# Patient Record
Sex: FEMALE | Race: BLACK OR AFRICAN AMERICAN | NOT HISPANIC OR LATINO | Employment: FULL TIME | URBAN - METROPOLITAN AREA
[De-identification: names, ages, dates, MRNs, and addresses within clinical notes are randomized per-mention and may not be internally consistent; named-entity substitution may affect disease eponyms.]

---

## 2017-06-01 PROBLEM — R87.612 LGSIL ON PAP SMEAR OF CERVIX: Status: ACTIVE | Noted: 2017-06-01

## 2017-11-16 ENCOUNTER — OFFICE VISIT (OUTPATIENT)
Dept: OBSTETRICS AND GYNECOLOGY | Facility: CLINIC | Age: 22
End: 2017-11-16
Payer: COMMERCIAL

## 2017-11-16 VITALS
HEIGHT: 61 IN | DIASTOLIC BLOOD PRESSURE: 64 MMHG | TEMPERATURE: 99 F | WEIGHT: 101.88 LBS | SYSTOLIC BLOOD PRESSURE: 120 MMHG | BODY MASS INDEX: 19.23 KG/M2

## 2017-11-16 DIAGNOSIS — R82.71 BACTERIURIA: Primary | ICD-10-CM

## 2017-11-16 DIAGNOSIS — R10.2 PELVIC PAIN IN FEMALE: ICD-10-CM

## 2017-11-16 DIAGNOSIS — N92.6 MISSED PERIOD: ICD-10-CM

## 2017-11-16 DIAGNOSIS — D57.1 HB-SS DISEASE WITHOUT CRISIS: ICD-10-CM

## 2017-11-16 LAB
B-HCG UR QL: NEGATIVE
BILIRUB SERPL-MCNC: ABNORMAL MG/DL
BLOOD URINE, POC: NEGATIVE
COLOR, POC UA: YELLOW
CTP QC/QA: YES
GLUCOSE UR QL STRIP: NORMAL
KETONES UR QL STRIP: NEGATIVE
LEUKOCYTE ESTERASE URINE, POC: ABNORMAL
NITRITE, POC UA: POSITIVE
PH, POC UA: 8
PROTEIN, POC: ABNORMAL
SPECIFIC GRAVITY, POC UA: 1
UROBILINOGEN, POC UA: 8

## 2017-11-16 PROCEDURE — 81000 URINALYSIS NONAUTO W/SCOPE: CPT

## 2017-11-16 PROCEDURE — 99999 PR PBB SHADOW E&M-NEW PATIENT-LVL III: CPT | Mod: PBBFAC,,, | Performed by: OBSTETRICS & GYNECOLOGY

## 2017-11-16 PROCEDURE — 99203 OFFICE O/P NEW LOW 30 MIN: CPT | Mod: 25,S$GLB,, | Performed by: OBSTETRICS & GYNECOLOGY

## 2017-11-16 PROCEDURE — 87591 N.GONORRHOEAE DNA AMP PROB: CPT

## 2017-11-16 PROCEDURE — 81002 URINALYSIS NONAUTO W/O SCOPE: CPT | Mod: S$GLB,,, | Performed by: OBSTETRICS & GYNECOLOGY

## 2017-11-16 PROCEDURE — 81025 URINE PREGNANCY TEST: CPT | Mod: S$GLB,,, | Performed by: OBSTETRICS & GYNECOLOGY

## 2017-11-16 RX ORDER — SULFAMETHOXAZOLE AND TRIMETHOPRIM 800; 160 MG/1; MG/1
1 TABLET ORAL 2 TIMES DAILY
Qty: 14 TABLET | Refills: 0 | Status: SHIPPED | OUTPATIENT
Start: 2017-11-16 | End: 2017-11-23

## 2017-11-16 RX ORDER — PENICILLIN V POTASSIUM 250 MG/1
TABLET, FILM COATED ORAL
Refills: 3 | COMMUNITY
Start: 2017-09-12 | End: 2022-11-23 | Stop reason: ALTCHOICE

## 2017-11-16 NOTE — PROGRESS NOTES
Subjective:       Patient ID: Lana Chou is a 22 y.o. female.    Chief Complaint:  Pelvic Discomfort (Pt complaining of pelvic discomfort for 2 weeks)      History of Present Illness  HPI  Patient comes in today complaining of having pelvic pain and discomfort for the past two weeks  No fever or chills.  No nausea or vomiting.  No back pain  No dyspareunia    Has prescribed Depo Provera by her other doctor.  But has not gotten the injection yet.      GYN & OB History  Patient's last menstrual period was 10/17/2017 (exact date).   Date of Last Pap: No result found    OB History    Para Term  AB Living   0 0 0 0 0 0   SAB TAB Ectopic Multiple Live Births   0 0 0 0 0           Past Medical History:   Diagnosis Date    Sickle cell disease        Past Surgical History:   Procedure Laterality Date    BREAST LUMPECTOMY      CARDIAC SURGERY      NEPHRECTOMY         Family History   Problem Relation Age of Onset    Sickle cell trait Father     Sickle cell trait Mother     Breast cancer Neg Hx     Colon cancer Neg Hx     Ovarian cancer Neg Hx        Social History     Social History    Marital status: Single     Spouse name: N/A    Number of children: N/A    Years of education: N/A     Social History Main Topics    Smoking status: Never Smoker    Smokeless tobacco: Never Used    Alcohol use No    Drug use: No    Sexual activity: Yes     Partners: Male     Birth control/ protection: Injection     Other Topics Concern    None     Social History Narrative    Together since early     He works offshore    She works for [employer]       Current Outpatient Prescriptions   Medication Sig Dispense Refill    folic acid (FOLVITE) 1 MG tablet TK 1 T PO QD  6    hydrocodone-acetaminophen 5-325mg (NORCO) 5-325 mg per tablet TK ONE T  PO Q 6 H PRN P  0    hydroxyurea (HYDREA) 500 mg Cap TAKE THREE CAPSULES (1500MG) BY MOUTH EVERY DAY  6    penicillin v potassium (VEETID) 250 MG tablet TK 1 T PO   BID  3    medroxyPROGESTERone (DEPO-PROVERA) 150 mg/mL injection Inject 1 mL (150 mg total) into the muscle every 3 (three) months. Please dispense kit with syringe and needle to bring to office 1 mL 3    sulfamethoxazole-trimethoprim 800-160mg (BACTRIM DS) 800-160 mg Tab Take 1 tablet by mouth 2 (two) times daily. 14 tablet 0     No current facility-administered medications for this visit.        Review of patient's allergies indicates:  No Known Allergies    Review of Systems  Review of Systems   Constitutional: Negative for activity change, appetite change, chills, fatigue, fever and unexpected weight change.   HENT: Negative for mouth sores.    Respiratory: Negative for cough, shortness of breath and wheezing.    Cardiovascular: Negative for chest pain and palpitations.   Gastrointestinal: Positive for abdominal pain. Negative for bloating, blood in stool, constipation, nausea and vomiting.   Endocrine: Negative for diabetes and hot flashes.   Genitourinary: Positive for hematuria and pelvic pain. Negative for dyspareunia, dysuria, frequency, menorrhagia, menstrual problem, urgency, vaginal bleeding, vaginal discharge, vaginal pain, dysmenorrhea, urinary incontinence, postcoital bleeding and vaginal odor.   Musculoskeletal: Negative for back pain and myalgias.   Skin:  Negative for rash.   Neurological: Negative for seizures and headaches.   Psychiatric/Behavioral: Negative for depression and sleep disturbance. The patient is not nervous/anxious.    Breast: Negative for breast mass, breast pain and nipple discharge          Objective:    Physical Exam:   Constitutional: She appears well-developed and well-nourished. No distress.    HENT:   Head: Normocephalic and atraumatic.    Eyes: EOM are normal.    Neck: Normal range of motion.     Pulmonary/Chest: Effort normal. No respiratory distress.            Abdominal: Soft. She exhibits no distension. There is no tenderness. There is no rebound and no guarding.    No suprapubic tenderness     Genitourinary: Vagina normal and uterus normal. No vaginal discharge found.   Genitourinary Comments: Vulva without any obvious lesions.  Vaginal vault with good support.  Minimal discharge noted.  No obvious lesion.  Cervix is somewhat stenotic without any cervical motion tenderness.  No obvious lesion.  Uterus is small, non-tender, normal contour.  Adnexa is without any masses or tenderness.    No bladder tenderness           Musculoskeletal: Normal range of motion.       Neurological: She is alert.    Skin: Skin is warm and dry.    Psychiatric: She has a normal mood and affect.          Urine dipstick with positive WBC, nitrite and blood    Assessment:        1. Bacteriuria    2. Missed period    3. Pelvic pain in female    4. Hb-SS disease without crisis             Plan:      I have extensively discussed with the patient regarding her condition  She has had sickle cell disease.  Now status post left nephrectomy  Urine dipstick with signs of infection  Will try Bactrim DS and send urine in for microscopy  Encourage more water intake    Gonorrhea and chlamydia performed.  Patient was treated for gonorrhea a couple of months ago.  Still with some pelvic pain    Back in about 2 weeks

## 2017-11-17 LAB
BACTERIA #/AREA URNS HPF: ABNORMAL /HPF
MICROSCOPIC COMMENT: ABNORMAL
RBC #/AREA URNS HPF: 2 /HPF (ref 0–4)
SQUAMOUS #/AREA URNS HPF: 7 /HPF
WBC #/AREA URNS HPF: 10 /HPF (ref 0–5)

## 2017-11-18 LAB
C TRACH DNA SPEC QL NAA+PROBE: NOT DETECTED
N GONORRHOEA DNA SPEC QL NAA+PROBE: NOT DETECTED

## 2017-11-30 ENCOUNTER — CLINICAL SUPPORT (OUTPATIENT)
Dept: OBSTETRICS AND GYNECOLOGY | Facility: CLINIC | Age: 22
End: 2017-11-30
Payer: COMMERCIAL

## 2017-11-30 ENCOUNTER — OFFICE VISIT (OUTPATIENT)
Dept: OBSTETRICS AND GYNECOLOGY | Facility: CLINIC | Age: 22
End: 2017-11-30
Payer: COMMERCIAL

## 2017-11-30 VITALS
WEIGHT: 99.88 LBS | DIASTOLIC BLOOD PRESSURE: 62 MMHG | DIASTOLIC BLOOD PRESSURE: 62 MMHG | WEIGHT: 99.88 LBS | HEIGHT: 61 IN | TEMPERATURE: 99 F | BODY MASS INDEX: 18.86 KG/M2 | BODY MASS INDEX: 18.87 KG/M2 | SYSTOLIC BLOOD PRESSURE: 110 MMHG | SYSTOLIC BLOOD PRESSURE: 110 MMHG

## 2017-11-30 DIAGNOSIS — R10.2 PELVIC PAIN IN FEMALE: Primary | ICD-10-CM

## 2017-11-30 DIAGNOSIS — Z30.013 ENCOUNTER FOR INITIAL PRESCRIPTION OF INJECTABLE CONTRACEPTIVE: ICD-10-CM

## 2017-11-30 DIAGNOSIS — Z30.42 ENCOUNTER FOR MANAGEMENT AND INJECTION OF DEPO-PROVERA: Primary | ICD-10-CM

## 2017-11-30 DIAGNOSIS — Z30.09 FAMILY PLANNING: ICD-10-CM

## 2017-11-30 PROCEDURE — 99999 PR PBB SHADOW E&M-EST. PATIENT-LVL III: CPT | Mod: PBBFAC,,, | Performed by: OBSTETRICS & GYNECOLOGY

## 2017-11-30 PROCEDURE — 99213 OFFICE O/P EST LOW 20 MIN: CPT | Mod: 25,S$GLB,, | Performed by: OBSTETRICS & GYNECOLOGY

## 2017-11-30 PROCEDURE — 96372 THER/PROPH/DIAG INJ SC/IM: CPT | Mod: S$GLB,,, | Performed by: OBSTETRICS & GYNECOLOGY

## 2017-11-30 PROCEDURE — 99999 PR PBB SHADOW E&M-EST. PATIENT-LVL II: CPT | Mod: PBBFAC,,,

## 2017-11-30 RX ORDER — MEDROXYPROGESTERONE ACETATE 150 MG/ML
150 INJECTION, SUSPENSION INTRAMUSCULAR ONCE
Status: DISCONTINUED | OUTPATIENT
Start: 2017-11-30 | End: 2017-11-30 | Stop reason: CLARIF

## 2017-11-30 RX ORDER — MEDROXYPROGESTERONE ACETATE 150 MG/ML
150 INJECTION, SUSPENSION INTRAMUSCULAR
Status: DISCONTINUED | OUTPATIENT
Start: 2017-11-30 | End: 2018-03-13

## 2017-11-30 RX ADMIN — MEDROXYPROGESTERONE ACETATE 150 MG: 150 INJECTION, SUSPENSION INTRAMUSCULAR at 02:11

## 2017-11-30 NOTE — PROGRESS NOTES
REVIEWED WITH PT DEPO PROVERA,  HAND OUT GIVEN TO PT ABOUT DEPO PROVERA, REVIEWED MEDICATION DOCUMENTATION TO CONFIRM CORRECT MEDICATION, INJECTION GIVEN VIA L GLUTEAL  W/O INCIDENT, NEXT APPT MADE FOR     02/28/18 @ 2P

## 2017-11-30 NOTE — PROGRESS NOTES
Subjective:       Patient ID: Lana Chou is a 22 y.o. female.    Chief Complaint:  Follow-up (2 wks follow up)      History of Present Illness  HPI  Patient comes in today for follow-up  Feeling much better  Still would like to get Depo Provera  Cycle starts today      GYN & OB History  Patient's last menstrual period was 2017 (exact date).   Date of Last Pap: No result found    OB History    Para Term  AB Living   0 0 0 0 0 0   SAB TAB Ectopic Multiple Live Births   0 0 0 0 0           Past Medical History:   Diagnosis Date    Sickle cell disease        Past Surgical History:   Procedure Laterality Date    BREAST LUMPECTOMY      CARDIAC SURGERY      NEPHRECTOMY         Family History   Problem Relation Age of Onset    Sickle cell trait Father     Sickle cell trait Mother     Breast cancer Neg Hx     Colon cancer Neg Hx     Ovarian cancer Neg Hx        Social History     Social History    Marital status: Single     Spouse name: N/A    Number of children: N/A    Years of education: N/A     Social History Main Topics    Smoking status: Never Smoker    Smokeless tobacco: Never Used    Alcohol use No    Drug use: No    Sexual activity: Yes     Partners: Male     Birth control/ protection: Injection     Other Topics Concern    None     Social History Narrative    Together since early     He works [employer]    She works for [employer]       Current Outpatient Prescriptions   Medication Sig Dispense Refill    folic acid (FOLVITE) 1 MG tablet TK 1 T PO QD  6    hydrocodone-acetaminophen 5-325mg (NORCO) 5-325 mg per tablet TK ONE T  PO Q 6 H PRN P  0    hydroxyurea (HYDREA) 500 mg Cap TAKE THREE CAPSULES (1500MG) BY MOUTH EVERY DAY  6    penicillin v potassium (VEETID) 250 MG tablet TK 1 T PO  BID  3     Current Facility-Administered Medications   Medication Dose Route Frequency Provider Last Rate Last Dose    medroxyPROGESTERone (DEPO-PROVERA) syringe 150 mg  150 mg  Intramuscular Once Tyson Landers MD           Review of patient's allergies indicates:  No Known Allergies    Review of Systems  Review of Systems   Constitutional: Negative for activity change, appetite change, chills, fatigue, fever and unexpected weight change.   HENT: Negative for mouth sores.    Respiratory: Negative for cough, shortness of breath and wheezing.    Cardiovascular: Negative for chest pain and palpitations.   Gastrointestinal: Negative for abdominal pain, bloating, blood in stool, constipation, nausea and vomiting.   Endocrine: Negative for diabetes and hot flashes.   Genitourinary: Negative for dyspareunia, dysuria, frequency, hematuria, menorrhagia, menstrual problem, pelvic pain, urgency, vaginal bleeding, vaginal discharge, vaginal pain, dysmenorrhea, urinary incontinence, postcoital bleeding and vaginal odor.   Musculoskeletal: Negative for back pain and myalgias.   Skin:  Negative for rash.   Neurological: Negative for seizures and headaches.   Psychiatric/Behavioral: Negative for depression and sleep disturbance. The patient is not nervous/anxious.    Breast: Negative for breast mass, breast pain and nipple discharge          Objective:    Physical Exam:   Constitutional: She appears well-developed and well-nourished. No distress.    HENT:   Head: Normocephalic and atraumatic.    Eyes: EOM are normal.    Neck: Normal range of motion.    Cardiovascular: Normal rate.     Pulmonary/Chest: Effort normal. No respiratory distress.                  Musculoskeletal: Normal range of motion.       Neurological: She is alert.    Skin: Skin is warm and dry.    Psychiatric: She has a normal mood and affect.          Assessment:        1. Pelvic pain in female    2. Family planning              Plan:      I have discussed with the patient regarding her condition  She is doing well   We again discussed Depo Provera.  She would like to try    Will give Depo Provera today  Back in 3 months for another  injection

## 2018-03-05 ENCOUNTER — TELEPHONE (OUTPATIENT)
Dept: OBSTETRICS AND GYNECOLOGY | Facility: CLINIC | Age: 23
End: 2018-03-05

## 2018-03-05 NOTE — TELEPHONE ENCOUNTER
3/5/18 @ 0940 (YUNIOR)  CALL ATTEMPT TO PT UNSUCCESSFUL , MESSAGE LEFT FOR PT TO RETURN CALL TO OFFICE CONCERNING RESCHEDULING HER MISSED APPT BEFORE 3/14/18

## 2018-03-13 ENCOUNTER — OFFICE VISIT (OUTPATIENT)
Dept: OBSTETRICS AND GYNECOLOGY | Facility: CLINIC | Age: 23
End: 2018-03-13
Payer: COMMERCIAL

## 2018-03-13 VITALS
BODY MASS INDEX: 19.65 KG/M2 | WEIGHT: 104.06 LBS | TEMPERATURE: 99 F | SYSTOLIC BLOOD PRESSURE: 104 MMHG | DIASTOLIC BLOOD PRESSURE: 64 MMHG | HEIGHT: 61 IN

## 2018-03-13 DIAGNOSIS — N92.1 BREAKTHROUGH BLEEDING ON DEPO PROVERA: Primary | ICD-10-CM

## 2018-03-13 DIAGNOSIS — Z30.09 FAMILY PLANNING: ICD-10-CM

## 2018-03-13 PROCEDURE — 99999 PR PBB SHADOW E&M-EST. PATIENT-LVL III: CPT | Mod: PBBFAC,,, | Performed by: OBSTETRICS & GYNECOLOGY

## 2018-03-13 PROCEDURE — 99213 OFFICE O/P EST LOW 20 MIN: CPT | Mod: S$GLB,,, | Performed by: OBSTETRICS & GYNECOLOGY

## 2018-03-13 RX ORDER — LEVONORGESTREL AND ETHINYL ESTRADIOL 0.1-0.02MG
1 KIT ORAL DAILY
Qty: 30 TABLET | Refills: 6 | Status: SHIPPED | OUTPATIENT
Start: 2018-03-13 | End: 2018-08-07

## 2018-03-13 NOTE — PROGRESS NOTES
"  Subjective:       Patient ID: Lana Chou is a 22 y.o. female.    Chief Complaint:  Vaginal Bleeding (Bleeding on depo)      History of Present Illness  HPI  Patient comes in today complaining of having vaginal bleeding "everyday since her Depo Provera"  Denies fever or chills.  No nausea or vomiting.  No pain.  Same partner    Also frequently "tearing up" though she denies "Stressing out" or depression.        GYN & OB History  No LMP recorded. Patient has had an injection.   Date of Last Pap: No result found    OB History    Para Term  AB Living   0 0 0 0 0 0   SAB TAB Ectopic Multiple Live Births   0 0 0 0 0           Past Medical History:   Diagnosis Date    Sickle cell disease        Past Surgical History:   Procedure Laterality Date    BREAST LUMPECTOMY      CARDIAC SURGERY      NEPHRECTOMY         Family History   Problem Relation Age of Onset    Sickle cell trait Father     Sickle cell trait Mother     Breast cancer Neg Hx     Colon cancer Neg Hx     Ovarian cancer Neg Hx        Social History     Social History    Marital status: Single     Spouse name: N/A    Number of children: N/A    Years of education: N/A     Social History Main Topics    Smoking status: Never Smoker    Smokeless tobacco: Never Used    Alcohol use No    Drug use: No    Sexual activity: Yes     Partners: Male     Birth control/ protection: Injection     Other Topics Concern    None     Social History Narrative    Together since early     He works [employer]    She works for [employer]       Current Outpatient Prescriptions   Medication Sig Dispense Refill    folic acid (FOLVITE) 1 MG tablet TK 1 T PO QD  6    hydrocodone-acetaminophen 5-325mg (NORCO) 5-325 mg per tablet TK ONE T  PO Q 6 H PRN P  0    hydroxyurea (HYDREA) 500 mg Cap TAKE THREE CAPSULES (1500MG) BY MOUTH EVERY DAY  6    penicillin v potassium (VEETID) 250 MG tablet TK 1 T PO  BID  3     Current Facility-Administered Medications "   Medication Dose Route Frequency Provider Last Rate Last Dose    medroxyPROGESTERone (DEPO-PROVERA) injection 150 mg  150 mg Intramuscular Q90 Days Tyson Landers MD   150 mg at 11/30/17 1401       Review of patient's allergies indicates:  No Known Allergies    Review of Systems  Review of Systems   Constitutional: Negative for activity change, appetite change, chills, fatigue, fever and unexpected weight change.   HENT: Negative for mouth sores.    Respiratory: Negative for cough, shortness of breath and wheezing.    Cardiovascular: Negative for chest pain and palpitations.   Gastrointestinal: Negative for abdominal pain, bloating, blood in stool, constipation, nausea and vomiting.   Endocrine: Negative for diabetes and hot flashes.   Genitourinary: Positive for vaginal bleeding. Negative for dyspareunia, dysuria, frequency, hematuria, menorrhagia, menstrual problem, pelvic pain, urgency, vaginal discharge, vaginal pain, dysmenorrhea, urinary incontinence, postcoital bleeding and vaginal odor.   Musculoskeletal: Negative for back pain and myalgias.   Skin:  Negative for rash.   Neurological: Negative for seizures and headaches.   Psychiatric/Behavioral: Negative for depression and sleep disturbance. The patient is not nervous/anxious.         Crying spells   Breast: Negative for breast mass, breast pain and nipple discharge          Objective:    Physical Exam:   Constitutional: She appears well-developed and well-nourished. No distress.    HENT:   Head: Normocephalic and atraumatic.    Eyes: EOM are normal.    Neck: Normal range of motion.     Pulmonary/Chest: Effort normal. No respiratory distress.        Abdominal: Soft. She exhibits no distension. There is no tenderness. There is no rebound and no guarding.     Genitourinary: Uterus normal. Vaginal discharge found.   Genitourinary Comments: Vulva without any obvious lesions.  Vaginal vault with good support.  Minimal bloody discharge noted.  No obvious lesion.   Cervix is somewhat stenotic without any cervical motion tenderness.  No obvious lesion.  Uterus is small, non-tender, normal contour.  Adnexa is without any masses or tenderness.           Musculoskeletal: Normal range of motion.       Neurological: She is alert.    Skin: Skin is warm and dry.    Psychiatric: She has a normal mood and affect.          Assessment:        1. Breakthrough bleeding on depo provera    2.  Family planning         Plan:      I have discussed with the patient regarding her condition  Will stop Depo Provera for now    I have also discussed with the patient regarding her contraceptive options.  Risks and benefits of all discussed including oral contraceptives, Depo-Provera, OrthoEvra, NuvaRing, Mirena/ParaGard, Implanon, sterilization. After extensive dicussion, the patient wishes to have oral contraceptive pills.  Risks and benefits again discussed along with how to use and when to start.  All of her questions were answered appropriately to her satisfaction.       Ronaldo prescribed.  Back in 2-3 months for follow-up

## 2018-08-07 ENCOUNTER — OFFICE VISIT (OUTPATIENT)
Dept: OBSTETRICS AND GYNECOLOGY | Facility: CLINIC | Age: 23
End: 2018-08-07
Payer: COMMERCIAL

## 2018-08-07 VITALS
BODY MASS INDEX: 18.48 KG/M2 | SYSTOLIC BLOOD PRESSURE: 98 MMHG | HEIGHT: 61 IN | WEIGHT: 97.88 LBS | DIASTOLIC BLOOD PRESSURE: 60 MMHG

## 2018-08-07 DIAGNOSIS — Z12.4 CERVICAL CANCER SCREENING: ICD-10-CM

## 2018-08-07 DIAGNOSIS — Z11.3 SCREEN FOR STD (SEXUALLY TRANSMITTED DISEASE): ICD-10-CM

## 2018-08-07 DIAGNOSIS — Z01.419 WELL WOMAN EXAM WITH ROUTINE GYNECOLOGICAL EXAM: Primary | ICD-10-CM

## 2018-08-07 DIAGNOSIS — Z87.42 H/O ABNORMAL CERVICAL PAPANICOLAOU SMEAR: ICD-10-CM

## 2018-08-07 LAB
B-HCG UR QL: NEGATIVE
CTP QC/QA: YES

## 2018-08-07 PROCEDURE — 88141 CYTOPATH C/V INTERPRET: CPT | Mod: ,,, | Performed by: PATHOLOGY

## 2018-08-07 PROCEDURE — 87491 CHLMYD TRACH DNA AMP PROBE: CPT

## 2018-08-07 PROCEDURE — 99395 PREV VISIT EST AGE 18-39: CPT | Mod: S$GLB,,, | Performed by: OBSTETRICS & GYNECOLOGY

## 2018-08-07 PROCEDURE — 81025 URINE PREGNANCY TEST: CPT | Mod: S$GLB,,, | Performed by: OBSTETRICS & GYNECOLOGY

## 2018-08-07 PROCEDURE — 99999 PR PBB SHADOW E&M-EST. PATIENT-LVL III: CPT | Mod: PBBFAC,,, | Performed by: OBSTETRICS & GYNECOLOGY

## 2018-08-07 PROCEDURE — 88175 CYTOPATH C/V AUTO FLUID REDO: CPT | Performed by: PATHOLOGY

## 2018-08-07 PROCEDURE — 87624 HPV HI-RISK TYP POOLED RSLT: CPT

## 2018-08-07 NOTE — PROGRESS NOTES
"[facility]  Ambulatory Clinic  Obstetrics & Gynecology    Visit Date:  8/7/2018    Chief Complaint:  Annual GYN exam    History of Present Illness:       Lana Chou is a 23 y.o. G0, new pt to me, here for a gynecologic exam.      Pt has no major complaints today.      Menses are regular, not heavy or painful.    Pt current method of family planning is condoms, and reports no problems with this method.      Pt denies family h/o adverse reaction to hormonal contraception.    Last pap LGSIL 5/2017.    Pt denies active sexually transmitted infections.    Pt performs monthly self breast examination, non-smoker, uses seat belts, and denies abuse.     Pt denies abnormal vaginal bleeding, vaginal discharge, dysmenorrhea, dyspareunia, pelvic pain, bloating, early satiety, unintentional weight loss, breast mass/skin changes, incontinence, GI or urinary complaints.      Otherwise, the pt is in her usual state of health.    Past History:  Gynecologic history as noted above.    Review of Systems:      GENERAL:  No fever, fatigue, excessive weight gain or loss  HEENT:  No headaches, hearing changes, visual disturbance  RESPIRATORY:  No cough, shortness of breath  CARDIOVASCULAR:  No chest pain, heart palpitations, leg swelling  BREAST:  No lump, pain, nipple discharge, skin changes  GASTROINTESTINAL:  No nausea, vomiting, constipation, diarrhea, abd pain, rectal bleeding   GENITOURINARY:  See HPI  ENDOCRINE:  No heat or cold intolerance  HEMATOLOGIC:  No easy bruisability or bleeding   LYMPHATICS:  No enlarged nodes  MUSCULOSKELETAL:  No joint pain or swelling  SKIN:  No rash, lesions, jaundice  NEUROLOGIC:  No dizziness, weakness, syncope  PSYCHIATRIC:  No depression, homicidal/suicidal ideations, anxiety or mood swings    Physical Exam:     BP 98/60   Ht 5' 1" (1.549 m)   Wt 44.4 kg (97 lb 14.2 oz)   LMP 06/28/2018   BMI 18.50 kg/m²   Pulse 70, Resp rate 16  Patient's last menstrual period was " 06/28/2018.     GENERAL:  No acute distress, well-nourished  HEENT:  Atraumatic, anicteric, moist mucus membranes. Neck supple w/o masses.  BREAST:  Symmetric, nontender, no obvious masses, adenopathy, skin changes or nipple discharge.  LUNGS:  Clear to auscultation  HEART:  Regular rate and rhythm, no murmurs, gallops, or rubs  ABDOMEN:  Soft, non-tender, non-distended, normoactive bowel sounds, no obvious organomegaly  EXT:  Symmetric w/o cramping, claudication, or edema. +2 distal pulses.  SKIN:  No rashes or bruising  PSYCH:  Mood and affect appropriate  NEURO:  Grossly intact bilaterally, FROM,  no sensory or motor deficits     GENITOURINARY:    VULVAR:  Female external genitalia w/o obvious lesions. Female hair distribution. Normal urethral meatus. No gross lymphadenopathy.    VAGINA:  Pink, moist, well-rugated. Good support. No obvious lesion. No discharge.  CERVIX:  No cervical motion tenderness, discharge, or obvious lesions.   UTERUS:  Small, non-tender, normal contour  ADNEXA:  No masses, non-tender    RECTAL:  Declined. No obvious external lesions  WET PREP:  Negative     Chaperone present for exam.    Assessment:     23 y.o. G0:    1. Well woman gynecologic exam  2. H/o LGSIL pap    Plan:    A gynecologic health assessment was performed with age appropriate counseling.    Cervical cancer screening - pap obtained.    STI screening - pt requested gonorrhea & chlamydia testing.  Pt declined other STI testing including HIV.  Safe sex discussed.      Encourage healthy lifestyle modifications, monthly self breast exams, encourage HPV vaccination, Ca/Vit D.    F/u with PCP for health maintenance.    Return 1 year for gynecologic exam, or sooner as needed.  All questions answered, pt voiced understanding.        Timi Harris MD

## 2018-08-08 LAB
C TRACH DNA SPEC QL NAA+PROBE: NOT DETECTED
N GONORRHOEA DNA SPEC QL NAA+PROBE: NOT DETECTED

## 2018-08-16 LAB
HPV HR 12 DNA CVX QL NAA+PROBE: NEGATIVE
HPV16 AG SPEC QL: NEGATIVE
HPV18 DNA SPEC QL NAA+PROBE: NEGATIVE

## 2019-06-12 ENCOUNTER — LAB VISIT (OUTPATIENT)
Dept: LAB | Facility: HOSPITAL | Age: 24
End: 2019-06-12
Attending: OBSTETRICS & GYNECOLOGY
Payer: COMMERCIAL

## 2019-06-12 ENCOUNTER — OFFICE VISIT (OUTPATIENT)
Dept: OBSTETRICS AND GYNECOLOGY | Facility: CLINIC | Age: 24
End: 2019-06-12
Payer: COMMERCIAL

## 2019-06-12 VITALS
WEIGHT: 104.5 LBS | BODY MASS INDEX: 19.73 KG/M2 | SYSTOLIC BLOOD PRESSURE: 112 MMHG | HEIGHT: 61 IN | DIASTOLIC BLOOD PRESSURE: 68 MMHG

## 2019-06-12 DIAGNOSIS — Z01.419 WELL WOMAN EXAM WITH ROUTINE GYNECOLOGICAL EXAM: Primary | ICD-10-CM

## 2019-06-12 DIAGNOSIS — Z01.419 WELL WOMAN EXAM WITH ROUTINE GYNECOLOGICAL EXAM: ICD-10-CM

## 2019-06-12 DIAGNOSIS — Z87.42 H/O ABNORMAL CERVICAL PAPANICOLAOU SMEAR: ICD-10-CM

## 2019-06-12 DIAGNOSIS — D57.1 HB-SS DISEASE WITHOUT CRISIS: ICD-10-CM

## 2019-06-12 PROCEDURE — 99395 PR PREVENTIVE VISIT,EST,18-39: ICD-10-PCS | Mod: S$GLB,,, | Performed by: OBSTETRICS & GYNECOLOGY

## 2019-06-12 PROCEDURE — 99999 PR PBB SHADOW E&M-EST. PATIENT-LVL III: CPT | Mod: PBBFAC,,, | Performed by: OBSTETRICS & GYNECOLOGY

## 2019-06-12 PROCEDURE — 99999 PR PBB SHADOW E&M-EST. PATIENT-LVL III: ICD-10-PCS | Mod: PBBFAC,,, | Performed by: OBSTETRICS & GYNECOLOGY

## 2019-06-12 PROCEDURE — 87491 CHLMYD TRACH DNA AMP PROBE: CPT

## 2019-06-12 PROCEDURE — 36415 COLL VENOUS BLD VENIPUNCTURE: CPT

## 2019-06-12 PROCEDURE — 88141 LIQUID-BASED PAP SMEAR, SCREENING: ICD-10-PCS | Mod: ,,, | Performed by: PATHOLOGY

## 2019-06-12 PROCEDURE — 86703 HIV-1/HIV-2 1 RESULT ANTBDY: CPT

## 2019-06-12 PROCEDURE — 99395 PREV VISIT EST AGE 18-39: CPT | Mod: S$GLB,,, | Performed by: OBSTETRICS & GYNECOLOGY

## 2019-06-12 PROCEDURE — 88141 CYTOPATH C/V INTERPRET: CPT | Mod: ,,, | Performed by: PATHOLOGY

## 2019-06-12 PROCEDURE — 88175 CYTOPATH C/V AUTO FLUID REDO: CPT | Performed by: PATHOLOGY

## 2019-06-12 RX ORDER — NITROFURANTOIN 25 MG/5ML
100 SUSPENSION ORAL
COMMUNITY
End: 2020-12-26

## 2019-06-12 NOTE — PROGRESS NOTES
Subjective:       Patient ID: Lana Chou is a 24 y.o. female.    Chief Complaint:  Gynecologic Exam (Last pap was 18- ASCUS and pap on 17= LGSIL, [place]: 17- Negative)      History of Present Illness  HPI  Annual Exam-Premenopausal  Patient presents for annual exam. The patient has no complaints today. The patient is sexually active. GYN screening history: last pap: approximate date 2018 and was abnormal: ASCUS. The patient wears seatbelts: yes. The patient participates in regular exercise: yes. Has the patient ever been transfused or tattooed?: yes. The patient reports that there is not domestic violence in her life.    History of LGSIL Pap on 2017.  Colposcopy with biopsy was negative.  Sickle cell disease.  Does not have a regular partner.  Uses condoms as needed.  Does not want to be on oral contraceptive.      GYN & OB History  Patient's last menstrual period was 2019.   Date of Last Pap: 2018    OB History    Para Term  AB Living   0 0 0 0 0 0   SAB TAB Ectopic Multiple Live Births   0 0 0 0 0     Past Medical History:   Diagnosis Date    Sickle cell disease        Past Surgical History:   Procedure Laterality Date    BREAST LUMPECTOMY      CARDIAC SURGERY      NEPHRECTOMY         Family History   Problem Relation Age of Onset    Sickle cell trait Father     Sickle cell trait Mother     Sickle cell trait Sister     Sickle cell trait Sister     Breast cancer Neg Hx     Colon cancer Neg Hx     Ovarian cancer Neg Hx        Social History     Socioeconomic History    Marital status: Single     Spouse name: Not on file    Number of children: Not on file    Years of education: Not on file    Highest education level: Not on file   Occupational History    Not on file   Social Needs    Financial resource strain: Not on file    Food insecurity:     Worry: Not on file     Inability: Not on file    Transportation needs:     Medical: Not on file      Non-medical: Not on file   Tobacco Use    Smoking status: Never Smoker    Smokeless tobacco: Never Used   Substance and Sexual Activity    Alcohol use: No    Drug use: No    Sexual activity: Yes     Partners: Male     Birth control/protection: Injection   Lifestyle    Physical activity:     Days per week: Not on file     Minutes per session: Not on file    Stress: Not on file   Relationships    Social connections:     Talks on phone: Not on file     Gets together: Not on file     Attends Cheondoism service: Not on file     Active member of club or organization: Not on file     Attends meetings of clubs or organizations: Not on file     Relationship status: Not on file   Other Topics Concern    Not on file   Social History Narrative    No partner since 2017    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  [place]       Current Outpatient Medications   Medication Sig Dispense Refill    folic acid (FOLVITE) 1 MG tablet TK 1 T PO QD  6    hydrocodone-acetaminophen 5-325mg (NORCO) 5-325 mg per tablet TK ONE T  PO Q 6 H PRN P  0    hydroxyurea (HYDREA) 500 mg Cap TAKE THREE CAPSULES (1500MG) BY MOUTH EVERY DAY  6    nitrofurantoin (FURADANTIN) 25 mg/5 mL Susp Take 100 mg by mouth.      penicillin v potassium (VEETID) 250 MG tablet TK 1 T PO  BID  3     No current facility-administered medications for this visit.        Review of patient's allergies indicates:  No Known Allergies    Review of Systems  Review of Systems   Constitutional: Negative for activity change, appetite change, chills, fatigue, fever and unexpected weight change.   HENT: Negative for mouth sores.    Respiratory: Negative for cough, shortness of breath and wheezing.    Cardiovascular: Negative for chest pain and palpitations.   Gastrointestinal: Negative for abdominal pain, bloating, blood in stool, constipation, nausea and vomiting.   Endocrine: Negative for diabetes and hot flashes.   Genitourinary: Negative for  dysmenorrhea, dyspareunia, dysuria, frequency, hematuria, menorrhagia, menstrual problem, pelvic pain, urgency, vaginal bleeding, vaginal discharge, vaginal pain, urinary incontinence, postcoital bleeding and vaginal odor.   Musculoskeletal: Negative for back pain and myalgias.   Integumentary:  Negative for rash, breast mass and nipple discharge.   Neurological: Negative for seizures and headaches.   Psychiatric/Behavioral: Negative for depression and sleep disturbance. The patient is not nervous/anxious.    Breast: Negative for mass, mastodynia and nipple discharge          Objective:    Physical Exam:   Constitutional: She appears well-developed and well-nourished. No distress.    HENT:   Head: Normocephalic and atraumatic.    Eyes: EOM are normal.    Neck: Normal range of motion.     Pulmonary/Chest: Effort normal. No respiratory distress.   Breasts: Non-tender, no engorgement, no masses, no retraction, no discharge. Negative for lymphadenopathy.         Abdominal: Soft. She exhibits no distension. There is no tenderness. There is no rebound and no guarding.     Genitourinary: Vagina normal and uterus normal. No vaginal discharge found.   Genitourinary Comments: Vulva without any obvious lesions.  Vaginal vault with good support.  Minimal white discharge noted.  No obvious lesion.  Cervix is stenotic but friable without any cervical motion tenderness.  No obvious lesion.  Uterus is small, non-tender, normal contour.  Adnexa is without any masses or tenderness.           Musculoskeletal: Normal range of motion.       Neurological: She is alert.    Skin: Skin is warm and dry.    Psychiatric: She has a normal mood and affect.          Assessment:        1. Well woman exam with routine gynecological exam    2. H/O abnormal cervical Papanicolaou smear    3. Hb-SS disease without crisis              Plan:          I have discussed with the patient her condition.  Monthly breast examination was instructed, discussed,  and encouraged.  Patient was encouraged to consume a low-calorie, low fat diet, and to increase of physical activity.  Healthy habits encouraged.  A Pap smear was performed according to the USPSTF recommendations.  Mammogram was not ordered because of the combination of her age and risk factors, according to ACOG guidelines.  Gonorrhea and Chlamydia testing performed;  HIV test ordered, again according to guidelines.    Patient is to continue her medications as prescribed.  She will come back to see me in one year for her annual visit.  She can come back to see me sooner as necessary.  All of her questions were answered appropriately to her satisfaction.

## 2019-06-13 LAB
C TRACH DNA SPEC QL NAA+PROBE: NOT DETECTED
HIV 1+2 AB+HIV1 P24 AG SERPL QL IA: NEGATIVE
N GONORRHOEA DNA SPEC QL NAA+PROBE: NOT DETECTED

## 2019-06-27 ENCOUNTER — PROCEDURE VISIT (OUTPATIENT)
Dept: OBSTETRICS AND GYNECOLOGY | Facility: CLINIC | Age: 24
End: 2019-06-27
Payer: COMMERCIAL

## 2019-06-27 VITALS
DIASTOLIC BLOOD PRESSURE: 66 MMHG | WEIGHT: 103.63 LBS | BODY MASS INDEX: 19.57 KG/M2 | SYSTOLIC BLOOD PRESSURE: 110 MMHG | HEIGHT: 61 IN

## 2019-06-27 DIAGNOSIS — R87.612 LGSIL ON PAP SMEAR OF CERVIX: Primary | ICD-10-CM

## 2019-06-27 LAB
B-HCG UR QL: NEGATIVE
CTP QC/QA: YES

## 2019-06-27 PROCEDURE — 57454 COLPOSCOPY W/BIOPSY AND ECC- TODAY: ICD-10-PCS | Mod: S$GLB,,, | Performed by: OBSTETRICS & GYNECOLOGY

## 2019-06-27 PROCEDURE — 88305 TISSUE EXAM BY PATHOLOGIST: CPT | Performed by: PATHOLOGY

## 2019-06-27 PROCEDURE — 88305 TISSUE EXAM BY PATHOLOGIST: CPT | Mod: 26,,, | Performed by: PATHOLOGY

## 2019-06-27 PROCEDURE — 57454 BX/CURETT OF CERVIX W/SCOPE: CPT | Mod: S$GLB,,, | Performed by: OBSTETRICS & GYNECOLOGY

## 2019-06-27 PROCEDURE — 88305 TISSUE SPECIMEN TO PATHOLOGY, OBSTETRICS/GYNECOLOGY: ICD-10-PCS | Mod: 26,,, | Performed by: PATHOLOGY

## 2019-06-27 PROCEDURE — 81025 URINE PREGNANCY TEST: CPT | Mod: S$GLB,,, | Performed by: OBSTETRICS & GYNECOLOGY

## 2019-06-27 PROCEDURE — 81025 POCT URINE PREGNANCY: ICD-10-PCS | Mod: S$GLB,,, | Performed by: OBSTETRICS & GYNECOLOGY

## 2019-06-27 NOTE — PROCEDURES
Colposcopy W/BIOPSY AND ECC- Today  Date/Time: 6/27/2019 3:01 PM  Performed by: Tyson Landers MD  Authorized by: Tyson Landers MD   Preparation: Patient was prepped and draped in the usual sterile fashion.  Local anesthesia used: no    Anesthesia:  Local anesthesia used: no    Sedation:  Patient sedated: no    Patient tolerance: Patient tolerated the procedure well with no immediate complications        The patient is here for colposcopy secondary to LGSIL on Pap.  The procedures with and without endocervical curettage explained to the patient.  All questions answered.  Consent signed.  Patient was eager to proceed.  The speculum was placed.  A weak solution of acetic acid was applied to the patient's cervix and upper vaginal vault using cotton balls.  Colposcopy was performed.  The transitional zone was seen in its entirety.  No obvious areas of acetowhite focal lesions noted.  Biopsy performed at 0600 and 1200.  Endocervical curettage was performed using the Kevorkian curet and Cytobrush.  Bleeding was noted at the biopsy site.  Silver nitrate was used to obtain good hemostasis.  Patient tolerated the procedure well without any complaints.  No complications noted.  Postprocedure pain was noted at 1/10.  Educational material given.    The lesion was most likely Normal.    Patient was reassured.  The plan is pending.  She will be back for followup in 2 weeks.

## 2019-07-01 ENCOUNTER — PATIENT MESSAGE (OUTPATIENT)
Dept: OBSTETRICS AND GYNECOLOGY | Facility: CLINIC | Age: 24
End: 2019-07-01

## 2019-07-30 ENCOUNTER — TELEPHONE (OUTPATIENT)
Dept: OBSTETRICS AND GYNECOLOGY | Facility: CLINIC | Age: 24
End: 2019-07-30

## 2019-07-30 NOTE — TELEPHONE ENCOUNTER
----- Message from Tyson Landers MD sent at 7/27/2019 10:40 AM CDT -----  Still does not have an appointment to come in?    Tyson Landers MD

## 2020-10-12 ENCOUNTER — OFFICE VISIT (OUTPATIENT)
Dept: OBSTETRICS AND GYNECOLOGY | Facility: CLINIC | Age: 25
End: 2020-10-12
Payer: COMMERCIAL

## 2020-10-12 ENCOUNTER — LAB VISIT (OUTPATIENT)
Dept: LAB | Facility: HOSPITAL | Age: 25
End: 2020-10-12
Attending: OBSTETRICS & GYNECOLOGY
Payer: COMMERCIAL

## 2020-10-12 VITALS
SYSTOLIC BLOOD PRESSURE: 114 MMHG | WEIGHT: 111.31 LBS | HEIGHT: 61 IN | BODY MASS INDEX: 21.02 KG/M2 | DIASTOLIC BLOOD PRESSURE: 60 MMHG

## 2020-10-12 DIAGNOSIS — Z01.419 WELL WOMAN EXAM WITH ROUTINE GYNECOLOGICAL EXAM: ICD-10-CM

## 2020-10-12 DIAGNOSIS — Z01.419 WELL WOMAN EXAM WITH ROUTINE GYNECOLOGICAL EXAM: Primary | ICD-10-CM

## 2020-10-12 DIAGNOSIS — N87.0 MILD CERVICAL DYSPLASIA, HISTOLOGICALLY CONFIRMED: ICD-10-CM

## 2020-10-12 DIAGNOSIS — D57.1 HB-SS DISEASE WITHOUT CRISIS: ICD-10-CM

## 2020-10-12 PROCEDURE — 88141 CYTOPATH C/V INTERPRET: CPT | Mod: ,,, | Performed by: PATHOLOGY

## 2020-10-12 PROCEDURE — 99999 PR PBB SHADOW E&M-EST. PATIENT-LVL III: ICD-10-PCS | Mod: PBBFAC,,, | Performed by: OBSTETRICS & GYNECOLOGY

## 2020-10-12 PROCEDURE — 36415 COLL VENOUS BLD VENIPUNCTURE: CPT

## 2020-10-12 PROCEDURE — 99395 PR PREVENTIVE VISIT,EST,18-39: ICD-10-PCS | Mod: S$GLB,,, | Performed by: OBSTETRICS & GYNECOLOGY

## 2020-10-12 PROCEDURE — 99999 PR PBB SHADOW E&M-EST. PATIENT-LVL III: CPT | Mod: PBBFAC,,, | Performed by: OBSTETRICS & GYNECOLOGY

## 2020-10-12 PROCEDURE — 88175 CYTOPATH C/V AUTO FLUID REDO: CPT | Performed by: PATHOLOGY

## 2020-10-12 PROCEDURE — 99395 PREV VISIT EST AGE 18-39: CPT | Mod: S$GLB,,, | Performed by: OBSTETRICS & GYNECOLOGY

## 2020-10-12 PROCEDURE — 86703 HIV-1/HIV-2 1 RESULT ANTBDY: CPT

## 2020-10-12 PROCEDURE — 87491 CHLMYD TRACH DNA AMP PROBE: CPT

## 2020-10-12 PROCEDURE — 88141 PR  CYTOPATH CERV/VAG INTERPRET: ICD-10-PCS | Mod: ,,, | Performed by: PATHOLOGY

## 2020-10-12 RX ORDER — HYDROCODONE BITARTRATE AND ACETAMINOPHEN 7.5; 325 MG/1; MG/1
1 TABLET ORAL
COMMUNITY
Start: 2020-04-20 | End: 2022-11-23 | Stop reason: SDUPTHER

## 2020-10-12 NOTE — PROGRESS NOTES
Subjective:       Patient ID: Laan Chou is a 25 y.o. female.    Chief Complaint:  Gynecologic Exam (Last pap was 19 and [place] on 19. Mild dysplasia- here for pap)      History of Present Illness  HPI  Annual Exam-Premenopausal  Patient presents for annual exam. The patient has no complaints today. The patient is not currently sexually active. GYN screening history: last pap: approximate date 2019 and was abnormal: LGSIL. The patient wears seatbelts: yes. The patient participates in regular exercise: no. Has the patient ever been transfused or tattooed?: yes. The patient reports that there is not domestic violence in her life.    Sickle cell disease  Status post left nephrectomy  History of mild cervical dysplasia with colposcopy on 2019.      GYN & OB History  Patient's last menstrual period was 2020 (exact date).   Date of Last Pap: 2019    OB History    Para Term  AB Living   0 0 0 0 0 0   SAB TAB Ectopic Multiple Live Births   0 0 0 0 0     Past Medical History:   Diagnosis Date    Sickle cell disease        Past Surgical History:   Procedure Laterality Date    BREAST LUMPECTOMY      CARDIAC SURGERY      NEPHRECTOMY         Family History   Problem Relation Age of Onset    Sickle cell trait Father     Sickle cell trait Mother     Sickle cell trait Sister     Sickle cell trait Sister     Breast cancer Neg Hx     Colon cancer Neg Hx     Ovarian cancer Neg Hx        Social History     Socioeconomic History    Marital status: Single     Spouse name: Not on file    Number of children: Not on file    Years of education: Not on file    Highest education level: Not on file   Occupational History    Not on file   Social Needs    Financial resource strain: Not on file    Food insecurity     Worry: Not on file     Inability: Not on file    Transportation needs     Medical: Not on file     Non-medical: Not on file   Tobacco Use    Smoking status: Never  Smoker    Smokeless tobacco: Never Used   Substance and Sexual Activity    Alcohol use: No    Drug use: No    Sexual activity: Yes     Partners: Male     Birth control/protection: Injection   Lifestyle    Physical activity     Days per week: Not on file     Minutes per session: Not on file    Stress: Not on file   Relationships    Social connections     Talks on phone: Not on file     Gets together: Not on file     Attends Christian service: Not on file     Active member of club or organization: Not on file     Attends meetings of clubs or organizations: Not on file     Relationship status: Not on file   Other Topics Concern    Not on file   Social History Narrative    No partner since 2017    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  [employer]       Current Outpatient Medications   Medication Sig Dispense Refill    folic acid (FOLVITE) 1 MG tablet TK 1 T PO QD  6    HYDROcodone-acetaminophen (NORCO) 7.5-325 mg per tablet Take 1 tablet by mouth.      hydroxyurea (HYDREA) 500 mg Cap TAKE THREE CAPSULES (1500MG) BY MOUTH EVERY DAY  6    nitrofurantoin (FURADANTIN) 25 mg/5 mL Susp Take 100 mg by mouth.      penicillin v potassium (VEETID) 250 MG tablet TK 1 T PO  BID  3     No current facility-administered medications for this visit.        Review of patient's allergies indicates:  No Known Allergies    Review of Systems  Review of Systems   Constitutional: Negative for activity change, appetite change, chills, fatigue, fever and unexpected weight change.   HENT: Negative for mouth sores.    Respiratory: Negative for cough, shortness of breath and wheezing.    Cardiovascular: Negative for chest pain and palpitations.   Gastrointestinal: Negative for abdominal pain, bloating, blood in stool, constipation, nausea and vomiting.   Endocrine: Negative for diabetes and hot flashes.   Genitourinary: Negative for dysmenorrhea, dyspareunia, dysuria, frequency, hematuria, menorrhagia,  menstrual problem, pelvic pain, urgency, vaginal bleeding, vaginal discharge, vaginal pain, urinary incontinence, postcoital bleeding and vaginal odor.   Musculoskeletal: Negative for back pain and myalgias.   Integumentary:  Negative for rash, breast mass and nipple discharge.   Neurological: Negative for seizures and headaches.   Psychiatric/Behavioral: Negative for depression and sleep disturbance. The patient is not nervous/anxious.    Breast: Negative for mass, mastodynia and nipple discharge          Objective:    Physical Exam:   Constitutional: She appears well-developed and well-nourished. No distress.    HENT:   Head: Normocephalic and atraumatic.    Eyes: EOM are normal.    Neck: Normal range of motion.     Pulmonary/Chest: Effort normal. No respiratory distress.   Breasts: Non-tender, no engorgement, no masses, no retraction, no discharge. Negative for lymphadenopathy.         Abdominal: Soft. She exhibits no distension. There is no abdominal tenderness. There is no rebound and no guarding.     Genitourinary:    Vagina and uterus normal.      Genitourinary Comments: Vulva without any obvious lesions.  Urethral meatus normal size and location without any lesion.  Urethra is non-tender without stricture or discharge.  Bladder is non-tender.  Vaginal vault with good support.  Minimal white discharge noted.  No obvious lesion.  Normal rugation.  Cervix is severely stenotic without any cervical motion tenderness.  No obvious lesion.  Uterus is small, non-tender, normal contour.  Adnexa is without any masses or tenderness.  Perineum without obvious lesion.     negative for vaginal discharge          Musculoskeletal: Normal range of motion.       Neurological: She is alert.    Skin: Skin is warm and dry.    Psychiatric: She has a normal mood and affect.          Assessment:        1. Well woman exam with routine gynecological exam    2. Hb-SS disease without crisis    3. Mild cervical dysplasia, histologically  confirmed             Plan:          I have discussed with the patient her condition.  Monthly breast examination was instructed, discussed, and encouraged.  Patient was encouraged to consume a low-calorie, low fat diet, and to increase of physical activity.  Healthy habits encouraged.  A Pap smear was performed without HR-HPV according to the USPSTF recommendations.  Mammogram was not ordered because of the combination of her age and risk factors, according to ACOG guidelines.  Gonorrhea and Chlamydia testing performed;  HIV test offered, again according to guidelines.    Patient is to continue her medications as prescribed.  She will come back to see me in one year for her annual visit.  She can come back to see me sooner as necessary.  All of her questions were answered appropriately to her satisfaction.     Does not want to be on any contraception.

## 2020-10-13 LAB — HIV 1+2 AB+HIV1 P24 AG SERPL QL IA: NEGATIVE

## 2020-11-09 LAB
FINAL PATHOLOGIC DIAGNOSIS: NORMAL
Lab: NORMAL

## 2020-12-04 DIAGNOSIS — Z01.84 ANTIBODY RESPONSE EXAMINATION: ICD-10-CM

## 2020-12-26 ENCOUNTER — HOSPITAL ENCOUNTER (EMERGENCY)
Facility: HOSPITAL | Age: 25
Discharge: HOME | End: 2020-12-26
Attending: EMERGENCY MEDICINE
Payer: COMMERCIAL

## 2020-12-26 VITALS
RESPIRATION RATE: 16 BRPM | DIASTOLIC BLOOD PRESSURE: 67 MMHG | TEMPERATURE: 98 F | OXYGEN SATURATION: 98 % | HEART RATE: 68 BPM | SYSTOLIC BLOOD PRESSURE: 100 MMHG | BODY MASS INDEX: 21.71 KG/M2 | HEIGHT: 61 IN | WEIGHT: 115 LBS

## 2020-12-26 DIAGNOSIS — R11.2 NAUSEA AND VOMITING, INTRACTABILITY OF VOMITING NOT SPECIFIED, UNSPECIFIED VOMITING TYPE: ICD-10-CM

## 2020-12-26 DIAGNOSIS — R05.9 COUGH: Primary | ICD-10-CM

## 2020-12-26 PROBLEM — N18.9 CHRONIC KIDNEY DISEASE (CKD): Status: ACTIVE | Noted: 2018-04-09

## 2020-12-26 PROBLEM — Z90.5 H/O UNILATERAL NEPHRECTOMY: Status: ACTIVE | Noted: 2018-07-23

## 2020-12-26 PROBLEM — D57.1 SICKLE CELL DISEASE: Status: ACTIVE | Noted: 2018-04-09

## 2020-12-26 LAB
ALBUMIN SERPL BCP-MCNC: 4.2 G/DL (ref 3.5–5.2)
ALP SERPL-CCNC: 90 U/L (ref 55–135)
ALT SERPL W/O P-5'-P-CCNC: 16 U/L (ref 10–44)
ANION GAP SERPL CALC-SCNC: 10 MMOL/L (ref 8–16)
ANISOCYTOSIS BLD QL SMEAR: SLIGHT
AST SERPL-CCNC: 33 U/L (ref 10–40)
B-HCG UR QL: NEGATIVE
BACTERIA #/AREA URNS AUTO: ABNORMAL /HPF
BASO STIPL BLD QL SMEAR: ABNORMAL
BASOPHILS # BLD AUTO: 0.08 K/UL (ref 0–0.2)
BASOPHILS NFR BLD: 0.6 % (ref 0–1.9)
BILIRUB SERPL-MCNC: 3.4 MG/DL (ref 0.1–1)
BILIRUB UR QL STRIP: NEGATIVE
BUN SERPL-MCNC: 10 MG/DL (ref 6–20)
CALCIUM SERPL-MCNC: 9.4 MG/DL (ref 8.7–10.5)
CHLORIDE SERPL-SCNC: 105 MMOL/L (ref 95–110)
CLARITY UR REFRACT.AUTO: ABNORMAL
CO2 SERPL-SCNC: 23 MMOL/L (ref 23–29)
COLOR UR AUTO: YELLOW
CREAT SERPL-MCNC: 0.8 MG/DL (ref 0.5–1.4)
CTP QC/QA: YES
DIFFERENTIAL METHOD: ABNORMAL
EOSINOPHIL # BLD AUTO: 0.2 K/UL (ref 0–0.5)
EOSINOPHIL NFR BLD: 1.5 % (ref 0–8)
ERYTHROCYTE [DISTWIDTH] IN BLOOD BY AUTOMATED COUNT: 22.2 % (ref 11.5–14.5)
EST. GFR  (AFRICAN AMERICAN): >60 ML/MIN/1.73 M^2
EST. GFR  (NON AFRICAN AMERICAN): >60 ML/MIN/1.73 M^2
GLUCOSE SERPL-MCNC: 88 MG/DL (ref 70–110)
GLUCOSE UR QL STRIP: NEGATIVE
HCT VFR BLD AUTO: 18.2 % (ref 37–48.5)
HGB BLD-MCNC: 6.3 G/DL (ref 12–16)
HGB UR QL STRIP: ABNORMAL
HOWELL-JOLLY BOD BLD QL SMEAR: ABNORMAL
HYPOCHROMIA BLD QL SMEAR: ABNORMAL
IMM GRANULOCYTES # BLD AUTO: 0.18 K/UL (ref 0–0.04)
IMM GRANULOCYTES NFR BLD AUTO: 1.3 % (ref 0–0.5)
KETONES UR QL STRIP: NEGATIVE
LEUKOCYTE ESTERASE UR QL STRIP: ABNORMAL
LYMPHOCYTES # BLD AUTO: 3.1 K/UL (ref 1–4.8)
LYMPHOCYTES NFR BLD: 23 % (ref 18–48)
MCH RBC QN AUTO: 29 PG (ref 27–31)
MCHC RBC AUTO-ENTMCNC: 34.6 G/DL (ref 32–36)
MCV RBC AUTO: 84 FL (ref 82–98)
MICROSCOPIC COMMENT: ABNORMAL
MONOCYTES # BLD AUTO: 2.2 K/UL (ref 0.3–1)
MONOCYTES NFR BLD: 16.5 % (ref 4–15)
NEUTROPHILS # BLD AUTO: 7.7 K/UL (ref 1.8–7.7)
NEUTROPHILS NFR BLD: 57.1 % (ref 38–73)
NITRITE UR QL STRIP: NEGATIVE
NRBC BLD-RTO: 4 /100 WBC
OVALOCYTES BLD QL SMEAR: ABNORMAL
PAPPENHEIMER BOD BLD QL SMEAR: PRESENT
PH UR STRIP: 6 [PH] (ref 5–8)
PLATELET # BLD AUTO: 393 K/UL (ref 150–350)
PLATELET BLD QL SMEAR: ABNORMAL
PMV BLD AUTO: 9.6 FL (ref 9.2–12.9)
POC MOLECULAR INFLUENZA A AGN: NEGATIVE
POC MOLECULAR INFLUENZA B AGN: NEGATIVE
POIKILOCYTOSIS BLD QL SMEAR: SLIGHT
POLYCHROMASIA BLD QL SMEAR: ABNORMAL
POTASSIUM SERPL-SCNC: 3.6 MMOL/L (ref 3.5–5.1)
PROT SERPL-MCNC: 7.7 G/DL (ref 6–8.4)
PROT UR QL STRIP: NEGATIVE
RBC # BLD AUTO: 2.17 M/UL (ref 4–5.4)
RBC #/AREA URNS AUTO: 1 /HPF (ref 0–4)
RETICS/RBC NFR AUTO: 15 % (ref 0.5–2.5)
SARS-COV-2 RDRP RESP QL NAA+PROBE: NEGATIVE
SCHISTOCYTES BLD QL SMEAR: ABNORMAL
SICKLE CELLS BLD QL SMEAR: ABNORMAL
SODIUM SERPL-SCNC: 138 MMOL/L (ref 136–145)
SP GR UR STRIP: 1 (ref 1–1.03)
SQUAMOUS #/AREA URNS AUTO: 2 /HPF
TARGETS BLD QL SMEAR: ABNORMAL
URN SPEC COLLECT METH UR: ABNORMAL
WBC # BLD AUTO: 13.41 K/UL (ref 3.9–12.7)
WBC #/AREA URNS AUTO: 11 /HPF (ref 0–5)

## 2020-12-26 PROCEDURE — U0002 COVID-19 LAB TEST NON-CDC: HCPCS | Performed by: PHYSICIAN ASSISTANT

## 2020-12-26 PROCEDURE — 87077 CULTURE AEROBIC IDENTIFY: CPT

## 2020-12-26 PROCEDURE — 99284 EMERGENCY DEPT VISIT MOD MDM: CPT | Mod: ,,, | Performed by: PHYSICIAN ASSISTANT

## 2020-12-26 PROCEDURE — 85025 COMPLETE CBC W/AUTO DIFF WBC: CPT

## 2020-12-26 PROCEDURE — 96361 HYDRATE IV INFUSION ADD-ON: CPT

## 2020-12-26 PROCEDURE — 99223 1ST HOSP IP/OBS HIGH 75: CPT | Mod: ,,, | Performed by: STUDENT IN AN ORGANIZED HEALTH CARE EDUCATION/TRAINING PROGRAM

## 2020-12-26 PROCEDURE — 85045 AUTOMATED RETICULOCYTE COUNT: CPT

## 2020-12-26 PROCEDURE — 81025 URINE PREGNANCY TEST: CPT | Performed by: PHYSICIAN ASSISTANT

## 2020-12-26 PROCEDURE — 87502 INFLUENZA DNA AMP PROBE: CPT

## 2020-12-26 PROCEDURE — 63600175 PHARM REV CODE 636 W HCPCS: Performed by: PHYSICIAN ASSISTANT

## 2020-12-26 PROCEDURE — 99223 PR INITIAL HOSPITAL CARE,LEVL III: ICD-10-PCS | Mod: ,,, | Performed by: STUDENT IN AN ORGANIZED HEALTH CARE EDUCATION/TRAINING PROGRAM

## 2020-12-26 PROCEDURE — 87086 URINE CULTURE/COLONY COUNT: CPT

## 2020-12-26 PROCEDURE — 81001 URINALYSIS AUTO W/SCOPE: CPT

## 2020-12-26 PROCEDURE — 80053 COMPREHEN METABOLIC PANEL: CPT

## 2020-12-26 PROCEDURE — 25000003 PHARM REV CODE 250: Performed by: PHYSICIAN ASSISTANT

## 2020-12-26 PROCEDURE — 99284 EMERGENCY DEPT VISIT MOD MDM: CPT | Mod: 25

## 2020-12-26 PROCEDURE — 99284 PR EMERGENCY DEPT VISIT,LEVEL IV: ICD-10-PCS | Mod: ,,, | Performed by: PHYSICIAN ASSISTANT

## 2020-12-26 PROCEDURE — 96374 THER/PROPH/DIAG INJ IV PUSH: CPT

## 2020-12-26 PROCEDURE — 87088 URINE BACTERIA CULTURE: CPT

## 2020-12-26 PROCEDURE — 87186 SC STD MICRODIL/AGAR DIL: CPT

## 2020-12-26 RX ORDER — ONDANSETRON 4 MG/1
4 TABLET, FILM COATED ORAL EVERY 6 HOURS
Qty: 12 TABLET | Refills: 0 | Status: SHIPPED | OUTPATIENT
Start: 2020-12-26

## 2020-12-26 RX ORDER — NITROFURANTOIN 25; 75 MG/1; MG/1
100 CAPSULE ORAL 2 TIMES DAILY
Qty: 10 CAPSULE | Refills: 0 | Status: SHIPPED | OUTPATIENT
Start: 2020-12-26 | End: 2020-12-31

## 2020-12-26 RX ORDER — ONDANSETRON 2 MG/ML
8 INJECTION INTRAMUSCULAR; INTRAVENOUS
Status: COMPLETED | OUTPATIENT
Start: 2020-12-26 | End: 2020-12-26

## 2020-12-26 RX ADMIN — SODIUM CHLORIDE 1000 ML: 0.9 INJECTION, SOLUTION INTRAVENOUS at 05:12

## 2020-12-26 RX ADMIN — ONDANSETRON 8 MG: 2 INJECTION INTRAMUSCULAR; INTRAVENOUS at 05:12

## 2020-12-26 NOTE — ASSESSMENT & PLAN NOTE
25 years old female patient with history of HbSS, who presented with nausea, vomiting, abdominal discomfort and shortness of breath. symptoms have resolved following IVF and zofran. She denies her regular crisis pain. She denies shortness of breath, chest pain at this moment.   Her hgb is 6.3 today, which is lower than her baseline. However, she is asymptomatic.   Recommendations:   - Would hold transfusion for now, as she is asymptomatic.   - repeat her labs in one week with her primary hematologist Dr. Loza (she said she will schedule her appointment with her )   - continue hydrea and folic acid on discharge   - update echo as outpatient to evaluate for pulmonary hypertension.

## 2020-12-26 NOTE — CONSULTS
[facility]  Hematology/Oncology  Consult Note    Patient Name: Lana Chou  MRN: [id number]  Admission Date: 12/26/2020  Hospital Length of Stay: 0 days  Code Status: No Order   Attending Provider: No att. providers found  Consulting Provider: Cristobal Gao MD  Primary Care Physician: Primary Doctor No  Principal Problem:<principal problem not specified>    Inpatient consult to Hematology  Consult performed by: Cristobal Gao MD  Consult ordered by: Jani North MD        Subjective:     HPI:  25 years old female patient with HbSS on hydrea, presenting with cough, shortness of breath, nausea and vomiting for one day. She had mild cough for few days, and then started to feel nauseous and short of breath. She denied her usual crisis pain, but she has mild abdominal discomfort. She denied fever, chills, rigors, chest pain, diarrhea or constipation.   For her HbSS, she is on hydrea 1500 mg daily, folic acid 1 mg daily. She uses norco 7.5 mg for pain,  2 - 3 times / month. She reports a history of acute chest syndrome, doesn't remember one, and she got simple transfusions for it, never had exchange transfusion. She was placed on nitrofurantoin for recurrent UTI. She follows with Dr. Loza at [facility], last seen a month ago (records not available).   She received IVF and Zofran in the ER with resolution of her symptoms. She feels she is back to her baseline.   Labs showed H/H 6.3/18.2, moderate sickle cells, Retic 15, Bilirubin 3.5. CXR is clear   Reviewing care-everywhere, her baseline hgb is 7-8, bilirubin 3-4.   Hematology was asked to evaluate for the need of blood transfusion      Oncology Treatment Plan:   [No treatment plan]    Medications:  Continuous Infusions:  Scheduled Meds:  PRN Meds:     Review of patient's allergies indicates:  No Known Allergies     Past Medical History:   Diagnosis Date    Sickle cell disease      Past Surgical History:   Procedure Laterality Date    BREAST LUMPECTOMY       CARDIAC SURGERY      NEPHRECTOMY       Family History     Problem Relation (Age of Onset)    Sickle cell trait Father, Mother, Sister, Sister        Tobacco Use    Smoking status: Never Smoker    Smokeless tobacco: Never Used   Substance and Sexual Activity    Alcohol use: No    Drug use: No    Sexual activity: Yes     Partners: Male     Birth control/protection: Injection       Review of Systems   Constitutional: Negative for activity change, appetite change, chills, fatigue, fever and unexpected weight change.   HENT: Negative for congestion.    Eyes: Negative for pain and visual disturbance.   Respiratory: Positive for cough and shortness of breath (resolved). Negative for chest tightness and wheezing.    Cardiovascular: Negative for chest pain, palpitations and leg swelling.   Gastrointestinal: Positive for nausea (resolved). Negative for abdominal pain (abdominal discomfort), blood in stool, constipation, diarrhea and vomiting.   Genitourinary: Negative for difficulty urinating, flank pain, frequency, hematuria and urgency.   Musculoskeletal: Negative for arthralgias, back pain and myalgias.   Neurological: Negative for dizziness, weakness, numbness and headaches.   Psychiatric/Behavioral: The patient is not nervous/anxious.      Objective:     Vital Signs (Most Recent):  Temp: 97.8 °F (36.6 °C) (12/26/20 0413)  Pulse: 69 (12/26/20 0754)  Resp: 16 (12/26/20 0754)  BP: (!) 100/47 (12/26/20 0754)  SpO2: 98 % (12/26/20 0754) Vital Signs (24h Range):  Temp:  [97.8 °F (36.6 °C)] 97.8 °F (36.6 °C)  Pulse:  [69-76] 69  Resp:  [16-22] 16  SpO2:  [98 %-100 %] 98 %  BP: (100-106)/(47-63) 100/47     Weight: 52.2 kg (115 lb)  Body mass index is 21.73 kg/m².  Body surface area is 1.5 meters squared.    No intake or output data in the 24 hours ending 12/26/20 0757    Physical Exam  Constitutional:       General: She is not in acute distress.     Appearance: Normal appearance. She is not ill-appearing.   HENT:       Head: Normocephalic and atraumatic.   Eyes:      Pupils: Pupils are equal, round, and reactive to light.   Cardiovascular:      Rate and Rhythm: Normal rate and regular rhythm.      Heart sounds: No murmur.   Pulmonary:      Effort: No respiratory distress.      Breath sounds: Normal breath sounds. No wheezing.   Abdominal:      General: Abdomen is flat. Bowel sounds are normal. There is no distension.      Palpations: Abdomen is soft. There is no mass.      Tenderness: There is no abdominal tenderness.   Musculoskeletal:         General: No swelling or deformity.   Skin:     Coloration: Skin is not jaundiced.   Neurological:      General: No focal deficit present.      Mental Status: She is alert and oriented to person, place, and time. Mental status is at baseline.         Significant Labs:   CBC:   Recent Labs   Lab 12/26/20  0528   WBC 13.41*   HGB 6.3*   HCT 18.2*   *    and CMP:   Recent Labs   Lab 12/26/20  0528      K 3.6      CO2 23   GLU 88   BUN 10   CREATININE 0.8   CALCIUM 9.4   PROT 7.7   ALBUMIN 4.2   BILITOT 3.4*   ALKPHOS 90   AST 33   ALT 16   ANIONGAP 10   EGFRNONAA >60.0       Diagnostic Results:  I have reviewed and interpreted all pertinent imaging results/findings within the past 24 hours.    Assessment/Plan:     Sickle cell disease  25 years old female patient with history of HbSS, who presented with nausea, vomiting, abdominal discomfort and shortness of breath. symptoms have resolved following IVF and zofran. She denies her regular crisis pain. She denies shortness of breath, chest pain at this moment.   Her hgb is 6.3 today, which is lower than her baseline. However, she is asymptomatic.   Recommendations:   - Would hold transfusion for now, as she is asymptomatic.   - repeat her labs in one week with her primary hematologist Dr. Loza (she said she will schedule her appointment with her )   - continue hydrea and folic acid on discharge   - update echo as outpatient to  evaluate for pulmonary hypertension.         The patient will be discussed with the attending physician  Recommendations outlined in this note are not final until attending attestation.    Thank you for your consult. I will sign off. Please contact us if you have any additional questions.    Cristobal Gao MD  Hematology/Oncology  [facility]

## 2020-12-26 NOTE — ED PROVIDER NOTES
Encounter Date: 12/26/2020       History     Chief Complaint   Patient presents with    COVID-19 Concerns     PT with n/v, runny nose and SOB.     25-year-old female with sickle cell anemia presents for cough, shortness of breath, nausea and vomiting for 1 day.  She endorses mild dry cough over the past couple days but today she started to feel short of breath with nausea and 3 episodes of emesis.  She denies any palliating factors, did not take any medications prior to arrival.  She states that she ate some Robin food may have made her feel nauseated.  She reports associated lightheadedness and abdominal cramping.  She denies abdominal pain, changes in bowel movements, fevers/chills, chest pain or urinary symptoms.  She does have a history of acute chest syndrome but states that her symptoms do not feel consistent with this.  She denies any known sick contacts.        Review of patient's allergies indicates:  No Known Allergies  Past Medical History:   Diagnosis Date    Sickle cell disease      Past Surgical History:   Procedure Laterality Date    BREAST LUMPECTOMY      CARDIAC SURGERY      NEPHRECTOMY       Family History   Problem Relation Age of Onset    Sickle cell trait Father     Sickle cell trait Mother     Sickle cell trait Sister     Sickle cell trait Sister     Breast cancer Neg Hx     Colon cancer Neg Hx     Ovarian cancer Neg Hx      Social History     Tobacco Use    Smoking status: Never Smoker    Smokeless tobacco: Never Used   Substance Use Topics    Alcohol use: No    Drug use: No     Review of Systems   Constitutional: Negative for fatigue and fever.   HENT: Negative for sore throat.    Respiratory: Positive for cough and shortness of breath.    Cardiovascular: Negative for chest pain, palpitations and leg swelling.   Gastrointestinal: Positive for nausea and vomiting. Negative for abdominal distention, abdominal pain, anal bleeding, blood in stool, constipation, diarrhea and  rectal pain.   Genitourinary: Negative for dysuria and hematuria.   Musculoskeletal: Negative for back pain.   Skin: Negative for rash.   Neurological: Negative for weakness.   Hematological: Does not bruise/bleed easily.       Physical Exam     Initial Vitals [12/26/20 0413]   BP Pulse Resp Temp SpO2   106/63 76 (!) 22 97.8 °F (36.6 °C) 100 %      MAP       --         Physical Exam    Nursing note and vitals reviewed.  Constitutional: She appears well-developed and well-nourished. She is not diaphoretic. No distress.   HENT:   Head: Normocephalic and atraumatic.   Eyes: EOM are normal. Pupils are equal, round, and reactive to light. Scleral icterus is present.   Neck: Normal range of motion.   Cardiovascular: Normal rate, regular rhythm, normal heart sounds and intact distal pulses. Exam reveals no gallop and no friction rub.    No murmur heard.  Pulmonary/Chest: Breath sounds normal. No respiratory distress. She has no wheezes. She has no rhonchi. She has no rales. She exhibits no tenderness.   Abdominal: Soft. Bowel sounds are normal. She exhibits no distension and no mass. There is no abdominal tenderness. There is no rebound and no guarding.   Musculoskeletal: Normal range of motion.   Neurological: She is alert and oriented to person, place, and time.   Skin: Skin is warm and dry.   Psychiatric: She has a normal mood and affect.         ED Course   Procedures  Labs Reviewed   CBC W/ AUTO DIFFERENTIAL - Abnormal; Notable for the following components:       Result Value    WBC 13.41 (*)     RBC 2.17 (*)     Hemoglobin 6.3 (*)     Hematocrit 18.2 (*)     RDW 22.2 (*)     Platelets 393 (*)     Immature Granulocytes 1.3 (*)     Immature Grans (Abs) 0.18 (*)     Mono # 2.2 (*)     nRBC 4 (*)     Mono % 16.5 (*)     All other components within normal limits    Narrative:      HCT  critical result(s) called and verbal readback obtained from   LINDA PA RN by [facility] 12/26/2020 06:03   COMPREHENSIVE METABOLIC PANEL    SARS-COV-2 RDRP GENE    Narrative:     This test utilizes isothermal nucleic acid amplification   technology to detect the SARS-CoV-2 RdRp nucleic acid segment.   The analytical sensitivity (limit of detection) is 125 genome   equivalents/mL.   A POSITIVE result implies infection with the SARS-CoV-2 virus;   the patient is presumed to be contagious.     A NEGATIVE result means that SARS-CoV-2 nucleic acids are not   present above the limit of detection. A NEGATIVE result should be   treated as presumptive. It does not rule out the possibility of   COVID-19 and should not be the sole basis for treatment decisions.   If COVID-19 is strongly suspected based on clinical and exposure   history, re-testing using an alternate molecular assay should be   considered.   This test is only for use under the Food and Drug   Administration s Emergency Use Authorization (EUA).   Commercial kits are provided by [employer].   Performance characteristics of the EUA have been independently   verified by [facility] Department of   Pathology and Laboratory Medicine.   _________________________________________________________________   The authorized Fact Sheet for Healthcare Providers and the authorized Fact   Sheet for Patients of the ID NOW COVID-19 are available on the FDA   website:     https://www.fda.gov/media/850881/download  https://www.fda.gov/media/189174/download         POCT URINE PREGNANCY   POCT INFLUENZA A/B MOLECULAR          Imaging Results          X-Ray Chest AP Portable (Final result)  Result time 12/26/20 05:43:23    Final result by Wiliam Alexander MD (12/26/20 05:43:23)                 Impression:      No convincing radiographic evidence of acute intrathoracic process on this single view.      Electronically signed by: Wiliam Alexander MD  Date:    12/26/2020  Time:    05:43             Narrative:    EXAMINATION:  XR CHEST AP PORTABLE    CLINICAL HISTORY:  shortness of  breath;    TECHNIQUE:  Single frontal view of the chest was performed.    COMPARISON:  None    FINDINGS:  The cardiomediastinal silhouette appears within normal limits.  Lungs are symmetrically expanded without evidence of confluent airspace consolidation.  No significant volume of pleural fluid or pneumothorax identified.  The visualized osseous structures appear intact.                                 Medical Decision Making:   History:   Old Medical Records: I decided to obtain old medical records.  Old Records Summarized: records from previous admission(s) and records from another hospital.       <> Summary of Records: Most recent admission in July for sickle cell crisis with UTI at [facility]  Initial Assessment:   25-year-old female with sickle cell anemia presenting for shortness of breath, cough, nausea and vomiting for 1 day.  She is mildly tachypneic with otherwise normal vitals.  Abdomen is soft and nontender with normoactive vitals.  Differential Diagnosis:   COVID-19  Gastroenteritis  Dehydration  Electrolyte derangement  Lower clinical suspicion for acute chest syndrome  Pneumonia  Independently Interpreted Test(s):   I have ordered and independently interpreted X-rays - see summary below.       <> Summary of X-Ray Reading(s): No consolidation  Clinical Tests:   Lab Tests: Ordered and Reviewed  Radiological Study: Ordered and Reviewed  ED Management:  Will check labs, give fluid bolus, Zofran, do chest x-ray and reassess.    Patient reports improvement of symptoms after Zofran and fluids.  Labs are notable for hemoglobin of 6.3.  Patient states that her baseline is around 6.9.  Per chart review, she was last seen at [facility] in July her hemoglobin was 7.5.  I am unable to find any notes from her hematologist regarding threshold for transfusion.  Dispo pending remainder of lab workup and p.o. challenge.  I discussed this patient my supervising physician and I am signing out to Jani North MD.    6:13 AM  Leanne  OSWALDO Heredia                Attending Attestation:     Physician Attestation Statement for NP/PA:   I discussed this assessment and plan of this patient with the NP/PA, but I did not personally examine the patient. The face to face encounter was performed by the NP/PA.                  ED Course as of Dec 26 0613   Sat Dec 26, 2020   0447 SARS-CoV-2 RNA, Amplification, Qual: Negative [CC]   0609 Patient, baseline hemoglobin is 6.9.  Most recent hemoglobin on file 7.5 at [facility] in July.   Hemoglobin(!): 6.3 [CC]      ED Course User Index  [CC] Leanne Heredia PA-C            Clinical Impression:                                       Leanne Heredia PA-C  12/26/20 0613       [place] MD  12/28/20 6132

## 2020-12-26 NOTE — SUBJECTIVE & OBJECTIVE
Oncology Treatment Plan:   [No treatment plan]    Medications:  Continuous Infusions:  Scheduled Meds:  PRN Meds:     Review of patient's allergies indicates:  No Known Allergies     Past Medical History:   Diagnosis Date    Sickle cell disease      Past Surgical History:   Procedure Laterality Date    BREAST LUMPECTOMY      CARDIAC SURGERY      NEPHRECTOMY       Family History     Problem Relation (Age of Onset)    Sickle cell trait Father, Mother, Sister, Sister        Tobacco Use    Smoking status: Never Smoker    Smokeless tobacco: Never Used   Substance and Sexual Activity    Alcohol use: No    Drug use: No    Sexual activity: Yes     Partners: Male     Birth control/protection: Injection       Review of Systems   Constitutional: Negative for activity change, appetite change, chills, fatigue, fever and unexpected weight change.   HENT: Negative for congestion.    Eyes: Negative for pain and visual disturbance.   Respiratory: Positive for cough and shortness of breath (resolved). Negative for chest tightness and wheezing.    Cardiovascular: Negative for chest pain, palpitations and leg swelling.   Gastrointestinal: Positive for nausea (resolved). Negative for abdominal pain (abdominal discomfort), blood in stool, constipation, diarrhea and vomiting.   Genitourinary: Negative for difficulty urinating, flank pain, frequency, hematuria and urgency.   Musculoskeletal: Negative for arthralgias, back pain and myalgias.   Neurological: Negative for dizziness, weakness, numbness and headaches.   Psychiatric/Behavioral: The patient is not nervous/anxious.      Objective:     Vital Signs (Most Recent):  Temp: 97.8 °F (36.6 °C) (12/26/20 0413)  Pulse: 69 (12/26/20 0754)  Resp: 16 (12/26/20 0754)  BP: (!) 100/47 (12/26/20 0754)  SpO2: 98 % (12/26/20 0754) Vital Signs (24h Range):  Temp:  [97.8 °F (36.6 °C)] 97.8 °F (36.6 °C)  Pulse:  [69-76] 69  Resp:  [16-22] 16  SpO2:  [98 %-100 %] 98 %  BP: (100-106)/(47-63)  100/47     Weight: 52.2 kg (115 lb)  Body mass index is 21.73 kg/m².  Body surface area is 1.5 meters squared.    No intake or output data in the 24 hours ending 12/26/20 0757    Physical Exam  Constitutional:       General: She is not in acute distress.     Appearance: Normal appearance. She is not ill-appearing.   HENT:      Head: Normocephalic and atraumatic.   Eyes:      Pupils: Pupils are equal, round, and reactive to light.   Cardiovascular:      Rate and Rhythm: Normal rate and regular rhythm.      Heart sounds: No murmur.   Pulmonary:      Effort: No respiratory distress.      Breath sounds: Normal breath sounds. No wheezing.   Abdominal:      General: Abdomen is flat. Bowel sounds are normal. There is no distension.      Palpations: Abdomen is soft. There is no mass.      Tenderness: There is no abdominal tenderness.   Musculoskeletal:         General: No swelling or deformity.   Skin:     Coloration: Skin is not jaundiced.   Neurological:      General: No focal deficit present.      Mental Status: She is alert and oriented to person, place, and time. Mental status is at baseline.         Significant Labs:   CBC:   Recent Labs   Lab 12/26/20  0528   WBC 13.41*   HGB 6.3*   HCT 18.2*   *    and CMP:   Recent Labs   Lab 12/26/20  0528      K 3.6      CO2 23   GLU 88   BUN 10   CREATININE 0.8   CALCIUM 9.4   PROT 7.7   ALBUMIN 4.2   BILITOT 3.4*   ALKPHOS 90   AST 33   ALT 16   ANIONGAP 10   EGFRNONAA >60.0       Diagnostic Results:  I have reviewed and interpreted all pertinent imaging results/findings within the past 24 hours.

## 2020-12-26 NOTE — PROVIDER PROGRESS NOTES - EMERGENCY DEPT.
Encounter Date: 12/26/2020    ED Physician Progress Notes        Physician Note:   Assumed care of patient at 0600 sign out pending labs, reassessment.  Labs notable for:  Negative COVID-19, negative urine pregnancy test, leukocytosis, anemia to 6.3 (baseline 6.9).  No infiltrate on CXR.  I discussed the patient with hematology who will evaluate her in the ED.     10:01 AM  Hematology evaluated patient.  No indication for transfusion at this time, given she is not symptomatic.  Favor her presentation to be secondary to viral illness.  Her abdomen is non-tender.  She has tolerated PO.  No dysuria or CVAT, but will tx for UTI, do not suspect pyelonephritis clinically.  Advised patient to f/u with PCP this week for repeat hemoglobin check.  All questions answered prior to discharge.  Return precautions given.

## 2020-12-26 NOTE — HPI
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 She had mild cough for few days, and then started to feel nauseous and short of breath. She denied her usual crisis pain, but she has mild abdominal discomfort. She denied fever, chills, rigors, chest pain, diarrhea or constipation.   For her HbSS, she is on hydrea 1500 mg daily, folic acid 1 mg daily. She uses norco 7.5 mg for pain,  2 - 3 times / month. She reports a history of acute chest syndrome, doesn't remember one, and she got simple transfusions for it, never had exchange transfusion. She was placed on nitrofurantoin for recurrent UTI. She follows with Dr. Loza at [facility], last seen a month ago (records not available).   She received IVF and Zofran in the ER with resolution of her symptoms. She feels she is back to her baseline.   Labs showed H/H 6.3/18.2, moderate sickle cells, Retic 15, Bilirubin 3.5. CXR is clear   Reviewing care-everywhere, her baseline hgb is 7-8, bilirubin 3-4.   Hematology was asked to evaluate for the need of blood transfusion

## 2020-12-28 LAB — BACTERIA UR CULT: ABNORMAL

## 2021-04-05 ENCOUNTER — OFFICE VISIT (OUTPATIENT)
Dept: OBSTETRICS AND GYNECOLOGY | Facility: CLINIC | Age: 26
End: 2021-04-05
Payer: COMMERCIAL

## 2021-04-05 VITALS
BODY MASS INDEX: 20.82 KG/M2 | HEIGHT: 61 IN | SYSTOLIC BLOOD PRESSURE: 110 MMHG | WEIGHT: 110.25 LBS | DIASTOLIC BLOOD PRESSURE: 62 MMHG

## 2021-04-05 DIAGNOSIS — Z11.3 SCREEN FOR STD (SEXUALLY TRANSMITTED DISEASE): ICD-10-CM

## 2021-04-05 DIAGNOSIS — N87.0 MILD CERVICAL DYSPLASIA, HISTOLOGICALLY CONFIRMED: Primary | ICD-10-CM

## 2021-04-05 PROCEDURE — 3008F BODY MASS INDEX DOCD: CPT | Mod: CPTII,S$GLB,, | Performed by: OBSTETRICS & GYNECOLOGY

## 2021-04-05 PROCEDURE — 3008F PR BODY MASS INDEX (BMI) DOCUMENTED: ICD-10-PCS | Mod: CPTII,S$GLB,, | Performed by: OBSTETRICS & GYNECOLOGY

## 2021-04-05 PROCEDURE — 99213 PR OFFICE/OUTPT VISIT, EST, LEVL III, 20-29 MIN: ICD-10-PCS | Mod: S$GLB,,, | Performed by: OBSTETRICS & GYNECOLOGY

## 2021-04-05 PROCEDURE — 99213 OFFICE O/P EST LOW 20 MIN: CPT | Mod: S$GLB,,, | Performed by: OBSTETRICS & GYNECOLOGY

## 2021-04-05 PROCEDURE — 87491 CHLMYD TRACH DNA AMP PROBE: CPT | Performed by: OBSTETRICS & GYNECOLOGY

## 2021-04-05 PROCEDURE — 99999 PR PBB SHADOW E&M-EST. PATIENT-LVL III: CPT | Mod: PBBFAC,,, | Performed by: OBSTETRICS & GYNECOLOGY

## 2021-04-05 PROCEDURE — 1126F PR PAIN SEVERITY QUANTIFIED, NO PAIN PRESENT: ICD-10-PCS | Mod: S$GLB,,, | Performed by: OBSTETRICS & GYNECOLOGY

## 2021-04-05 PROCEDURE — 87591 N.GONORRHOEAE DNA AMP PROB: CPT | Performed by: OBSTETRICS & GYNECOLOGY

## 2021-04-05 PROCEDURE — 1126F AMNT PAIN NOTED NONE PRSNT: CPT | Mod: S$GLB,,, | Performed by: OBSTETRICS & GYNECOLOGY

## 2021-04-05 PROCEDURE — 99999 PR PBB SHADOW E&M-EST. PATIENT-LVL III: ICD-10-PCS | Mod: PBBFAC,,, | Performed by: OBSTETRICS & GYNECOLOGY

## 2021-04-05 PROCEDURE — 87624 HPV HI-RISK TYP POOLED RSLT: CPT | Performed by: OBSTETRICS & GYNECOLOGY

## 2021-04-05 RX ORDER — FERROUS SULFATE 325(65) MG
1 TABLET ORAL DAILY
COMMUNITY
Start: 2021-01-09 | End: 2023-03-15 | Stop reason: SDUPTHER

## 2021-04-05 RX ORDER — NITROFURANTOIN MACROCRYSTALS 50 MG/1
50 CAPSULE ORAL NIGHTLY
COMMUNITY
Start: 2021-01-09 | End: 2022-11-23

## 2021-04-05 RX ORDER — ASPIRIN 325 MG
50000 TABLET, DELAYED RELEASE (ENTERIC COATED) ORAL
COMMUNITY
Start: 2021-01-09 | End: 2023-03-15 | Stop reason: SDUPTHER

## 2021-04-07 LAB
C TRACH DNA SPEC QL NAA+PROBE: NOT DETECTED
N GONORRHOEA DNA SPEC QL NAA+PROBE: NOT DETECTED

## 2021-04-13 ENCOUNTER — PATIENT MESSAGE (OUTPATIENT)
Dept: OBSTETRICS AND GYNECOLOGY | Facility: CLINIC | Age: 26
End: 2021-04-13

## 2021-04-13 LAB
HPV HR 12 DNA SPEC QL NAA+PROBE: POSITIVE
HPV16 AG SPEC QL: NEGATIVE
HPV18 DNA SPEC QL NAA+PROBE: NEGATIVE

## 2021-04-15 ENCOUNTER — OFFICE VISIT (OUTPATIENT)
Dept: OBSTETRICS AND GYNECOLOGY | Facility: CLINIC | Age: 26
End: 2021-04-15
Payer: COMMERCIAL

## 2021-04-15 VITALS
SYSTOLIC BLOOD PRESSURE: 102 MMHG | HEIGHT: 61 IN | DIASTOLIC BLOOD PRESSURE: 60 MMHG | WEIGHT: 110.25 LBS | BODY MASS INDEX: 20.82 KG/M2

## 2021-04-15 DIAGNOSIS — N87.0 MILD CERVICAL DYSPLASIA, HISTOLOGICALLY CONFIRMED: Primary | ICD-10-CM

## 2021-04-15 PROCEDURE — 3008F BODY MASS INDEX DOCD: CPT | Mod: CPTII,S$GLB,, | Performed by: OBSTETRICS & GYNECOLOGY

## 2021-04-15 PROCEDURE — 99999 PR PBB SHADOW E&M-EST. PATIENT-LVL III: CPT | Mod: PBBFAC,,, | Performed by: OBSTETRICS & GYNECOLOGY

## 2021-04-15 PROCEDURE — 3008F PR BODY MASS INDEX (BMI) DOCUMENTED: ICD-10-PCS | Mod: CPTII,S$GLB,, | Performed by: OBSTETRICS & GYNECOLOGY

## 2021-04-15 PROCEDURE — 1126F AMNT PAIN NOTED NONE PRSNT: CPT | Mod: S$GLB,,, | Performed by: OBSTETRICS & GYNECOLOGY

## 2021-04-15 PROCEDURE — 99499 UNLISTED E&M SERVICE: CPT | Mod: S$GLB,,, | Performed by: OBSTETRICS & GYNECOLOGY

## 2021-04-15 PROCEDURE — 99499 NO LOS: ICD-10-PCS | Mod: S$GLB,,, | Performed by: OBSTETRICS & GYNECOLOGY

## 2021-04-15 PROCEDURE — 99999 PR PBB SHADOW E&M-EST. PATIENT-LVL III: ICD-10-PCS | Mod: PBBFAC,,, | Performed by: OBSTETRICS & GYNECOLOGY

## 2021-04-15 PROCEDURE — 1126F PR PAIN SEVERITY QUANTIFIED, NO PAIN PRESENT: ICD-10-PCS | Mod: S$GLB,,, | Performed by: OBSTETRICS & GYNECOLOGY

## 2021-08-30 ENCOUNTER — HOSPITAL ENCOUNTER (EMERGENCY)
Facility: HOSPITAL | Age: 26
Discharge: HOME | End: 2021-08-30
Attending: EMERGENCY MEDICINE
Payer: COMMERCIAL

## 2021-08-30 VITALS
BODY MASS INDEX: 20.78 KG/M2 | WEIGHT: 110 LBS | RESPIRATION RATE: 16 BRPM | HEART RATE: 85 BPM | TEMPERATURE: 98 F | OXYGEN SATURATION: 95 % | DIASTOLIC BLOOD PRESSURE: 53 MMHG | SYSTOLIC BLOOD PRESSURE: 97 MMHG

## 2021-08-30 DIAGNOSIS — R07.9 CHEST PAIN: ICD-10-CM

## 2021-08-30 DIAGNOSIS — R00.0 TACHYCARDIA: ICD-10-CM

## 2021-08-30 DIAGNOSIS — D57.00 SICKLE CELL ANEMIA WITH CRISIS: Primary | ICD-10-CM

## 2021-08-30 LAB
ABO + RH BLD: NORMAL
ALBUMIN SERPL BCP-MCNC: 4.2 G/DL (ref 3.5–5.2)
ALP SERPL-CCNC: 85 U/L (ref 55–135)
ALT SERPL W/O P-5'-P-CCNC: 22 U/L (ref 10–44)
ANION GAP SERPL CALC-SCNC: 14 MMOL/L (ref 8–16)
AST SERPL-CCNC: 49 U/L (ref 10–40)
B-HCG UR QL: NEGATIVE
BASOPHILS # BLD AUTO: 0.11 K/UL (ref 0–0.2)
BASOPHILS NFR BLD: 0.8 % (ref 0–1.9)
BILIRUB SERPL-MCNC: 4.2 MG/DL (ref 0.1–1)
BLD GP AB SCN CELLS X3 SERPL QL: NORMAL
BUN SERPL-MCNC: 9 MG/DL (ref 6–20)
CALCIUM SERPL-MCNC: 9.4 MG/DL (ref 8.7–10.5)
CHLORIDE SERPL-SCNC: 110 MMOL/L (ref 95–110)
CO2 SERPL-SCNC: 16 MMOL/L (ref 23–29)
CREAT SERPL-MCNC: 0.8 MG/DL (ref 0.5–1.4)
CTP QC/QA: YES
CTP QC/QA: YES
DIFFERENTIAL METHOD: ABNORMAL
EOSINOPHIL # BLD AUTO: 0.2 K/UL (ref 0–0.5)
EOSINOPHIL NFR BLD: 1.4 % (ref 0–8)
ERYTHROCYTE [DISTWIDTH] IN BLOOD BY AUTOMATED COUNT: 19.5 % (ref 11.5–14.5)
EST. GFR  (AFRICAN AMERICAN): >60 ML/MIN/1.73 M^2
EST. GFR  (NON AFRICAN AMERICAN): >60 ML/MIN/1.73 M^2
GLUCOSE SERPL-MCNC: 78 MG/DL (ref 70–110)
HCT VFR BLD AUTO: 18.4 % (ref 37–48.5)
HGB BLD-MCNC: 6.9 G/DL (ref 12–16)
IMM GRANULOCYTES # BLD AUTO: 0.51 K/UL (ref 0–0.04)
IMM GRANULOCYTES NFR BLD AUTO: 3.7 % (ref 0–0.5)
LYMPHOCYTES # BLD AUTO: 3.1 K/UL (ref 1–4.8)
LYMPHOCYTES NFR BLD: 22.5 % (ref 18–48)
MCH RBC QN AUTO: 32.7 PG (ref 27–31)
MCHC RBC AUTO-ENTMCNC: 37.5 G/DL (ref 32–36)
MCV RBC AUTO: 87 FL (ref 82–98)
MONOCYTES # BLD AUTO: 1.9 K/UL (ref 0.3–1)
MONOCYTES NFR BLD: 13.4 % (ref 4–15)
NEUTROPHILS # BLD AUTO: 8.1 K/UL (ref 1.8–7.7)
NEUTROPHILS NFR BLD: 58.2 % (ref 38–73)
NRBC BLD-RTO: 4 /100 WBC
PLATELET # BLD AUTO: 332 K/UL (ref 150–450)
PMV BLD AUTO: 9.6 FL (ref 9.2–12.9)
POTASSIUM SERPL-SCNC: 4.1 MMOL/L (ref 3.5–5.1)
PROT SERPL-MCNC: 7.6 G/DL (ref 6–8.4)
RBC # BLD AUTO: 2.11 M/UL (ref 4–5.4)
RETICS/RBC NFR AUTO: 12.7 % (ref 0.5–2.5)
SARS-COV-2 RDRP RESP QL NAA+PROBE: NEGATIVE
SODIUM SERPL-SCNC: 140 MMOL/L (ref 136–145)
WBC # BLD AUTO: 13.93 K/UL (ref 3.9–12.7)

## 2021-08-30 PROCEDURE — 85025 COMPLETE CBC W/AUTO DIFF WBC: CPT | Performed by: EMERGENCY MEDICINE

## 2021-08-30 PROCEDURE — 85045 AUTOMATED RETICULOCYTE COUNT: CPT | Performed by: EMERGENCY MEDICINE

## 2021-08-30 PROCEDURE — 99285 EMERGENCY DEPT VISIT HI MDM: CPT | Mod: 25

## 2021-08-30 PROCEDURE — 93010 ELECTROCARDIOGRAM REPORT: CPT | Mod: ,,, | Performed by: INTERNAL MEDICINE

## 2021-08-30 PROCEDURE — 96374 THER/PROPH/DIAG INJ IV PUSH: CPT

## 2021-08-30 PROCEDURE — 96361 HYDRATE IV INFUSION ADD-ON: CPT

## 2021-08-30 PROCEDURE — 86900 BLOOD TYPING SEROLOGIC ABO: CPT | Performed by: EMERGENCY MEDICINE

## 2021-08-30 PROCEDURE — 99285 EMERGENCY DEPT VISIT HI MDM: CPT | Mod: CR,CS,, | Performed by: EMERGENCY MEDICINE

## 2021-08-30 PROCEDURE — 93005 ELECTROCARDIOGRAM TRACING: CPT

## 2021-08-30 PROCEDURE — 80053 COMPREHEN METABOLIC PANEL: CPT | Performed by: EMERGENCY MEDICINE

## 2021-08-30 PROCEDURE — 99285 PR EMERGENCY DEPT VISIT,LEVEL V: ICD-10-PCS | Mod: CR,CS,, | Performed by: EMERGENCY MEDICINE

## 2021-08-30 PROCEDURE — 96375 TX/PRO/DX INJ NEW DRUG ADDON: CPT

## 2021-08-30 PROCEDURE — 93010 EKG 12-LEAD: ICD-10-PCS | Mod: ,,, | Performed by: INTERNAL MEDICINE

## 2021-08-30 PROCEDURE — 87389 HIV-1 AG W/HIV-1&-2 AB AG IA: CPT | Performed by: EMERGENCY MEDICINE

## 2021-08-30 PROCEDURE — 86803 HEPATITIS C AB TEST: CPT | Performed by: EMERGENCY MEDICINE

## 2021-08-30 PROCEDURE — U0002 COVID-19 LAB TEST NON-CDC: HCPCS | Performed by: EMERGENCY MEDICINE

## 2021-08-30 PROCEDURE — 25000003 PHARM REV CODE 250: Performed by: EMERGENCY MEDICINE

## 2021-08-30 PROCEDURE — 63600175 PHARM REV CODE 636 W HCPCS: Performed by: EMERGENCY MEDICINE

## 2021-08-30 PROCEDURE — 81025 URINE PREGNANCY TEST: CPT | Performed by: EMERGENCY MEDICINE

## 2021-08-30 PROCEDURE — 80047 BASIC METABLC PNL IONIZED CA: CPT | Mod: 59

## 2021-08-30 RX ORDER — MORPHINE SULFATE 4 MG/ML
4 INJECTION, SOLUTION INTRAMUSCULAR; INTRAVENOUS
Status: COMPLETED | OUTPATIENT
Start: 2021-08-30 | End: 2021-08-30

## 2021-08-30 RX ORDER — OXYCODONE AND ACETAMINOPHEN 10; 325 MG/1; MG/1
1 TABLET ORAL EVERY 4 HOURS PRN
Qty: 12 TABLET | Refills: 0 | Status: SHIPPED | OUTPATIENT
Start: 2021-08-30 | End: 2022-11-23 | Stop reason: ALTCHOICE

## 2021-08-30 RX ORDER — HYDROMORPHONE HYDROCHLORIDE 1 MG/ML
1 INJECTION, SOLUTION INTRAMUSCULAR; INTRAVENOUS; SUBCUTANEOUS
Status: COMPLETED | OUTPATIENT
Start: 2021-08-30 | End: 2021-08-30

## 2021-08-30 RX ADMIN — MORPHINE SULFATE 4 MG: 4 INJECTION INTRAVENOUS at 10:08

## 2021-08-30 RX ADMIN — SODIUM CHLORIDE 1000 ML: 0.9 INJECTION, SOLUTION INTRAVENOUS at 07:08

## 2021-08-30 RX ADMIN — HYDROMORPHONE HYDROCHLORIDE 1 MG: 1 INJECTION, SOLUTION INTRAMUSCULAR; INTRAVENOUS; SUBCUTANEOUS at 08:08

## 2021-08-31 LAB
BUN SERPL-MCNC: 8 MG/DL (ref 6–30)
CHLORIDE SERPL-SCNC: 110 MMOL/L (ref 95–110)
CREAT SERPL-MCNC: 0.9 MG/DL (ref 0.5–1.4)
GLUCOSE SERPL-MCNC: 81 MG/DL (ref 70–110)
HCT VFR BLD CALC: 19 %PCV (ref 36–54)
POC IONIZED CALCIUM: ABNORMAL MMOL/L
POC TCO2 (MEASURED): 18 MMOL/L (ref 23–29)
POTASSIUM BLD-SCNC: 4.1 MMOL/L (ref 3.5–5.1)
SAMPLE: ABNORMAL
SODIUM BLD-SCNC: 143 MMOL/L (ref 136–145)

## 2021-09-02 LAB
HCV AB SERPL QL IA: NEGATIVE
HIV 1+2 AB+HIV1 P24 AG SERPL QL IA: NEGATIVE

## 2021-09-21 ENCOUNTER — IMMUNIZATION (OUTPATIENT)
Dept: OBSTETRICS AND GYNECOLOGY | Facility: CLINIC | Age: 26
End: 2021-09-21
Payer: COMMERCIAL

## 2021-09-21 DIAGNOSIS — Z23 NEED FOR VACCINATION: Primary | ICD-10-CM

## 2021-09-21 PROCEDURE — 91300 COVID-19, MRNA, LNP-S, PF, 30 MCG/0.3 ML DOSE VACCINE: CPT | Mod: PBBFAC | Performed by: FAMILY MEDICINE

## 2021-10-11 ENCOUNTER — IMMUNIZATION (OUTPATIENT)
Dept: OBSTETRICS AND GYNECOLOGY | Facility: CLINIC | Age: 26
End: 2021-10-11
Payer: COMMERCIAL

## 2021-10-11 DIAGNOSIS — Z23 NEED FOR VACCINATION: Primary | ICD-10-CM

## 2021-10-11 PROCEDURE — 91300 COVID-19, MRNA, LNP-S, PF, 30 MCG/0.3 ML DOSE VACCINE: CPT | Mod: PBBFAC | Performed by: FAMILY MEDICINE

## 2021-10-11 PROCEDURE — 0002A COVID-19, MRNA, LNP-S, PF, 30 MCG/0.3 ML DOSE VACCINE: CPT | Mod: PBBFAC | Performed by: FAMILY MEDICINE

## 2021-12-30 ENCOUNTER — PATIENT MESSAGE (OUTPATIENT)
Dept: ADMINISTRATIVE | Facility: OTHER | Age: 26
End: 2021-12-30
Payer: COMMERCIAL

## 2021-12-30 ENCOUNTER — LAB VISIT (OUTPATIENT)
Dept: PRIMARY CARE CLINIC | Facility: OTHER | Age: 26
End: 2021-12-30
Attending: INTERNAL MEDICINE
Payer: COMMERCIAL

## 2021-12-30 DIAGNOSIS — Z20.822 ENCOUNTER FOR LABORATORY TESTING FOR COVID-19 VIRUS: ICD-10-CM

## 2021-12-30 PROCEDURE — U0003 INFECTIOUS AGENT DETECTION BY NUCLEIC ACID (DNA OR RNA); SEVERE ACUTE RESPIRATORY SYNDROME CORONAVIRUS 2 (SARS-COV-2) (CORONAVIRUS DISEASE [COVID-19]), AMPLIFIED PROBE TECHNIQUE, MAKING USE OF HIGH THROUGHPUT TECHNOLOGIES AS DESCRIBED BY CMS-2020-01-R: HCPCS | Performed by: INTERNAL MEDICINE

## 2022-01-01 LAB
SARS-COV-2 RNA RESP QL NAA+PROBE: DETECTED
SARS-COV-2- CYCLE NUMBER: 16

## 2022-05-14 ENCOUNTER — HOSPITAL ENCOUNTER (EMERGENCY)
Facility: HOSPITAL | Age: 27
Discharge: HOME | End: 2022-05-14
Attending: EMERGENCY MEDICINE
Payer: COMMERCIAL

## 2022-05-14 VITALS
WEIGHT: 110 LBS | OXYGEN SATURATION: 94 % | RESPIRATION RATE: 18 BRPM | HEIGHT: 61 IN | TEMPERATURE: 98 F | BODY MASS INDEX: 20.77 KG/M2 | HEART RATE: 76 BPM | DIASTOLIC BLOOD PRESSURE: 54 MMHG | SYSTOLIC BLOOD PRESSURE: 99 MMHG

## 2022-05-14 DIAGNOSIS — D57.00 SICKLE CELL PAIN CRISIS: Primary | ICD-10-CM

## 2022-05-14 LAB
ALBUMIN SERPL BCP-MCNC: 4.4 G/DL (ref 3.5–5.2)
ALP SERPL-CCNC: 65 U/L (ref 55–135)
ALT SERPL W/O P-5'-P-CCNC: 16 U/L (ref 10–44)
ANION GAP SERPL CALC-SCNC: 10 MMOL/L (ref 8–16)
AST SERPL-CCNC: 37 U/L (ref 10–40)
B-HCG UR QL: NEGATIVE
BASOPHILS # BLD AUTO: 0.08 K/UL (ref 0–0.2)
BASOPHILS NFR BLD: 0.6 % (ref 0–1.9)
BILIRUB SERPL-MCNC: 4.9 MG/DL (ref 0.1–1)
BILIRUB UR QL STRIP: NEGATIVE
BUN SERPL-MCNC: 7 MG/DL (ref 6–20)
CALCIUM SERPL-MCNC: 9.3 MG/DL (ref 8.7–10.5)
CHLORIDE SERPL-SCNC: 106 MMOL/L (ref 95–110)
CLARITY UR: CLEAR
CO2 SERPL-SCNC: 21 MMOL/L (ref 23–29)
COLOR UR: YELLOW
CREAT SERPL-MCNC: 0.8 MG/DL (ref 0.5–1.4)
CTP QC/QA: YES
DIFFERENTIAL METHOD: ABNORMAL
EOSINOPHIL # BLD AUTO: 0.2 K/UL (ref 0–0.5)
EOSINOPHIL NFR BLD: 1.3 % (ref 0–8)
ERYTHROCYTE [DISTWIDTH] IN BLOOD BY AUTOMATED COUNT: 18.9 % (ref 11.5–14.5)
EST. GFR  (AFRICAN AMERICAN): >60 ML/MIN/1.73 M^2
EST. GFR  (NON AFRICAN AMERICAN): >60 ML/MIN/1.73 M^2
GLUCOSE SERPL-MCNC: 91 MG/DL (ref 70–110)
GLUCOSE UR QL STRIP: NEGATIVE
HCT VFR BLD AUTO: 19.7 % (ref 37–48.5)
HGB BLD-MCNC: 7.1 G/DL (ref 12–16)
HGB UR QL STRIP: NEGATIVE
IMM GRANULOCYTES # BLD AUTO: 0.21 K/UL (ref 0–0.04)
IMM GRANULOCYTES NFR BLD AUTO: 1.6 % (ref 0–0.5)
KETONES UR QL STRIP: NEGATIVE
LEUKOCYTE ESTERASE UR QL STRIP: NEGATIVE
LYMPHOCYTES # BLD AUTO: 2.5 K/UL (ref 1–4.8)
LYMPHOCYTES NFR BLD: 19.5 % (ref 18–48)
MCH RBC QN AUTO: 32.6 PG (ref 27–31)
MCHC RBC AUTO-ENTMCNC: 36 G/DL (ref 32–36)
MCV RBC AUTO: 90 FL (ref 82–98)
MONOCYTES # BLD AUTO: 1.9 K/UL (ref 0.3–1)
MONOCYTES NFR BLD: 14.9 % (ref 4–15)
NEUTROPHILS # BLD AUTO: 7.9 K/UL (ref 1.8–7.7)
NEUTROPHILS NFR BLD: 62.1 % (ref 38–73)
NITRITE UR QL STRIP: NEGATIVE
NRBC BLD-RTO: 3 /100 WBC
PH UR STRIP: 6 [PH] (ref 5–8)
PLATELET # BLD AUTO: 398 K/UL (ref 150–450)
PMV BLD AUTO: 9.3 FL (ref 9.2–12.9)
POTASSIUM SERPL-SCNC: 3.7 MMOL/L (ref 3.5–5.1)
PROT SERPL-MCNC: 7.5 G/DL (ref 6–8.4)
PROT UR QL STRIP: NEGATIVE
RBC # BLD AUTO: 2.18 M/UL (ref 4–5.4)
RETICS/RBC NFR AUTO: 17.1 % (ref 0.5–2.5)
SODIUM SERPL-SCNC: 137 MMOL/L (ref 136–145)
SP GR UR STRIP: 1.01 (ref 1–1.03)
URN SPEC COLLECT METH UR: NORMAL
UROBILINOGEN UR STRIP-ACNC: NEGATIVE EU/DL
WBC # BLD AUTO: 12.78 K/UL (ref 3.9–12.7)

## 2022-05-14 PROCEDURE — 85045 AUTOMATED RETICULOCYTE COUNT: CPT | Performed by: EMERGENCY MEDICINE

## 2022-05-14 PROCEDURE — 85025 COMPLETE CBC W/AUTO DIFF WBC: CPT | Performed by: EMERGENCY MEDICINE

## 2022-05-14 PROCEDURE — 81025 URINE PREGNANCY TEST: CPT | Performed by: EMERGENCY MEDICINE

## 2022-05-14 PROCEDURE — 63600175 PHARM REV CODE 636 W HCPCS: Performed by: EMERGENCY MEDICINE

## 2022-05-14 PROCEDURE — 96374 THER/PROPH/DIAG INJ IV PUSH: CPT

## 2022-05-14 PROCEDURE — 80053 COMPREHEN METABOLIC PANEL: CPT | Performed by: EMERGENCY MEDICINE

## 2022-05-14 PROCEDURE — 99284 EMERGENCY DEPT VISIT MOD MDM: CPT | Mod: 25

## 2022-05-14 PROCEDURE — 81003 URINALYSIS AUTO W/O SCOPE: CPT | Performed by: EMERGENCY MEDICINE

## 2022-05-14 PROCEDURE — 96375 TX/PRO/DX INJ NEW DRUG ADDON: CPT

## 2022-05-14 PROCEDURE — 96376 TX/PRO/DX INJ SAME DRUG ADON: CPT

## 2022-05-14 PROCEDURE — 25000003 PHARM REV CODE 250: Performed by: EMERGENCY MEDICINE

## 2022-05-14 PROCEDURE — 96361 HYDRATE IV INFUSION ADD-ON: CPT

## 2022-05-14 RX ORDER — KETOROLAC TROMETHAMINE 30 MG/ML
15 INJECTION, SOLUTION INTRAMUSCULAR; INTRAVENOUS
Status: COMPLETED | OUTPATIENT
Start: 2022-05-14 | End: 2022-05-14

## 2022-05-14 RX ORDER — HYDROCODONE BITARTRATE AND ACETAMINOPHEN 7.5; 325 MG/1; MG/1
1 TABLET ORAL EVERY 4 HOURS PRN
Qty: 18 TABLET | Refills: 0 | Status: SHIPPED | OUTPATIENT
Start: 2022-05-14 | End: 2022-05-17

## 2022-05-14 RX ORDER — MORPHINE SULFATE 4 MG/ML
4 INJECTION, SOLUTION INTRAMUSCULAR; INTRAVENOUS
Status: COMPLETED | OUTPATIENT
Start: 2022-05-14 | End: 2022-05-14

## 2022-05-14 RX ORDER — MORPHINE SULFATE 4 MG/ML
0.1 INJECTION, SOLUTION INTRAMUSCULAR; INTRAVENOUS
Status: COMPLETED | OUTPATIENT
Start: 2022-05-14 | End: 2022-05-14

## 2022-05-14 RX ADMIN — SODIUM CHLORIDE 1000 ML: 0.9 INJECTION, SOLUTION INTRAVENOUS at 03:05

## 2022-05-14 RX ADMIN — MORPHINE SULFATE 4.99 MG: 4 INJECTION INTRAVENOUS at 01:05

## 2022-05-14 RX ADMIN — SODIUM CHLORIDE 1000 ML: 0.9 INJECTION, SOLUTION INTRAVENOUS at 01:05

## 2022-05-14 RX ADMIN — MORPHINE SULFATE 4 MG: 4 INJECTION INTRAVENOUS at 03:05

## 2022-05-14 RX ADMIN — KETOROLAC TROMETHAMINE 15 MG: 30 INJECTION, SOLUTION INTRAMUSCULAR at 01:05

## 2022-05-14 NOTE — ED PROVIDER NOTES
Encounter Date: 5/14/2022       History     Chief Complaint   Patient presents with    Sickle Cell Pain Crisis     26 yo female to triage for sickle cell pain crisis. Has pain to bilateral knees and shoulders that started about an hour ago. Says she tried to take a pain pill to help w/ discomfort but it didn't help any. VSS, NAD, AAOx4      27-year-old female with history of sickle cell disease presenting with generalized pain to bilateral knees, arms, hips, back.  Per patient reports symptoms are identical to prior sickle cell crises.  Notes symptoms started yesterday, attempted to take home medications without significant relief.  Reports she believes this is likely due to the recent change in the weather which usually causes her crises.  Denies chest pain, shortness of breath, fevers, chills, nausea, vomiting, abdominal pain, headache, numbness, weakness.  Previously in usual state of health.        Review of patient's allergies indicates:  No Known Allergies  Past Medical History:   Diagnosis Date    Sickle cell anemia     Sickle cell disease      Past Surgical History:   Procedure Laterality Date    BREAST LUMPECTOMY      CARDIAC SURGERY      NEPHRECTOMY       Family History   Problem Relation Age of Onset    Sickle cell trait Father     Sickle cell trait Mother     Sickle cell trait Sister     Sickle cell trait Sister     Breast cancer Neg Hx     Colon cancer Neg Hx     Ovarian cancer Neg Hx      Social History     Tobacco Use    Smoking status: Never Smoker    Smokeless tobacco: Never Used   Substance Use Topics    Alcohol use: No    Drug use: No     Review of Systems   Constitutional: Negative for chills and fever.   HENT: Negative for sore throat.    Respiratory: Negative for chest tightness and shortness of breath.    Cardiovascular: Negative for chest pain.   Gastrointestinal: Negative for nausea.   Genitourinary: Negative for dysuria.   Musculoskeletal: Positive for arthralgias and back  pain.   Skin: Negative for rash.   Neurological: Negative for weakness.   Psychiatric/Behavioral: Negative for confusion.       Physical Exam     Initial Vitals [05/14/22 1305]   BP Pulse Resp Temp SpO2   (!) 100/50 85 17 98 °F (36.7 °C) 98 %      MAP       --         Physical Exam    Nursing note and vitals reviewed.  Constitutional: She appears well-developed and well-nourished. She is not diaphoretic. No distress.   HENT:   Head: Normocephalic and atraumatic.   Nose: Nose normal.   Eyes: EOM are normal. Pupils are equal, round, and reactive to light. No scleral icterus.   Neck: Neck supple.   Normal range of motion.  Cardiovascular: Normal rate, regular rhythm, normal heart sounds and intact distal pulses. Exam reveals no gallop and no friction rub.    No murmur heard.  Pulmonary/Chest: Breath sounds normal. No stridor. No respiratory distress. She has no wheezes. She has no rhonchi. She has no rales.   Abdominal: Abdomen is soft. Bowel sounds are normal. She exhibits no distension. There is no abdominal tenderness. There is no rebound and no guarding.   Musculoskeletal:         General: No tenderness or edema. Normal range of motion.      Cervical back: Normal range of motion and neck supple.     Neurological: She is oriented to person, place, and time. No cranial nerve deficit. GCS score is 15. GCS eye subscore is 4. GCS verbal subscore is 5. GCS motor subscore is 6.   Skin: Skin is warm and dry. No rash noted.   Psychiatric: She has a normal mood and affect. Her behavior is normal.         ED Course   Procedures  Labs Reviewed   CBC W/ AUTO DIFFERENTIAL - Abnormal; Notable for the following components:       Result Value    WBC 12.78 (*)     RBC 2.18 (*)     Hemoglobin 7.1 (*)     Hematocrit 19.7 (*)     MCH 32.6 (*)     RDW 18.9 (*)     Immature Granulocytes 1.6 (*)     Gran # (ANC) 7.9 (*)     Immature Grans (Abs) 0.21 (*)     Mono # 1.9 (*)     nRBC 3 (*)     All other components within normal limits     Narrative:      HCT critical result(s) called and verbal readback obtained from   Sera Barakat RN by FREDDIE 05/14/2022 14:06   COMPREHENSIVE METABOLIC PANEL - Abnormal; Notable for the following components:    CO2 21 (*)     Total Bilirubin 4.9 (*)     All other components within normal limits   RETICULOCYTES - Abnormal; Notable for the following components:    Retic 17.1 (*)     All other components within normal limits   URINALYSIS, REFLEX TO URINE CULTURE    Narrative:     Specimen Source->Urine   POCT URINE PREGNANCY          Imaging Results          X-Ray Chest AP Portable (Final result)  Result time 05/14/22 13:58:15    Final result by Moira Robledo MD (05/14/22 13:58:15)                 Impression:      No acute cardiopulmonary disease      Electronically signed by: Moira Robledo  Date:    05/14/2022  Time:    13:58             Narrative:    EXAMINATION:  XR CHEST AP PORTABLE    CLINICAL HISTORY:  sickle cell crisis;    TECHNIQUE:  Single frontal view of the chest was performed.    COMPARISON:  08/30/2021    FINDINGS:  The lungs are clear.  No pleural effusion.  The cardiomediastinal silhouette is within normal limits.                                 Medications   morphine injection 4.992 mg (4.992 mg Intravenous Given 5/14/22 1347)   ketorolac injection 15 mg (15 mg Intravenous Given 5/14/22 1345)   sodium chloride 0.9% bolus 1,000 mL (0 mLs Intravenous Stopped 5/14/22 1505)   morphine injection 4 mg (4 mg Intravenous Given 5/14/22 1527)   sodium chloride 0.9% bolus 1,000 mL (0 mLs Intravenous Stopped 5/14/22 1659)     Medical Decision Making:   Initial Assessment:   27-year-old male with history of sickle cell pain crises.  Presenting with pain in her back in limbs consistent with prior crisis.  Denies fevers chills nausea vomiting shortness of breath, severe headache, numbness, weakness, or other unusual symptoms. Patient attempted pain medication at home.    On exam, patient well-appearing and a  minimal if any distress. Pleasant, polite.  Has some scleral icterus.  Exam otherwise unremarkable. We will get labs, give pain control, fluid hydration with Toradol and saline, and reassess.    ED Management:  Patient reports feeling substantially improved after multiple rounds of pain medication.  Labs reassuring.  No evidence of acute chest syndrome or aplastic crisis.  Discharged with home narcotic prescription for 3 days.  Believe she is safe for discharge home.  Discussed return precautions.                      Clinical Impression:   Final diagnoses:  [D57.00] Sickle cell pain crisis (Primary)          ED Disposition Condition    Discharge Stable        ED Prescriptions     Medication Sig Dispense Start Date End Date Auth. Provider    HYDROcodone-acetaminophen (NORCO) 7.5-325 mg per tablet Take 1 tablet by mouth every 4 (four) hours as needed for Pain. 18 tablet 5/14/2022 5/17/2022 López Thibodeaux MD        Follow-up Information     Follow up With Specialties Details Why Contact Info    [facility] [place] [facility] [place] [facility]  As needed, If symptoms worsen [id number]  [facility] [id number] [phone]           López Thibodeaux MD  05/14/22 6390

## 2022-05-14 NOTE — ED NOTES
Patient presents to ED reports sickle cell pain crisis x1 hour. Patient reports took hydrocodone, but after taking pain was worse. Patient reports pain to bilateral shoulders and knees.

## 2022-05-14 NOTE — DISCHARGE INSTRUCTIONS
You were seen in the emergency department for pain similar to your prior sickle cell pain.  You're labs and x-ray are reassuring and at or near your baseline.  We gave me some pain medication and you felt better. Please follow up with your primary care provider this week. Please return for any new or worsening pain, fever, difficulty breathing, dizziness, weakness, changes in vision, severe headache, or you become concerned in any other way.

## 2022-05-14 NOTE — ED NOTES
Patient requesting another liter of fluids and additional pain medications will message provider.

## 2022-07-07 DIAGNOSIS — R05.9 COUGH: Primary | ICD-10-CM

## 2022-07-07 LAB
CTP QC/QA: YES
SARS-COV-2 AG RESP QL IA.RAPID: POSITIVE

## 2022-07-12 ENCOUNTER — PATIENT MESSAGE (OUTPATIENT)
Dept: ADMINISTRATIVE | Facility: OTHER | Age: 27
End: 2022-07-12
Payer: COMMERCIAL

## 2022-11-23 ENCOUNTER — OFFICE VISIT (OUTPATIENT)
Dept: PRIMARY CARE CLINIC | Facility: CLINIC | Age: 27
End: 2022-11-23
Payer: COMMERCIAL

## 2022-11-23 ENCOUNTER — LAB VISIT (OUTPATIENT)
Dept: LAB | Facility: HOSPITAL | Age: 27
End: 2022-11-23
Attending: INTERNAL MEDICINE
Payer: COMMERCIAL

## 2022-11-23 VITALS
BODY MASS INDEX: 19.83 KG/M2 | TEMPERATURE: 98 F | HEART RATE: 105 BPM | WEIGHT: 105 LBS | OXYGEN SATURATION: 97 % | SYSTOLIC BLOOD PRESSURE: 110 MMHG | DIASTOLIC BLOOD PRESSURE: 68 MMHG | RESPIRATION RATE: 18 BRPM | HEIGHT: 61 IN

## 2022-11-23 DIAGNOSIS — Z30.02 ENCOUNTER FOR COUNSELING AND INSTRUCTION IN NATURAL FAMILY PLANNING TO AVOID PREGNANCY: ICD-10-CM

## 2022-11-23 DIAGNOSIS — E55.9 VITAMIN D DEFICIENCY: ICD-10-CM

## 2022-11-23 DIAGNOSIS — D57.1 HEMOGLOBIN SS DISEASE WITHOUT CRISIS: ICD-10-CM

## 2022-11-23 DIAGNOSIS — D57.1 HEMOGLOBIN SS DISEASE WITHOUT CRISIS: Primary | ICD-10-CM

## 2022-11-23 DIAGNOSIS — D57.00 SICKLE CELL PAIN CRISIS: ICD-10-CM

## 2022-11-23 DIAGNOSIS — Z00.00 PREVENTATIVE HEALTH CARE: ICD-10-CM

## 2022-11-23 DIAGNOSIS — R63.4 LOSS OF WEIGHT: ICD-10-CM

## 2022-11-23 DIAGNOSIS — R11.11 VOMITING WITHOUT NAUSEA, UNSPECIFIED VOMITING TYPE: ICD-10-CM

## 2022-11-23 DIAGNOSIS — Z02.0 SCHOOL PHYSICAL EXAM: ICD-10-CM

## 2022-11-23 PROBLEM — R87.612 LGSIL ON PAP SMEAR OF CERVIX: Status: RESOLVED | Noted: 2017-06-01 | Resolved: 2022-11-23

## 2022-11-23 PROBLEM — R11.10 EMESIS: Status: ACTIVE | Noted: 2022-11-23

## 2022-11-23 LAB
25(OH)D3+25(OH)D2 SERPL-MCNC: 8 NG/ML (ref 30–96)
FOLATE SERPL-MCNC: 5.9 NG/ML (ref 4–24)
HIV 1+2 AB+HIV1 P24 AG SERPL QL IA: NORMAL
PREALB SERPL-MCNC: 19 MG/DL (ref 20–43)
VIT B12 SERPL-MCNC: 429 PG/ML (ref 210–950)

## 2022-11-23 PROCEDURE — 99999 PR PBB SHADOW E&M-EST. PATIENT-LVL IV: CPT | Mod: PBBFAC,,, | Performed by: INTERNAL MEDICINE

## 2022-11-23 PROCEDURE — 3074F PR MOST RECENT SYSTOLIC BLOOD PRESSURE < 130 MM HG: ICD-10-PCS | Mod: CPTII,S$GLB,, | Performed by: INTERNAL MEDICINE

## 2022-11-23 PROCEDURE — 80061 LIPID PANEL: CPT | Performed by: INTERNAL MEDICINE

## 2022-11-23 PROCEDURE — 90715 TDAP VACCINE 7 YRS/> IM: CPT | Mod: S$GLB,,, | Performed by: INTERNAL MEDICINE

## 2022-11-23 PROCEDURE — 99214 OFFICE O/P EST MOD 30 MIN: CPT | Mod: PBBFAC,PN | Performed by: INTERNAL MEDICINE

## 2022-11-23 PROCEDURE — 87389 HIV-1 AG W/HIV-1&-2 AB AG IA: CPT | Performed by: INTERNAL MEDICINE

## 2022-11-23 PROCEDURE — 82746 ASSAY OF FOLIC ACID SERUM: CPT | Performed by: INTERNAL MEDICINE

## 2022-11-23 PROCEDURE — 3074F SYST BP LT 130 MM HG: CPT | Mod: CPTII,S$GLB,, | Performed by: INTERNAL MEDICINE

## 2022-11-23 PROCEDURE — 85025 COMPLETE CBC W/AUTO DIFF WBC: CPT | Performed by: INTERNAL MEDICINE

## 2022-11-23 PROCEDURE — 86580 TB INTRADERMAL TEST: CPT | Mod: S$GLB,,, | Performed by: INTERNAL MEDICINE

## 2022-11-23 PROCEDURE — 82607 VITAMIN B-12: CPT | Performed by: INTERNAL MEDICINE

## 2022-11-23 PROCEDURE — 90715 TDAP VACCINE GREATER THAN OR EQUAL TO 7YO IM: ICD-10-PCS | Mod: S$GLB,,, | Performed by: INTERNAL MEDICINE

## 2022-11-23 PROCEDURE — 99999 PR PBB SHADOW E&M-EST. PATIENT-LVL IV: ICD-10-PCS | Mod: PBBFAC,,, | Performed by: INTERNAL MEDICINE

## 2022-11-23 PROCEDURE — 99395 PREV VISIT EST AGE 18-39: CPT | Mod: 25,S$GLB,, | Performed by: INTERNAL MEDICINE

## 2022-11-23 PROCEDURE — 3008F PR BODY MASS INDEX (BMI) DOCUMENTED: ICD-10-PCS | Mod: CPTII,S$GLB,, | Performed by: INTERNAL MEDICINE

## 2022-11-23 PROCEDURE — 84134 ASSAY OF PREALBUMIN: CPT | Performed by: INTERNAL MEDICINE

## 2022-11-23 PROCEDURE — 86592 SYPHILIS TEST NON-TREP QUAL: CPT | Performed by: INTERNAL MEDICINE

## 2022-11-23 PROCEDURE — 80053 COMPREHEN METABOLIC PANEL: CPT | Performed by: INTERNAL MEDICINE

## 2022-11-23 PROCEDURE — 3008F BODY MASS INDEX DOCD: CPT | Mod: CPTII,S$GLB,, | Performed by: INTERNAL MEDICINE

## 2022-11-23 PROCEDURE — 86580 POCT TB SKIN TEST: ICD-10-PCS | Mod: S$GLB,,, | Performed by: INTERNAL MEDICINE

## 2022-11-23 PROCEDURE — 3078F PR MOST RECENT DIASTOLIC BLOOD PRESSURE < 80 MM HG: ICD-10-PCS | Mod: CPTII,S$GLB,, | Performed by: INTERNAL MEDICINE

## 2022-11-23 PROCEDURE — 85045 AUTOMATED RETICULOCYTE COUNT: CPT | Performed by: INTERNAL MEDICINE

## 2022-11-23 PROCEDURE — 90715 TDAP VACCINE 7 YRS/> IM: CPT | Mod: PBBFAC,PN

## 2022-11-23 PROCEDURE — 82306 VITAMIN D 25 HYDROXY: CPT | Performed by: INTERNAL MEDICINE

## 2022-11-23 PROCEDURE — 90471 TDAP VACCINE GREATER THAN OR EQUAL TO 7YO IM: ICD-10-PCS | Mod: S$GLB,,, | Performed by: INTERNAL MEDICINE

## 2022-11-23 PROCEDURE — 86580 TB INTRADERMAL TEST: CPT | Mod: PBBFAC,PN

## 2022-11-23 PROCEDURE — 1159F MED LIST DOCD IN RCRD: CPT | Mod: CPTII,S$GLB,, | Performed by: INTERNAL MEDICINE

## 2022-11-23 PROCEDURE — 36415 COLL VENOUS BLD VENIPUNCTURE: CPT | Mod: PN | Performed by: INTERNAL MEDICINE

## 2022-11-23 PROCEDURE — 90471 IMMUNIZATION ADMIN: CPT | Mod: S$GLB,,, | Performed by: INTERNAL MEDICINE

## 2022-11-23 PROCEDURE — 99395 PR PREVENTIVE VISIT,EST,18-39: ICD-10-PCS | Mod: 25,S$GLB,, | Performed by: INTERNAL MEDICINE

## 2022-11-23 PROCEDURE — 3078F DIAST BP <80 MM HG: CPT | Mod: CPTII,S$GLB,, | Performed by: INTERNAL MEDICINE

## 2022-11-23 PROCEDURE — 81514 NFCT DS BV&VAGINITIS DNA ALG: CPT | Performed by: INTERNAL MEDICINE

## 2022-11-23 PROCEDURE — 1159F PR MEDICATION LIST DOCUMENTED IN MEDICAL RECORD: ICD-10-PCS | Mod: CPTII,S$GLB,, | Performed by: INTERNAL MEDICINE

## 2022-11-23 RX ORDER — HYDROCODONE BITARTRATE AND ACETAMINOPHEN 7.5; 325 MG/1; MG/1
1 TABLET ORAL EVERY 4 HOURS PRN
Qty: 35 TABLET | Refills: 0 | Status: SHIPPED | OUTPATIENT
Start: 2022-11-23 | End: 2023-01-08 | Stop reason: SDUPTHER

## 2022-11-23 RX ORDER — ETONOGESTREL AND ETHINYL ESTRADIOL VAGINAL RING .015; .12 MG/D; MG/D
1 RING VAGINAL
Qty: 1 EACH | Refills: 11 | Status: SHIPPED | OUTPATIENT
Start: 2022-11-23 | End: 2023-11-15

## 2022-11-23 RX ORDER — DICYCLOMINE HYDROCHLORIDE 10 MG/1
10 CAPSULE ORAL
Qty: 120 CAPSULE | Refills: 5 | Status: SHIPPED | OUTPATIENT
Start: 2022-11-23 | End: 2022-12-23

## 2022-11-23 RX ORDER — IBUPROFEN 600 MG/1
TABLET ORAL
Qty: 90 TABLET | Refills: 3 | Status: ON HOLD | OUTPATIENT
Start: 2022-11-23 | End: 2023-03-03 | Stop reason: HOSPADM

## 2022-11-23 RX ORDER — FOLIC ACID 1 MG/1
1 TABLET ORAL DAILY
Qty: 90 TABLET | Refills: 3 | Status: SHIPPED | OUTPATIENT
Start: 2022-11-23 | End: 2023-03-15 | Stop reason: SDUPTHER

## 2022-11-23 NOTE — ASSESSMENT & PLAN NOTE
-get COVID bivalent booster  -pap smear in 10/2023  -take MVI daily   -STD labs, vaginitis swab   -PPD today and RTC after 48 hours to be read (Friday)  -Tdap today   -order Nuvaring for contraception  -counseled on protected sex with condoms always

## 2022-11-23 NOTE — ASSESSMENT & PLAN NOTE
-refill HC and Motrin 600mg with food prn pain crisis  -refill folic acid  -cont Hydroxyurea 1500mg po daily  -check CBC with retic, CMP, folic acid level

## 2022-11-23 NOTE — PROGRESS NOTES
[facility] Progress Note      Chief Complaint     Annual Exam       Subjective:      History of Present Illness:  Lana Chou is a 27 y.o. female established to me here for routine care.    HbSS disease/anemia:  had  pain crisis last night right shoulder to right leg relieved with Motrin 800mg; upon awakening in am, her left shoulder 4/10; needs refill of folic acid and HC which ran out approx 2 weeks ago  -last pain crisis in Sept - current due to weather change now with approx twice per month relieved with Motrin since ran out of HC which usually takes one tab with extra dose of tylenol and relieved pain.     Stress disorder with weight loss: due to school; not eating well; works 12 hour shift from 6:45pm -7:15am 3 times per week but goes to school 8:30am-4pm so gets very little sleep!!!!    Emesis since 10/2019 since GB removed: no nausea just emesis of food approx 30 min after eating; food gets stuck in mid-chest; no constipation; drinks 6-8 glasses of liquids daily     Has one more semester at [employer]; doing clinicals at [employer]; going to [employer] next semester.     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      ferrous sulfate (FEOSOL) 325 mg (65 mg iron) Tab tablet Take 1 tablet by mouth once  daily.      hydroxyurea (HYDREA) 500 mg Cap TAKE THREE CAPSULES (1500MG) BY MOUTH EVERY DAY  6    ondansetron (ZOFRAN) 4 MG tablet Take 1 tablet (4 mg total) by mouth every 6 (six) hours. 12 tablet 0    dicyclomine (BENTYL) 10 MG capsule Take 1 capsule (10 mg total) by mouth 4 (four) times daily before meals and nightly. 120 capsule 5    etonogestreL-ethinyl estradioL (NUVARING) 0.12-0.015 mg/24 hr vaginal ring Place 1 each vaginally every 28 days. 1 each 11    folic acid (FOLVITE) 1 MG tablet Take 1 tablet (1 mg total) by mouth once daily. 90 tablet 3    HYDROcodone-acetaminophen (NORCO) 7.5-325 mg per tablet Take 1 tablet by mouth every 4 (four) hours as needed for Pain. 35 tablet 0    ibuprofen (ADVIL,MOTRIN) 600 MG tablet Take one tab po with food every 8 hours prn 90 tablet 3     No current facility-administered medications for this visit.        Family History:  Family History   Problem Relation Age of Onset    Sickle cell trait Father     Sickle cell trait Mother     Sickle cell trait Sister     Sickle cell trait Sister     Breast cancer Neg Hx     Colon cancer Neg Hx     Ovarian cancer Neg Hx        Social History:  Social History     Tobacco Use    Smoking status: Never    Smokeless tobacco: Never   Substance Use Topics    Alcohol use: No    Drug use: No       Review of Systems:  Pertinent positives and negatives listed in HPI. All other systems are reviewed and are negative.    Health Maintaince :   Health Maintenance Topics with due status: Not Due       Topic Last Completion Date    TETANUS VACCINE 11/23/2022           Eye: May 2022 myopia  Dental: needs dental after Thanksgiving     Immunizations:   Tdap: 2008 last one  Influenza: UTD.  Pneumovax: 5/2021 UTD  COVID: x 2; needs COVID booster (bivalent)  Hepatitis C:   Cancer Screening:  PAP: UTD 10/2020      The ASCVD Risk score (Mikhail KAUR, et al., 2019) failed to calculate for the following reasons:    The 2019 ASCVD risk score is only valid for ages 40  "to 79      Objective:   /68 (BP Location: Right arm, Patient Position: Sitting, BP Method: Small (Manual))   Pulse 105   Temp 98.4 °F (36.9 °C) (Oral)   Resp 18   Ht 5' 1" (1.549 m)   Wt 47.6 kg (105 lb)   LMP 10/27/2022   SpO2 97%   BMI 19.84 kg/m²      Body mass index is 19.84 kg/m².       Physical Examination:  General: Alert and awake in no apparent distress  HEENT: Normocephalic and atraumatic; Tms WNL  Eyes:  PERRL; EOMi with mild icteric sclera and clear conjunctivae  Mouth:  Oropharynx clear and without exudate; moist mucous membranes  Neck:   Cervical nodes not enlarged; supple; no bruits  Cardio:  Regular rate and rhythm with normal S1 and S2; II/VI COLEEN or rubs  Resp:  CTAB; respirations unlabored; no wheezes, crackles or rhonchi  Abdom: Soft, NTND with normoactive bowel sounds; negative HSM  Extrem: Warm and well-perfused with no clubbing, cyanosis or edema  Skin:  No rashes, lesions, or color changes  Pulses:  2+ and symmetric distally  Neuro:  AAOx3; cooperative and pleasant with no focal deficits    Laboratory:      Most Recent Data:  Lab Results   Component Value Date    WBC 12.78 (H) 05/14/2022    HGB 7.1 (L) 05/14/2022    HCT 19.7 (LL) 05/14/2022     05/14/2022    CHOL 103 07/01/2022    TRIG 96 07/01/2022    HDL 39 (L) 07/01/2022    ALT 16 05/14/2022    AST 37 05/14/2022     05/14/2022    K 3.7 05/14/2022     05/14/2022    BUN 7 05/14/2022    CO2 21 (L) 05/14/2022              CBC:   WBC   Date Value Ref Range Status   05/14/2022 12.78 (H) 3.90 - 12.70 K/uL Final     Hemoglobin   Date Value Ref Range Status   05/14/2022 7.1 (L) 12.0 - 16.0 g/dL Final     POC Hematocrit   Date Value Ref Range Status   08/30/2021 19 (LL) 36 - 54 %PCV Final     Hematocrit   Date Value Ref Range Status   05/14/2022 19.7 (LL) 37.0 - 48.5 % Final     Comment:     HCT critical result(s) called and verbal readback obtained from   Sera Barakat RN by FREDDIE 05/14/2022 14:06       Platelets "   Date Value Ref Range Status   05/14/2022 398 150 - 450 K/uL Final     MCV   Date Value Ref Range Status   05/14/2022 90 82 - 98 fL Final     RDW   Date Value Ref Range Status   05/14/2022 18.9 (H) 11.5 - 14.5 % Final     BMP:   Sodium   Date Value Ref Range Status   05/14/2022 137 136 - 145 mmol/L Final     Potassium   Date Value Ref Range Status   05/14/2022 3.7 3.5 - 5.1 mmol/L Final     Chloride   Date Value Ref Range Status   05/14/2022 106 95 - 110 mmol/L Final     CO2   Date Value Ref Range Status   05/14/2022 21 (L) 23 - 29 mmol/L Final     BUN   Date Value Ref Range Status   05/14/2022 7 6 - 20 mg/dL Final     Creatinine   Date Value Ref Range Status   05/14/2022 0.8 0.5 - 1.4 mg/dL Final     Glucose   Date Value Ref Range Status   05/14/2022 91 70 - 110 mg/dL Final     Calcium   Date Value Ref Range Status   05/14/2022 9.3 8.7 - 10.5 mg/dL Final     LFTs:   Total Protein   Date Value Ref Range Status   05/14/2022 7.5 6.0 - 8.4 g/dL Final     Albumin   Date Value Ref Range Status   05/14/2022 4.4 3.5 - 5.2 g/dL Final     Total Bilirubin   Date Value Ref Range Status   05/14/2022 4.9 (H) 0.1 - 1.0 mg/dL Final     Comment:     For infants and newborns, interpretation of results should be based  on gestational age, weight and in agreement with clinical  observations.    Premature Infant recommended reference ranges:  Up to 24 hours.............<8.0 mg/dL  Up to 48 hours............<12.0 mg/dL  3-5 days..................<15.0 mg/dL  6-29 days.................<15.0 mg/dL       AST   Date Value Ref Range Status   05/14/2022 37 10 - 40 U/L Final     Alkaline Phosphatase   Date Value Ref Range Status   05/14/2022 65 55 - 135 U/L Final     ALT   Date Value Ref Range Status   05/14/2022 16 10 - 44 U/L Final     Coags: No results found for: INR, PROTIME, PTT  FLP:      Lab Results   Component Value Date    CHOL 103 07/01/2022     Lab Results   Component Value Date    HDL 39 (L) 07/01/2022     Lab Results   Component  Value Date    LDLCALC 49 07/01/2022     Lab Results   Component Value Date    TRIG 96 07/01/2022     Lab Results   Component Value Date    CHOLHDL 2.64 07/01/2022      DM:      LDL Calculated   Date Value Ref Range Status   07/01/2022 49 0 - 85 mg/dL Final     Creatinine   Date Value Ref Range Status   05/14/2022 0.8 0.5 - 1.4 mg/dL Final     Thyroid: No results found for: TSH, FREET4, B3WSAAE, H4PMLNN, THYROIDAB  Anemia:   Vitamin B-12   Date Value Ref Range Status   11/23/2022 429 210 - 950 pg/mL Final     Folate   Date Value Ref Range Status   11/23/2022 5.9 4.0 - 24.0 ng/mL Final     Cardiac: No results found for: TROPONINI, CKTOTAL, CKMB, BNP  Urinalysis:   Urine Culture, Routine   Date Value Ref Range Status   12/26/2020 KLEBSIELLA PNEUMONIAE  >100,000 cfu/ml   (A)  Final     Color, UA   Date Value Ref Range Status   11/23/2022 Yellow Yellow, Straw, Faith Final     Specific Gravity, UA   Date Value Ref Range Status   11/23/2022 1.010 1.005 - 1.030 Final     Nitrite, UA   Date Value Ref Range Status   11/23/2022 Negative Negative Final     Ketones, UA   Date Value Ref Range Status   11/23/2022 Negative Negative Final     Urobilinogen, UA   Date Value Ref Range Status   05/14/2022 Negative <2.0 EU/dL Final     WBC, UA   Date Value Ref Range Status   12/26/2020 11 (H) 0 - 5 /hpf Final       Other Laboratory Data:      Other Results:  EKG (my interpretation):     Radiology:  X-Ray Chest AP Portable  Narrative: EXAMINATION:  XR CHEST AP PORTABLE    CLINICAL HISTORY:  sickle cell crisis;    TECHNIQUE:  Single frontal view of the chest was performed.    COMPARISON:  08/30/2021    FINDINGS:  The lungs are clear.  No pleural effusion.  The cardiomediastinal silhouette is within normal limits.  Impression: No acute cardiopulmonary disease    Electronically signed by: Moira Robledo  Date:    05/14/2022  Time:    13:58          Assessment/Plan     Lana Chou is a 27 y.o. female with:  1. 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 Encounter for counseling and instruction in natural family planning to avoid pregnancy  -     etonogestreL-ethinyl estradioL (NUVARING) 0.12-0.015 mg/24 hr vaginal ring; Place 1 each vaginally every 28 days.  Dispense: 1 each; Refill: 11    3. Sickle cell pain crisis  -     folic acid (FOLVITE) 1 MG tablet; Take 1 tablet (1 mg total) by mouth once daily.  Dispense: 90 tablet; Refill: 3  -     HYDROcodone-acetaminophen (NORCO) 7.5-325 mg per tablet; Take 1 tablet by mouth every 4 (four) hours as needed for Pain.  Dispense: 35 tablet; Refill: 0  -     ibuprofen (ADVIL,MOTRIN) 600 MG tablet; Take one tab po with food every 8 hours prn  Dispense: 90 tablet; Refill: 3    4. Vomiting without nausea, unspecified vomiting type  Assessment & Plan:  -start Bentyl 10mg qid 30 min prior to meals  -refer to GI for EGD vs barium swallow    Orders:  -     dicyclomine (BENTYL) 10 MG capsule; Take 1 capsule (10 mg total) by mouth 4 (four) times daily before meals and nightly.  Dispense: 120 capsule; Refill: 5  -     Ambulatory referral/consult to Gastroenterology; Future; Expected date: 11/30/2022    5. Loss of weight  Assessment & Plan:  -check prealbumin, CMP, folate, B12  -start MVI daily  -encourage small frequent meals/snacks    Orders:  -     Vitamin B12; Future; Expected date: 11/23/2022  -     PREALBUMIN; Future; Expected date: 11/23/2022    6. Preventative health care  Assessment & Plan:  -get COVID bivalent booster  -pap smear in 10/2023  -take MVI daily   -STD labs, vaginitis swab   -PPD today and RTC after 48 hours to be read (Friday)  -Tdap today    -order Nuvaring for contraception  -counseled on protected sex with condoms always    Orders:  -     Lipid Panel; Future; Expected date: 11/23/2022  -     Urinalysis; Future; Expected date: 11/23/2022  -     Comprehensive Metabolic Panel; Future; Expected date: 11/23/2022  -     HIV 1/2 Ag/Ab (4th Gen); Future; Expected date: 11/23/2022  -     VAGINOSIS SCREEN BY DNA PROBE  -     RPR; Future; Expected date: 11/23/2022    7. Vitamin D deficiency  Assessment & Plan:  -check Vit D level on Vit D 50,000 units weekly     Orders:  -     Vitamin D; Future; Expected date: 11/23/2022    8. School physical exam  Assessment & Plan:  -get COVID bivalent booster  -pap smear in 10/2023  -take MVI daily   -STD labs, vaginitis swab   -PPD today and RTC after 48 hours to be read (Friday)  -Tdap today   -order Nuvaring for contraception  -counseled on protected sex with condoms always    Orders:  -     (In Office Administered) Tdap Vaccine  -     POCT TB Skin Test Read           I spent a total of 61 minutes on the day of the visit.This includes face to face time and non-face to face time preparing to see the patient (eg, review of tests), obtaining and/or reviewing separately obtained history, documenting clinical information in the electronic or other health record, independently interpreting results and communicating results to the patient/family/caregiver, or care coordinator.   Code Status:     Luann Loza MD

## 2022-11-25 ENCOUNTER — CLINICAL SUPPORT (OUTPATIENT)
Dept: PRIMARY CARE CLINIC | Facility: CLINIC | Age: 27
End: 2022-11-25
Payer: COMMERCIAL

## 2022-11-25 DIAGNOSIS — Z02.0 SCHOOL PHYSICAL EXAM: Primary | ICD-10-CM

## 2022-11-25 LAB
ALBUMIN SERPL BCP-MCNC: 4.6 G/DL (ref 3.5–5.2)
ALP SERPL-CCNC: 61 U/L (ref 55–135)
ALT SERPL W/O P-5'-P-CCNC: 23 U/L (ref 10–44)
ANION GAP SERPL CALC-SCNC: 11 MMOL/L (ref 8–16)
AST SERPL-CCNC: 48 U/L (ref 10–40)
BASOPHILS # BLD AUTO: 0.08 K/UL (ref 0–0.2)
BASOPHILS NFR BLD: 0.6 % (ref 0–1.9)
BILIRUB SERPL-MCNC: 6.1 MG/DL (ref 0.1–1)
BUN SERPL-MCNC: 14 MG/DL (ref 6–20)
CALCIUM SERPL-MCNC: 9.7 MG/DL (ref 8.7–10.5)
CHLORIDE SERPL-SCNC: 102 MMOL/L (ref 95–110)
CHOLEST SERPL-MCNC: 113 MG/DL (ref 120–199)
CHOLEST/HDLC SERPL: 2.9 {RATIO} (ref 2–5)
CO2 SERPL-SCNC: 22 MMOL/L (ref 23–29)
CREAT SERPL-MCNC: 0.8 MG/DL (ref 0.5–1.4)
DIFFERENTIAL METHOD: ABNORMAL
EOSINOPHIL # BLD AUTO: 0.2 K/UL (ref 0–0.5)
EOSINOPHIL NFR BLD: 1.4 % (ref 0–8)
ERYTHROCYTE [DISTWIDTH] IN BLOOD BY AUTOMATED COUNT: 19.4 % (ref 11.5–14.5)
EST. GFR  (NO RACE VARIABLE): >60 ML/MIN/1.73 M^2
GLUCOSE SERPL-MCNC: 79 MG/DL (ref 70–110)
HCT VFR BLD AUTO: 21.2 % (ref 37–48.5)
HDLC SERPL-MCNC: 39 MG/DL (ref 40–75)
HDLC SERPL: 34.5 % (ref 20–50)
HGB BLD-MCNC: 7.3 G/DL (ref 12–16)
IMM GRANULOCYTES # BLD AUTO: 0.1 K/UL (ref 0–0.04)
IMM GRANULOCYTES NFR BLD AUTO: 0.7 % (ref 0–0.5)
LDLC SERPL CALC-MCNC: 49.4 MG/DL (ref 63–159)
LYMPHOCYTES # BLD AUTO: 2.4 K/UL (ref 1–4.8)
LYMPHOCYTES NFR BLD: 17.8 % (ref 18–48)
MCH RBC QN AUTO: 31.7 PG (ref 27–31)
MCHC RBC AUTO-ENTMCNC: 34.4 G/DL (ref 32–36)
MCV RBC AUTO: 92 FL (ref 82–98)
MONOCYTES # BLD AUTO: 1.9 K/UL (ref 0.3–1)
MONOCYTES NFR BLD: 14 % (ref 4–15)
NEUTROPHILS # BLD AUTO: 8.8 K/UL (ref 1.8–7.7)
NEUTROPHILS NFR BLD: 65.5 % (ref 38–73)
NONHDLC SERPL-MCNC: 74 MG/DL
NRBC BLD-RTO: 4 /100 WBC
PLATELET # BLD AUTO: 324 K/UL (ref 150–450)
PMV BLD AUTO: 10.5 FL (ref 9.2–12.9)
POTASSIUM SERPL-SCNC: 4.4 MMOL/L (ref 3.5–5.1)
PROT SERPL-MCNC: 8.1 G/DL (ref 6–8.4)
RBC # BLD AUTO: 2.3 M/UL (ref 4–5.4)
RETICS/RBC NFR AUTO: 16.3 % (ref 0.5–2.5)
RPR SER QL: NORMAL
SODIUM SERPL-SCNC: 135 MMOL/L (ref 136–145)
TB INDURATION - 48 HR READ: 0 MM
TB INDURATION - 72 HR READ: NORMAL
TB SKIN TEST - 48 HR READ: NEGATIVE
TB SKIN TEST - 72 HR READ: NORMAL
TRIGL SERPL-MCNC: 123 MG/DL (ref 30–150)
WBC # BLD AUTO: 13.39 K/UL (ref 3.9–12.7)

## 2022-11-26 ENCOUNTER — PATIENT MESSAGE (OUTPATIENT)
Dept: PRIMARY CARE CLINIC | Facility: CLINIC | Age: 27
End: 2022-11-26
Payer: COMMERCIAL

## 2022-11-26 LAB
BACTERIAL VAGINOSIS DNA: POSITIVE
CANDIDA GLABRATA DNA: NEGATIVE
CANDIDA KRUSEI DNA: NEGATIVE
CANDIDA RRNA VAG QL PROBE: POSITIVE
T VAGINALIS RRNA GENITAL QL PROBE: NEGATIVE

## 2023-01-02 RX ORDER — METRONIDAZOLE 500 MG/1
500 TABLET ORAL EVERY 12 HOURS
Qty: 14 TABLET | Refills: 3 | Status: SHIPPED | OUTPATIENT
Start: 2023-01-02 | End: 2023-01-09

## 2023-01-08 DIAGNOSIS — D57.00 SICKLE CELL PAIN CRISIS: ICD-10-CM

## 2023-01-08 RX ORDER — HYDROCODONE BITARTRATE AND ACETAMINOPHEN 7.5; 325 MG/1; MG/1
1 TABLET ORAL EVERY 4 HOURS PRN
Qty: 35 TABLET | Refills: 0 | Status: SHIPPED | OUTPATIENT
Start: 2023-01-08 | End: 2023-02-09 | Stop reason: SDUPTHER

## 2023-02-09 ENCOUNTER — TELEPHONE (OUTPATIENT)
Dept: PRIMARY CARE CLINIC | Facility: CLINIC | Age: 28
End: 2023-02-09
Payer: COMMERCIAL

## 2023-02-09 DIAGNOSIS — D57.00 SICKLE CELL PAIN CRISIS: ICD-10-CM

## 2023-02-09 RX ORDER — HYDROCODONE BITARTRATE AND ACETAMINOPHEN 7.5; 325 MG/1; MG/1
1 TABLET ORAL EVERY 4 HOURS PRN
Qty: 35 TABLET | Refills: 0 | Status: SHIPPED | OUTPATIENT
Start: 2023-02-09 | End: 2023-03-15 | Stop reason: SDUPTHER

## 2023-02-27 PROBLEM — Z00.00 PREVENTATIVE HEALTH CARE: Status: RESOLVED | Noted: 2022-11-23 | Resolved: 2023-02-27

## 2023-03-02 ENCOUNTER — HOSPITAL ENCOUNTER (OUTPATIENT)
Facility: HOSPITAL | Age: 28
Discharge: HOME | End: 2023-03-03
Attending: HOSPITALIST | Admitting: HOSPITALIST
Payer: COMMERCIAL

## 2023-03-02 DIAGNOSIS — D64.9 ANEMIA: ICD-10-CM

## 2023-03-02 DIAGNOSIS — N76.0 BV (BACTERIAL VAGINOSIS): Primary | ICD-10-CM

## 2023-03-02 DIAGNOSIS — B96.89 BV (BACTERIAL VAGINOSIS): Primary | ICD-10-CM

## 2023-03-02 DIAGNOSIS — R07.9 CHEST PAIN: ICD-10-CM

## 2023-03-02 DIAGNOSIS — N39.0 URINARY TRACT INFECTION WITHOUT HEMATURIA, SITE UNSPECIFIED: ICD-10-CM

## 2023-03-02 DIAGNOSIS — R10.32 LEFT LOWER QUADRANT ABDOMINAL PAIN: ICD-10-CM

## 2023-03-02 LAB
ABO + RH BLD: NORMAL
ALBUMIN SERPL BCP-MCNC: 4.3 G/DL (ref 3.5–5.2)
ALP SERPL-CCNC: 65 U/L (ref 55–135)
ALT SERPL W/O P-5'-P-CCNC: 11 U/L (ref 10–44)
ANION GAP SERPL CALC-SCNC: 10 MMOL/L (ref 8–16)
ANISOCYTOSIS BLD QL SMEAR: SLIGHT
AST SERPL-CCNC: 30 U/L (ref 10–40)
B-HCG UR QL: NEGATIVE
BACTERIA #/AREA URNS HPF: ABNORMAL /HPF
BACTERIA GENITAL QL WET PREP: ABNORMAL
BASOPHILS # BLD AUTO: 0.04 K/UL (ref 0–0.2)
BASOPHILS NFR BLD: 0.3 % (ref 0–1.9)
BILIRUB SERPL-MCNC: 5.3 MG/DL (ref 0.1–1)
BILIRUB UR QL STRIP: NEGATIVE
BLD GP AB SCN CELLS X3 SERPL QL: NORMAL
BUN SERPL-MCNC: 13 MG/DL (ref 6–20)
CALCIUM SERPL-MCNC: 9.5 MG/DL (ref 8.7–10.5)
CHLORIDE SERPL-SCNC: 106 MMOL/L (ref 95–110)
CLARITY UR: ABNORMAL
CLUE CELLS VAG QL WET PREP: ABNORMAL
CO2 SERPL-SCNC: 24 MMOL/L (ref 23–29)
COLOR UR: YELLOW
CREAT SERPL-MCNC: 0.9 MG/DL (ref 0.5–1.4)
CTP QC/QA: YES
DACRYOCYTES BLD QL SMEAR: ABNORMAL
DIFFERENTIAL METHOD: ABNORMAL
EOSINOPHIL # BLD AUTO: 0.1 K/UL (ref 0–0.5)
EOSINOPHIL NFR BLD: 0.6 % (ref 0–8)
ERYTHROCYTE [DISTWIDTH] IN BLOOD BY AUTOMATED COUNT: 20.5 % (ref 11.5–14.5)
EST. GFR  (NO RACE VARIABLE): >60 ML/MIN/1.73 M^2
FILAMENT FUNGI VAG WET PREP-#/AREA: ABNORMAL
GLUCOSE SERPL-MCNC: 86 MG/DL (ref 70–110)
GLUCOSE UR QL STRIP: NEGATIVE
HCT VFR BLD AUTO: 17.7 % (ref 37–48.5)
HGB BLD-MCNC: 6.5 G/DL (ref 12–16)
HGB UR QL STRIP: NEGATIVE
IMM GRANULOCYTES # BLD AUTO: 0.1 K/UL (ref 0–0.04)
IMM GRANULOCYTES NFR BLD AUTO: 0.6 % (ref 0–0.5)
KETONES UR QL STRIP: NEGATIVE
LEUKOCYTE ESTERASE UR QL STRIP: ABNORMAL
LIPASE SERPL-CCNC: 35 U/L (ref 4–60)
LYMPHOCYTES # BLD AUTO: 2.3 K/UL (ref 1–4.8)
LYMPHOCYTES NFR BLD: 14.5 % (ref 18–48)
MCH RBC QN AUTO: 31.6 PG (ref 27–31)
MCHC RBC AUTO-ENTMCNC: 36.7 G/DL (ref 32–36)
MCV RBC AUTO: 86 FL (ref 82–98)
MICROSCOPIC COMMENT: ABNORMAL
MONOCYTES # BLD AUTO: 2.8 K/UL (ref 0.3–1)
MONOCYTES NFR BLD: 17.9 % (ref 4–15)
NEUTROPHILS # BLD AUTO: 10.3 K/UL (ref 1.8–7.7)
NEUTROPHILS NFR BLD: 66.1 % (ref 38–73)
NITRITE UR QL STRIP: NEGATIVE
NRBC BLD-RTO: 2 /100 WBC
OVALOCYTES BLD QL SMEAR: ABNORMAL
PH UR STRIP: 7 [PH] (ref 5–8)
PLATELET # BLD AUTO: 324 K/UL (ref 150–450)
PMV BLD AUTO: 9.1 FL (ref 9.2–12.9)
POIKILOCYTOSIS BLD QL SMEAR: SLIGHT
POLYCHROMASIA BLD QL SMEAR: ABNORMAL
POTASSIUM SERPL-SCNC: 3.7 MMOL/L (ref 3.5–5.1)
PROT SERPL-MCNC: 8.4 G/DL (ref 6–8.4)
PROT UR QL STRIP: NEGATIVE
RBC # BLD AUTO: 2.06 M/UL (ref 4–5.4)
RBC #/AREA URNS HPF: 1 /HPF (ref 0–4)
RETICS/RBC NFR AUTO: 14.2 % (ref 0.5–2.5)
SICKLE CELLS BLD QL SMEAR: ABNORMAL
SODIUM SERPL-SCNC: 140 MMOL/L (ref 136–145)
SP GR UR STRIP: 1.01 (ref 1–1.03)
SPECIMEN SOURCE: ABNORMAL
SQUAMOUS #/AREA URNS HPF: 8 /HPF
T VAGINALIS GENITAL QL WET PREP: ABNORMAL
TARGETS BLD QL SMEAR: ABNORMAL
URN SPEC COLLECT METH UR: ABNORMAL
UROBILINOGEN UR STRIP-ACNC: ABNORMAL EU/DL
WBC # BLD AUTO: 15.59 K/UL (ref 3.9–12.7)
WBC #/AREA URNS HPF: 33 /HPF (ref 0–5)
WBC #/AREA VAG WET PREP: ABNORMAL
WBC CLUMPS URNS QL MICRO: ABNORMAL
YEAST GENITAL QL WET PREP: ABNORMAL

## 2023-03-02 PROCEDURE — 96361 HYDRATE IV INFUSION ADD-ON: CPT

## 2023-03-02 PROCEDURE — 87210 SMEAR WET MOUNT SALINE/INK: CPT

## 2023-03-02 PROCEDURE — 86902 BLOOD TYPE ANTIGEN DONOR EA: CPT

## 2023-03-02 PROCEDURE — 85025 COMPLETE CBC W/AUTO DIFF WBC: CPT | Performed by: EMERGENCY MEDICINE

## 2023-03-02 PROCEDURE — 87077 CULTURE AEROBIC IDENTIFY: CPT | Performed by: EMERGENCY MEDICINE

## 2023-03-02 PROCEDURE — 81025 URINE PREGNANCY TEST: CPT | Performed by: EMERGENCY MEDICINE

## 2023-03-02 PROCEDURE — 63600175 PHARM REV CODE 636 W HCPCS

## 2023-03-02 PROCEDURE — G0378 HOSPITAL OBSERVATION PER HR: HCPCS

## 2023-03-02 PROCEDURE — 80053 COMPREHEN METABOLIC PANEL: CPT | Performed by: EMERGENCY MEDICINE

## 2023-03-02 PROCEDURE — 86920 COMPATIBILITY TEST SPIN: CPT | Performed by: NURSE PRACTITIONER

## 2023-03-02 PROCEDURE — 83690 ASSAY OF LIPASE: CPT | Performed by: EMERGENCY MEDICINE

## 2023-03-02 PROCEDURE — 25000003 PHARM REV CODE 250: Performed by: PHYSICIAN ASSISTANT

## 2023-03-02 PROCEDURE — 99285 EMERGENCY DEPT VISIT HI MDM: CPT | Mod: 25

## 2023-03-02 PROCEDURE — 87186 SC STD MICRODIL/AGAR DIL: CPT | Performed by: EMERGENCY MEDICINE

## 2023-03-02 PROCEDURE — 86905 BLOOD TYPING RBC ANTIGENS: CPT

## 2023-03-02 PROCEDURE — 25000003 PHARM REV CODE 250

## 2023-03-02 PROCEDURE — 87591 N.GONORRHOEAE DNA AMP PROB: CPT

## 2023-03-02 PROCEDURE — 87088 URINE BACTERIA CULTURE: CPT | Performed by: EMERGENCY MEDICINE

## 2023-03-02 PROCEDURE — 25500020 PHARM REV CODE 255

## 2023-03-02 PROCEDURE — 96375 TX/PRO/DX INJ NEW DRUG ADDON: CPT

## 2023-03-02 PROCEDURE — 81000 URINALYSIS NONAUTO W/SCOPE: CPT | Performed by: EMERGENCY MEDICINE

## 2023-03-02 PROCEDURE — 96365 THER/PROPH/DIAG IV INF INIT: CPT | Mod: 59

## 2023-03-02 PROCEDURE — 87086 URINE CULTURE/COLONY COUNT: CPT | Performed by: EMERGENCY MEDICINE

## 2023-03-02 PROCEDURE — 86900 BLOOD TYPING SEROLOGIC ABO: CPT

## 2023-03-02 PROCEDURE — 85045 AUTOMATED RETICULOCYTE COUNT: CPT | Performed by: EMERGENCY MEDICINE

## 2023-03-02 PROCEDURE — 86920 COMPATIBILITY TEST SPIN: CPT

## 2023-03-02 RX ORDER — KETOROLAC TROMETHAMINE 30 MG/ML
15 INJECTION, SOLUTION INTRAMUSCULAR; INTRAVENOUS
Status: COMPLETED | OUTPATIENT
Start: 2023-03-02 | End: 2023-03-02

## 2023-03-02 RX ORDER — METRONIDAZOLE 7.5 MG/G
GEL VAGINAL DAILY
Status: DISCONTINUED | OUTPATIENT
Start: 2023-03-03 | End: 2023-03-02

## 2023-03-02 RX ORDER — LIDOCAINE HYDROCHLORIDE 20 MG/ML
15 SOLUTION OROPHARYNGEAL ONCE
Status: COMPLETED | OUTPATIENT
Start: 2023-03-02 | End: 2023-03-02

## 2023-03-02 RX ORDER — GLUCAGON 1 MG
1 KIT INJECTION
Status: DISCONTINUED | OUTPATIENT
Start: 2023-03-02 | End: 2023-03-03 | Stop reason: HOSPADM

## 2023-03-02 RX ORDER — AMOXICILLIN 250 MG
1 CAPSULE ORAL DAILY PRN
Status: DISCONTINUED | OUTPATIENT
Start: 2023-03-02 | End: 2023-03-03 | Stop reason: HOSPADM

## 2023-03-02 RX ORDER — IBUPROFEN 200 MG
24 TABLET ORAL
Status: DISCONTINUED | OUTPATIENT
Start: 2023-03-02 | End: 2023-03-03 | Stop reason: HOSPADM

## 2023-03-02 RX ORDER — HYDROCODONE BITARTRATE AND ACETAMINOPHEN 500; 5 MG/1; MG/1
TABLET ORAL
Status: DISCONTINUED | OUTPATIENT
Start: 2023-03-02 | End: 2023-03-03 | Stop reason: HOSPADM

## 2023-03-02 RX ORDER — HYDROXYUREA 500 MG/1
1500 CAPSULE ORAL DAILY
Status: DISCONTINUED | OUTPATIENT
Start: 2023-03-03 | End: 2023-03-03 | Stop reason: HOSPADM

## 2023-03-02 RX ORDER — LANOLIN ALCOHOL/MO/W.PET/CERES
800 CREAM (GRAM) TOPICAL
Status: DISCONTINUED | OUTPATIENT
Start: 2023-03-02 | End: 2023-03-03 | Stop reason: HOSPADM

## 2023-03-02 RX ORDER — TALC
6 POWDER (GRAM) TOPICAL NIGHTLY PRN
Status: DISCONTINUED | OUTPATIENT
Start: 2023-03-02 | End: 2023-03-03 | Stop reason: HOSPADM

## 2023-03-02 RX ORDER — ACETAMINOPHEN 325 MG/1
650 TABLET ORAL EVERY 4 HOURS PRN
Status: DISCONTINUED | OUTPATIENT
Start: 2023-03-02 | End: 2023-03-03 | Stop reason: HOSPADM

## 2023-03-02 RX ORDER — NALOXONE HCL 0.4 MG/ML
0.02 VIAL (ML) INJECTION
Status: DISCONTINUED | OUTPATIENT
Start: 2023-03-02 | End: 2023-03-03 | Stop reason: HOSPADM

## 2023-03-02 RX ORDER — MAG HYDROX/ALUMINUM HYD/SIMETH 200-200-20
30 SUSPENSION, ORAL (FINAL DOSE FORM) ORAL ONCE
Status: COMPLETED | OUTPATIENT
Start: 2023-03-02 | End: 2023-03-02

## 2023-03-02 RX ORDER — SODIUM CHLORIDE 0.9 % (FLUSH) 0.9 %
10 SYRINGE (ML) INJECTION EVERY 8 HOURS
Status: DISCONTINUED | OUTPATIENT
Start: 2023-03-02 | End: 2023-03-02

## 2023-03-02 RX ORDER — FOLIC ACID 1 MG/1
1 TABLET ORAL DAILY
Status: DISCONTINUED | OUTPATIENT
Start: 2023-03-03 | End: 2023-03-03 | Stop reason: HOSPADM

## 2023-03-02 RX ORDER — ONDANSETRON 2 MG/ML
4 INJECTION INTRAMUSCULAR; INTRAVENOUS
Status: DISCONTINUED | OUTPATIENT
Start: 2023-03-02 | End: 2023-03-02

## 2023-03-02 RX ORDER — IBUPROFEN 200 MG
16 TABLET ORAL
Status: DISCONTINUED | OUTPATIENT
Start: 2023-03-02 | End: 2023-03-03 | Stop reason: HOSPADM

## 2023-03-02 RX ORDER — SODIUM CHLORIDE 0.9 % (FLUSH) 0.9 %
10 SYRINGE (ML) INJECTION
Status: DISCONTINUED | OUTPATIENT
Start: 2023-03-02 | End: 2023-03-03 | Stop reason: HOSPADM

## 2023-03-02 RX ORDER — KETOROLAC TROMETHAMINE 30 MG/ML
15 INJECTION, SOLUTION INTRAMUSCULAR; INTRAVENOUS
Status: DISCONTINUED | OUTPATIENT
Start: 2023-03-02 | End: 2023-03-02

## 2023-03-02 RX ORDER — METRONIDAZOLE 7.5 MG/G
GEL VAGINAL NIGHTLY
Status: DISCONTINUED | OUTPATIENT
Start: 2023-03-02 | End: 2023-03-03 | Stop reason: HOSPADM

## 2023-03-02 RX ADMIN — KETOROLAC TROMETHAMINE 15 MG: 30 INJECTION, SOLUTION INTRAMUSCULAR; INTRAVENOUS at 03:03

## 2023-03-02 RX ADMIN — SODIUM CHLORIDE 1000 ML: 9 INJECTION, SOLUTION INTRAVENOUS at 03:03

## 2023-03-02 RX ADMIN — ACETAMINOPHEN 650 MG: 325 TABLET ORAL at 10:03

## 2023-03-02 RX ADMIN — LIDOCAINE HYDROCHLORIDE 15 ML: 20 SOLUTION ORAL; TOPICAL at 04:03

## 2023-03-02 RX ADMIN — IOHEXOL 75 ML: 350 INJECTION, SOLUTION INTRAVENOUS at 05:03

## 2023-03-02 RX ADMIN — METRONIDAZOLE: 7.5 GEL VAGINAL at 09:03

## 2023-03-02 RX ADMIN — ALUMINUM HYDROXIDE, MAGNESIUM HYDROXIDE, AND SIMETHICONE 30 ML: 200; 200; 20 SUSPENSION ORAL at 04:03

## 2023-03-02 RX ADMIN — CEFTRIAXONE 1 G: 1 INJECTION, SOLUTION INTRAVENOUS at 04:03

## 2023-03-02 NOTE — ED PROVIDER NOTES
"Encounter Date: 3/2/2023       History     Chief Complaint   Patient presents with    Abdominal Pain     Pt reports lower abdominal pain since yesterday, reports she think she has "food poisoning" because she ate a "salad that was out overnight". Denies N/V/D.      27-year-old female with a past medical history of sickle cell anemia presents to ED for abdominal pain.  Patient states this started yesterday.  Patient complaining of left lower quadrant pain that is feels like tightening that comes and goes, she rates it a 7/10.  She is not taken anything to make this better.  States movement and urination makes it worse.  Patient denies any sick contacts or recent trauma.  Patient's last menstrual period was February 6th.  Patient states she has had a cholecystectomy, but no other surgeries.  Patient denies this feeling like a sickle cell pain crisis.  Patient does admit to eating a salad that could have been possibly bad yesterday.  Patient denies fever, sweats, chills, shortness breath, chest pain, nausea, vomiting, diarrhea constipation,  symptoms, dizziness, lightheadedness, and headaches.    Review of patient's allergies indicates:  No Known Allergies  Past Medical History:   Diagnosis Date    LGSIL on Pap smear of cervix 6/1/2017    Sickle cell anemia     Sickle cell disease      Past Surgical History:   Procedure Laterality Date    BREAST LUMPECTOMY      CARDIAC SURGERY      NEPHRECTOMY       Family History   Problem Relation Age of Onset    Sickle cell trait Father     Sickle cell trait Mother     Sickle cell trait Sister     Sickle cell trait Sister     Breast cancer Neg Hx     Colon cancer Neg Hx     Ovarian cancer Neg Hx      Social History     Tobacco Use    Smoking status: Never    Smokeless tobacco: Never   Substance Use Topics    Alcohol use: No    Drug use: No     Review of Systems   Constitutional:  Negative for chills, diaphoresis and fever.   Respiratory:  Negative for shortness of breath.  "   Cardiovascular:  Negative for chest pain.   Gastrointestinal:  Positive for abdominal pain (LLQ). Negative for constipation, diarrhea, nausea and vomiting.   Genitourinary:  Negative for decreased urine volume, difficulty urinating, dysuria, frequency, urgency, vaginal bleeding, vaginal discharge and vaginal pain.   Musculoskeletal:  Negative for arthralgias and myalgias.   Skin:  Negative for rash.   Neurological:  Negative for dizziness, light-headedness and headaches.     Physical Exam     Initial Vitals [03/02/23 1520]   BP Pulse Resp Temp SpO2   (!) 114/55 (!) 128 16 99.3 °F (37.4 °C) 98 %      MAP       --         Physical Exam    Nursing note and vitals reviewed.  Constitutional: She appears well-developed and well-nourished. She is not diaphoretic. She is active. She does not appear ill. No distress.   HENT:   Head: Normocephalic and atraumatic.   Right Ear: External ear normal.   Left Ear: External ear normal.   Nose: Nose normal.   Eyes: Conjunctivae, EOM and lids are normal. Pupils are equal, round, and reactive to light. Right eye exhibits no discharge. Left eye exhibits no discharge.   Neck: Neck supple.   Normal range of motion.   Full passive range of motion without pain.     Cardiovascular:  Normal rate and regular rhythm.           Pulmonary/Chest: Effort normal and breath sounds normal. No respiratory distress.   Abdominal: Abdomen is soft. She exhibits no distension. There is abdominal tenderness in the suprapubic area and left lower quadrant.   Genitourinary: There is no rash, tenderness, lesion or injury on the right labia. There is no rash, tenderness, lesion or injury on the left labia. Cervix exhibits no motion tenderness, no discharge and no friability. Right adnexum displays no mass, no tenderness and no fullness. Left adnexum displays no mass, no tenderness and no fullness.    Vaginal discharge (yellow green) present.      No vaginal erythema, tenderness or bleeding.   No erythema,  tenderness or bleeding in the vagina.    No foreign body in the vagina.      No signs of injury in the vagina.     Musculoskeletal:         General: Normal range of motion.      Cervical back: Full passive range of motion without pain, normal range of motion and neck supple.     Neurological: She is alert and oriented to person, place, and time. GCS eye subscore is 4. GCS verbal subscore is 5. GCS motor subscore is 6.   Skin: Skin is dry. Capillary refill takes less than 2 seconds.       ED Course   Procedures  Labs Reviewed   VAGINAL SCREEN - Abnormal; Notable for the following components:       Result Value    Clue Cells Occasional (*)     WBC - Vaginal Screen Rare (*)     Bacteria - Vaginal Screen Occasional (*)     All other components within normal limits    Narrative:     Release to patient->Immediate   CBC W/ AUTO DIFFERENTIAL - Abnormal; Notable for the following components:    WBC 15.59 (*)     RBC 2.06 (*)     Hemoglobin 6.5 (*)     Hematocrit 17.7 (*)     MCH 31.6 (*)     MCHC 36.7 (*)     RDW 20.5 (*)     MPV 9.1 (*)     Immature Granulocytes 0.6 (*)     Gran # (ANC) 10.3 (*)     Immature Grans (Abs) 0.10 (*)     Mono # 2.8 (*)     nRBC 2 (*)     Lymph % 14.5 (*)     Mono % 17.9 (*)     Sickle Cells Occasional (*)     All other components within normal limits    Narrative:     HCT critical result(s) called and verbal readback obtained from Alanis Hernandez RN  by [facility] 03/02/2023 16:54   COMPREHENSIVE METABOLIC PANEL - Abnormal; Notable for the following components:    Total Bilirubin 5.3 (*)     All other components within normal limits   URINALYSIS, REFLEX TO URINE CULTURE - Abnormal; Notable for the following components:    Appearance, UA Hazy (*)     Urobilinogen, UA 4.0-6.0 (*)     Leukocytes, UA 2+ (*)     All other components within normal limits    Narrative:     Specimen Source->Urine   URINALYSIS MICROSCOPIC - Abnormal; Notable for the following components:    WBC, UA 33 (*)     WBC Clumps, UA  Occasional (*)     Bacteria Many (*)     All other components within normal limits    Narrative:     Specimen Source->Urine   RETICULOCYTES - Abnormal; Notable for the following components:    Retic 14.2 (*)     All other components within normal limits   CULTURE, URINE   C. TRACHOMATIS/N. GONORRHOEAE BY AMP DNA   LIPASE   RETICULOCYTES   POCT URINE PREGNANCY   TYPE & SCREEN   PREPARE RBC SOFT          Imaging Results              CT Abdomen Pelvis With Contrast (Final result)  Result time 03/02/23 18:09:22      Final result by Oralia Diaz MD (03/02/23 18:09:22)                   Impression:      Mildly heterogeneous prominent liver.  No intrahepatic biliary ductal dilatation.  Consider correlation with LFTs.    Absent left kidney.    Left adnexal cyst.  Findings may be ovarian in origin, possibly a hemorrhagic cyst or cyst with debris, or other etiology.  If warranted, consider pelvic ultrasound.    Few prominent mesenteric lymph nodes.  Mesenteric adenitis is a diagnosis of exclusion.      Electronically signed by: Oralia Diaz  Date:    03/02/2023  Time:    18:09               Narrative:    EXAMINATION:  CT OF ABDOMEN PELVIS WITH    CLINICAL HISTORY:  LLQ abdominal pain;    TECHNIQUE:  5 mm enhanced axial images were obtained from the lung bases through the greater trochanters.  Seventy-five mL of Omnipaque 350 was injected.    COMPARISON:  None.    FINDINGS:  The prominent liver is mildly heterogeneous.  The right lobe of the liver extends caudal relative to the inferior pole of the left kidney.  There is no intrahepatic biliary ductal dilatation.    Spleen, pancreas, and adrenal glands are unremarkable. The gallbladder is surgically absent.    A solitary right kidney is present.    There is no definite evidence for abdominal ascites.  There are few prominent mesenteric lymph nodes.  There is belly jewelry.    There is a left adnexal cystic lesion measuring 2.9 x 2.6 x 2.2 cm (series 2 axial image 107  and series 601 coronal image 74).  It measures slightly higher than simple fluid.    There is no free fluid in the pelvis.  The uterus is retroverted.  Moderate fecal material is noted.    There is mild bibasilar atelectasis.                                       Medications   0.9%  NaCl infusion (for blood administration) (has no administration in time range)   sodium chloride 0.9% bolus 1,000 mL 1,000 mL (0 mLs Intravenous Stopped 3/2/23 1740)   aluminum-magnesium hydroxide-simethicone 200-200-20 mg/5 mL suspension 30 mL (30 mLs Oral Given 3/2/23 1635)     And   LIDOcaine HCl 2% oral solution 15 mL (15 mLs Oral Given 3/2/23 1634)   ketorolac injection 15 mg (15 mg Intravenous Given 3/2/23 1545)   cefTRIAXone (ROCEPHIN) 1 g/50 mL D5W IVPB (1 g Intravenous New Bag 3/2/23 1644)   iohexoL (OMNIPAQUE 350) injection 75 mL (75 mLs Intravenous Given 3/2/23 1734)     Medical Decision Making:   Initial Assessment:   27-year-old female with a past medical history of sickle cell anemia presents to ED for abdominal pain.  Patient's chart and medical history reviewed.  Differential Diagnosis:   Constipation  Diverticulitis   SBO  Colitis  Appendicitis  UTI  Ectopic pregnancy  Ovarian torsion  Ovarian cyst  Gonorrhea  Chlamydia  Trichomonas  PID  Bacterial vaginosis  Clinical Tests:   Lab Tests: Ordered and Reviewed  Radiological Study: Ordered and Reviewed  ED Management:  Patient's vitals reviewed.  She is afebrile, no respiratory distress, nontoxic-appearing in the ED. patient had suprapubic and left lower quadrant tenderness to palpation.  Pelvic exam showed a yellow-green vaginal discharge with no CMT or friability, unlikely PID.. Sent off wet prep and GC swabs. Discussed with patient her results will be back in 2-3 days, and she will be called with any abnormal results and sent in the proper medications. She verbalized understanding.  UPT was negative.  Patient started on fluids given Toradol for pain, and a GI cocktail.  UA was remarkable for UTI. Patient will be sent home on Doxy.  Discussed with her a urine culture will be performed and if anything grows thatt is not covered by this antibiotic she will be called and an appropriate antibiotic will be prescribed.  She verbalized understanding.  Patient given Rocephin in the ED. CBC showed leukocytosis of 15.59 with anemia of 2.0 section H&H of 6.5 and 17.7, patient meets criteria for transfusion.  Patient moved to the main side ED for blood transfusion.  Patient consented.  Patient given 1 unit of blood.  Lipase in normal range, pancreatitis unlikely.  CMP was unremarkable.  Retic count is 14.2%, per chart review this is less than her baseline. CT showed Mildly heterogeneous prominent liver.  No intrahepatic biliary ductal dilatation.  Consider correlation with LFTs. Absent left kidney. Left adnexal cyst.  Findings may be ovarian in origin, possibly a hemorrhagic cyst or cyst with debris, or other etiology.  If warranted, consider pelvic ultrasound. Few prominent mesenteric lymph nodes.  Mesenteric adenitis is a diagnosis of exclusion.  Pelvic ultrasound ordered.  Wet prep showed occasional clue cells, patient will be treated for BV with Flagyl.  Patient states she does not like the oral antibiotics and would prefer the vaginal applicators.  Discussed this case with Hospital Medicine Tai Cooney PA-C.  Patient will be admitted to observation for further management to Dr. Bonilla.                        Clinical Impression:   Final diagnoses:  [D64.9] Anemia  [N76.0, B96.89] BV (bacterial vaginosis) (Primary)  [R10.32] Left lower quadrant abdominal pain  [N39.0] Urinary tract infection without hematuria, site unspecified        ED Disposition Condition    Observation Stable                Alayna Holdsworth, PA-C  03/02/23 7218

## 2023-03-02 NOTE — Clinical Note
Diagnosis: Anemia [119714]   Future Attending Provider: CATINA MONTGOMERY [[id number]]   Admitting Provider:: CATINA MONTGOMERY [[id number]]

## 2023-03-02 NOTE — ED TRIAGE NOTES
Pt to the ED with complaints of lower abdominal pain since last night. Pt denies N/V/D, fever/chills, constipation.

## 2023-03-02 NOTE — FIRST PROVIDER EVALUATION
Medical screening examination initiated.  I have conducted a focused provider triage encounter, findings are as follows:    Brief history of present illness:  Patient with lower abdominal pain, nausea vomiting since yesterday. Thinks she has food poisoning from salad.     There were no vitals filed for this visit.    Pertinent physical exam:  Normal exam, well appearing but mildly uncomfortable appearing    Brief workup plan:  Labs, POCT pregnancy test,     Preliminary workup initiated; this workup will be continued and followed by the physician or advanced practice provider that is assigned to the patient when roomed.

## 2023-03-03 VITALS
RESPIRATION RATE: 18 BRPM | HEIGHT: 61 IN | DIASTOLIC BLOOD PRESSURE: 53 MMHG | BODY MASS INDEX: 20.11 KG/M2 | SYSTOLIC BLOOD PRESSURE: 93 MMHG | WEIGHT: 106.5 LBS | TEMPERATURE: 98 F | OXYGEN SATURATION: 98 % | HEART RATE: 93 BPM

## 2023-03-03 LAB
ALBUMIN SERPL BCP-MCNC: 3.3 G/DL (ref 3.5–5.2)
ALP SERPL-CCNC: 57 U/L (ref 55–135)
ALT SERPL W/O P-5'-P-CCNC: 9 U/L (ref 10–44)
ANION GAP SERPL CALC-SCNC: 8 MMOL/L (ref 8–16)
AST SERPL-CCNC: 33 U/L (ref 10–40)
BASOPHILS # BLD AUTO: 0.04 K/UL (ref 0–0.2)
BASOPHILS NFR BLD: 0.4 % (ref 0–1.9)
BILIRUB SERPL-MCNC: 3 MG/DL (ref 0.1–1)
BLD PROD TYP BPU: NORMAL
BLD PROD TYP BPU: NORMAL
BLOOD UNIT EXPIRATION DATE: NORMAL
BLOOD UNIT EXPIRATION DATE: NORMAL
BLOOD UNIT TYPE CODE: 5100
BLOOD UNIT TYPE CODE: 5100
BLOOD UNIT TYPE: NORMAL
BLOOD UNIT TYPE: NORMAL
BUN SERPL-MCNC: 13 MG/DL (ref 6–20)
C TRACH DNA SPEC QL NAA+PROBE: NOT DETECTED
CALCIUM SERPL-MCNC: 8.6 MG/DL (ref 8.7–10.5)
CHLORIDE SERPL-SCNC: 112 MMOL/L (ref 95–110)
CO2 SERPL-SCNC: 22 MMOL/L (ref 23–29)
CODING SYSTEM: NORMAL
CODING SYSTEM: NORMAL
CREAT SERPL-MCNC: 0.8 MG/DL (ref 0.5–1.4)
CROSSMATCH INTERPRETATION: NORMAL
CROSSMATCH INTERPRETATION: NORMAL
DIFFERENTIAL METHOD: ABNORMAL
DISPENSE STATUS: NORMAL
DISPENSE STATUS: NORMAL
EOSINOPHIL # BLD AUTO: 0.2 K/UL (ref 0–0.5)
EOSINOPHIL NFR BLD: 1.5 % (ref 0–8)
ERYTHROCYTE [DISTWIDTH] IN BLOOD BY AUTOMATED COUNT: 20.7 % (ref 11.5–14.5)
EST. GFR  (NO RACE VARIABLE): >60 ML/MIN/1.73 M^2
FERRITIN SERPL-MCNC: 800 NG/ML (ref 20–300)
GLUCOSE SERPL-MCNC: 89 MG/DL (ref 70–110)
HCT VFR BLD AUTO: 17.8 % (ref 37–48.5)
HGB BLD-MCNC: 6.2 G/DL (ref 12–16)
IMM GRANULOCYTES # BLD AUTO: 0.08 K/UL (ref 0–0.04)
IMM GRANULOCYTES NFR BLD AUTO: 0.7 % (ref 0–0.5)
IRON SERPL-MCNC: 145 UG/DL (ref 30–160)
LYMPHOCYTES # BLD AUTO: 2.4 K/UL (ref 1–4.8)
LYMPHOCYTES NFR BLD: 22.8 % (ref 18–48)
MCH RBC QN AUTO: 28.1 PG (ref 27–31)
MCHC RBC AUTO-ENTMCNC: 34.8 G/DL (ref 32–36)
MCV RBC AUTO: 81 FL (ref 82–98)
MONOCYTES # BLD AUTO: 2 K/UL (ref 0.3–1)
MONOCYTES NFR BLD: 18.3 % (ref 4–15)
N GONORRHOEA DNA SPEC QL NAA+PROBE: NOT DETECTED
NEUTROPHILS # BLD AUTO: 6 K/UL (ref 1.8–7.7)
NEUTROPHILS NFR BLD: 56.3 % (ref 38–73)
NRBC BLD-RTO: 2 /100 WBC
NUM UNITS TRANS PACKED RBC: NORMAL
PLATELET # BLD AUTO: 273 K/UL (ref 150–450)
PMV BLD AUTO: 9.2 FL (ref 9.2–12.9)
POTASSIUM SERPL-SCNC: 3.9 MMOL/L (ref 3.5–5.1)
PROT SERPL-MCNC: 6.6 G/DL (ref 6–8.4)
RBC # BLD AUTO: 2.21 M/UL (ref 4–5.4)
SATURATED IRON: 71 % (ref 20–50)
SODIUM SERPL-SCNC: 142 MMOL/L (ref 136–145)
TOTAL IRON BINDING CAPACITY: 204 UG/DL (ref 250–450)
TRANS ERYTHROCYTES VOL PATIENT: NORMAL ML
TRANSFERRIN SERPL-MCNC: 138 MG/DL (ref 200–375)
WBC # BLD AUTO: 10.72 K/UL (ref 3.9–12.7)

## 2023-03-03 PROCEDURE — 96366 THER/PROPH/DIAG IV INF ADDON: CPT

## 2023-03-03 PROCEDURE — 84466 ASSAY OF TRANSFERRIN: CPT | Performed by: PHYSICIAN ASSISTANT

## 2023-03-03 PROCEDURE — 63600175 PHARM REV CODE 636 W HCPCS: Performed by: PHYSICIAN ASSISTANT

## 2023-03-03 PROCEDURE — 25000003 PHARM REV CODE 250

## 2023-03-03 PROCEDURE — G0378 HOSPITAL OBSERVATION PER HR: HCPCS

## 2023-03-03 PROCEDURE — P9016 RBC LEUKOCYTES REDUCED: HCPCS

## 2023-03-03 PROCEDURE — 36415 COLL VENOUS BLD VENIPUNCTURE: CPT | Performed by: PHYSICIAN ASSISTANT

## 2023-03-03 PROCEDURE — 85025 COMPLETE CBC W/AUTO DIFF WBC: CPT | Performed by: PHYSICIAN ASSISTANT

## 2023-03-03 PROCEDURE — 25000003 PHARM REV CODE 250: Performed by: PHYSICIAN ASSISTANT

## 2023-03-03 PROCEDURE — 82728 ASSAY OF FERRITIN: CPT | Performed by: PHYSICIAN ASSISTANT

## 2023-03-03 PROCEDURE — 80053 COMPREHEN METABOLIC PANEL: CPT | Performed by: PHYSICIAN ASSISTANT

## 2023-03-03 PROCEDURE — 36430 TRANSFUSION BLD/BLD COMPNT: CPT

## 2023-03-03 RX ORDER — HYDROCODONE BITARTRATE AND ACETAMINOPHEN 500; 5 MG/1; MG/1
TABLET ORAL
Status: DISCONTINUED | OUTPATIENT
Start: 2023-03-03 | End: 2023-03-03 | Stop reason: HOSPADM

## 2023-03-03 RX ORDER — CEPHALEXIN 500 MG/1
500 CAPSULE ORAL 2 TIMES DAILY
Qty: 10 CAPSULE | Refills: 0 | Status: SHIPPED | OUTPATIENT
Start: 2023-03-03 | End: 2023-03-15

## 2023-03-03 RX ORDER — METRONIDAZOLE 7.5 MG/G
4 GEL VAGINAL NIGHTLY
Qty: 70 G | Refills: 0 | Status: SHIPPED | OUTPATIENT
Start: 2023-03-03 | End: 2023-03-15

## 2023-03-03 RX ADMIN — FOLIC ACID 1 MG: 1 TABLET ORAL at 08:03

## 2023-03-03 RX ADMIN — CEFTRIAXONE 1 G: 1 INJECTION, SOLUTION INTRAVENOUS at 08:03

## 2023-03-03 RX ADMIN — SODIUM CHLORIDE: 9 INJECTION, SOLUTION INTRAVENOUS at 07:03

## 2023-03-03 RX ADMIN — HYDROXYUREA 1500 MG: 500 CAPSULE ORAL at 08:03

## 2023-03-03 NOTE — HPI
Lana Chou 27 y.o. female with Sickle cell disease presents to the hospital with a chief complaint of LLQ. She reports sudden onset of left lower quadrant abdominal pain that began yesterday and has remained constant.  And she describes it as sharp  She finds it is better with sitting worse with ambulation.  She attempted no treatment home.  She denies fever chest pain nausea vomiting leg swelling syncope dizziness dysuria melena hematuria hematemesis.    In the ED, hemoglobin 6.5 white blood cell count 15.5 for tick count 14.2 bilirubin 5.3 O positive type and screen UPT negative vaginal screen with clue cells UA with leukocytes bacteria 8 squamous cells ultrasound pelvis with left ovarian hemorrhagic cyst CT abdomen with mildly heterogenous liver left adnexal cyst.

## 2023-03-03 NOTE — PLAN OF CARE
03/03/2023      Lana Chou  [address] [place] [address]          Hospital Medicine Dept.  [facility] [address] [place] [address]  [phone] after hours  [phone] fax Lana Chou has been hospitalized at the [facility] since 3/2/2023.  Please excuse the patient from duties.  Patient may return on Monday 3/6/2023.  No restrictions.     Please contact me if you have any questions.                  __________________________  Angie Harris NP  03/03/2023

## 2023-03-03 NOTE — NURSING
Received report from [facility]. Patient lying in bed resting, NAD noted. Safety Precautions maintained, Will Monitor.

## 2023-03-03 NOTE — SUBJECTIVE & OBJECTIVE
Past Medical History:   Diagnosis Date    LGSIL on Pap smear of cervix 6/1/2017    Sickle cell anemia     Sickle cell disease        Past Surgical History:   Procedure Laterality Date    BREAST LUMPECTOMY      CARDIAC SURGERY      NEPHRECTOMY         Review of patient's allergies indicates:  No Known Allergies    No current facility-administered medications on file prior to encounter.     Current Outpatient Medications on File Prior to Encounter   Medication Sig    cholecalciferol, vitamin D3, 1,250 mcg (50,000 unit) capsule Take 50,000 Units by mouth every 7 days.    etonogestreL-ethinyl estradioL (NUVARING) 0.12-0.015 mg/24 hr vaginal ring Place 1 each vaginally every 28 days.    ferrous sulfate (FEOSOL) 325 mg (65 mg iron) Tab tablet Take 1 tablet by mouth once daily.    folic acid (FOLVITE) 1 MG tablet Take 1 tablet (1 mg total) by mouth once daily.    HYDROcodone-acetaminophen (NORCO) 7.5-325 mg per tablet Take 1 tablet by mouth every 4 (four) hours as needed for Pain.    hydroxyurea (HYDREA) 500 mg Cap TAKE THREE CAPSULES (1500MG) BY MOUTH EVERY DAY    ibuprofen (ADVIL,MOTRIN) 600 MG tablet Take one tab po with food every 8 hours prn    ondansetron (ZOFRAN) 4 MG tablet Take 1 tablet (4 mg total) by mouth every 6 (six) hours.     Family History       Problem Relation (Age of Onset)    Sickle cell trait Father, Mother, Sister, Sister          Tobacco Use    Smoking status: Never    Smokeless tobacco: Never   Substance and Sexual Activity    Alcohol use: No    Drug use: No    Sexual activity: Yes     Partners: Male     Birth control/protection: Injection     Review of Systems   Constitutional:  Negative for chills and fever.   HENT:  Negative for nosebleeds and tinnitus.    Eyes:  Negative for photophobia and visual disturbance.   Respiratory:  Negative for shortness of breath and wheezing.    Cardiovascular:  Negative for chest pain, palpitations and leg swelling.   Gastrointestinal:  Positive for abdominal  pain. Negative for abdominal distention, nausea and vomiting.   Genitourinary:  Negative for dysuria, flank pain and hematuria.   Musculoskeletal:  Negative for gait problem and joint swelling.   Skin:  Negative for rash and wound.   Neurological:  Negative for seizures and syncope.   Objective:     Vital Signs (Most Recent):  Temp: 98.8 °F (37.1 °C) (03/02/23 2014)  Pulse: 80 (03/02/23 2014)  Resp: 16 (03/02/23 2014)  BP: 99/62 (03/02/23 2014)  SpO2: 100 % (03/02/23 2014) Vital Signs (24h Range):  Temp:  [98 °F (36.7 °C)-99.3 °F (37.4 °C)] 98.8 °F (37.1 °C)  Pulse:  [] 80  Resp:  [16-19] 16  SpO2:  [98 %-100 %] 100 %  BP: ()/(55-64) 99/62     Weight: 47.6 kg (105 lb)  Body mass index is 19.84 kg/m².    Physical Exam  Vitals and nursing note reviewed.   Constitutional:       General: She is not in acute distress.     Appearance: She is well-developed. She is not diaphoretic.   HENT:      Head: Normocephalic and atraumatic.      Right Ear: External ear normal.      Left Ear: External ear normal.   Eyes:      General:         Right eye: No discharge.         Left eye: No discharge.      Conjunctiva/sclera: Conjunctivae normal.   Neck:      Thyroid: No thyromegaly.   Cardiovascular:      Rate and Rhythm: Normal rate and regular rhythm.      Heart sounds: No murmur heard.  Pulmonary:      Effort: Pulmonary effort is normal. No respiratory distress.      Breath sounds: Normal breath sounds.   Abdominal:      General: Bowel sounds are normal. There is no distension.      Palpations: Abdomen is soft. There is no mass.      Tenderness: There is abdominal tenderness (LLQ).   Musculoskeletal:         General: No deformity.      Cervical back: Normal range of motion and neck supple.      Right lower leg: No edema.      Left lower leg: No edema.   Skin:     General: Skin is warm and dry.   Neurological:      Mental Status: She is alert and oriented to person, place, and time.      Sensory: No sensory deficit.    Psychiatric:         Mood and Affect: Mood normal.         Behavior: Behavior normal.           Significant Labs: CBC:   Recent Labs   Lab 03/02/23  1632   WBC 15.59*   HGB 6.5*   HCT 17.7*        CMP:   Recent Labs   Lab 03/02/23  1632      K 3.7      CO2 24   GLU 86   BUN 13   CREATININE 0.9   CALCIUM 9.5   PROT 8.4   ALBUMIN 4.3   BILITOT 5.3*   ALKPHOS 65   AST 30   ALT 11   ANIONGAP 10     Urine Studies:   Recent Labs   Lab 03/02/23  1540   COLORU Yellow   APPEARANCEUA Hazy*   PHUR 7.0   SPECGRAV 1.010   PROTEINUA Negative   GLUCUA Negative   KETONESU Negative   BILIRUBINUA Negative   OCCULTUA Negative   NITRITE Negative   UROBILINOGEN 4.0-6.0*   LEUKOCYTESUR 2+*   RBCUA 1   WBCUA 33*   BACTERIA Many*   SQUAMEPITHEL 8       Significant Imaging:   Imaging Results              US Pelvis Comp with Transvag NON-OB (xpd) (Final result)  Result time 03/02/23 20:19:40      Final result by Oralia Diaz MD (03/02/23 20:19:40)                   Impression:      2.8 x 2.0 x 2.2 cm left ovarian hemorrhagic cyst.  No evidence of ovarian torsion.      Electronically signed by: Oralia Diaz  Date:    03/02/2023  Time:    20:19               Narrative:    EXAMINATION:  PELVIC ULTRASOUND    CLINICAL HISTORY:  LLQ pain with cyst in left adexenal on CT;    TECHNIQUE:  Real-time ultrasound of the pelvis was performed transabdominally and transvaginally.    COMPARISON:  CT abdomen pelvis 03/02/2023 describing a left adnexal cystic lesion    FINDINGS:  The uterus measures 6.2 x 3.6 x 4.4 cm.  The endometrial stripe measures 11.6 mm.  There are no uterine masses.    The right ovary measures 1.8 x 1.4 x 0.9 cm. Vascular flow seen in the right ovary.    The left ovary measures 4.3 x 2.5 x 3.7 cm. There is a 2.8 x 2.0 x 2.2 cm complex left ovarian cystic lesion with a lattice like pattern.  Vascular flow is seen in the left ovary    There is small free fluid in the pelvis.                                        CT Abdomen Pelvis With Contrast (Final result)  Result time 03/02/23 18:09:22      Final result by Oralia Diaz MD (03/02/23 18:09:22)                   Impression:      Mildly heterogeneous prominent liver.  No intrahepatic biliary ductal dilatation.  Consider correlation with LFTs.    Absent left kidney.    Left adnexal cyst.  Findings may be ovarian in origin, possibly a hemorrhagic cyst or cyst with debris, or other etiology.  If warranted, consider pelvic ultrasound.    Few prominent mesenteric lymph nodes.  Mesenteric adenitis is a diagnosis of exclusion.      Electronically signed by: Oralia Diaz  Date:    03/02/2023  Time:    18:09               Narrative:    EXAMINATION:  CT OF ABDOMEN PELVIS WITH    CLINICAL HISTORY:  LLQ abdominal pain;    TECHNIQUE:  5 mm enhanced axial images were obtained from the lung bases through the greater trochanters.  Seventy-five mL of Omnipaque 350 was injected.    COMPARISON:  None.    FINDINGS:  The prominent liver is mildly heterogeneous.  The right lobe of the liver extends caudal relative to the inferior pole of the left kidney.  There is no intrahepatic biliary ductal dilatation.    Spleen, pancreas, and adrenal glands are unremarkable. The gallbladder is surgically absent.    A solitary right kidney is present.    There is no definite evidence for abdominal ascites.  There are few prominent mesenteric lymph nodes.  There is belly jewelry.    There is a left adnexal cystic lesion measuring 2.9 x 2.6 x 2.2 cm (series 2 axial image 107 and series 601 coronal image 74).  It measures slightly higher than simple fluid.    There is no free fluid in the pelvis.  The uterus is retroverted.  Moderate fecal material is noted.    There is mild bibasilar atelectasis.

## 2023-03-03 NOTE — ASSESSMENT & PLAN NOTE
Presents with pain, with findings possible for UTI, and bacterial vaginosis seen on pelvic exam in ED. US with left hemorrhagic cyst. Suspect pain from cyst  Will continue rocephin for possible UTI  Started on vaginal flagyl for BV  outpt f/u for ovarian cyst

## 2023-03-03 NOTE — NURSING
Received call from Jana in Blood Bank, stated that blood for this patient has to come from [facility].

## 2023-03-03 NOTE — PLAN OF CARE
[facility] - Telemetry  Discharge Final Note  [facility] - Telemetry  Discharge Final Note    Primary Care Provider: Luann Loza MD    Expected Discharge Date: 3/3/2023    Final Discharge Note (most recent)       Final Note - 03/03/23 1033          Final Note    Assessment Type Final Discharge Note     Anticipated Discharge Disposition Home or Self Care     What phone number can be called within the next 1-3 days to see how you are doing after discharge? [phone]     Hospital Resources/Appts/Education Provided Appointments scheduled and added to AVS;Appointments scheduled by Navigator/Coordinator        Post-Acute Status    Discharge Delays Procedure Scheduling (IR, OR, Labs, Echo, Cath, Echo, EEG)   Patient is to receive blood.                    Important Message from Medicare                      Patient is pending receiving blood today.   M[facility] secure chat nurse Belle that patient cleared from case management once patient receives blood.

## 2023-03-03 NOTE — HOSPITAL COURSE
Admission to the hospital for left lower quadrant pain felt secondary to left hemorraghic cyst. Patient admitted for acute cystitis and anemia.  Rocephin and vaginal flagyl started for UTI and BV. Patient have history of sickle cell anemia.  On admission hemoglobin 6.5> 6.2 after 1 unit blood transfusion.  Denies any overt melena hematochezia.  Patient does admit to heavy menstrual cycle.  Patient received 1 L of normal saline in ED which likely is contributing to hemoglobin.  Patient denies chest pain shortness of breath headache or dizziness or weakness.  Leukocytosis resolved. See below for discharge home medication. Patient requesting to go home.   US left hemorrhagic cyst. No longer have pain when cough or positioning which is a significant improvement. Follow up primary GYN Dr. Landers.   All findings and plan were explained to the patient. All questions and concerns were answered. Patient verbalized understanding. Patient is in stable condition to d/c home and has been informed to follow up with PCP and GYN.

## 2023-03-03 NOTE — PLAN OF CARE
Problem: Adult Inpatient Plan of Care  Goal: Plan of Care Review  3/3/2023 0646 by Peter Green RN  Outcome: Ongoing, Progressing  3/3/2023 0646 by Peter Green RN  Outcome: Ongoing, Progressing  Goal: Optimal Comfort and Wellbeing  Outcome: Ongoing, Progressing  Goal: Readiness for Transition of Care  Outcome: Ongoing, Progressing

## 2023-03-03 NOTE — NURSING
Reviewed all discharge instructions with patient, new medications, continued medications, follow-up appointments and signs/symptoms to report to PCP or seek emergency medical care. Patient verbalized understanding to all instructions and education. Patient denies any complaints or concerns at this time, and does not require second unit of blood per GONZALO Adams. Patient medically stable for discharge, ambulated off unit, will drive self home.

## 2023-03-03 NOTE — PLAN OF CARE
[facility] - Telemetry  Discharge Assessment    Primary Care Provider: Luann Loza MD     Discharge Assessment (most recent)       BRIEF DISCHARGE ASSESSMENT - 03/03/23 0933          Discharge Planning    Assessment Type Discharge Planning Brief Assessment     Resource/Environmental Concerns none     Support Systems Parent     Assistance Needed None     Equipment Currently Used at Home none     Current Living Arrangements home     Patient/Family Anticipates Transition to home with family     Patient/Family Anticipated Services at Transition none     DME Needed Upon Discharge  none     Discharge Plan A Home with family     Discharge Plan B Home with family        Housing Stability    In the last 12 months, was there a time when you were not able to pay the mortgage or rent on time? No     In the last 12 months, was there a time when you did not have a steady place to sleep or slept in a shelter (including now)? No        Transportation Needs    In the past 12 months, has lack of transportation kept you from medical appointments or from getting medications? No     In the past 12 months, has lack of transportation kept you from meetings, work, or from getting things needed for daily living? No                   Patient stated she lives with her mother who will be her help at home. Patient stated she plans  herself home at discharge.

## 2023-03-03 NOTE — NURSING
BP transfusion in progress. 15min vitas cycled. Pt BP 82/38 HR 97. Pt stated she felt lightheaded. Denied SOB, chest pain, itching. Pt afebrile. Blood transfusion paused.               notified. Per MD, symptoms not consistent with transfusion reaction, wait 15 min and resume transfusion at lower rate. If episode occurs again terminate blood transfusion.

## 2023-03-03 NOTE — ASSESSMENT & PLAN NOTE
Presents with hemoglobin of 6.5, bilirubin of 5.3, and retic count of 14. Denies pain symptoms. Baseline hemoglobin of 6.5-7.3 for the last 2 years. Denies bleeding. Has chronic leukocytosis  Typed and screened  Transfused 1 unit per ED  Continue home folic acid/hydroxyurea.

## 2023-03-03 NOTE — H&P
"[facility] Medicine  History & Physical    Patient Name: Lana Chou  MRN: [id number]  Patient Class: OP- Observation  Admission Date: 3/2/2023  Attending Physician: Radu Bonilla MD   Primary Care Provider: Luann Loza MD         Patient information was obtained from patient, past medical records and ER records.     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 Denies N/V/D.         HPI: Lana Chou 27 y.o. female with Sickle cell disease presents to the hospital with a chief complaint of LLQ. She reports sudden onset of left lower quadrant abdominal pain that began yesterday and has remained constant.  And she describes it as sharp  She finds it is better with sitting worse with ambulation.  She attempted no treatment home.  She denies fever chest pain nausea vomiting leg swelling syncope dizziness dysuria melena hematuria hematemesis.    In the ED, hemoglobin 6.5 white blood cell count 15.5 for tick count 14.2 bilirubin 5.3 O positive type and screen UPT negative vaginal screen with clue cells UA with leukocytes bacteria 8 squamous cells ultrasound pelvis with left ovarian hemorrhagic cyst CT abdomen with mildly heterogenous liver left adnexal cyst.      Past Medical History:   Diagnosis Date    LGSIL on Pap smear of cervix 6/1/2017    Sickle cell anemia     Sickle cell disease        Past Surgical History:   Procedure Laterality Date    BREAST LUMPECTOMY      CARDIAC SURGERY      NEPHRECTOMY         Review of patient's allergies indicates:  No Known Allergies    No current facility-administered medications on file prior to encounter.     Current Outpatient Medications on File Prior to Encounter   Medication Sig    " cholecalciferol, vitamin D3, 1,250 mcg (50,000 unit) capsule Take 50,000 Units by mouth every 7 days.    etonogestreL-ethinyl estradioL (NUVARING) 0.12-0.015 mg/24 hr vaginal ring Place 1 each vaginally every 28 days.    ferrous sulfate (FEOSOL) 325 mg (65 mg iron) Tab tablet Take 1 tablet by mouth once daily.    folic acid (FOLVITE) 1 MG tablet Take 1 tablet (1 mg total) by mouth once daily.    HYDROcodone-acetaminophen (NORCO) 7.5-325 mg per tablet Take 1 tablet by mouth every 4 (four) hours as needed for Pain.    hydroxyurea (HYDREA) 500 mg Cap TAKE THREE CAPSULES (1500MG) BY MOUTH EVERY DAY    ibuprofen (ADVIL,MOTRIN) 600 MG tablet Take one tab po with food every 8 hours prn    ondansetron (ZOFRAN) 4 MG tablet Take 1 tablet (4 mg total) by mouth every 6 (six) hours.     Family History       Problem Relation (Age of Onset)    Sickle cell trait Father, Mother, Sister, Sister          Tobacco Use    Smoking status: Never    Smokeless tobacco: Never   Substance and Sexual Activity    Alcohol use: No    Drug use: No    Sexual activity: Yes     Partners: Male     Birth control/protection: Injection     Review of Systems   Constitutional:  Negative for chills and fever.   HENT:  Negative for nosebleeds and tinnitus.    Eyes:  Negative for photophobia and visual disturbance.   Respiratory:  Negative for shortness of breath and wheezing.    Cardiovascular:  Negative for chest pain, palpitations and leg swelling.   Gastrointestinal:  Positive for abdominal pain. Negative for abdominal distention, nausea and vomiting.   Genitourinary:  Negative for dysuria, flank pain and hematuria.   Musculoskeletal:  Negative for gait problem and joint swelling.   Skin:  Negative for rash and wound.   Neurological:  Negative for seizures and syncope.   Objective:     Vital Signs (Most Recent):  Temp: 98.8 °F (37.1 °C) (03/02/23 2014)  Pulse: 80 (03/02/23 2014)  Resp: 16 (03/02/23 2014)  BP: 99/62 (03/02/23 2014)  SpO2: 100 %  (03/02/23 2014) Vital Signs (24h Range):  Temp:  [98 °F (36.7 °C)-99.3 °F (37.4 °C)] 98.8 °F (37.1 °C)  Pulse:  [] 80  Resp:  [16-19] 16  SpO2:  [98 %-100 %] 100 %  BP: ()/(55-64) 99/62     Weight: 47.6 kg (105 lb)  Body mass index is 19.84 kg/m².    Physical Exam  Vitals and nursing note reviewed.   Constitutional:       General: She is not in acute distress.     Appearance: She is well-developed. She is not diaphoretic.   HENT:      Head: Normocephalic and atraumatic.      Right Ear: External ear normal.      Left Ear: External ear normal.   Eyes:      General:         Right eye: No discharge.         Left eye: No discharge.      Conjunctiva/sclera: Conjunctivae normal.   Neck:      Thyroid: No thyromegaly.   Cardiovascular:      Rate and Rhythm: Normal rate and regular rhythm.      Heart sounds: No murmur heard.  Pulmonary:      Effort: Pulmonary effort is normal. No respiratory distress.      Breath sounds: Normal breath sounds.   Abdominal:      General: Bowel sounds are normal. There is no distension.      Palpations: Abdomen is soft. There is no mass.      Tenderness: There is abdominal tenderness (LLQ).   Musculoskeletal:         General: No deformity.      Cervical back: Normal range of motion and neck supple.      Right lower leg: No edema.      Left lower leg: No edema.   Skin:     General: Skin is warm and dry.   Neurological:      Mental Status: She is alert and oriented to person, place, and time.      Sensory: No sensory deficit.   Psychiatric:         Mood and Affect: Mood normal.         Behavior: Behavior normal.           Significant Labs: CBC:   Recent Labs   Lab 03/02/23  1632   WBC 15.59*   HGB 6.5*   HCT 17.7*        CMP:   Recent Labs   Lab 03/02/23  1632      K 3.7      CO2 24   GLU 86   BUN 13   CREATININE 0.9   CALCIUM 9.5   PROT 8.4   ALBUMIN 4.3   BILITOT 5.3*   ALKPHOS 65   AST 30   ALT 11   ANIONGAP 10     Urine Studies:   Recent Labs   Lab 03/02/23  1540    COLORU Yellow   APPEARANCEUA Hazy*   PHUR 7.0   SPECGRAV 1.010   PROTEINUA Negative   GLUCUA Negative   KETONESU Negative   BILIRUBINUA Negative   OCCULTUA Negative   NITRITE Negative   UROBILINOGEN 4.0-6.0*   LEUKOCYTESUR 2+*   RBCUA 1   WBCUA 33*   BACTERIA Many*   SQUAMEPITHEL 8       Significant Imaging:   Imaging Results              US Pelvis Comp with Transvag NON-OB (xpd) (Final result)  Result time 03/02/23 20:19:40      Final result by Oralia Diaz MD (03/02/23 20:19:40)                   Impression:      2.8 x 2.0 x 2.2 cm left ovarian hemorrhagic cyst.  No evidence of ovarian torsion.      Electronically signed by: Oralia Diaz  Date:    03/02/2023  Time:    20:19               Narrative:    EXAMINATION:  PELVIC ULTRASOUND    CLINICAL HISTORY:  LLQ pain with cyst in left adexenal on CT;    TECHNIQUE:  Real-time ultrasound of the pelvis was performed transabdominally and transvaginally.    COMPARISON:  CT abdomen pelvis 03/02/2023 describing a left adnexal cystic lesion    FINDINGS:  The uterus measures 6.2 x 3.6 x 4.4 cm.  The endometrial stripe measures 11.6 mm.  There are no uterine masses.    The right ovary measures 1.8 x 1.4 x 0.9 cm. Vascular flow seen in the right ovary.    The left ovary measures 4.3 x 2.5 x 3.7 cm. There is a 2.8 x 2.0 x 2.2 cm complex left ovarian cystic lesion with a lattice like pattern.  Vascular flow is seen in the left ovary    There is small free fluid in the pelvis.                                       CT Abdomen Pelvis With Contrast (Final result)  Result time 03/02/23 18:09:22      Final result by Oralia Diaz MD (03/02/23 18:09:22)                   Impression:      Mildly heterogeneous prominent liver.  No intrahepatic biliary ductal dilatation.  Consider correlation with LFTs.    Absent left kidney.    Left adnexal cyst.  Findings may be ovarian in origin, possibly a hemorrhagic cyst or cyst with debris, or other etiology.  If warranted, consider  pelvic ultrasound.    Few prominent mesenteric lymph nodes.  Mesenteric adenitis is a diagnosis of exclusion.      Electronically signed by: Oralia Diaz  Date:    03/02/2023  Time:    18:09               Narrative:    EXAMINATION:  CT OF ABDOMEN PELVIS WITH    CLINICAL HISTORY:  LLQ abdominal pain;    TECHNIQUE:  5 mm enhanced axial images were obtained from the lung bases through the greater trochanters.  Seventy-five mL of Omnipaque 350 was injected.    COMPARISON:  None.    FINDINGS:  The prominent liver is mildly heterogeneous.  The right lobe of the liver extends caudal relative to the inferior pole of the left kidney.  There is no intrahepatic biliary ductal dilatation.    Spleen, pancreas, and adrenal glands are unremarkable. The gallbladder is surgically absent.    A solitary right kidney is present.    There is no definite evidence for abdominal ascites.  There are few prominent mesenteric lymph nodes.  There is belly jewelry.    There is a left adnexal cystic lesion measuring 2.9 x 2.6 x 2.2 cm (series 2 axial image 107 and series 601 coronal image 74).  It measures slightly higher than simple fluid.    There is no free fluid in the pelvis.  The uterus is retroverted.  Moderate fecal material is noted.    There is mild bibasilar atelectasis.                                        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 Denies pain symptoms. Baseline hemoglobin of 6.5-7.3 for the last 2 years. Denies bleeding. Has chronic leukocytosis  Typed and screened  Transfused 1 unit per ED  Continue home folic acid/hydroxyurea.     LLQ abdominal pain  Presents with pain, with findings possible for UTI, and bacterial vaginosis seen on pelvic exam in ED. US with left hemorrhagic cyst. Suspect pain from cyst  Will continue rocephin for possible UTI  Started on vaginal flagyl for BV  outpt f/u for ovarian cyst    UTI  (urinary tract infection)  Presents with lower abdominal pain, UA with 2+ leukocytes, many bacteria, though 8 squamous cells. Previous culture pan sensitive klebsiella  Will continue Rocephin  Repeat UA given squamous cells    VTE Risk Mitigation (From admission, onward)         Ordered     IP VTE LOW RISK PATIENT  Once         03/02/23 2008     Place sequential compression device  Until discontinued         03/02/23 2008               Discussed with ED physician.    VTE: SCD  Code: Full  Diet: regular  Dispo: pending transfusion  As clarification, on 3/2/2023, patient should be admitted for hospital observation services under my care in collaboration with Radu Bonilla MD. Tai Cooney PA-C  Department of Hospital Medicine   [facility] [place] [facility]

## 2023-03-03 NOTE — DISCHARGE SUMMARY
[facility] Medicine  Discharge Summary      Patient Name: Lana Chou  MRN: [id number]  [facility]: [id number]  Patient Class: OP- Observation  Admission Date: 3/2/2023  Hospital Length of Stay: 0 days  Discharge Date and Time:  03/03/2023 12:19 PM  Attending Physician: Radu Bonilla MD   Discharging Provider: Angie Rm NP  Primary Care Provider: Luann Loza MD    Primary Care Team: ANGIE RM    HPI:   Lana Chou 27 y.o. female with Sickle cell disease presents to the hospital with a chief complaint of LLQ. She reports sudden onset of left lower quadrant abdominal pain that began yesterday and has remained constant.  And she describes it as sharp  She finds it is better with sitting worse with ambulation.  She attempted no treatment home.  She denies fever chest pain nausea vomiting leg swelling syncope dizziness dysuria melena hematuria hematemesis.    In the ED, hemoglobin 6.5 white blood cell count 15.5 for tick count 14.2 bilirubin 5.3 O positive type and screen UPT negative vaginal screen with clue cells UA with leukocytes bacteria 8 squamous cells ultrasound pelvis with left ovarian hemorrhagic cyst CT abdomen with mildly heterogenous liver left adnexal cyst.      * No surgery found *      Hospital Course:   Admission to the hospital for left lower quadrant pain felt secondary to left hemorraghic cyst. Patient admitted for acute cystitis and anemia.  Rocephin and vaginal flagyl started for UTI and BV. Patient have history of sickle cell anemia.  On admission hemoglobin 6.5> 6.2 after 1 unit blood transfusion.  Denies any overt melena hematochezia.  Patient does admit to heavy menstrual cycle.  Patient received 1 L of normal saline in ED which likely is contributing to hemoglobin.  Patient denies chest pain shortness of breath headache or dizziness or weakness.  Leukocytosis resolved. See below for discharge home medication.   US left hemorrhagic cyst. No longer have pain  when cough or positioning which is a significant improvement. Follow up primary GYN Dr. Landers.   All findings and plan were explained to the patient. All questions and concerns were answered. Patient verbalized understanding. Patient is in stable condition to d/c home and has been informed to follow up with PCP and GYN.        Goals of Care Treatment Preferences:  Code Status: Full Code      Consults:   Consults (From admission, onward)        Status Ordering Provider     Case Management/  Once        Provider:  (Not yet assigned)    KERRIE Kessler          Renal/  UTI (urinary tract infection)  Presents with lower abdominal pain, UA with 2+ leukocytes, many bacteria, though 8 squamous cells. 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 Denies pain symptoms. Baseline hemoglobin of 6.5-7.3 for the last 2 years. Denies bleeding. Has chronic leukocytosis  Typed and screened  Transfused 1 unit per ED  Continue home folic acid/hydroxyurea.     GI  LLQ abdominal pain  Presents with pain, with findings possible for UTI, and bacterial vaginosis seen on pelvic exam in ED. US with left hemorrhagic cyst. 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    Specialties: Obstetrics and Gynecology, Obstetrics and Gynecology  Contact  information:  [address]  [address]  [place] [address]  [phone]                       Patient Instructions:      Diet Adult Regular     Notify your health care provider if you experience any of the following:  temperature >100.4     Notify your health care provider if you experience any of the following:  difficulty breathing or increased cough     Notify your health care provider if you experience any of the following:  increased confusion or weakness     Activity as tolerated       Significant Diagnostic Studies: Labs: All labs within the past 24 hours have been reviewed    Pending Diagnostic Studies:     None         Medications:  Reconciled Home Medications:      Medication List      START taking these medications    cephALEXin 500 MG capsule  Commonly known as: KEFLEX  Take 1 capsule (500 mg total) by mouth 2 (two) times a day. for 5 days     metroNIDAZOLE 0.75 % (37.5mg/5 gram) vaginal gel  Commonly known as: METROGEL  Place 4 applicators vaginally every evening. for 4 days        CONTINUE taking these medications    cholecalciferol (vitamin D3) 1,250 mcg (50,000 unit) capsule  Take 50,000 Units by mouth every 7 days.     etonogestreL-ethinyl estradioL 0.12-0.015 mg/24 hr vaginal ring  Commonly known as: NUVARING  Place 1 each vaginally every 28 days.     ferrous sulfate 325 mg (65 mg iron) Tab tablet  Commonly known as: FEOSOL  Take 1 tablet by mouth once daily.     folic acid 1 MG tablet  Commonly known as: FOLVITE  Take 1 tablet (1 mg total) by mouth once daily.     HYDROcodone-acetaminophen 7.5-325 mg per tablet  Commonly known as: NORCO  Take 1 tablet by mouth every 4 (four) hours as needed for Pain.     hydroxyurea 500 mg Cap  Commonly known as: HYDREA  TAKE THREE CAPSULES (1500MG) BY MOUTH EVERY DAY     ondansetron 4 MG tablet  Commonly known as: ZOFRAN  Take 1 tablet (4 mg total) by mouth every 6 (six) hours.        STOP taking these medications    ibuprofen 600 MG tablet  Commonly known as:  CONRADO LOPEZ            Indwelling Lines/Drains at time of discharge:   Lines/Drains/Airways     None                 Time spent on the discharge of patient: 35 minutes         Angie Harris NP  Department of [facility] Medicine  [facility]

## 2023-03-03 NOTE — ASSESSMENT & PLAN NOTE
Presents with lower abdominal pain, UA with 2+ leukocytes, many bacteria, though 8 squamous cells. Previous culture pan sensitive klebsiella  Will continue Rocephin  Repeat UA given squamous cells

## 2023-03-03 NOTE — NURSING
Call received from blood bank. 1 unit PRBC ready for transfusion. Pt informed that blood will be transfused this shift.

## 2023-03-04 LAB — BACTERIA UR CULT: ABNORMAL

## 2023-03-06 LAB
BLD PROD TYP BPU: NORMAL
BLOOD UNIT EXPIRATION DATE: NORMAL
BLOOD UNIT TYPE CODE: 5100
BLOOD UNIT TYPE: NORMAL
CODING SYSTEM: NORMAL
CROSSMATCH INTERPRETATION: NORMAL
DISPENSE STATUS: NORMAL
NUM UNITS TRANS PACKED RBC: NORMAL

## 2023-03-15 ENCOUNTER — TELEPHONE (OUTPATIENT)
Dept: PRIMARY CARE CLINIC | Facility: CLINIC | Age: 28
End: 2023-03-15
Payer: COMMERCIAL

## 2023-03-15 ENCOUNTER — OFFICE VISIT (OUTPATIENT)
Dept: PRIMARY CARE CLINIC | Facility: CLINIC | Age: 28
End: 2023-03-15
Payer: COMMERCIAL

## 2023-03-15 VITALS
HEART RATE: 112 BPM | HEIGHT: 61 IN | DIASTOLIC BLOOD PRESSURE: 64 MMHG | BODY MASS INDEX: 19.65 KG/M2 | WEIGHT: 104.06 LBS | SYSTOLIC BLOOD PRESSURE: 100 MMHG | OXYGEN SATURATION: 99 % | RESPIRATION RATE: 18 BRPM

## 2023-03-15 DIAGNOSIS — Z00.00 PREVENTATIVE HEALTH CARE: ICD-10-CM

## 2023-03-15 DIAGNOSIS — E55.9 VITAMIN D DEFICIENCY: ICD-10-CM

## 2023-03-15 DIAGNOSIS — N83.202 LEFT OVARIAN CYST: ICD-10-CM

## 2023-03-15 DIAGNOSIS — D57.1 HB-SS DISEASE WITHOUT CRISIS: Primary | ICD-10-CM

## 2023-03-15 DIAGNOSIS — R62.7 POOR WEIGHT GAIN IN ADULT: ICD-10-CM

## 2023-03-15 DIAGNOSIS — D57.1 HEMOGLOBIN SS DISEASE WITHOUT CRISIS: ICD-10-CM

## 2023-03-15 DIAGNOSIS — D57.00 SICKLE CELL PAIN CRISIS: ICD-10-CM

## 2023-03-15 DIAGNOSIS — Z90.5 H/O UNILATERAL NEPHRECTOMY: ICD-10-CM

## 2023-03-15 DIAGNOSIS — R63.4 LOSS OF WEIGHT: ICD-10-CM

## 2023-03-15 PROBLEM — R10.32 LLQ ABDOMINAL PAIN: Status: RESOLVED | Noted: 2023-03-02 | Resolved: 2023-03-15

## 2023-03-15 PROBLEM — R11.10 EMESIS: Status: RESOLVED | Noted: 2022-11-23 | Resolved: 2023-03-15

## 2023-03-15 PROBLEM — N18.9 CHRONIC KIDNEY DISEASE (CKD): Status: RESOLVED | Noted: 2018-04-09 | Resolved: 2023-03-15

## 2023-03-15 PROBLEM — N39.0 UTI (URINARY TRACT INFECTION): Status: RESOLVED | Noted: 2023-03-02 | Resolved: 2023-03-15

## 2023-03-15 PROCEDURE — 1159F PR MEDICATION LIST DOCUMENTED IN MEDICAL RECORD: ICD-10-PCS | Mod: CPTII,S$GLB,, | Performed by: INTERNAL MEDICINE

## 2023-03-15 PROCEDURE — 3008F PR BODY MASS INDEX (BMI) DOCUMENTED: ICD-10-PCS | Mod: CPTII,S$GLB,, | Performed by: INTERNAL MEDICINE

## 2023-03-15 PROCEDURE — 99215 OFFICE O/P EST HI 40 MIN: CPT | Mod: S$GLB,,, | Performed by: INTERNAL MEDICINE

## 2023-03-15 PROCEDURE — 99215 PR OFFICE/OUTPT VISIT, EST, LEVL V, 40-54 MIN: ICD-10-PCS | Mod: S$GLB,,, | Performed by: INTERNAL MEDICINE

## 2023-03-15 PROCEDURE — 3008F BODY MASS INDEX DOCD: CPT | Mod: CPTII,S$GLB,, | Performed by: INTERNAL MEDICINE

## 2023-03-15 PROCEDURE — 3074F SYST BP LT 130 MM HG: CPT | Mod: CPTII,S$GLB,, | Performed by: INTERNAL MEDICINE

## 2023-03-15 PROCEDURE — 99999 PR PBB SHADOW E&M-EST. PATIENT-LVL III: CPT | Mod: PBBFAC,,, | Performed by: INTERNAL MEDICINE

## 2023-03-15 PROCEDURE — 3078F PR MOST RECENT DIASTOLIC BLOOD PRESSURE < 80 MM HG: ICD-10-PCS | Mod: CPTII,S$GLB,, | Performed by: INTERNAL MEDICINE

## 2023-03-15 PROCEDURE — 99213 OFFICE O/P EST LOW 20 MIN: CPT | Mod: PBBFAC,PN | Performed by: INTERNAL MEDICINE

## 2023-03-15 PROCEDURE — 99999 PR PBB SHADOW E&M-EST. PATIENT-LVL III: ICD-10-PCS | Mod: PBBFAC,,, | Performed by: INTERNAL MEDICINE

## 2023-03-15 PROCEDURE — 3078F DIAST BP <80 MM HG: CPT | Mod: CPTII,S$GLB,, | Performed by: INTERNAL MEDICINE

## 2023-03-15 PROCEDURE — 1159F MED LIST DOCD IN RCRD: CPT | Mod: CPTII,S$GLB,, | Performed by: INTERNAL MEDICINE

## 2023-03-15 PROCEDURE — 3074F PR MOST RECENT SYSTOLIC BLOOD PRESSURE < 130 MM HG: ICD-10-PCS | Mod: CPTII,S$GLB,, | Performed by: INTERNAL MEDICINE

## 2023-03-15 RX ORDER — FOLIC ACID 1 MG/1
1 TABLET ORAL DAILY
Qty: 90 TABLET | Refills: 3 | Status: SHIPPED | OUTPATIENT
Start: 2023-03-15 | End: 2023-12-04 | Stop reason: SDUPTHER

## 2023-03-15 RX ORDER — IBUPROFEN 400 MG/1
TABLET ORAL
Qty: 90 TABLET | Refills: 3 | Status: SHIPPED | OUTPATIENT
Start: 2023-03-15

## 2023-03-15 RX ORDER — FERROUS SULFATE 325(65) MG
325 TABLET ORAL DAILY
Qty: 90 TABLET | Refills: 3 | Status: SHIPPED | OUTPATIENT
Start: 2023-03-15 | End: 2023-06-16

## 2023-03-15 RX ORDER — HYDROCODONE BITARTRATE AND ACETAMINOPHEN 7.5; 325 MG/1; MG/1
1 TABLET ORAL EVERY 4 HOURS PRN
Qty: 60 TABLET | Refills: 0 | Status: SHIPPED | OUTPATIENT
Start: 2023-03-15 | End: 2023-06-16 | Stop reason: SDUPTHER

## 2023-03-15 RX ORDER — HYDROXYUREA 500 MG/1
1500 CAPSULE ORAL DAILY
Qty: 90 CAPSULE | Refills: 6 | Status: SHIPPED | OUTPATIENT
Start: 2023-03-15

## 2023-03-15 RX ORDER — ASPIRIN 325 MG
50000 TABLET, DELAYED RELEASE (ENTERIC COATED) ORAL
Qty: 12 CAPSULE | Refills: 3 | Status: SHIPPED | OUTPATIENT
Start: 2023-03-15

## 2023-03-15 NOTE — ASSESSMENT & PLAN NOTE
Labs in 3 mo   Refill folic acid, hydroxyurea, HC 7.5/325 #60, Motrin 400mg with food tid, iron daily  -hydrate water 6-8 glasses water per day  Sleep at least 8 hours per night  Take MVI daily

## 2023-03-15 NOTE — PROGRESS NOTES
[facility] Progress Note      Chief Complaint     Follow-up and Cyst (Went to hospital and found a cyst on her ovaries and was told to follow up with primary care dr)       Subjective:      History of Present Illness:  Lana Chou is a 27 y.o. female     Recently admitted to [facility] on 3/2/2023 with LLQ pain and dx'ed with left ovarian hemorrhagic cyst by transvaginal US as etiology of pain - should have had cycle during this time but did not have it; completed Keflex for UTI and completed flagyl vaginal gel   Has appt with GYN Dr. 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     Past Medical History:  Past Medical History:   Diagnosis Date    Chronic kidney disease (CKD) 4/9/2018    Emesis 11/23/2022    LGSIL on Pap smear of cervix 6/1/2017    LLQ abdominal pain 3/2/2023    Sickle cell anemia     Sickle cell disease     UTI (urinary tract infection) 3/2/2023       Past Surgical History:  Past Surgical History:   Procedure Laterality Date    BREAST LUMPECTOMY      CARDIAC SURGERY      NEPHRECTOMY         Allergies:  Review of patient's allergies indicates:  No Known Allergies    Home Medications:  Current Outpatient Medications   Medication Sig Dispense Refill    etonogestreL-ethinyl estradioL (NUVARING) 0.12-0.015 mg/24 hr vaginal ring Place 1 each vaginally every 28 days. 1 each 11    ondansetron (ZOFRAN) 4 MG tablet Take 1 tablet (4 mg total) by mouth every 6 (six) hours. 12 tablet 0     "cholecalciferol, vitamin D3, 1,250 mcg (50,000 unit) capsule Take 1 capsule (50,000 Units total) by mouth every 7 days. 12 capsule 3    ferrous sulfate (FEOSOL) 325 mg (65 mg iron) Tab tablet Take 1 tablet (325 mg total) by mouth once daily. 90 tablet 3    folic acid (FOLVITE) 1 MG tablet Take 1 tablet (1 mg total) by mouth once daily. 90 tablet 3    HYDROcodone-acetaminophen (NORCO) 7.5-325 mg per tablet Take 1 tablet by mouth every 4 (four) hours as needed for Pain. 60 tablet 0    hydroxyurea (HYDREA) 500 mg Cap Take 3 capsules (1,500 mg total) by mouth once daily. 90 capsule 6    ibuprofen (ADVIL,MOTRIN) 400 MG tablet Take one tab po tid with food prn 90 tablet 3     No current facility-administered medications for this visit.        Family History:  Family History   Problem Relation Age of Onset    Sickle cell trait Father     Sickle cell trait Mother     Sickle cell trait Sister     Sickle cell trait Sister     Breast cancer Neg Hx     Colon cancer Neg Hx     Ovarian cancer Neg Hx        Social History:  Social History     Tobacco Use    Smoking status: Never    Smokeless tobacco: Never   Substance Use Topics    Alcohol use: No    Drug use: No       Review of Systems:  Pertinent positives and negatives listed in HPI. All other systems are reviewed and are negative.    Health Maintaince :   Health Maintenance Topics with due status: Not Due       Topic Last Completion Date    TETANUS VACCINE 11/23/2022           Eye: needs  Dental: needs    Immunizations:   Influenza: UTD.  COVID: needs   Hepatitis C:   Cancer Screening:  PAP: UTD 3/2023 WNL    The ASCVD Risk score (Mikhail KAUR, et al., 2019) failed to calculate for the following reasons:    The 2019 ASCVD risk score is only valid for ages 40 to 79      Objective:   /64 (BP Location: Left arm, Patient Position: Sitting, BP Method: Medium (Manual))   Pulse (!) 112   Resp 18   Ht 5' 1" (1.549 m)   Wt 47.2 kg (104 lb 0.9 oz)   SpO2 99%   BMI 19.66 kg/m²   "    Body mass index is 19.66 kg/m².       Physical Examination:  General: Alert and awake in no apparent distress  HEENT: Normocephalic and atraumatic; Tms WNL  Eyes:  PERRL; EOMi with anicteric sclera and clear conjunctivae  Mouth:  Oropharynx clear and without exudate; moist mucous membranes  Neck:   Cervical nodes not enlarged; supple; no bruits  Cardio:  Regular rate and rhythm with normal S1 and S2; no murmurs or rubs  Resp:  CTAB; respirations unlabored; no wheezes, crackles or rhonchi  Abdom: Soft, NTND with normoactive bowel sounds; negative HSM  Extrem: Warm and well-perfused with no clubbing, cyanosis or edema  Skin:  No rashes, lesions, or color changes  Pulses:  2+ and symmetric distally  Neuro:  AAOx3; cooperative and pleasant with no focal deficits    Laboratory:      Most Recent Data:  Lab Results   Component Value Date    WBC 10.72 03/03/2023    HGB 6.2 (L) 03/03/2023    HCT 17.8 (LL) 03/03/2023     03/03/2023    CHOL 113 (L) 11/25/2022    TRIG 123 11/25/2022    HDL 39 (L) 11/25/2022    ALT 9 (L) 03/03/2023    AST 33 03/03/2023     03/03/2023    K 3.9 03/03/2023     (H) 03/03/2023    BUN 13 03/03/2023    CO2 22 (L) 03/03/2023              CBC:   WBC   Date Value Ref Range Status   03/03/2023 10.72 3.90 - 12.70 K/uL Final     Hemoglobin   Date Value Ref Range Status   03/03/2023 6.2 (L) 12.0 - 16.0 g/dL Final     POC Hematocrit   Date Value Ref Range Status   08/30/2021 19 (LL) 36 - 54 %PCV Final     Hematocrit   Date Value Ref Range Status   03/03/2023 17.8 (LL) 37.0 - 48.5 % Final     Comment:     HCT critical result(s) called and verbal readback obtained from   Belle Bedolla RN by [facility] 03/03/2023 07:35       Platelets   Date Value Ref Range Status   03/03/2023 273 150 - 450 K/uL Final     MCV   Date Value Ref Range Status   03/03/2023 81 (L) 82 - 98 fL Final     RDW   Date Value Ref Range Status   03/03/2023 20.7 (H) 11.5 - 14.5 % Final     BMP:   Sodium   Date Value Ref Range  Status   03/03/2023 142 136 - 145 mmol/L Final     Potassium   Date Value Ref Range Status   03/03/2023 3.9 3.5 - 5.1 mmol/L Final     Chloride   Date Value Ref Range Status   03/03/2023 112 (H) 95 - 110 mmol/L Final     CO2   Date Value Ref Range Status   03/03/2023 22 (L) 23 - 29 mmol/L Final     BUN   Date Value Ref Range Status   03/03/2023 13 6 - 20 mg/dL Final     Creatinine   Date Value Ref Range Status   03/03/2023 0.8 0.5 - 1.4 mg/dL Final     Glucose   Date Value Ref Range Status   03/03/2023 89 70 - 110 mg/dL Final     Calcium   Date Value Ref Range Status   03/03/2023 8.6 (L) 8.7 - 10.5 mg/dL Final     LFTs:   Total Protein   Date Value Ref Range Status   03/03/2023 6.6 6.0 - 8.4 g/dL Final     Albumin   Date Value Ref Range Status   03/03/2023 3.3 (L) 3.5 - 5.2 g/dL Final     Total Bilirubin   Date Value Ref Range Status   03/03/2023 3.0 (H) 0.1 - 1.0 mg/dL Final     Comment:     For infants and newborns, interpretation of results should be based  on gestational age, weight and in agreement with clinical  observations.    Premature Infant recommended reference ranges:  Up to 24 hours.............<8.0 mg/dL  Up to 48 hours............<12.0 mg/dL  3-5 days..................<15.0 mg/dL  6-29 days.................<15.0 mg/dL       AST   Date Value Ref Range Status   03/03/2023 33 10 - 40 U/L Final     Alkaline Phosphatase   Date Value Ref Range Status   03/03/2023 57 55 - 135 U/L Final     ALT   Date Value Ref Range Status   03/03/2023 9 (L) 10 - 44 U/L Final     Coags: No results found for: INR, PROTIME, PTT  FLP:      Lab Results   Component Value Date    CHOL 113 (L) 11/25/2022    CHOL 103 07/01/2022     Lab Results   Component Value Date    HDL 39 (L) 11/25/2022    HDL 39 (L) 07/01/2022     Lab Results   Component Value Date    LDLCALC 49.4 (L) 11/25/2022    LDLCALC 49 07/01/2022     Lab Results   Component Value Date    TRIG 123 11/25/2022    TRIG 96 07/01/2022     Lab Results   Component Value Date     CHOLHDL 34.5 11/25/2022    CHOLHDL 2.64 07/01/2022      DM:      LDL Cholesterol   Date Value Ref Range Status   11/25/2022 49.4 (L) 63.0 - 159.0 mg/dL Final     Comment:     The National Cholesterol Education Program (NCEP) has set the  following guidelines (reference values) for LDL Cholesterol:  Optimal.......................<130 mg/dL  Borderline High...............130-159 mg/dL  High..........................160-189 mg/dL  Very High.....................>190 mg/dL       Creatinine   Date Value Ref Range Status   03/03/2023 0.8 0.5 - 1.4 mg/dL Final     Thyroid: No results found for: TSH, FREET4, A6ZKPGC, Z2PEWTS, THYROIDAB  Anemia:   Iron   Date Value Ref Range Status   03/03/2023 145 30 - 160 ug/dL Final     TIBC   Date Value Ref Range Status   03/03/2023 204 (L) 250 - 450 ug/dL Final     Ferritin   Date Value Ref Range Status   03/03/2023 800 (H) 20.0 - 300.0 ng/mL Final     Vitamin B-12   Date Value Ref Range Status   11/23/2022 429 210 - 950 pg/mL Final     Folate   Date Value Ref Range Status   11/23/2022 5.9 4.0 - 24.0 ng/mL Final     Cardiac: No results found for: TROPONINI, CKTOTAL, CKMB, BNP  Urinalysis:   Urine Culture, Routine   Date Value Ref Range Status   03/02/2023 ESCHERICHIA COLI ESBL  >100,000 cfu/ml   (A)  Final     Color, UA   Date Value Ref Range Status   03/02/2023 Yellow Yellow, Straw, Faith Final     Specific Gravity, UA   Date Value Ref Range Status   03/02/2023 1.010 1.005 - 1.030 Final     Nitrite, UA   Date Value Ref Range Status   03/02/2023 Negative Negative Final     Ketones, UA   Date Value Ref Range Status   03/02/2023 Negative Negative Final     Urobilinogen, UA   Date Value Ref Range Status   03/02/2023 4.0-6.0 (A) <2.0 EU/dL Final     WBC, UA   Date Value Ref Range Status   03/02/2023 33 (H) 0 - 5 /hpf Final       Other Results:  EKG (my interpretation):     Radiology:  US Pelvis Comp with Transvag NON-OB (xpd)  Narrative: EXAMINATION:  PELVIC ULTRASOUND    CLINICAL  HISTORY:  LLQ pain with cyst in left adexenal on CT;    TECHNIQUE:  Real-time ultrasound of the pelvis was performed transabdominally and transvaginally.    COMPARISON:  CT abdomen pelvis 03/02/2023 describing a left adnexal cystic lesion    FINDINGS:  The uterus measures 6.2 x 3.6 x 4.4 cm.  The endometrial stripe measures 11.6 mm.  There are no uterine masses.    The right ovary measures 1.8 x 1.4 x 0.9 cm. Vascular flow seen in the right ovary.    The left ovary measures 4.3 x 2.5 x 3.7 cm. There is a 2.8 x 2.0 x 2.2 cm complex left ovarian cystic lesion with a lattice like pattern.  Vascular flow is seen in the left ovary    There is small free fluid in the pelvis.  Impression: 2.8 x 2.0 x 2.2 cm left ovarian hemorrhagic cyst.  No evidence of ovarian torsion.    Electronically signed by: Oralia Diaz  Date:    03/02/2023  Time:    20:19  CT Abdomen Pelvis With Contrast  Narrative: EXAMINATION:  CT OF ABDOMEN PELVIS WITH    CLINICAL HISTORY:  LLQ abdominal pain;    TECHNIQUE:  5 mm enhanced axial images were obtained from the lung bases through the greater trochanters.  Seventy-five mL of Omnipaque 350 was injected.    COMPARISON:  None.    FINDINGS:  The prominent liver is mildly heterogeneous.  The right lobe of the liver extends caudal relative to the inferior pole of the left kidney.  There is no intrahepatic biliary ductal dilatation.    Spleen, pancreas, and adrenal glands are unremarkable. The gallbladder is surgically absent.    A solitary right kidney is present.    There is no definite evidence for abdominal ascites.  There are few prominent mesenteric lymph nodes.  There is belly jewelry.    There is a left adnexal cystic lesion measuring 2.9 x 2.6 x 2.2 cm (series 2 axial image 107 and series 601 coronal image 74).  It measures slightly higher than simple fluid.    There is no free fluid in the pelvis.  The uterus is retroverted.  Moderate fecal material is noted.    There is mild bibasilar  atelectasis.  Impression: Mildly heterogeneous prominent liver.  No intrahepatic biliary ductal dilatation.  Consider correlation with LFTs.    Absent left kidney.    Left adnexal cyst.  Findings may be ovarian in origin, possibly a hemorrhagic cyst or cyst with debris, or other etiology.  If warranted, consider pelvic ultrasound.    Few prominent mesenteric lymph nodes.  Mesenteric adenitis is a diagnosis of exclusion.    Electronically signed by: Oralia Diaz  Date:    03/02/2023  Time:    18:09          Assessment/Plan     Lana Chou is a 27 y.o. female with:  1. Hb-SS disease without crisis  -     folic acid (FOLVITE) 1 MG tablet; Take 1 tablet (1 mg total) by mouth once daily.  Dispense: 90 tablet; Refill: 3  -     ferrous sulfate (FEOSOL) 325 mg (65 mg iron) Tab tablet; Take 1 tablet (325 mg total) by mouth once daily.  Dispense: 90 tablet; Refill: 3  -     hydroxyurea (HYDREA) 500 mg Cap; Take 3 capsules (1,500 mg total) by mouth once daily.  Dispense: 90 capsule; Refill: 6  -     Lipid Panel; Future; Expected date: 06/15/2023  -     Urinalysis; Future; Expected date: 06/15/2023  -     Comprehensive Metabolic Panel; Future; Expected date: 06/15/2023  -     CBC Auto Differential; Future; Expected date: 06/15/2023  -     FERRITIN; Future; Expected date: 06/15/2023  -     Microalbumin/Creatinine Ratio, Urine; Future; Expected date: 06/15/2023  -     Reticulocytes; Future; Expected date: 03/15/2023    2. Sickle cell pain crisis  -     HYDROcodone-acetaminophen (NORCO) 7.5-325 mg per tablet; Take 1 tablet by mouth every 4 (four) hours as needed for Pain.  Dispense: 60 tablet; Refill: 0  -     ibuprofen (ADVIL,MOTRIN) 400 MG tablet; Take one tab po tid with food prn  Dispense: 90 tablet; Refill: 3    3. Vitamin D deficiency  Assessment & Plan:  Check Vit D level and refill 50,000 units weekly     Orders:  -     cholecalciferol, vitamin D3, 1,250 mcg (50,000 unit) capsule; Take 1 capsule (50,000 Units total)  by mouth every 7 days.  Dispense: 12 capsule; Refill: 3  -     Vitamin D; Future; Expected date: 06/15/2023    4. H/O unilateral nephrectomy  Overview:  Left removed      5. 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 Loss of weight    7. Left ovarian cyst  Assessment & Plan:  -f/u GYN 4/2023 with Dr. Son       8. Poor weight gain in adult  Assessment & Plan:  Take MVI daily with Ensure Plus at least 1-3 cans per day  -avoid skipping meals  -take protein supplement to increase weight      9. Preventative health care  Assessment & Plan:  Get labs drawn in 3 months prior to next routine visit  -refill all meds  Make eye and dental appts  Get COVID booster                I spent a total of 25 minutes on the day of the visit.This includes face to face time and non-face to face time preparing to see the patient (eg, review of tests), obtaining and/or reviewing separately obtained history, documenting clinical information in the electronic or other health record, independently interpreting results and communicating results to the patient/family/caregiver, or care coordinator.   Code Status:     Luann Loza MD

## 2023-03-16 NOTE — ASSESSMENT & PLAN NOTE
Take MVI daily with Ensure Plus at least 1-3 cans per day  -avoid skipping meals  -take protein supplement to increase weight

## 2023-03-16 NOTE — ASSESSMENT & PLAN NOTE
Get labs drawn in 3 months prior to next routine visit  -refill all meds  Make eye and dental appts  Get COVID booster

## 2023-04-27 ENCOUNTER — HOSPITAL ENCOUNTER (EMERGENCY)
Facility: HOSPITAL | Age: 28
Discharge: HOME | End: 2023-04-27
Attending: EMERGENCY MEDICINE
Payer: COMMERCIAL

## 2023-04-27 VITALS
WEIGHT: 104 LBS | DIASTOLIC BLOOD PRESSURE: 55 MMHG | OXYGEN SATURATION: 98 % | BODY MASS INDEX: 19.63 KG/M2 | RESPIRATION RATE: 18 BRPM | HEIGHT: 61 IN | SYSTOLIC BLOOD PRESSURE: 90 MMHG | TEMPERATURE: 98 F | HEART RATE: 90 BPM

## 2023-04-27 DIAGNOSIS — R05.9 COUGH: ICD-10-CM

## 2023-04-27 DIAGNOSIS — B34.9 VIRAL SYNDROME: Primary | ICD-10-CM

## 2023-04-27 DIAGNOSIS — N39.0 URINARY TRACT INFECTION WITHOUT HEMATURIA, SITE UNSPECIFIED: ICD-10-CM

## 2023-04-27 LAB
ABO + RH BLD: NORMAL
ALBUMIN SERPL BCP-MCNC: 4.5 G/DL (ref 3.5–5.2)
ALP SERPL-CCNC: 66 U/L (ref 55–135)
ALT SERPL W/O P-5'-P-CCNC: 20 U/L (ref 10–44)
ANION GAP SERPL CALC-SCNC: 9 MMOL/L (ref 8–16)
ANISOCYTOSIS BLD QL SMEAR: SLIGHT
AST SERPL-CCNC: 55 U/L (ref 10–40)
BACTERIA #/AREA URNS HPF: ABNORMAL /HPF
BASOPHILS # BLD AUTO: 0.05 K/UL (ref 0–0.2)
BASOPHILS NFR BLD: 0.4 % (ref 0–1.9)
BILIRUB SERPL-MCNC: 4.8 MG/DL (ref 0.1–1)
BILIRUB UR QL STRIP: NEGATIVE
BLD GP AB SCN CELLS X3 SERPL QL: NORMAL
BUN SERPL-MCNC: 7 MG/DL (ref 6–20)
CALCIUM SERPL-MCNC: 9.2 MG/DL (ref 8.7–10.5)
CHLORIDE SERPL-SCNC: 106 MMOL/L (ref 95–110)
CLARITY UR: ABNORMAL
CO2 SERPL-SCNC: 20 MMOL/L (ref 23–29)
COLOR UR: ABNORMAL
CREAT SERPL-MCNC: 0.8 MG/DL (ref 0.5–1.4)
CTP QC/QA: YES
DIFFERENTIAL METHOD: ABNORMAL
EOSINOPHIL # BLD AUTO: 0.1 K/UL (ref 0–0.5)
EOSINOPHIL NFR BLD: 0.6 % (ref 0–8)
ERYTHROCYTE [DISTWIDTH] IN BLOOD BY AUTOMATED COUNT: 21.1 % (ref 11.5–14.5)
EST. GFR  (NO RACE VARIABLE): >60 ML/MIN/1.73 M^2
GLUCOSE SERPL-MCNC: 107 MG/DL (ref 70–110)
GLUCOSE UR QL STRIP: NEGATIVE
HCT VFR BLD AUTO: 20.4 % (ref 37–48.5)
HGB BLD-MCNC: 7.3 G/DL (ref 12–16)
HGB UR QL STRIP: ABNORMAL
HYPOCHROMIA BLD QL SMEAR: ABNORMAL
IMM GRANULOCYTES # BLD AUTO: 0.09 K/UL (ref 0–0.04)
IMM GRANULOCYTES NFR BLD AUTO: 0.7 % (ref 0–0.5)
KETONES UR QL STRIP: NEGATIVE
LEUKOCYTE ESTERASE UR QL STRIP: ABNORMAL
LYMPHOCYTES # BLD AUTO: 1.6 K/UL (ref 1–4.8)
LYMPHOCYTES NFR BLD: 12.1 % (ref 18–48)
MCH RBC QN AUTO: 30.7 PG (ref 27–31)
MCHC RBC AUTO-ENTMCNC: 35.8 G/DL (ref 32–36)
MCV RBC AUTO: 86 FL (ref 82–98)
MICROSCOPIC COMMENT: ABNORMAL
MOLECULAR STREP A: NEGATIVE
MONOCYTES # BLD AUTO: 2.5 K/UL (ref 0.3–1)
MONOCYTES NFR BLD: 18.6 % (ref 4–15)
NEUTROPHILS # BLD AUTO: 9.1 K/UL (ref 1.8–7.7)
NEUTROPHILS NFR BLD: 67.6 % (ref 38–73)
NITRITE UR QL STRIP: POSITIVE
NRBC BLD-RTO: 2 /100 WBC
OVALOCYTES BLD QL SMEAR: ABNORMAL
PH UR STRIP: 6 [PH] (ref 5–8)
PLATELET # BLD AUTO: 370 K/UL (ref 150–450)
PLATELET BLD QL SMEAR: ABNORMAL
PMV BLD AUTO: 10.3 FL (ref 9.2–12.9)
POC MOLECULAR INFLUENZA A AGN: NEGATIVE
POC MOLECULAR INFLUENZA B AGN: NEGATIVE
POIKILOCYTOSIS BLD QL SMEAR: SLIGHT
POLYCHROMASIA BLD QL SMEAR: ABNORMAL
POTASSIUM SERPL-SCNC: 4.6 MMOL/L (ref 3.5–5.1)
PROT SERPL-MCNC: 8.4 G/DL (ref 6–8.4)
PROT UR QL STRIP: NEGATIVE
RBC # BLD AUTO: 2.38 M/UL (ref 4–5.4)
RBC #/AREA URNS HPF: 1 /HPF (ref 0–4)
RETICS/RBC NFR AUTO: 15.9 % (ref 0.5–2.5)
SARS-COV-2 RDRP RESP QL NAA+PROBE: NEGATIVE
SICKLE CELLS BLD QL SMEAR: ABNORMAL
SODIUM SERPL-SCNC: 135 MMOL/L (ref 136–145)
SP GR UR STRIP: 1.01 (ref 1–1.03)
SPECIMEN OUTDATE: NORMAL
SQUAMOUS #/AREA URNS HPF: 11 /HPF
TARGETS BLD QL SMEAR: ABNORMAL
URN SPEC COLLECT METH UR: ABNORMAL
UROBILINOGEN UR STRIP-ACNC: NEGATIVE EU/DL
WBC # BLD AUTO: 13.52 K/UL (ref 3.9–12.7)
WBC #/AREA URNS HPF: 46 /HPF (ref 0–5)
WBC CLUMPS URNS QL MICRO: ABNORMAL

## 2023-04-27 PROCEDURE — 87086 URINE CULTURE/COLONY COUNT: CPT | Performed by: EMERGENCY MEDICINE

## 2023-04-27 PROCEDURE — 96360 HYDRATION IV INFUSION INIT: CPT

## 2023-04-27 PROCEDURE — 25000003 PHARM REV CODE 250: Performed by: EMERGENCY MEDICINE

## 2023-04-27 PROCEDURE — 87077 CULTURE AEROBIC IDENTIFY: CPT | Performed by: EMERGENCY MEDICINE

## 2023-04-27 PROCEDURE — 80053 COMPREHEN METABOLIC PANEL: CPT | Performed by: EMERGENCY MEDICINE

## 2023-04-27 PROCEDURE — 99284 EMERGENCY DEPT VISIT MOD MDM: CPT | Mod: 25

## 2023-04-27 PROCEDURE — 85025 COMPLETE CBC W/AUTO DIFF WBC: CPT | Performed by: EMERGENCY MEDICINE

## 2023-04-27 PROCEDURE — 87088 URINE BACTERIA CULTURE: CPT | Performed by: EMERGENCY MEDICINE

## 2023-04-27 PROCEDURE — 85045 AUTOMATED RETICULOCYTE COUNT: CPT | Performed by: EMERGENCY MEDICINE

## 2023-04-27 PROCEDURE — 87186 SC STD MICRODIL/AGAR DIL: CPT | Performed by: EMERGENCY MEDICINE

## 2023-04-27 PROCEDURE — 81000 URINALYSIS NONAUTO W/SCOPE: CPT | Performed by: EMERGENCY MEDICINE

## 2023-04-27 PROCEDURE — 86900 BLOOD TYPING SEROLOGIC ABO: CPT | Performed by: EMERGENCY MEDICINE

## 2023-04-27 PROCEDURE — 87651 STREP A DNA AMP PROBE: CPT

## 2023-04-27 RX ORDER — CEPHALEXIN 500 MG/1
500 CAPSULE ORAL EVERY 12 HOURS
Qty: 14 CAPSULE | Refills: 0 | Status: SHIPPED | OUTPATIENT
Start: 2023-04-27 | End: 2023-05-04

## 2023-04-27 RX ORDER — FLUTICASONE PROPIONATE 50 MCG
1 SPRAY, SUSPENSION (ML) NASAL 2 TIMES DAILY
Qty: 15 G | Refills: 0 | Status: SHIPPED | OUTPATIENT
Start: 2023-04-27 | End: 2023-05-10

## 2023-04-27 RX ORDER — CEPHALEXIN 500 MG/1
500 CAPSULE ORAL
Status: COMPLETED | OUTPATIENT
Start: 2023-04-27 | End: 2023-04-27

## 2023-04-27 RX ORDER — AZELASTINE 1 MG/ML
1 SPRAY, METERED NASAL 2 TIMES DAILY
Qty: 30 ML | Refills: 0 | Status: SHIPPED | OUTPATIENT
Start: 2023-04-27 | End: 2023-05-10

## 2023-04-27 RX ADMIN — CEPHALEXIN 500 MG: 500 CAPSULE ORAL at 06:04

## 2023-04-27 RX ADMIN — SODIUM CHLORIDE 1000 ML: 9 INJECTION, SOLUTION INTRAVENOUS at 04:04

## 2023-04-27 NOTE — DISCHARGE INSTRUCTIONS
Emergency department testing shows that you have a urinary tract infection.  Complete the entire course of antibiotics prescribed.  Use Tylenol or ibuprofen as needed for fever or body aches.  You also likely have a upper respiratory virus.  Use Flonase and Astelin to control nasal congestion.  Schedule close follow-up with your primary physician.  Return to the emergency department for any new, worsening or significantly concerning symptoms.    Thank you for coming to our Emergency Department today. It is important to remember that some problems are difficult to diagnose and may not be found during your first visit. Be sure to follow up with your primary care doctor and review any labs/imaging that was performed with them. If you do not have a primary care doctor, you may contact the one listed on your discharge paperwork or you may also call the [facility] Appointment Desk at [phone] to schedule an appointment with one.     All medications may potentially have side effects and it is impossible to predict which medications may give you side effects. If you feel that you are having a negative effect of any medication you should immediately stop taking them and seek medical attention.    Return to the ER with any questions/concerns, new/concerning symptoms, worsening or failure to improve. Do not drive or make any important decisions for 24 hours if you have received any pain medications, sedatives or mood altering drugs during your ER visit.

## 2023-04-27 NOTE — ED PROVIDER NOTES
Encounter Date: 4/27/2023       History     Chief Complaint   Patient presents with    Chills    Sore Throat    Nasal Congestion    Cough     Pt c/o congestion, chills, sore throat, and cough x 2 days. Pt states her niece had strep throat and pt suspects she may have caught it from  her. Pt states she took Dayquil this morning with minimal relief.      28yo female with hx of SCD presents to the emergency department for evaluation sore throat, congestion, chills, cough beginning 2 days ago.  Patient reports her niece was recently diagnosed with strep throat and she is concerned that she may have it as well.  Denies fever but does report chills.  Patient took DayQuil this morning with minimal relief in symptoms.  Denies generalized pain, dysuria, urgency, frequency.     Review of patient's allergies indicates:  No Known Allergies  Past Medical History:   Diagnosis Date    Chronic kidney disease (CKD) 4/9/2018    Emesis 11/23/2022    LGSIL on Pap smear of cervix 6/1/2017    LLQ abdominal pain 3/2/2023    Sickle cell anemia     Sickle cell disease     UTI (urinary tract infection) 3/2/2023     Past Surgical History:   Procedure Laterality Date    BREAST LUMPECTOMY      CARDIAC SURGERY      NEPHRECTOMY       Family History   Problem Relation Age of Onset    Sickle cell trait Father     Sickle cell trait Mother     Sickle cell trait Sister     Sickle cell trait Sister     Breast cancer Neg Hx     Colon cancer Neg Hx     Ovarian cancer Neg Hx      Social History     Tobacco Use    Smoking status: Never    Smokeless tobacco: Never   Substance Use Topics    Alcohol use: No    Drug use: No     Review of Systems   Constitutional:  Positive for appetite change (Decreased) and chills. Negative for fever.   HENT:  Positive for congestion, rhinorrhea and sore throat.    Eyes:  Negative for visual disturbance.   Respiratory:  Positive for cough. Negative for shortness of breath.    Cardiovascular:  Negative for chest pain.    Gastrointestinal:  Negative for nausea and vomiting.   Genitourinary:  Negative for dysuria, frequency and urgency.   Musculoskeletal:  Negative for back pain.   Skin:  Negative for rash.   Neurological:  Negative for weakness.   Hematological:  Does not bruise/bleed easily.     Physical Exam     Initial Vitals [04/27/23 1527]   BP Pulse Resp Temp SpO2   (!) 104/55 (!) 115 19 98.4 °F (36.9 °C) 95 %      MAP       --         Physical Exam    Nursing note and vitals reviewed.  Constitutional: She is not diaphoretic. No distress.   HENT:   Head: Normocephalic and atraumatic.   Protecting airway  Mild pharyngeal erythema, no exudates   Eyes: Conjunctivae and EOM are normal. No scleral icterus.   Neck: Neck supple. No tracheal deviation present.   Normal range of motion.  Cardiovascular:  Regular rhythm and intact distal pulses.           Tachycardic   Pulmonary/Chest: No stridor. No respiratory distress.   Speaking in full sentences   Abdominal: Abdomen is soft. She exhibits no distension. There is no abdominal tenderness.   Musculoskeletal:         General: No tenderness or edema.      Cervical back: Normal range of motion and neck supple.     Neurological: She is alert. She has normal strength. No cranial nerve deficit or sensory deficit.   Skin: Skin is warm and dry.   Psychiatric: She has a normal mood and affect.       ED Course   Procedures  Labs Reviewed   CBC W/ AUTO DIFFERENTIAL - Abnormal; Notable for the following components:       Result Value    WBC 13.52 (*)     RBC 2.38 (*)     Hemoglobin 7.3 (*)     Hematocrit 20.4 (*)     RDW 21.1 (*)     Immature Granulocytes 0.7 (*)     Gran # (ANC) 9.1 (*)     Immature Grans (Abs) 0.09 (*)     Mono # 2.5 (*)     nRBC 2 (*)     Lymph % 12.1 (*)     Mono % 18.6 (*)     Sickle Cells Occasional (*)     All other components within normal limits   COMPREHENSIVE METABOLIC PANEL - Abnormal; Notable for the following components:    Sodium 135 (*)     CO2 20 (*)     Total  Bilirubin 4.8 (*)     AST 55 (*)     All other components within normal limits   RETICULOCYTES - Abnormal; Notable for the following components:    Retic 15.9 (*)     All other components within normal limits   URINALYSIS, REFLEX TO URINE CULTURE - Abnormal; Notable for the following components:    Color, UA Orange (*)     Appearance, UA Hazy (*)     Occult Blood UA Trace (*)     Nitrite, UA Positive (*)     Leukocytes, UA 2+ (*)     All other components within normal limits    Narrative:     Specimen Source->Urine   URINALYSIS MICROSCOPIC - Abnormal; Notable for the following components:    WBC, UA 46 (*)     WBC Clumps, UA Occasional (*)     All other components within normal limits    Narrative:     Specimen Source->Urine   CULTURE, URINE   POCT INFLUENZA A/B MOLECULAR   SARS-COV-2 RDRP GENE   POCT STREP A MOLECULAR   POCT URINE PREGNANCY   TYPE & SCREEN          Imaging Results              X-Ray Chest PA And Lateral (Final result)  Result time 04/27/23 17:07:44      Final result by Enmanuel Manley MD (04/27/23 17:07:44)                   Impression:      No acute process.      Electronically signed by: Enmanuel Manley MD  Date:    04/27/2023  Time:    17:07               Narrative:    EXAMINATION:  XR CHEST PA AND LATERAL    CLINICAL HISTORY:  Cough, unspecified    TECHNIQUE:  PA and lateral views of the chest were performed.    COMPARISON:  05/14/2022.    FINDINGS:  The trachea is unremarkable.  The cardiomediastinal silhouette is within normal limits.  The hemidiaphragms are unremarkable.  There are no pleural effusions.  There is no evidence of a pneumothorax.  There is no evidence of pneumomediastinum.  No airspace opacity is present.  The osseous structures are unremarkable.                                       Medications   sodium chloride 0.9% bolus 1,000 mL 1,000 mL (0 mLs Intravenous Stopped 4/27/23 1753)   cephALEXin capsule 500 mg (500 mg Oral Given 4/27/23 1838)     Medical Decision Making:   History:    Old Medical Records: I decided to obtain old medical records.  Differential Diagnosis:   Includes but not limited to:  Viral syndrome, COVID, flu, strep pharyngitis, pneumonia, UTI, sickle cell crises  Clinical Tests:   Lab Tests: Ordered and Reviewed  Radiological Study: Ordered and Reviewed  ED Management:  Patient is afebrile and in no acute distress at time history and physical.  She is tachycardic but is not toxic appearing.  Lungs are clear auscultation.  She denies generalized pain and reports that her symptoms are not consistent with her usual sickle crises.  COVID, flu, strep are negative.  Labs with mild leukocytosis but normal granulocytes.  Hemoglobin is improved compared to prior.  Patient has mild hyponatremia and has been given normal saline hydration in the emergency department.  Chest x-ray does not demonstrate pneumonia. UA is suggestive of UTI. Patient started on antibiotics in the ED. On reassessment pt reports some improvement in symptoms. She is stable for discharge on trial of management with oral antibiotics, supportive care. Referred to primary physician for follow up. counseled on supportive care, appropriate medication usage, concerning symptoms for which to return to ER and the importance of follow up. Understanding and agreement with treatment plan was expressed.   This chart was completed using dictation software, as a result there may be some transcription errors.                           Clinical Impression:   Final diagnoses:  [R05.9] Cough  [B34.9] Viral syndrome (Primary)  [N39.0] Urinary tract infection without hematuria, site unspecified        ED Disposition Condition    Discharge Stable          ED Prescriptions       Medication Sig Dispense Start Date End Date Auth. Provider    cephALEXin (KEFLEX) 500 MG capsule Take 1 capsule (500 mg total) by mouth every 12 (twelve) hours. for 7 days 14 capsule 4/27/2023 5/4/2023 Linn Deras MD    fluticasone propionate (FLONASE) 50  mcg/actuation nasal spray 1 spray (50 mcg total) by Each Nostril route 2 (two) times daily. 15 g 4/27/2023 -- Linn Deras MD    azelastine (ASTELIN) 137 mcg (0.1 %) nasal spray 1 spray (137 mcg total) by Nasal route 2 (two) times daily. 30 mL 4/27/2023 4/26/2024 Linn Deras MD          Follow-up Information       Follow up With Specialties Details Why Contact Info    Luann Loza MD Internal Medicine Schedule an appointment as soon as possible for a visit   [phone]  [phone]               Linn Deras MD  04/27/23 3470

## 2023-04-29 LAB — BACTERIA UR CULT: ABNORMAL

## 2023-05-08 ENCOUNTER — TELEPHONE (OUTPATIENT)
Dept: EMERGENCY MEDICINE | Facility: HOSPITAL | Age: 28
End: 2023-05-08
Payer: MEDICAID

## 2023-05-08 RX ORDER — NITROFURANTOIN 25; 75 MG/1; MG/1
100 CAPSULE ORAL 2 TIMES DAILY
Qty: 10 CAPSULE | Refills: 0 | Status: SHIPPED | OUTPATIENT
Start: 2023-05-08 | End: 2023-05-13

## 2023-05-08 NOTE — TELEPHONE ENCOUNTER
Patient called back after receiving a letter in the mail telling her to call about test results.  After chart review patient's urine culture was resulted at the end of April.  The antibiotic that she was given was not covered by the antibiotic that grew out.  We will change her antibiotic to Macrobid.  No known drug allergies.  All questions answered.  No further follow-up required.

## 2023-05-10 ENCOUNTER — OFFICE VISIT (OUTPATIENT)
Dept: OBSTETRICS AND GYNECOLOGY | Facility: CLINIC | Age: 28
End: 2023-05-10
Payer: MEDICAID

## 2023-05-10 VITALS
DIASTOLIC BLOOD PRESSURE: 60 MMHG | HEIGHT: 61 IN | BODY MASS INDEX: 19.15 KG/M2 | SYSTOLIC BLOOD PRESSURE: 110 MMHG | WEIGHT: 101.44 LBS

## 2023-05-10 DIAGNOSIS — Z01.419 WELL WOMAN EXAM WITH ROUTINE GYNECOLOGICAL EXAM: Primary | ICD-10-CM

## 2023-05-10 DIAGNOSIS — R87.810 CERVICAL HIGH RISK HUMAN PAPILLOMAVIRUS (HPV) DNA TEST POSITIVE: ICD-10-CM

## 2023-05-10 PROCEDURE — 99395 PREV VISIT EST AGE 18-39: CPT | Mod: S$PBB,,, | Performed by: OBSTETRICS & GYNECOLOGY

## 2023-05-10 PROCEDURE — 99213 OFFICE O/P EST LOW 20 MIN: CPT | Mod: PBBFAC | Performed by: OBSTETRICS & GYNECOLOGY

## 2023-05-10 PROCEDURE — 99395 PR PREVENTIVE VISIT,EST,18-39: ICD-10-PCS | Mod: S$PBB,,, | Performed by: OBSTETRICS & GYNECOLOGY

## 2023-05-10 PROCEDURE — 99999 PR PBB SHADOW E&M-EST. PATIENT-LVL III: ICD-10-PCS | Mod: PBBFAC,,, | Performed by: OBSTETRICS & GYNECOLOGY

## 2023-05-10 PROCEDURE — 99999 PR PBB SHADOW E&M-EST. PATIENT-LVL III: CPT | Mod: PBBFAC,,, | Performed by: OBSTETRICS & GYNECOLOGY

## 2023-05-10 PROCEDURE — 87624 HPV HI-RISK TYP POOLED RSLT: CPT | Performed by: OBSTETRICS & GYNECOLOGY

## 2023-05-10 PROCEDURE — 88175 CYTOPATH C/V AUTO FLUID REDO: CPT | Performed by: OBSTETRICS & GYNECOLOGY

## 2023-05-10 NOTE — PROGRESS NOTES
Subjective:       Patient ID: Lana Chou is a 28 y.o. female.    Chief Complaint:  Well Woman (Last normal pap was 10/12/2020.)        History of Present Illness  HPI  Annual Exam-Premenopausal  Patient presents for annual exam. The patient has no complaints today. The patient is sexually active. GYN screening history:  Last normal pap was 10/12/2020 . The patient wears seatbelts: yes. The patient participates in regular exercise: yes. Has the patient ever been transfused or tattooed?:  yes/yes . The patient reports that there is not domestic violence in her life.    Sickle cell disease.  No crisis since earlier this year  Chronic kidney disease    pap 2017 LGSIL.  Colpos 17 normal  Pap 18 ASCUS  Pap 19 LGSIL.  Colpos 19 TONIA-1  Pap 10/12/2020 wnl w POS HR-HPV but neg for -16/-18  pap 21 HR-HPV pos but neg for -16/-18    Status post quadrivalent HPV vaccine when she was 23 yo    Getting antibiotic for UTI now.  Has not been using her NuvaRing      GYN & OB History  Patient's last menstrual period was 2023 (exact date).   Date of Last Pap: 2020    OB History    Para Term  AB Living   0 0 0 0 0 0   SAB IAB Ectopic Multiple Live Births   0 0 0 0 0       Past Medical History:   Diagnosis Date    Chronic kidney disease (CKD) 2018    Emesis 2022    LGSIL on Pap smear of cervix 2017    LLQ abdominal pain 3/2/2023    Sickle cell anemia     Sickle cell disease     UTI (urinary tract infection) 3/2/2023       Past Surgical History:   Procedure Laterality Date    BREAST LUMPECTOMY      CARDIAC SURGERY      NEPHRECTOMY         Family History   Problem Relation Age of Onset    Sickle cell trait Father     Sickle cell trait Mother     Sickle cell trait Sister     Sickle cell trait Sister     Breast cancer Neg Hx     Colon cancer Neg Hx     Ovarian cancer Neg Hx        Social History     Socioeconomic History    Marital status: Single   Tobacco Use    Smoking  status: Never    Smokeless tobacco: Never   Substance and Sexual Activity    Alcohol use: No    Drug use: No    Sexual activity: Yes     Partners: Male     Birth control/protection: Condom   Social History Narrative    Together since 1/2021    He is  a     She worked at [employer].  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     Social Determinants of Health     Transportation Needs: No Transportation Needs    Lack of Transportation (Medical): No    Lack of Transportation (Non-Medical): No   Housing Stability: Unknown    Unable to Pay for Housing in the Last Year: No    Unstable Housing in the Last Year: No       Current Outpatient Medications   Medication Sig Dispense Refill    cholecalciferol, vitamin D3, 1,250 mcg (50,000 unit) capsule Take 1 capsule (50,000 Units total) by mouth every 7 days. 12 capsule 3    etonogestreL-ethinyl estradioL (NUVARING) 0.12-0.015 mg/24 hr vaginal ring Place 1 each vaginally every 28 days. 1 each 11    ferrous sulfate (FEOSOL) 325 mg (65 mg iron) Tab tablet Take 1 tablet (325 mg total) by mouth once daily. 90 tablet 3    folic acid (FOLVITE) 1 MG tablet Take 1 tablet (1 mg total) by mouth once daily. 90 tablet 3    HYDROcodone-acetaminophen (NORCO) 7.5-325 mg per tablet Take 1 tablet by mouth every 4 (four) hours as needed for Pain. 60 tablet 0    hydroxyurea (HYDREA) 500 mg Cap Take 3 capsules (1,500 mg total) by mouth once daily. 90 capsule 6    ibuprofen (ADVIL,MOTRIN) 400 MG tablet Take one tab po tid with food prn 90 tablet 3    ondansetron (ZOFRAN) 4 MG tablet Take 1 tablet (4 mg total) by mouth every 6 (six) hours. 12 tablet 0    nitrofurantoin, macrocrystal-monohydrate, (MACROBID) 100 MG capsule Take 1 capsule (100 mg total) by mouth 2 (two) times daily. for 5 days (Patient not taking: Reported on 5/10/2023) 10 capsule 0     No current facility-administered medications for this visit.       Review of patient's allergies indicates:  No Known  Allergies     Review of Systems  Review of Systems   Constitutional:  Negative for activity change, appetite change, chills, fatigue, fever and unexpected weight change.   HENT:  Negative for mouth sores.    Respiratory:  Negative for cough, shortness of breath and wheezing.    Cardiovascular:  Negative for chest pain and palpitations.   Gastrointestinal:  Negative for abdominal pain, bloating, blood in stool, constipation, nausea and vomiting.   Endocrine: Negative for diabetes and hot flashes.   Genitourinary:  Negative for dysmenorrhea, dyspareunia, dysuria, frequency, hematuria, menorrhagia, menstrual problem, pelvic pain, urgency, vaginal bleeding, vaginal discharge, vaginal pain, urinary incontinence, postcoital bleeding and vaginal odor.   Musculoskeletal:  Negative for back pain and myalgias.   Integumentary:  Negative for rash, breast mass and nipple discharge.   Neurological:  Negative for seizures and headaches.   Psychiatric/Behavioral:  Negative for depression and sleep disturbance. The patient is not nervous/anxious.    Breast: Negative for mass, mastodynia and nipple discharge        Objective:    Physical Exam:   Constitutional: She appears well-developed and well-nourished. No distress.   BMI of 19.16    HENT:   Head: Normocephalic and atraumatic.    Eyes: EOM are normal.      Pulmonary/Chest: Effort normal. No respiratory distress.   Breasts: Non-tender, no engorgement, no masses, no retraction, no discharge. Negative for lymphadenopathy.         Abdominal: Soft. She exhibits no distension. There is no abdominal tenderness. There is no rebound and no guarding.     Genitourinary:    Vagina and uterus normal.   No  no vaginal discharge in the vagina.    Genitourinary Comments: Vulva without any obvious lesions.  Urethral meatus normal size and location without any lesion.  Urethra is non-tender without stricture or discharge.  Bladder is non-tender.  Vaginal vault with good support.  Minimal white  discharge noted.  No obvious lesion.  Normal rugation.  Cervix is stenotic without any cervical motion tenderness.  No obvious lesion.  Uterus is small, non-tender, normal contour.  Adnexa is without any masses or tenderness.  Perineum without obvious lesion.               Musculoskeletal: Normal range of motion.       Neurological: She is alert.    Skin: Skin is warm and dry.    Psychiatric: She has a normal mood and affect.        Assessment:        1. Well woman exam with routine gynecological exam    2. Cervical high risk human papillomavirus (HPV) DNA test positive    3.  Family planning         Plan:          I have discussed with the patient her condition.  Monthly breast examination was instructed, discussed, and encouraged.  Patient was encouraged to consume a low-calorie, low fat diet, and to increase of physical activity.  Healthy habits encouraged.  A Pap smear was performed with HR-HPV according to the USPSTF recommendations.  Mammogram was not ordered because of the combination of her age and risk factors, according to ACOG guidelines.  Gonorrhea and Chlamydia testing not performed;  HIV test not offered, again according to guidelines.    Care Gaps addressed.  Patient is to continue her medications as prescribed.  Refills for NuvaRing given.  She will come back to see me in one year for her annual visit.  She can come back to see me sooner as necessary.  All of her questions were answered appropriately to her satisfaction.

## 2023-05-16 LAB
CLINICAL INFO: NORMAL
CYTO CVX: NORMAL
CYTOLOGIST CVX/VAG CYTO: NORMAL
CYTOLOGIST CVX/VAG CYTO: NORMAL
CYTOLOGY CMNT CVX/VAG CYTO-IMP: NORMAL
CYTOLOGY PAP THIN PREP EXPLANATION: NORMAL
DATE OF PREVIOUS PAP: NORMAL
DATE PREVIOUS BX: YES
GEN CATEG CVX/VAG CYTO-IMP: NORMAL
HPV I/H RISK 4 DNA CVX QL NAA+PROBE: NOT DETECTED
LMP START DATE: NORMAL
MICROORGANISM CVX/VAG CYTO: NORMAL
PATHOLOGIST CVX/VAG CYTO: NORMAL
SERVICE CMNT-IMP: NORMAL
SPECIMEN SOURCE CVX/VAG CYTO: NORMAL
STAT OF ADQ CVX/VAG CYTO-IMP: NORMAL

## 2023-05-30 ENCOUNTER — TELEPHONE (OUTPATIENT)
Dept: PRIMARY CARE CLINIC | Facility: CLINIC | Age: 28
End: 2023-05-30
Payer: MEDICAID

## 2023-06-07 ENCOUNTER — LAB VISIT (OUTPATIENT)
Dept: LAB | Facility: HOSPITAL | Age: 28
End: 2023-06-07
Attending: INTERNAL MEDICINE
Payer: MEDICAID

## 2023-06-07 DIAGNOSIS — D57.1 HB-SS DISEASE WITHOUT CRISIS: ICD-10-CM

## 2023-06-07 DIAGNOSIS — E55.9 VITAMIN D DEFICIENCY: ICD-10-CM

## 2023-06-07 LAB
25(OH)D3+25(OH)D2 SERPL-MCNC: 19 NG/ML (ref 30–96)
ALBUMIN SERPL BCP-MCNC: 4.6 G/DL (ref 3.5–5.2)
ALBUMIN/CREAT UR: 14.1 UG/MG (ref 0–30)
ALP SERPL-CCNC: 71 U/L (ref 55–135)
ALT SERPL W/O P-5'-P-CCNC: 14 U/L (ref 10–44)
ANION GAP SERPL CALC-SCNC: 8 MMOL/L (ref 8–16)
ANISOCYTOSIS BLD QL SMEAR: ABNORMAL
AST SERPL-CCNC: 36 U/L (ref 10–40)
BACTERIA #/AREA URNS HPF: ABNORMAL /HPF
BASOPHILS # BLD AUTO: 0.1 K/UL (ref 0–0.2)
BASOPHILS NFR BLD: 0.8 % (ref 0–1.9)
BILIRUB SERPL-MCNC: 5.2 MG/DL (ref 0.1–1)
BILIRUB UR QL STRIP: NEGATIVE
BUN SERPL-MCNC: 8 MG/DL (ref 6–20)
CALCIUM SERPL-MCNC: 9.8 MG/DL (ref 8.7–10.5)
CHLORIDE SERPL-SCNC: 107 MMOL/L (ref 95–110)
CHOLEST SERPL-MCNC: 117 MG/DL (ref 120–199)
CHOLEST/HDLC SERPL: 2.9 {RATIO} (ref 2–5)
CLARITY UR: ABNORMAL
CO2 SERPL-SCNC: 26 MMOL/L (ref 23–29)
COLOR UR: ABNORMAL
CREAT SERPL-MCNC: 0.9 MG/DL (ref 0.5–1.4)
CREAT UR-MCNC: 78 MG/DL (ref 15–325)
DIFFERENTIAL METHOD: ABNORMAL
EOSINOPHIL # BLD AUTO: 0.2 K/UL (ref 0–0.5)
EOSINOPHIL NFR BLD: 1.3 % (ref 0–8)
ERYTHROCYTE [DISTWIDTH] IN BLOOD BY AUTOMATED COUNT: 21.5 % (ref 11.5–14.5)
EST. GFR  (NO RACE VARIABLE): >60 ML/MIN/1.73 M^2
FERRITIN SERPL-MCNC: 785 NG/ML (ref 20–300)
GLUCOSE SERPL-MCNC: 87 MG/DL (ref 70–110)
GLUCOSE UR QL STRIP: NEGATIVE
HCT VFR BLD AUTO: 21.8 % (ref 37–48.5)
HDLC SERPL-MCNC: 40 MG/DL (ref 40–75)
HDLC SERPL: 34.2 % (ref 20–50)
HGB BLD-MCNC: 7.5 G/DL (ref 12–16)
HGB UR QL STRIP: NEGATIVE
HYPOCHROMIA BLD QL SMEAR: ABNORMAL
IMM GRANULOCYTES # BLD AUTO: 0.09 K/UL (ref 0–0.04)
IMM GRANULOCYTES NFR BLD AUTO: 0.8 % (ref 0–0.5)
KETONES UR QL STRIP: NEGATIVE
LDLC SERPL CALC-MCNC: 53.4 MG/DL (ref 63–159)
LEUKOCYTE ESTERASE UR QL STRIP: ABNORMAL
LYMPHOCYTES # BLD AUTO: 2.2 K/UL (ref 1–4.8)
LYMPHOCYTES NFR BLD: 18.2 % (ref 18–48)
MCH RBC QN AUTO: 31 PG (ref 27–31)
MCHC RBC AUTO-ENTMCNC: 34.4 G/DL (ref 32–36)
MCV RBC AUTO: 90 FL (ref 82–98)
MICROALBUMIN UR DL<=1MG/L-MCNC: 11 UG/ML
MICROSCOPIC COMMENT: ABNORMAL
MONOCYTES # BLD AUTO: 2.1 K/UL (ref 0.3–1)
MONOCYTES NFR BLD: 17.2 % (ref 4–15)
NEUTROPHILS # BLD AUTO: 7.4 K/UL (ref 1.8–7.7)
NEUTROPHILS NFR BLD: 61.7 % (ref 38–73)
NITRITE UR QL STRIP: POSITIVE
NONHDLC SERPL-MCNC: 77 MG/DL
NRBC BLD-RTO: 3 /100 WBC
OVALOCYTES BLD QL SMEAR: ABNORMAL
PH UR STRIP: 6 [PH] (ref 5–8)
PLATELET # BLD AUTO: 368 K/UL (ref 150–450)
PLATELET BLD QL SMEAR: ABNORMAL
PMV BLD AUTO: 9.2 FL (ref 9.2–12.9)
POLYCHROMASIA BLD QL SMEAR: ABNORMAL
POTASSIUM SERPL-SCNC: 4.4 MMOL/L (ref 3.5–5.1)
PROT SERPL-MCNC: 8.3 G/DL (ref 6–8.4)
PROT UR QL STRIP: NEGATIVE
RBC # BLD AUTO: 2.42 M/UL (ref 4–5.4)
RETICS/RBC NFR AUTO: 18 % (ref 0.5–2.5)
SICKLE CELLS BLD QL SMEAR: ABNORMAL
SODIUM SERPL-SCNC: 141 MMOL/L (ref 136–145)
SP GR UR STRIP: 1.01 (ref 1–1.03)
SQUAMOUS #/AREA URNS HPF: 3 /HPF
TRIGL SERPL-MCNC: 118 MG/DL (ref 30–150)
URN SPEC COLLECT METH UR: ABNORMAL
UROBILINOGEN UR STRIP-ACNC: NEGATIVE EU/DL
WBC # BLD AUTO: 11.96 K/UL (ref 3.9–12.7)
WBC #/AREA URNS HPF: 11 /HPF (ref 0–5)
WBC CLUMPS URNS QL MICRO: ABNORMAL

## 2023-06-07 PROCEDURE — 80061 LIPID PANEL: CPT | Performed by: INTERNAL MEDICINE

## 2023-06-07 PROCEDURE — 81000 URINALYSIS NONAUTO W/SCOPE: CPT | Performed by: INTERNAL MEDICINE

## 2023-06-07 PROCEDURE — 85025 COMPLETE CBC W/AUTO DIFF WBC: CPT | Performed by: INTERNAL MEDICINE

## 2023-06-07 PROCEDURE — 82570 ASSAY OF URINE CREATININE: CPT | Performed by: INTERNAL MEDICINE

## 2023-06-07 PROCEDURE — 80053 COMPREHEN METABOLIC PANEL: CPT | Performed by: INTERNAL MEDICINE

## 2023-06-07 PROCEDURE — 85045 AUTOMATED RETICULOCYTE COUNT: CPT | Performed by: INTERNAL MEDICINE

## 2023-06-07 PROCEDURE — 36415 COLL VENOUS BLD VENIPUNCTURE: CPT | Performed by: INTERNAL MEDICINE

## 2023-06-07 PROCEDURE — 82728 ASSAY OF FERRITIN: CPT | Performed by: INTERNAL MEDICINE

## 2023-06-07 PROCEDURE — 82306 VITAMIN D 25 HYDROXY: CPT | Performed by: INTERNAL MEDICINE

## 2023-06-16 ENCOUNTER — OFFICE VISIT (OUTPATIENT)
Dept: PRIMARY CARE CLINIC | Facility: CLINIC | Age: 28
End: 2023-06-16
Payer: MEDICAID

## 2023-06-16 DIAGNOSIS — Z00.00 PREVENTATIVE HEALTH CARE: ICD-10-CM

## 2023-06-16 DIAGNOSIS — D57.1 HEMOGLOBIN SS DISEASE WITHOUT CRISIS: Primary | ICD-10-CM

## 2023-06-16 DIAGNOSIS — E55.9 VITAMIN D DEFICIENCY: ICD-10-CM

## 2023-06-16 PROCEDURE — 99213 OFFICE O/P EST LOW 20 MIN: CPT | Mod: 95,,, | Performed by: INTERNAL MEDICINE

## 2023-06-16 PROCEDURE — 99213 PR OFFICE/OUTPT VISIT, EST, LEVL III, 20-29 MIN: ICD-10-PCS | Mod: 95,,, | Performed by: INTERNAL MEDICINE

## 2023-06-16 RX ORDER — HYDROCODONE BITARTRATE AND ACETAMINOPHEN 7.5; 325 MG/1; MG/1
1 TABLET ORAL EVERY 4 HOURS PRN
Qty: 60 TABLET | Refills: 0 | Status: SHIPPED | OUTPATIENT
Start: 2023-06-16 | End: 2023-11-15 | Stop reason: SDUPTHER

## 2023-06-16 RX ORDER — PSYLLIUM HUSK 3.4 G/7G
1 POWDER ORAL DAILY
Qty: 90 EACH | Refills: 3 | Status: SHIPPED | OUTPATIENT
Start: 2023-06-16

## 2023-06-16 NOTE — ASSESSMENT & PLAN NOTE
Cont hydrating 6-8 glasses of water at least -drink Pedialyte when very hot and humid  Cont Motrin 400mg first then HC prn pain   -cont Hydrea 500mg tid; folic acid 1mg daily   Refill of HC

## 2023-06-16 NOTE — PROGRESS NOTES
[facility]ediatrics Progress Note  AV visit      Chief Complaint     No chief complaint on file.   F/u HbSS    Subjective:      History of Present Illness:  Lana Chou is a 28 y.o. female had labs drawn  on June 7, 2023 prior to this visit which I reviewed with the patient.     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     Vit D def'y: missed a few wks of Vit D with level 19 in 6/05/2023     Past Medical History:  Past Medical History:   Diagnosis Date    Chronic kidney disease (CKD) 4/9/2018    Emesis 11/23/2022    LGSIL on Pap smear of cervix 6/1/2017    LLQ abdominal pain 3/2/2023    Sickle cell anemia     Sickle cell disease     UTI (urinary tract infection) 3/2/2023       Past Surgical History:  Past Surgical History:   Procedure Laterality Date    BREAST LUMPECTOMY      CARDIAC SURGERY      NEPHRECTOMY         Allergies:  Review of patient's allergies indicates:  No Known Allergies    Home Medications:  Current Outpatient Medications   Medication Sig Dispense Refill    ascorbic acid, vitamin C, (VITAMIN C) 1,000 mg TbSR Take 1 tablet by mouth once daily. 90 each 3    cholecalciferol, vitamin D3, 1,250 mcg (50,000 unit) capsule Take 1 capsule (50,000 Units total) by mouth every 7 days. 12 capsule 3    etonogestreL-ethinyl estradioL (NUVARING) 0.12-0.015 mg/24 hr vaginal ring Place 1 each vaginally every 28 days. 1 each 11    folic acid (FOLVITE) 1 MG tablet Take 1 tablet (1 mg total) by mouth once daily. 90 tablet 3    HYDROcodone-acetaminophen (NORCO) 7.5-325 mg per tablet Take 1 tablet by mouth every 4 (four) hours as needed for Pain. 60 tablet 0    hydroxyurea  (HYDREA) 500 mg Cap Take 3 capsules (1,500 mg total) by mouth once daily. 90 capsule 6    ibuprofen (ADVIL,MOTRIN) 400 MG tablet Take one tab po tid with food prn 90 tablet 3    ondansetron (ZOFRAN) 4 MG tablet Take 1 tablet (4 mg total) by mouth every 6 (six) hours. 12 tablet 0     No current facility-administered medications for this visit.        Family History:  Family History   Problem Relation Age of Onset    Sickle cell trait Father     Sickle cell trait Mother     Sickle cell trait Sister     Sickle cell trait Sister     Breast cancer Neg Hx     Colon cancer Neg Hx     Ovarian cancer Neg Hx        Social History:  Social History     Tobacco Use    Smoking status: Never    Smokeless tobacco: Never   Substance Use Topics    Alcohol use: No    Drug use: No       Review of Systems:  Pertinent positives and negatives listed in HPI. All other systems are reviewed and are negative.    Health Maintaince :   Health Maintenance Topics with due status: Not Due       Topic Last Completion Date    TETANUS VACCINE 11/23/2022    Pap Smear 05/10/2023           Eye:   Dental:     Immunizations: Pneumovax 5/2021    The ASCVD Risk score (Mikhail KAUR, et al., 2019) failed to calculate for the following reasons:    The 2019 ASCVD risk score is only valid for ages 40 to 79      Objective:   There were no vitals taken for this visit.     There is no height or weight on file to calculate BMI.       Physical Examination:  General: Alert and awake in no apparent distress  Neuro:  AAOx3; cooperative and pleasant with no focal deficits    Laboratory:      Most Recent Data:  Lab Results   Component Value Date    WBC 11.96 06/07/2023    HGB 7.5 (L) 06/07/2023    HCT 21.8 (L) 06/07/2023     06/07/2023    CHOL 117 (L) 06/07/2023    TRIG 118 06/07/2023    HDL 40 06/07/2023    ALT 14 06/07/2023    AST 36 06/07/2023     06/07/2023    K 4.4 06/07/2023     06/07/2023    BUN 8 06/07/2023    CO2 26 06/07/2023              CBC:    WBC   Date Value Ref Range Status   06/07/2023 11.96 3.90 - 12.70 K/uL Final     Hemoglobin   Date Value Ref Range Status   06/07/2023 7.5 (L) 12.0 - 16.0 g/dL Final     POC Hematocrit   Date Value Ref Range Status   08/30/2021 19 (LL) 36 - 54 %PCV Final     Hematocrit   Date Value Ref Range Status   06/07/2023 21.8 (L) 37.0 - 48.5 % Final     Platelets   Date Value Ref Range Status   06/07/2023 368 150 - 450 K/uL Final     MCV   Date Value Ref Range Status   06/07/2023 90 82 - 98 fL Final     RDW   Date Value Ref Range Status   06/07/2023 21.5 (H) 11.5 - 14.5 % Final     BMP:   Sodium   Date Value Ref Range Status   06/07/2023 141 136 - 145 mmol/L Final     Potassium   Date Value Ref Range Status   06/07/2023 4.4 3.5 - 5.1 mmol/L Final     Chloride   Date Value Ref Range Status   06/07/2023 107 95 - 110 mmol/L Final     CO2   Date Value Ref Range Status   06/07/2023 26 23 - 29 mmol/L Final     BUN   Date Value Ref Range Status   06/07/2023 8 6 - 20 mg/dL Final     Creatinine   Date Value Ref Range Status   06/07/2023 0.9 0.5 - 1.4 mg/dL Final     Glucose   Date Value Ref Range Status   06/07/2023 87 70 - 110 mg/dL Final     Calcium   Date Value Ref Range Status   06/07/2023 9.8 8.7 - 10.5 mg/dL Final     LFTs:   Total Protein   Date Value Ref Range Status   06/07/2023 8.3 6.0 - 8.4 g/dL Final     Albumin   Date Value Ref Range Status   06/07/2023 4.6 3.5 - 5.2 g/dL Final     Total Bilirubin   Date Value Ref Range Status   06/07/2023 5.2 (H) 0.1 - 1.0 mg/dL Final     Comment:     For infants and newborns, interpretation of results should be based  on gestational age, weight and in agreement with clinical  observations.    Premature Infant recommended reference ranges:  Up to 24 hours.............<8.0 mg/dL  Up to 48 hours............<12.0 mg/dL  3-5 days..................<15.0 mg/dL  6-29 days.................<15.0 mg/dL       AST   Date Value Ref Range Status   06/07/2023 36 10 - 40 U/L Final     Alkaline  Phosphatase   Date Value Ref Range Status   06/07/2023 71 55 - 135 U/L Final     ALT   Date Value Ref Range Status   06/07/2023 14 10 - 44 U/L Final     Coags: No results found for: INR, PROTIME, PTT  FLP:      Lab Results   Component Value Date    CHOL 117 (L) 06/07/2023    CHOL 113 (L) 11/25/2022    CHOL 103 07/01/2022     Lab Results   Component Value Date    HDL 40 06/07/2023    HDL 39 (L) 11/25/2022    HDL 39 (L) 07/01/2022     Lab Results   Component Value Date    LDLCALC 53.4 (L) 06/07/2023    LDLCALC 49.4 (L) 11/25/2022    LDLCALC 49 07/01/2022     Lab Results   Component Value Date    TRIG 118 06/07/2023    TRIG 123 11/25/2022    TRIG 96 07/01/2022     Lab Results   Component Value Date    CHOLHDL 34.2 06/07/2023    CHOLHDL 34.5 11/25/2022    CHOLHDL 2.64 07/01/2022      DM:      LDL Cholesterol   Date Value Ref Range Status   06/07/2023 53.4 (L) 63.0 - 159.0 mg/dL Final     Comment:     The National Cholesterol Education Program (NCEP) has set the  following guidelines (reference values) for LDL Cholesterol:  Optimal.......................<130 mg/dL  Borderline High...............130-159 mg/dL  High..........................160-189 mg/dL  Very High.....................>190 mg/dL       Creatinine   Date Value Ref Range Status   06/07/2023 0.9 0.5 - 1.4 mg/dL Final     Thyroid: No results found for: TSH, FREET4, Z7ZZDSS, M9GIYKW, THYROIDAB  Anemia:   Iron   Date Value Ref Range Status   03/03/2023 145 30 - 160 ug/dL Final     TIBC   Date Value Ref Range Status   03/03/2023 204 (L) 250 - 450 ug/dL Final     Ferritin   Date Value Ref Range Status   06/07/2023 785 (H) 20.0 - 300.0 ng/mL Final     Vitamin B-12   Date Value Ref Range Status   11/23/2022 429 210 - 950 pg/mL Final     Folate   Date Value Ref Range Status   11/23/2022 5.9 4.0 - 24.0 ng/mL Final     Cardiac: No results found for: TROPONINI, CKTOTAL, CKMB, BNP  Urinalysis:   Urine Culture, Routine   Date Value Ref Range Status   04/27/2023 ESCHERICHIA  COLI ESBL  > 100,000 cfu/ml   (A)  Final     Color, UA   Date Value Ref Range Status   06/07/2023 Orange (A) Yellow, Straw, Faith Final     Specific Gravity, UA   Date Value Ref Range Status   06/07/2023 1.010 1.005 - 1.030 Final     Nitrite, UA   Date Value Ref Range Status   06/07/2023 Positive (A) Negative Final     Ketones, UA   Date Value Ref Range Status   06/07/2023 Negative Negative Final     Urobilinogen, UA   Date Value Ref Range Status   06/07/2023 Negative <2.0 EU/dL Final     WBC, UA   Date Value Ref Range Status   06/07/2023 11 (H) 0 - 5 /hpf Final       Other Results:  EKG (my interpretation):     Radiology:  X-Ray Chest PA And Lateral  Narrative: EXAMINATION:  XR CHEST PA AND LATERAL    CLINICAL HISTORY:  Cough, unspecified    TECHNIQUE:  PA and lateral views of the chest were performed.    COMPARISON:  05/14/2022.    FINDINGS:  The trachea is unremarkable.  The cardiomediastinal silhouette is within normal limits.  The hemidiaphragms are unremarkable.  There are no pleural effusions.  There is no evidence of a pneumothorax.  There is no evidence of pneumomediastinum.  No airspace opacity is present.  The osseous structures are unremarkable.  Impression: No acute process.    Electronically signed by: Enmanuel Manley MD  Date:    04/27/2023  Time:    17:07          Assessment/Plan     Lana Chou is a 28 y.o. female with:  1. 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  Dispense: 60 tablet; Refill: 0    2. Preventative health care  Assessment & Plan:  Take MVI daily  Suck on Vit C lozenges  Take Vit C 1000mg daily at minimum   Sleep 8-9 hours per night      Orders:  -     ascorbic acid, vitamin C, (VITAMIN C) 1,000 mg TbSR; Take 1 tablet by mouth once daily.  Dispense: 90 each; Refill: 3    3. Vitamin D deficiency  Assessment & Plan:  Encouraged compliance with Vit D 50,000 units weekly!               I spent 23 min for this visit which  includes face to face time and non-face to face time preparing to see the patient (eg, review of tests), obtaining and/or reviewing separately obtained history, documenting clinical information in the electronic or other health record, independently interpreting results and communicating results to the patient/family/caregiver, or care coordinator.   Code Status:     Luann Loza MD

## 2023-06-16 NOTE — ASSESSMENT & PLAN NOTE
Take MVI daily  Suck on Vit C lozenges  Take Vit C 1000mg daily at minimum   Sleep 8-9 hours per night     RTC in 3 mo

## 2023-06-19 PROBLEM — Z00.00 PREVENTATIVE HEALTH CARE: Status: RESOLVED | Noted: 2022-11-23 | Resolved: 2023-06-19

## 2023-08-24 ENCOUNTER — TELEPHONE (OUTPATIENT)
Dept: PRIMARY CARE CLINIC | Facility: CLINIC | Age: 28
End: 2023-08-24
Payer: MEDICAID

## 2023-08-25 ENCOUNTER — TELEPHONE (OUTPATIENT)
Dept: PRIMARY CARE CLINIC | Facility: CLINIC | Age: 28
End: 2023-08-25
Payer: MEDICAID

## 2023-10-02 ENCOUNTER — PATIENT OUTREACH (OUTPATIENT)
Dept: ADMINISTRATIVE | Facility: HOSPITAL | Age: 28
End: 2023-10-02
Payer: MEDICAID

## 2023-10-18 RX ORDER — PREDNISONE 20 MG/1
20 TABLET ORAL DAILY
Qty: 10 TABLET | Refills: 0 | Status: SHIPPED | OUTPATIENT
Start: 2023-10-18 | End: 2023-10-28

## 2023-10-18 RX ORDER — TRIAMCINOLONE ACETONIDE 1 MG/G
CREAM TOPICAL 2 TIMES DAILY
Qty: 15 G | Refills: 1 | Status: SHIPPED | OUTPATIENT
Start: 2023-10-18 | End: 2024-01-31 | Stop reason: SDUPTHER

## 2023-11-15 ENCOUNTER — TELEPHONE (OUTPATIENT)
Dept: PRIMARY CARE CLINIC | Facility: CLINIC | Age: 28
End: 2023-11-15

## 2023-11-15 ENCOUNTER — OFFICE VISIT (OUTPATIENT)
Dept: PRIMARY CARE CLINIC | Facility: CLINIC | Age: 28
End: 2023-11-15
Payer: MEDICAID

## 2023-11-15 VITALS
SYSTOLIC BLOOD PRESSURE: 92 MMHG | OXYGEN SATURATION: 98 % | DIASTOLIC BLOOD PRESSURE: 50 MMHG | HEART RATE: 88 BPM | WEIGHT: 104.5 LBS | BODY MASS INDEX: 19.73 KG/M2 | HEIGHT: 61 IN

## 2023-11-15 DIAGNOSIS — Z30.44 ENCOUNTER FOR SURVEILLANCE OF VAGINAL RING HORMONAL CONTRACEPTIVE DEVICE: ICD-10-CM

## 2023-11-15 DIAGNOSIS — E55.9 VITAMIN D DEFICIENCY: ICD-10-CM

## 2023-11-15 DIAGNOSIS — D57.1 HEMOGLOBIN SS DISEASE WITHOUT CRISIS: ICD-10-CM

## 2023-11-15 DIAGNOSIS — Z00.00 PREVENTATIVE HEALTH CARE: Primary | ICD-10-CM

## 2023-11-15 PROCEDURE — 99395 PR PREVENTIVE VISIT,EST,18-39: ICD-10-PCS | Mod: S$PBB,,, | Performed by: INTERNAL MEDICINE

## 2023-11-15 PROCEDURE — 1159F PR MEDICATION LIST DOCUMENTED IN MEDICAL RECORD: ICD-10-PCS | Mod: CPTII,,, | Performed by: INTERNAL MEDICINE

## 2023-11-15 PROCEDURE — 3074F PR MOST RECENT SYSTOLIC BLOOD PRESSURE < 130 MM HG: ICD-10-PCS | Mod: CPTII,,, | Performed by: INTERNAL MEDICINE

## 2023-11-15 PROCEDURE — 3008F BODY MASS INDEX DOCD: CPT | Mod: CPTII,,, | Performed by: INTERNAL MEDICINE

## 2023-11-15 PROCEDURE — 3078F DIAST BP <80 MM HG: CPT | Mod: CPTII,,, | Performed by: INTERNAL MEDICINE

## 2023-11-15 PROCEDURE — 3078F PR MOST RECENT DIASTOLIC BLOOD PRESSURE < 80 MM HG: ICD-10-PCS | Mod: CPTII,,, | Performed by: INTERNAL MEDICINE

## 2023-11-15 PROCEDURE — 99999 PR PBB SHADOW E&M-EST. PATIENT-LVL IV: ICD-10-PCS | Mod: PBBFAC,,, | Performed by: INTERNAL MEDICINE

## 2023-11-15 PROCEDURE — 99999 PR PBB SHADOW E&M-EST. PATIENT-LVL IV: CPT | Mod: PBBFAC,,, | Performed by: INTERNAL MEDICINE

## 2023-11-15 PROCEDURE — 1159F MED LIST DOCD IN RCRD: CPT | Mod: CPTII,,, | Performed by: INTERNAL MEDICINE

## 2023-11-15 PROCEDURE — 99395 PREV VISIT EST AGE 18-39: CPT | Mod: S$PBB,,, | Performed by: INTERNAL MEDICINE

## 2023-11-15 PROCEDURE — 99214 OFFICE O/P EST MOD 30 MIN: CPT | Mod: PBBFAC,PN | Performed by: INTERNAL MEDICINE

## 2023-11-15 PROCEDURE — 3074F SYST BP LT 130 MM HG: CPT | Mod: CPTII,,, | Performed by: INTERNAL MEDICINE

## 2023-11-15 PROCEDURE — 3008F PR BODY MASS INDEX (BMI) DOCUMENTED: ICD-10-PCS | Mod: CPTII,,, | Performed by: INTERNAL MEDICINE

## 2023-11-15 RX ORDER — HYDROCODONE BITARTRATE AND ACETAMINOPHEN 7.5; 325 MG/1; MG/1
1 TABLET ORAL EVERY 4 HOURS PRN
Qty: 60 TABLET | Refills: 0 | OUTPATIENT
Start: 2023-11-15 | End: 2023-11-30

## 2023-11-15 NOTE — PROGRESS NOTES
[facility] Progress Note      Chief Complaint     Annual Exam, Flank Pain (Right side), and Migraine      Subjective:      History of Present Illness:  Lana Chou is a 28 y.o. female admits to non-compliance with her meds x 2 wks after her 25 y/o brother was tragically killed. She just restarted all of her meds 1 wk ago. She is drinking a lot of water and trying to eat a healthy diet.    -needs refill of her HC; has been using a muscle relaxant prn pain crisis.    SH: plans to start [employer] school at [employer] in 8/2024; sexually active unprotected with partner x 6 years     Past Medical History:  Past Medical History:   Diagnosis Date    Chronic kidney disease (CKD) 4/9/2018    Emesis 11/23/2022    LGSIL on Pap smear of cervix 6/1/2017    LLQ abdominal pain 3/2/2023    Sickle cell anemia     Sickle cell disease     UTI (urinary tract infection) 3/2/2023       Past Surgical History:  Past Surgical History:   Procedure Laterality Date    BREAST LUMPECTOMY      CARDIAC SURGERY      NEPHRECTOMY         Allergies:  Review of patient's allergies indicates:  No Known Allergies    Home Medications:  Current Outpatient Medications   Medication Sig Dispense Refill    ascorbic acid, vitamin C, (VITAMIN C) 1,000 mg TbSR Take 1 tablet by mouth once daily. 90 each 3    cholecalciferol, vitamin D3, 1,250 mcg (50,000 unit) capsule Take 1 capsule (50,000 Units total) by mouth every 7 days. 12 capsule 3    folic acid (FOLVITE) 1 MG tablet Take 1 tablet (1 mg total) by mouth once daily. 90 tablet 3    hydroxyurea (HYDREA) 500 mg Cap Take 3 capsules (1,500 mg total) by mouth once daily. 90 capsule 6    ibuprofen (ADVIL,MOTRIN) 400 MG tablet Take one tab po tid with food prn 90 tablet 3    ondansetron (ZOFRAN) 4 MG tablet Take 1 tablet (4 mg total) by mouth every 6 (six) hours. 12 tablet 0    triamcinolone acetonide 0.1% (KENALOG) 0.1 % cream Apply topically 2 (two) times daily. Apply bid x 7-10 days 15 g 1     "HYDROcodone-acetaminophen (NORCO) 7.5-325 mg per tablet Take 1 tablet by mouth every 4 (four) hours as needed for Pain. 60 tablet 0     No current facility-administered medications for this visit.        Family History:  Family History   Problem Relation Age of Onset    Sickle cell trait Father     Sickle cell trait Mother     Sickle cell trait Sister     Sickle cell trait Sister     Breast cancer Neg Hx     Colon cancer Neg Hx     Ovarian cancer Neg Hx        Social History:  Social History     Tobacco Use    Smoking status: Every Day     Types: Vaping with nicotine    Smokeless tobacco: Never   Substance Use Topics    Alcohol use: No    Drug use: No       Review of Systems:  Pertinent positives and negatives listed in HPI. All other systems are reviewed and are negative.    Health Maintaince :   Health Maintenance Topics with due status: Not Due       Topic Last Completion Date    TETANUS VACCINE 11/23/2022    Pap Smear 05/10/2023           Eye:   Dental:     Immunizations:   Tdap: n/a.  Influenza: today.  Pneumovax: 5/2021; men B and Menactra 2021   COVID: needs  Hepatitis C:   Cancer Screening:  PAP: UTD HPV neg 5/2023    The ASCVD Risk score (Mikhail KAUR, et al., 2019) failed to calculate for the following reasons:    The 2019 ASCVD risk score is only valid for ages 40 to 79      Objective:   BP (!) 92/50 (BP Location: Left arm, Patient Position: Sitting, BP Method: Medium (Manual))   Pulse 88   Ht 5' 1" (1.549 m)   Wt 47.4 kg (104 lb 8 oz)   LMP 11/08/2023   SpO2 98%   BMI 19.74 kg/m²      Body mass index is 19.74 kg/m².       Physical Examination:  General: Alert and awake in no apparent distress  HEENT: Normocephalic and atraumatic; Tms WNL  Eyes:  PERRL; EOMi with anicteric sclera and clear conjunctivae  Mouth:  Oropharynx clear and without exudate; moist mucous membranes  Neck:   Cervical nodes not enlarged; supple; no bruits  Cardio:  Regular rate and rhythm with normal S1 and S2; no murmurs or " rubs  Resp:  CTAB; respirations unlabored; no wheezes, crackles or rhonchi  Abdom: Soft, NTND with normoactive bowel sounds; negative HSM  Extrem: Warm and well-perfused with no clubbing, cyanosis or edema  Skin:  No rashes, lesions, or color changes  Pulses:  2+ and symmetric distally  Neuro:  AAOx3; cooperative and pleasant with no focal deficits    Laboratory:      Most Recent Data:  Lab Results   Component Value Date    WBC 11.96 06/07/2023    HGB 7.5 (L) 06/07/2023    HCT 21.8 (L) 06/07/2023     06/07/2023    CHOL 117 (L) 06/07/2023    TRIG 118 06/07/2023    HDL 40 06/07/2023    ALT 14 06/07/2023    AST 36 06/07/2023     06/07/2023    K 4.4 06/07/2023     06/07/2023    BUN 8 06/07/2023    CO2 26 06/07/2023              CBC:   WBC   Date Value Ref Range Status   06/07/2023 11.96 3.90 - 12.70 K/uL Final     Hemoglobin   Date Value Ref Range Status   06/07/2023 7.5 (L) 12.0 - 16.0 g/dL Final     POC Hematocrit   Date Value Ref Range Status   08/30/2021 19 (LL) 36 - 54 %PCV Final     Hematocrit   Date Value Ref Range Status   06/07/2023 21.8 (L) 37.0 - 48.5 % Final     Platelets   Date Value Ref Range Status   06/07/2023 368 150 - 450 K/uL Final     MCV   Date Value Ref Range Status   06/07/2023 90 82 - 98 fL Final     RDW   Date Value Ref Range Status   06/07/2023 21.5 (H) 11.5 - 14.5 % Final     BMP:   Sodium   Date Value Ref Range Status   06/07/2023 141 136 - 145 mmol/L Final     Potassium   Date Value Ref Range Status   06/07/2023 4.4 3.5 - 5.1 mmol/L Final     Chloride   Date Value Ref Range Status   06/07/2023 107 95 - 110 mmol/L Final     CO2   Date Value Ref Range Status   06/07/2023 26 23 - 29 mmol/L Final     BUN   Date Value Ref Range Status   06/07/2023 8 6 - 20 mg/dL Final     Creatinine   Date Value Ref Range Status   06/07/2023 0.9 0.5 - 1.4 mg/dL Final     Glucose   Date Value Ref Range Status   06/07/2023 87 70 - 110 mg/dL Final     Calcium   Date Value Ref Range Status  "  06/07/2023 9.8 8.7 - 10.5 mg/dL Final     LFTs:   Total Protein   Date Value Ref Range Status   06/07/2023 8.3 6.0 - 8.4 g/dL Final     Albumin   Date Value Ref Range Status   06/07/2023 4.6 3.5 - 5.2 g/dL Final     Total Bilirubin   Date Value Ref Range Status   06/07/2023 5.2 (H) 0.1 - 1.0 mg/dL Final     Comment:     For infants and newborns, interpretation of results should be based  on gestational age, weight and in agreement with clinical  observations.    Premature Infant recommended reference ranges:  Up to 24 hours.............<8.0 mg/dL  Up to 48 hours............<12.0 mg/dL  3-5 days..................<15.0 mg/dL  6-29 days.................<15.0 mg/dL       AST   Date Value Ref Range Status   06/07/2023 36 10 - 40 U/L Final     Alkaline Phosphatase   Date Value Ref Range Status   06/07/2023 71 55 - 135 U/L Final     ALT   Date Value Ref Range Status   06/07/2023 14 10 - 44 U/L Final     Coags: No results found for: "INR", "PROTIME", "PTT"  FLP:      Lab Results   Component Value Date    CHOL 117 (L) 06/07/2023    CHOL 113 (L) 11/25/2022    CHOL 103 07/01/2022     Lab Results   Component Value Date    HDL 40 06/07/2023    HDL 39 (L) 11/25/2022    HDL 39 (L) 07/01/2022     Lab Results   Component Value Date    LDLCALC 53.4 (L) 06/07/2023    LDLCALC 49.4 (L) 11/25/2022    LDLCALC 49 07/01/2022     Lab Results   Component Value Date    TRIG 118 06/07/2023    TRIG 123 11/25/2022    TRIG 96 07/01/2022     Lab Results   Component Value Date    CHOLHDL 34.2 06/07/2023    CHOLHDL 34.5 11/25/2022    CHOLHDL 2.64 07/01/2022      DM:      LDL Cholesterol   Date Value Ref Range Status   06/07/2023 53.4 (L) 63.0 - 159.0 mg/dL Final     Comment:     The National Cholesterol Education Program (NCEP) has set the  following guidelines (reference values) for LDL Cholesterol:  Optimal.......................<130 mg/dL  Borderline High...............130-159 mg/dL  High..........................160-189 mg/dL  Very " "High.....................>190 mg/dL       Creatinine   Date Value Ref Range Status   06/07/2023 0.9 0.5 - 1.4 mg/dL Final     Thyroid: No results found for: "TSH", "FREET4", "N5QJRBN", "K1REWVW", "THYROIDAB"  Anemia:   Iron   Date Value Ref Range Status   03/03/2023 145 30 - 160 ug/dL Final     TIBC   Date Value Ref Range Status   03/03/2023 204 (L) 250 - 450 ug/dL Final     Ferritin   Date Value Ref Range Status   06/07/2023 785 (H) 20.0 - 300.0 ng/mL Final     Vitamin B-12   Date Value Ref Range Status   11/23/2022 429 210 - 950 pg/mL Final     Folate   Date Value Ref Range Status   11/23/2022 5.9 4.0 - 24.0 ng/mL Final     Cardiac: No results found for: "TROPONINI", "CKTOTAL", "CKMB", "BNP"  Urinalysis:   Urine Culture, Routine   Date Value Ref Range Status   04/27/2023 ESCHERICHIA COLI ESBL  > 100,000 cfu/ml   (A)  Final     Color, UA   Date Value Ref Range Status   06/07/2023 Orange (A) Yellow, Straw, Faith Final     Specific Gravity, UA   Date Value Ref Range Status   06/07/2023 1.010 1.005 - 1.030 Final     Nitrite, UA   Date Value Ref Range Status   06/07/2023 Positive (A) Negative Final     Ketones, UA   Date Value Ref Range Status   06/07/2023 Negative Negative Final     Urobilinogen, UA   Date Value Ref Range Status   06/07/2023 Negative <2.0 EU/dL Final     WBC, UA   Date Value Ref Range Status   06/07/2023 11 (H) 0 - 5 /hpf Final       Other Results:  EKG (my interpretation):     Radiology:  X-Ray Chest PA And Lateral  Narrative: EXAMINATION:  XR CHEST PA AND LATERAL    CLINICAL HISTORY:  Cough, unspecified    TECHNIQUE:  PA and lateral views of the chest were performed.    COMPARISON:  05/14/2022.    FINDINGS:  The trachea is unremarkable.  The cardiomediastinal silhouette is within normal limits.  The hemidiaphragms are unremarkable.  There are no pleural effusions.  There is no evidence of a pneumothorax.  There is no evidence of pneumomediastinum.  No airspace opacity is present.  The osseous " structures are unremarkable.  Impression: No acute process.    Electronically signed by: Enmanuel Manley MD  Date:    04/27/2023  Time:    17:07          Assessment/Plan     Lana Chou is a 28 y.o. female with:  1. Preventative health care  Assessment & Plan:  Routine labs in 4 wks  Flu shot today  Refill HC   Get COVID vaccine     Orders:  -     Cancel: HIV 1/2 Ag/Ab (4th Gen); Future; Expected date: 11/15/2023  -     Cancel: C. trachomatis/N. gonorrhoeae by AMP DNA [facility]; Urine  -     Cancel: HEPATITIS PANEL, ACUTE; Future; Expected date: 11/15/2023  -     Cancel: RPR; Future; Expected date: 11/15/2023  -     HEPATITIS PANEL, ACUTE; Future; Expected date: 11/15/2023  -     HIV 1/2 Ag/Ab (4th Gen); Future; Expected date: 11/15/2023  -     RPR; Future; Expected date: 11/15/2023  -     C. trachomatis/N. gonorrhoeae by AMP DNA Other; Urine    2. 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  Assessment & Plan:  Get labs in 1 month  Refill HC 7.5 mg tid  and Motrin 400mg tid with food prn pain crisis  Cont Folic acid 1 mg daily, Hydroxyurea 1500mg daily   Hydrate with 6-8 glasses of water daily   Take MVI with iron daily     Need to check immunization status for prevnar, Men B and Menactra to ensure UTD    Orders:  -     Cancel: Comprehensive Metabolic Panel; Future; Expected date: 11/15/2023  -     Cancel: CBC Auto Differential; Future; Expected date: 11/15/2023  -     Cancel: RETICULOCYTES; Future; Expected date: 11/15/2023  -     Cancel: Thalasseima and Hemoglobinopathy Eval; Future; Expected date: 11/15/2023  -     Cancel: FERRITIN; Future; Expected date: 11/15/2023  -     Cancel: FOLATE; Future; Expected date: 11/15/2023  -     Cancel: Urinalysis; Future; Expected date: 11/15/2023  -     Comprehensive Metabolic Panel; Future; Expected date: 11/15/2023  -     CBC Auto Differential; Future; Expected date: 11/15/2023  -     FERRITIN; Future; Expected date: 11/15/2023  -     FOLATE; Future; Expected date: 11/15/2023  -      RETICULOCYTES; Future; Expected date: 11/15/2023  -     Thalasseima and Hemoglobinopathy Eval; Future; Expected date: 11/15/2023  -     Urinalysis; Future; Expected date: 11/15/2023  -     Microalbumin/Creatinine Ratio, Urine; Future; Expected date: 11/15/2023  -     HYDROcodone-acetaminophen (NORCO) 7.5-325 mg per tablet; Take 1 tablet by mouth every 4 (four) hours as needed for Pain.  Dispense: 60 tablet; Refill: 0    3. Vitamin D deficiency  Assessment & Plan:  Check Vit D level on Vit D 50,000 IU weekly     Orders:  -     Cancel: Vitamin D; Future; Expected date: 11/15/2023  -     Vitamin D; Future; Expected date: 11/15/2023    4. Encounter for surveillance of vaginal ring hormonal contraceptive device  Assessment & Plan:  Cont Nuvaring   Pt has no C/I to nuvaring (no female cancers, no tobacco, no gallbladder, no liver disease, no hx arterial or venous clots)               I spent a total of 30 minutes on the day of the visit.This includes face to face time and non-face to face time preparing to see the patient (eg, review of tests), obtaining and/or reviewing separately obtained history, documenting clinical information in the electronic or other health record, independently interpreting results and communicating results to the patient/family/caregiver, or care coordinator.   Code Status:     Luann Loza MD

## 2023-11-16 DIAGNOSIS — D57.1 HB-SS DISEASE WITHOUT CRISIS: Primary | ICD-10-CM

## 2023-11-16 NOTE — ASSESSMENT & PLAN NOTE
Get labs in 1 month  Refill HC 7.5 mg tid  and Motrin 400mg tid with food prn pain crisis  Cont Folic acid 1 mg daily, Hydroxyurea 1500mg daily   Hydrate with 6-8 glasses of water daily   Take MVI with iron daily     Need to check immunization status for prevnar, Men B and Menactra to ensure UTD

## 2023-11-16 NOTE — ASSESSMENT & PLAN NOTE
Cont Nuvaring   Pt has no C/I to nuvaring (no female cancers, no tobacco, no gallbladder, no liver disease, no hx arterial or venous clots)

## 2023-11-30 ENCOUNTER — HOSPITAL ENCOUNTER (EMERGENCY)
Facility: HOSPITAL | Age: 28
Discharge: HOME | End: 2023-11-30
Attending: EMERGENCY MEDICINE
Payer: MEDICAID

## 2023-11-30 ENCOUNTER — PATIENT MESSAGE (OUTPATIENT)
Dept: PRIMARY CARE CLINIC | Facility: CLINIC | Age: 28
End: 2023-11-30
Payer: MEDICAID

## 2023-11-30 VITALS
RESPIRATION RATE: 19 BRPM | HEART RATE: 92 BPM | DIASTOLIC BLOOD PRESSURE: 59 MMHG | TEMPERATURE: 98 F | WEIGHT: 104 LBS | OXYGEN SATURATION: 100 % | BODY MASS INDEX: 19.65 KG/M2 | SYSTOLIC BLOOD PRESSURE: 100 MMHG

## 2023-11-30 DIAGNOSIS — R00.0 TACHYCARDIA: ICD-10-CM

## 2023-11-30 DIAGNOSIS — D57.1 SICKLE CELL DISEASE WITHOUT CRISIS: ICD-10-CM

## 2023-11-30 DIAGNOSIS — N83.209 HEMORRHAGIC OVARIAN CYST: ICD-10-CM

## 2023-11-30 DIAGNOSIS — R10.32 ACUTE BILATERAL LOWER ABDOMINAL PAIN: ICD-10-CM

## 2023-11-30 DIAGNOSIS — R10.31 ACUTE BILATERAL LOWER ABDOMINAL PAIN: ICD-10-CM

## 2023-11-30 DIAGNOSIS — N30.90 CYSTITIS: Primary | ICD-10-CM

## 2023-11-30 LAB
ALBUMIN SERPL BCP-MCNC: 4.4 G/DL (ref 3.5–5.2)
ALP SERPL-CCNC: 72 U/L (ref 55–135)
ALT SERPL W/O P-5'-P-CCNC: 16 U/L (ref 10–44)
ANION GAP SERPL CALC-SCNC: 9 MMOL/L (ref 8–16)
ANISOCYTOSIS BLD QL SMEAR: SLIGHT
AST SERPL-CCNC: 34 U/L (ref 10–40)
B-HCG UR QL: NEGATIVE
BACTERIA #/AREA URNS HPF: ABNORMAL /HPF
BASOPHILS # BLD AUTO: 0.1 K/UL (ref 0–0.2)
BASOPHILS NFR BLD: 0.4 % (ref 0–1.9)
BILIRUB SERPL-MCNC: 5.3 MG/DL (ref 0.1–1)
BILIRUB UR QL STRIP: NEGATIVE
BUN SERPL-MCNC: 10 MG/DL (ref 6–20)
CALCIUM SERPL-MCNC: 9.5 MG/DL (ref 8.7–10.5)
CHLORIDE SERPL-SCNC: 109 MMOL/L (ref 95–110)
CLARITY UR: ABNORMAL
CO2 SERPL-SCNC: 20 MMOL/L (ref 23–29)
COLOR UR: ABNORMAL
CREAT SERPL-MCNC: 0.8 MG/DL (ref 0.5–1.4)
CTP QC/QA: YES
DIFFERENTIAL METHOD: ABNORMAL
EOSINOPHIL # BLD AUTO: 0.1 K/UL (ref 0–0.5)
EOSINOPHIL NFR BLD: 0.6 % (ref 0–8)
ERYTHROCYTE [DISTWIDTH] IN BLOOD BY AUTOMATED COUNT: 18.5 % (ref 11.5–14.5)
EST. GFR  (NO RACE VARIABLE): >60 ML/MIN/1.73 M^2
GIANT PLATELETS BLD QL SMEAR: PRESENT
GLUCOSE SERPL-MCNC: 87 MG/DL (ref 70–110)
GLUCOSE UR QL STRIP: NEGATIVE
HCT VFR BLD AUTO: 20.2 % (ref 37–48.5)
HGB BLD-MCNC: 7.3 G/DL (ref 12–16)
HGB UR QL STRIP: NEGATIVE
IMM GRANULOCYTES # BLD AUTO: 0.18 K/UL (ref 0–0.04)
IMM GRANULOCYTES NFR BLD AUTO: 0.8 % (ref 0–0.5)
KETONES UR QL STRIP: NEGATIVE
LEUKOCYTE ESTERASE UR QL STRIP: ABNORMAL
LYMPHOCYTES # BLD AUTO: 2.5 K/UL (ref 1–4.8)
LYMPHOCYTES NFR BLD: 10.8 % (ref 18–48)
MCH RBC QN AUTO: 31.2 PG (ref 27–31)
MCHC RBC AUTO-ENTMCNC: 36.1 G/DL (ref 32–36)
MCV RBC AUTO: 86 FL (ref 82–98)
MICROSCOPIC COMMENT: ABNORMAL
MONOCYTES # BLD AUTO: 2.8 K/UL (ref 0.3–1)
MONOCYTES NFR BLD: 12.2 % (ref 4–15)
NEUTROPHILS # BLD AUTO: 17.6 K/UL (ref 1.8–7.7)
NEUTROPHILS NFR BLD: 75.2 % (ref 38–73)
NITRITE UR QL STRIP: POSITIVE
NRBC BLD-RTO: 2 /100 WBC
PH UR STRIP: 6 [PH] (ref 5–8)
PLATELET # BLD AUTO: 354 K/UL (ref 150–450)
PMV BLD AUTO: 9.2 FL (ref 9.2–12.9)
POLYCHROMASIA BLD QL SMEAR: ABNORMAL
POTASSIUM SERPL-SCNC: 4.2 MMOL/L (ref 3.5–5.1)
PROT SERPL-MCNC: 8.2 G/DL (ref 6–8.4)
PROT UR QL STRIP: NEGATIVE
RBC # BLD AUTO: 2.34 M/UL (ref 4–5.4)
RETICS/RBC NFR AUTO: 11.9 % (ref 0.5–2.5)
SICKLE CELLS BLD QL SMEAR: ABNORMAL
SODIUM SERPL-SCNC: 138 MMOL/L (ref 136–145)
SP GR UR STRIP: 1.01 (ref 1–1.03)
SQUAMOUS #/AREA URNS HPF: 14 /HPF
URN SPEC COLLECT METH UR: ABNORMAL
UROBILINOGEN UR STRIP-ACNC: NEGATIVE EU/DL
WBC # BLD AUTO: 23.33 K/UL (ref 3.9–12.7)
WBC #/AREA URNS HPF: 10 /HPF (ref 0–5)
WBC CLUMPS URNS QL MICRO: ABNORMAL

## 2023-11-30 PROCEDURE — 80053 COMPREHEN METABOLIC PANEL: CPT | Performed by: STUDENT IN AN ORGANIZED HEALTH CARE EDUCATION/TRAINING PROGRAM

## 2023-11-30 PROCEDURE — 96375 TX/PRO/DX INJ NEW DRUG ADDON: CPT

## 2023-11-30 PROCEDURE — 99285 EMERGENCY DEPT VISIT HI MDM: CPT | Mod: 25

## 2023-11-30 PROCEDURE — 87491 CHLMYD TRACH DNA AMP PROBE: CPT | Performed by: EMERGENCY MEDICINE

## 2023-11-30 PROCEDURE — 63600175 PHARM REV CODE 636 W HCPCS: Performed by: EMERGENCY MEDICINE

## 2023-11-30 PROCEDURE — 25500020 PHARM REV CODE 255: Performed by: EMERGENCY MEDICINE

## 2023-11-30 PROCEDURE — 25000003 PHARM REV CODE 250: Performed by: EMERGENCY MEDICINE

## 2023-11-30 PROCEDURE — 85025 COMPLETE CBC W/AUTO DIFF WBC: CPT | Performed by: STUDENT IN AN ORGANIZED HEALTH CARE EDUCATION/TRAINING PROGRAM

## 2023-11-30 PROCEDURE — 81000 URINALYSIS NONAUTO W/SCOPE: CPT | Performed by: STUDENT IN AN ORGANIZED HEALTH CARE EDUCATION/TRAINING PROGRAM

## 2023-11-30 PROCEDURE — 96372 THER/PROPH/DIAG INJ SC/IM: CPT | Performed by: EMERGENCY MEDICINE

## 2023-11-30 PROCEDURE — 93010 ELECTROCARDIOGRAM REPORT: CPT | Mod: ,,, | Performed by: INTERNAL MEDICINE

## 2023-11-30 PROCEDURE — 81025 URINE PREGNANCY TEST: CPT | Performed by: STUDENT IN AN ORGANIZED HEALTH CARE EDUCATION/TRAINING PROGRAM

## 2023-11-30 PROCEDURE — 93005 ELECTROCARDIOGRAM TRACING: CPT

## 2023-11-30 PROCEDURE — 96361 HYDRATE IV INFUSION ADD-ON: CPT

## 2023-11-30 PROCEDURE — 85045 AUTOMATED RETICULOCYTE COUNT: CPT | Performed by: STUDENT IN AN ORGANIZED HEALTH CARE EDUCATION/TRAINING PROGRAM

## 2023-11-30 PROCEDURE — 93010 EKG 12-LEAD: ICD-10-PCS | Mod: ,,, | Performed by: INTERNAL MEDICINE

## 2023-11-30 PROCEDURE — 96374 THER/PROPH/DIAG INJ IV PUSH: CPT

## 2023-11-30 RX ORDER — DOXYCYCLINE 100 MG/1
100 CAPSULE ORAL 2 TIMES DAILY
Qty: 20 CAPSULE | Refills: 0 | Status: SHIPPED | OUTPATIENT
Start: 2023-11-30 | End: 2023-11-30 | Stop reason: SDUPTHER

## 2023-11-30 RX ORDER — DICYCLOMINE HYDROCHLORIDE 10 MG/ML
20 INJECTION INTRAMUSCULAR
Status: COMPLETED | OUTPATIENT
Start: 2023-11-30 | End: 2023-11-30

## 2023-11-30 RX ORDER — ONDANSETRON 4 MG/1
4 TABLET, ORALLY DISINTEGRATING ORAL
Status: COMPLETED | OUTPATIENT
Start: 2023-11-30 | End: 2023-11-30

## 2023-11-30 RX ORDER — CIPROFLOXACIN 500 MG/1
500 TABLET ORAL 2 TIMES DAILY
Qty: 14 TABLET | Refills: 0 | Status: SHIPPED | OUTPATIENT
Start: 2023-11-30 | End: 2023-12-07

## 2023-11-30 RX ORDER — HYDROMORPHONE HYDROCHLORIDE 1 MG/ML
1 INJECTION, SOLUTION INTRAMUSCULAR; INTRAVENOUS; SUBCUTANEOUS
Status: COMPLETED | OUTPATIENT
Start: 2023-11-30 | End: 2023-11-30

## 2023-11-30 RX ORDER — DICYCLOMINE HYDROCHLORIDE 20 MG/1
20 TABLET ORAL 2 TIMES DAILY
Qty: 20 TABLET | Refills: 0 | Status: SHIPPED | OUTPATIENT
Start: 2023-11-30 | End: 2023-12-30

## 2023-11-30 RX ORDER — HYDROCODONE BITARTRATE AND ACETAMINOPHEN 7.5; 325 MG/1; MG/1
1 TABLET ORAL EVERY 6 HOURS PRN
Qty: 12 TABLET | Refills: 0 | Status: SHIPPED | OUTPATIENT
Start: 2023-11-30 | End: 2024-01-23

## 2023-11-30 RX ORDER — DOXYCYCLINE 100 MG/1
100 CAPSULE ORAL 2 TIMES DAILY
Qty: 20 CAPSULE | Refills: 0 | Status: SHIPPED | OUTPATIENT
Start: 2023-11-30 | End: 2023-12-10

## 2023-11-30 RX ORDER — DIPHENHYDRAMINE HYDROCHLORIDE 50 MG/ML
50 INJECTION INTRAMUSCULAR; INTRAVENOUS
Status: COMPLETED | OUTPATIENT
Start: 2023-11-30 | End: 2023-11-30

## 2023-11-30 RX ADMIN — DICYCLOMINE HYDROCHLORIDE 20 MG: 10 INJECTION, SOLUTION INTRAMUSCULAR at 02:11

## 2023-11-30 RX ADMIN — SODIUM CHLORIDE 1000 ML: 9 INJECTION, SOLUTION INTRAVENOUS at 05:11

## 2023-11-30 RX ADMIN — HYDROMORPHONE HYDROCHLORIDE 1 MG: 1 INJECTION, SOLUTION INTRAMUSCULAR; INTRAVENOUS; SUBCUTANEOUS at 02:11

## 2023-11-30 RX ADMIN — DIPHENHYDRAMINE HYDROCHLORIDE 50 MG: 50 INJECTION, SOLUTION INTRAMUSCULAR; INTRAVENOUS at 05:11

## 2023-11-30 RX ADMIN — CEFTRIAXONE SODIUM 2 G: 2 INJECTION, POWDER, FOR SOLUTION INTRAMUSCULAR; INTRAVENOUS at 05:11

## 2023-11-30 RX ADMIN — IOHEXOL 75 ML: 350 INJECTION, SOLUTION INTRAVENOUS at 04:11

## 2023-11-30 RX ADMIN — SODIUM CHLORIDE 2000 ML: 9 INJECTION, SOLUTION INTRAVENOUS at 02:11

## 2023-11-30 RX ADMIN — ONDANSETRON 4 MG: 4 TABLET, ORALLY DISINTEGRATING ORAL at 02:11

## 2023-11-30 NOTE — ED TRIAGE NOTES
Pt presents to ED via POV with c/o worsening lower abd pain x1 week. States feels like she has a UTI. Denies dysuria, frequency, flank pain, hematuria,or foul smelling urine. Denies N/V/D, fever. No tx used.

## 2023-11-30 NOTE — ED PROVIDER NOTES
Encounter Date: 11/30/2023       History     Chief Complaint   Patient presents with    Abdominal Pain     Pt presents to the ER with abdominal pain. Pt has a hx of sickle cell, pt stated she is in pain does not feel she is in a crisis. Pt also stated she has a hx of UTI and the pain is someone the same. She describes her pain as sharp and stabbing.      HPI  This 28-year-old black female presents emergency with a history of sickle cell disease complaining of a one-week history of crampy symmetrical bilateral low pelvic pain.  Her pain is worse over the course of the last 24 hours.  She also has some pain in the right knee.  There is no painful urination vomiting or diarrhea the patient denies vaginal discharge vaginal bleeding.  She does not believe that she is pregnant.  The patient has a history of chronic kidney disease and urinary tract infections.  Review of patient's allergies indicates:   Allergen Reactions    Rocephin [ceftriaxone] Shortness Of Breath, Itching and Anxiety     Past Medical History:   Diagnosis Date    Chronic kidney disease (CKD) 4/9/2018    Emesis 11/23/2022    LGSIL on Pap smear of cervix 6/1/2017    LLQ abdominal pain 3/2/2023    Sickle cell anemia     Sickle cell disease     UTI (urinary tract infection) 3/2/2023     Past Surgical History:   Procedure Laterality Date    BREAST LUMPECTOMY      CARDIAC SURGERY      NEPHRECTOMY       Family History   Problem Relation Age of Onset    Sickle cell trait Father     Sickle cell trait Mother     Sickle cell trait Sister     Sickle cell trait Sister     Breast cancer Neg Hx     Colon cancer Neg Hx     Ovarian cancer Neg Hx      Social History     Tobacco Use    Smoking status: Every Day     Types: Vaping with nicotine    Smokeless tobacco: Never   Substance Use Topics    Alcohol use: No    Drug use: No     Review of Systems  The patient was questioned specifically with regard to the following.  General: Fever, chills, sweats. Neuro: Headache.  Eyes: eye problems. ENT: Ear pain, sore throat. Cardiovascular: Chest pain. Respiratory: Cough, shortness of breath. Gastrointestinal: Abdominal pain, vomiting, diarrhea. Genitourinary: Painful urination.  Musculoskeletal: Arm and leg problems. Skin: Rash.  The review of systems was negative except for the following:  Pelvic pain, right knee pain,  Physical Exam     Initial Vitals [11/30/23 1359]   BP Pulse Resp Temp SpO2   133/60 (!) 122 20 98.1 °F (36.7 °C) 99 %      MAP       --         Physical Exam  The patient was examined specifically for the following:   General:No significant distress, Good color, Warm and dry. Head and neck:Scalp atraumatic, Neck supple. Neurological:Appropriate conversation, Gross motor deficits. Eyes:Conjugate gaze, Clear corneas. ENT: No epistaxis. Cardiac: Regular rate and rhythm, Grossly normal heart tones. Pulmonary: Wheezing, Rales. Gastrointestinal: Abdominal tenderness, Abdominal distention. Musculoskeletal: Extremity deformity, Apparent pain with range of motion of the joints. Skin: Rash.   The findings on examination were normal except for the following:  The patient has bilateral symmetrical low pelvic tenderness.  There is some guarding.  She will not permit testing for rebound.  There is no significant tenderness or pain with range of motion of the right knee.   ED Course   Procedures  Labs Reviewed   URINALYSIS, REFLEX TO URINE CULTURE - Abnormal; Notable for the following components:       Result Value    Color, UA Orange (*)     Appearance, UA Hazy (*)     Nitrite, UA Positive (*)     Leukocytes, UA Trace (*)     All other components within normal limits    Narrative:     Specimen Source->Urine   CBC W/ AUTO DIFFERENTIAL - Abnormal; Notable for the following components:    WBC 23.33 (*)     RBC 2.34 (*)     Hemoglobin 7.3 (*)     Hematocrit 20.2 (*)     MCH 31.2 (*)     MCHC 36.1 (*)     RDW 18.5 (*)     Immature Granulocytes 0.8 (*)     Gran # (ANC) 17.6 (*)     Immature  Grans (Abs) 0.18 (*)     Mono # 2.8 (*)     nRBC 2 (*)     Gran % 75.2 (*)     Lymph % 10.8 (*)     Sickle Cells Occasional (*)     All other components within normal limits   COMPREHENSIVE METABOLIC PANEL - Abnormal; Notable for the following components:    CO2 20 (*)     Total Bilirubin 5.3 (*)     All other components within normal limits   RETICULOCYTES - Abnormal; Notable for the following components:    Retic 11.9 (*)     All other components within normal limits   URINALYSIS MICROSCOPIC - Abnormal; Notable for the following components:    WBC, UA 10 (*)     All other components within normal limits    Narrative:     Specimen Source->Urine   C. TRACHOMATIS/N. GONORRHOEAE BY AMP DNA   POCT URINE PREGNANCY     EKG Readings: (Independently Interpreted)   This patient is in a normal sinus rhythm with a heart rate of 86 there are no significant ST segment or T-wave changes.  There is no definite evidence of acute myocardial infarction or malignant arrhythmia.  This is a normal EKG.       Imaging Results              CT Abdomen Pelvis With IV Contrast NO Oral Contrast (Final result)  Result time 11/30/23 17:20:46      Final result by Camille Menendez MD (11/30/23 17:20:46)                   Impression:      1. No acute intra-abdominal abnormalities identified.  2. Right ovarian 2.5 cm cyst or possible hemorrhagic cyst which measures slightly higher than simple fluid density.  3. Postsurgical changes and additional findings as detailed above.      Electronically signed by: Camille Menendez MD  Date:    11/30/2023  Time:    17:20               Narrative:    EXAMINATION:  CT ABDOMEN PELVIS WITH IV CONTRAST    CLINICAL HISTORY:  Peritonitis or perforation suspected;    TECHNIQUE:  Low dose axial images, sagittal and coronal reformations were obtained from the lung bases to the pubic symphysis following the IV administration of 75 mL of Omnipaque 350 .  Oral contrast was not given.    COMPARISON:  CT abdomen and pelvis  from 03/02/2023.    FINDINGS:  The visualized portion of the heart is unremarkable.  The lung bases are clear.  Distal esophagus is mildly dilated with air and fluid which can be seen with gastroesophageal reflux.    No significant hepatic abnormalities are identified.  There is no intra-or extrahepatic biliary ductal dilatation.  Gallbladder has been removed.  Spleen is absent.  The stomach, pancreas, and adrenal glands are unremarkable.    Right kidney enhances normally with no evidence of hydronephrosis.  Left kidney is absent.  Right adnexal/ovarian 2.5 cm hypodense lesion is seen which measures slightly higher than simple fluid density.  Small volume free fluid is seen dependently within the pelvis, possibly physiologic.    Appendix is visualized and is unremarkable.  The visualized loops of small and large bowel show no evidence of obstruction or inflammation.  No free air.    Aorta tapers normally.    No acute osseous abnormality identified. Subcutaneous soft tissues show no significant abnormalities.                                       Medications   HYDROmorphone injection 1 mg (1 mg Intravenous Given 11/30/23 1445)   sodium chloride 0.9% bolus 2,000 mL 2,000 mL (0 mLs Intravenous Stopped 11/30/23 1645)   dicyclomine injection 20 mg (20 mg Intramuscular Given 11/30/23 1446)   ondansetron disintegrating tablet 4 mg (4 mg Oral Given 11/30/23 1445)   sodium chloride 0.9% bolus 1,000 mL 1,000 mL (1,000 mLs Intravenous New Bag 11/30/23 1713)   cefTRIAXone (ROCEPHIN) 2 g in dextrose 5 % in water (D5W) 100 mL IVPB (MB+) (0 g Intravenous Stopped 11/30/23 1722)   iohexoL (OMNIPAQUE 350) injection 75 mL (75 mLs Intravenous Given 11/30/23 1624)   diphenhydrAMINE injection 50 mg (50 mg Intravenous Given 11/30/23 1725)     Medical Decision Making  Amount and/or Complexity of Data Reviewed  Labs: ordered.  Radiology: ordered.    Risk  Prescription drug management.    Given the above this patient presents to the  emergency room with bilateral crampy low pelvic pain for 5 days, worse over the course the afternoon.  The patient's pain is symmetrical.  CT of the abdomen fails to reveal acute appendicitis or other peritonitis or bowel obstruction.  Torsion of the ovary is considered but the patient's pain improved to a level 5.  I do not feel that this is large enough to put the patient at risk for ovarian torsion.  There are no ureteral calculi.  The patient does have an elevated white blood count at 23,000. This can not be seen at sickle cell disease.  This is higher than the patient's usual white blood count.  This may be demargination phenomenon.  The patient does have nitrite positive urine.  She was treated with IV Rocephin in the emergency room she received approximately a g and developed some itching.  We stopped this medicine.  We will list her as allergic to Rocephin.  At 6:18 p.m. the patient's pain fell to a level 2.  I carefully considered ovarian torsion this seems unlikely.  Hemorrhagic cyst seems likely.  I will refer to follow up with Gynecology I will treat for pain.  I will also treat for urinary tract infection.  Pelvic examination revealed some minimal whitish discharge only there was no purulent discharge.  There was moderate cervical motion tenderness.  Both adnexa were symmetrically tender.  No adnexal masses.  Ruptured ovarian cyst is in the differential diagnosis.  Pelvic inflammatory disease is considered as well but seems less likely.  The patient will be treated.  There is no costovertebral angle tenderness to suggest pyelonephritis.                                  Clinical Impression:  Final diagnoses:  [R00.0] Tachycardia  [N30.90] Cystitis (Primary)  [N83.209] Hemorrhagic ovarian cyst  [R10.31, R10.32] Acute bilateral lower abdominal pain  [D57.1] Sickle cell disease without crisis          ED Disposition Condition    Discharge Stable          ED Prescriptions       Medication Sig Dispense Start  Date End Date Auth. Provider    HYDROcodone-acetaminophen (NORCO) 7.5-325 mg per tablet Take 1 tablet by mouth every 6 (six) hours as needed for Pain. 12 tablet 11/30/2023 -- Bertram Somers MD    dicyclomine (BENTYL) 20 mg tablet Take 1 tablet (20 mg total) by mouth 2 (two) times daily. 20 tablet 11/30/2023 12/30/2023 Bertram Somers MD    doxycycline (VIBRAMYCIN) 100 MG Cap Take 1 capsule (100 mg total) by mouth 2 (two) times daily. for 10 days 20 capsule 11/30/2023 12/10/2023 Bertram Somers MD    ciprofloxacin HCl (CIPRO) 500 MG tablet Take 1 tablet (500 mg total) by mouth 2 (two) times daily. for 7 days 14 tablet 11/30/2023 12/7/2023 Bertram Somers MD          Follow-up Information       Follow up With Specialties Details Why Contact Info    Carmelo Burgess III, MD Obstetrics and Gynecology In 3 days  [address]  [address]  [place] [address]  [phone]      Luann Loza MD Internal Medicine In 3 days  [address] [place] [address]  [place] [id number] [phone]               Bertram Somers MD  11/30/23 [id number]       Bertram Somers MD  11/30/23 1644

## 2023-12-01 ENCOUNTER — TELEPHONE (OUTPATIENT)
Dept: PRIMARY CARE CLINIC | Facility: CLINIC | Age: 28
End: 2023-12-01
Payer: MEDICAID

## 2023-12-01 ENCOUNTER — PATIENT MESSAGE (OUTPATIENT)
Dept: PRIMARY CARE CLINIC | Facility: CLINIC | Age: 28
End: 2023-12-01
Payer: MEDICAID

## 2023-12-01 LAB
C TRACH DNA SPEC QL NAA+PROBE: NOT DETECTED
N GONORRHOEA DNA SPEC QL NAA+PROBE: NOT DETECTED

## 2023-12-01 NOTE — DISCHARGE INSTRUCTIONS
Please return immediately if you get worse or if new problems develop.  Please follow-up with your primary care doctor this week.  Rest.  Lots of liquids.  Dicyclomine for cramping.  Tylenol with hydrocodone for pain.  Please follow-up with your gynecologist, or the gynecologist above this week.  Rest.

## 2023-12-03 DIAGNOSIS — D57.1 HB-SS DISEASE WITHOUT CRISIS: ICD-10-CM

## 2023-12-03 NOTE — TELEPHONE ENCOUNTER
No care due was identified.  [facility] Embedded Care Due Messages. Reference number: [id number].   12/03/2023 10:29:17 AM CST

## 2023-12-04 RX ORDER — FOLIC ACID 1 MG/1
1 TABLET ORAL
Qty: 90 TABLET | Refills: 3 | Status: SHIPPED | OUTPATIENT
Start: 2023-12-04

## 2023-12-29 LAB
25(OH)D3 SERPL-MCNC: 29 NG/ML (ref 30–100)
ALBUMIN SERPL-MCNC: 4.5 G/DL (ref 3.6–5.1)
ALBUMIN/GLOB SERPL: 1.5 (CALC) (ref 1–2.5)
ALP SERPL-CCNC: 59 U/L (ref 31–125)
ALT SERPL-CCNC: 12 U/L (ref 6–29)
AST SERPL-CCNC: 28 U/L (ref 10–30)
BASOPHILS # BLD AUTO: 79 CELLS/UL (ref 0–200)
BASOPHILS NFR BLD AUTO: 0.9 %
BILIRUB SERPL-MCNC: 4.7 MG/DL (ref 0.2–1.2)
BUN SERPL-MCNC: 7 MG/DL (ref 7–25)
BUN/CREAT SERPL: ABNORMAL (CALC) (ref 6–22)
CALCIUM SERPL-MCNC: 9.6 MG/DL (ref 8.6–10.2)
CHLORIDE SERPL-SCNC: 107 MMOL/L (ref 98–110)
CO2 SERPL-SCNC: 26 MMOL/L (ref 20–32)
COMMENT: NORMAL
CREAT SERPL-MCNC: 0.76 MG/DL (ref 0.5–0.96)
EGFR: 111 ML/MIN/1.73M2
EOSINOPHIL # BLD AUTO: 88 CELLS/UL (ref 15–500)
EOSINOPHIL NFR BLD AUTO: 1 %
ERYTHROCYTE [DISTWIDTH] IN BLOOD BY AUTOMATED COUNT: 18.5 % (ref 11–15)
FERRITIN SERPL-MCNC: 448 NG/ML (ref 16–154)
FOLATE SERPL-MCNC: 9.3 NG/ML
GLOBULIN SER CALC-MCNC: 3 G/DL (CALC) (ref 1.9–3.7)
GLUCOSE SERPL-MCNC: 83 MG/DL (ref 65–99)
HAV IGM SERPL QL IA: NORMAL
HBV CORE IGM SERPL QL IA: NORMAL
HBV SURFACE AG SERPL QL IA: NORMAL
HCT VFR BLD AUTO: 21 % (ref 35–45)
HCV AB SERPL QL IA: NORMAL
HGB BLD-MCNC: 7.1 G/DL (ref 11.7–15.5)
HIV 1+2 AB+HIV1 P24 AG SERPL QL IA: NORMAL
LYMPHOCYTES # BLD AUTO: 2728 CELLS/UL (ref 850–3900)
LYMPHOCYTES NFR BLD AUTO: 31 %
MCH RBC QN AUTO: 31.6 PG (ref 27–33)
MCHC RBC AUTO-ENTMCNC: 33.8 G/DL (ref 32–36)
MCV RBC AUTO: 93.3 FL (ref 80–100)
MONOCYTES # BLD AUTO: 1707 CELLS/UL (ref 200–950)
MONOCYTES NFR BLD AUTO: 19.4 %
NEUTROPHILS # BLD AUTO: 4198 CELLS/UL (ref 1500–7800)
NEUTROPHILS NFR BLD AUTO: 47.7 %
PLATELET # BLD AUTO: 303 THOUSAND/UL (ref 140–400)
PMV BLD REES-ECKER: 9.8 FL (ref 7.5–12.5)
POTASSIUM SERPL-SCNC: 4.1 MMOL/L (ref 3.5–5.3)
PROT SERPL-MCNC: 7.5 G/DL (ref 6.1–8.1)
RBC # BLD AUTO: 2.25 MILLION/UL (ref 3.8–5.1)
RETICS #: ABNORMAL CELLS/UL (ref 20000–80000)
RETICS/RBC NFR AUTO: 14.4 %
RPR SER QL: NORMAL
SODIUM SERPL-SCNC: 139 MMOL/L (ref 135–146)
WBC # BLD AUTO: 8.8 THOUSAND/UL (ref 3.8–10.8)

## 2024-01-18 ENCOUNTER — TELEPHONE (OUTPATIENT)
Dept: PRIMARY CARE CLINIC | Facility: CLINIC | Age: 29
End: 2024-01-18
Payer: MEDICAID

## 2024-01-23 ENCOUNTER — OFFICE VISIT (OUTPATIENT)
Dept: OBSTETRICS AND GYNECOLOGY | Facility: CLINIC | Age: 29
End: 2024-01-23
Payer: MEDICAID

## 2024-01-23 VITALS
DIASTOLIC BLOOD PRESSURE: 66 MMHG | BODY MASS INDEX: 19.15 KG/M2 | HEIGHT: 61 IN | SYSTOLIC BLOOD PRESSURE: 110 MMHG | WEIGHT: 101.44 LBS

## 2024-01-23 DIAGNOSIS — N83.209 CYST OF OVARY, UNSPECIFIED LATERALITY: Primary | ICD-10-CM

## 2024-01-23 DIAGNOSIS — D57.1 HEMOGLOBIN SS DISEASE WITHOUT CRISIS: ICD-10-CM

## 2024-01-23 PROCEDURE — 99213 OFFICE O/P EST LOW 20 MIN: CPT | Mod: S$PBB,,, | Performed by: OBSTETRICS & GYNECOLOGY

## 2024-01-23 PROCEDURE — 3074F SYST BP LT 130 MM HG: CPT | Mod: CPTII,,, | Performed by: OBSTETRICS & GYNECOLOGY

## 2024-01-23 PROCEDURE — 3078F DIAST BP <80 MM HG: CPT | Mod: CPTII,,, | Performed by: OBSTETRICS & GYNECOLOGY

## 2024-01-23 PROCEDURE — 99999 PR PBB SHADOW E&M-EST. PATIENT-LVL III: CPT | Mod: PBBFAC,,, | Performed by: OBSTETRICS & GYNECOLOGY

## 2024-01-23 PROCEDURE — 1160F RVW MEDS BY RX/DR IN RCRD: CPT | Mod: CPTII,,, | Performed by: OBSTETRICS & GYNECOLOGY

## 2024-01-23 PROCEDURE — 3008F BODY MASS INDEX DOCD: CPT | Mod: CPTII,,, | Performed by: OBSTETRICS & GYNECOLOGY

## 2024-01-23 PROCEDURE — 99213 OFFICE O/P EST LOW 20 MIN: CPT | Mod: PBBFAC | Performed by: OBSTETRICS & GYNECOLOGY

## 2024-01-23 PROCEDURE — 1159F MED LIST DOCD IN RCRD: CPT | Mod: CPTII,,, | Performed by: OBSTETRICS & GYNECOLOGY

## 2024-01-23 RX ORDER — NORETHINDRONE ACETATE AND ETHINYL ESTRADIOL .02; 1 MG/1; MG/1
1 TABLET ORAL DAILY
Qty: 30 TABLET | Refills: 11 | Status: SHIPPED | OUTPATIENT
Start: 2024-01-23 | End: 2025-01-22

## 2024-01-23 NOTE — PROGRESS NOTES
Subjective:      Patient ID: Lana Chou is a 28 y.o. female.    Chief Complaint:  Hospital Follow Up (Pt had CT done on 2023 and was told she had cyst. Pt has been having pain during ovulation for the past 3 months.  She is not on any birth control at this time. )      History of Present Illness  HPI  Patient comes in today requesting help for history of ovarian cysts, dysmenorrhea  Has been to the ED several times.  Radiologic studies with ovarian cysts    Was on Depo Provera previously resulting in depression  Has not tried OCP/patches/ring yet.    History of sickle cell disease    GYN & OB History  Patient's last menstrual period was 2024 (exact date).   Date of Last Pap: 2020    OB History    Para Term  AB Living   0 0 0 0 0 0   SAB IAB Ectopic Multiple Live Births   0 0 0 0 0     Past Medical History:   Diagnosis Date    Chronic kidney disease (CKD) 2018    Emesis 2022    LGSIL on Pap smear of cervix 2017    LLQ abdominal pain 3/2/2023    Sickle cell anemia     Sickle cell disease     UTI (urinary tract infection) 3/2/2023       Past Surgical History:   Procedure Laterality Date    BREAST LUMPECTOMY      CARDIAC SURGERY      NEPHRECTOMY         Family History   Problem Relation Age of Onset    Sickle cell trait Father     Sickle cell trait Mother     Sickle cell trait Sister     Sickle cell trait Sister     Breast cancer Neg Hx     Colon cancer Neg Hx     Ovarian cancer Neg Hx        Social History     Socioeconomic History    Marital status: Single   Tobacco Use    Smoking status: Every Day     Types: Vaping with nicotine    Smokeless tobacco: Never   Substance and Sexual Activity    Alcohol use: No    Drug use: No    Sexual activity: Yes     Partners: Male     Birth control/protection: Condom   Social History Narrative    Together since 2021    He is  a     She worked at [employer].  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      Social Determinants of Health     Financial Resource Strain: Low Risk  (11/14/2023)    Overall Financial Resource Strain (CARDIA)     Difficulty of Paying Living Expenses: Not hard at all   Food Insecurity: No Food Insecurity (11/14/2023)    Hunger Vital Sign     Worried About Running Out of Food in the Last Year: Never true     Ran Out of Food in the Last Year: Never true   Transportation Needs: No Transportation Needs (11/14/2023)    PRAPARE - Transportation     Lack of Transportation (Medical): No     Lack of Transportation (Non-Medical): No   Physical Activity: Inactive (11/14/2023)    Exercise Vital Sign     Days of Exercise per Week: 0 days     Minutes of Exercise per Session: 0 min   Stress: No Stress Concern Present (11/14/2023)    [employer] - Occupational Stress Questionnaire     Feeling of Stress : Not at all   Social Connections: Unknown (11/14/2023)    Social Connection and Isolation Panel [NHANES]     Frequency of Communication with Friends and Family: More than three times a week     Frequency of Social Gatherings with Friends and Family: Once a week     Active Member of Clubs or Organizations: No     Attends Club or Organization Meetings: Never     Marital Status: Never    Housing Stability: Low Risk  (11/14/2023)    Housing Stability Vital Sign     Unable to Pay for Housing in the Last Year: No     Number of Places Lived in the Last Year: 1     Unstable Housing in the Last Year: No       Current Outpatient Medications   Medication Sig Dispense Refill    ascorbic acid, vitamin C, (VITAMIN C) 1,000 mg TbSR Take 1 tablet by mouth once daily. 90 each 3    cholecalciferol, vitamin D3, 1,250 mcg (50,000 unit) capsule Take 1 capsule (50,000 Units total) by mouth every 7 days. 12 capsule 3    folic acid (FOLVITE) 1 MG tablet TAKE 1 TABLET(1 MG) BY MOUTH EVERY DAY 90 tablet 3    hydroxyurea (HYDREA) 500 mg Cap Take 3 capsules (1,500 mg total) by mouth once daily. 90  I will START or STAY ON the medications listed below when I get home from the hospital:    acetaminophen 160 mg/5 mL oral suspension  -- 5 milliliter(s) by mouth every 6 hours, As needed, Mild Pain (1 - 3)  -- Indication: For mild pain    ibuprofen  -- 150 milligram(s) by mouth every 6 hours, As Needed  -- Indication: For moderate pain    oxyCODONE 5 mg/5 mL oral solution  -- 1.5 milliliter(s) by mouth every 6 hours, As needed, Severe Pain (7 - 10) MDD:6 mL  -- Indication: For Severe pain capsule 6    ibuprofen (ADVIL,MOTRIN) 400 MG tablet Take one tab po tid with food prn 90 tablet 3    ondansetron (ZOFRAN) 4 MG tablet Take 1 tablet (4 mg total) by mouth every 6 (six) hours. 12 tablet 0    norethindrone-ethinyl estradiol (MICROGESTIN 1/20) 1-20 mg-mcg per tablet Take 1 tablet by mouth once daily. 30 tablet 11    triamcinolone acetonide 0.1% (KENALOG) 0.1 % cream Apply topically 2 (two) times daily. Apply bid x 7-10 days 15 g 1     No current facility-administered medications for this visit.       Review of patient's allergies indicates:   Allergen Reactions    Rocephin [ceftriaxone] Shortness Of Breath, Itching and Anxiety       Review of Systems  Review of Systems   Constitutional:  Negative for activity change, appetite change, chills, fatigue, fever and unexpected weight change.   HENT:  Negative for mouth sores.    Respiratory:  Negative for cough, shortness of breath and wheezing.    Cardiovascular:  Negative for chest pain and palpitations.   Gastrointestinal:  Negative for abdominal pain, bloating, blood in stool, constipation, nausea and vomiting.   Endocrine: Negative for diabetes and hot flashes.   Genitourinary:  Negative for dysmenorrhea, dyspareunia, dysuria, frequency, hematuria, menorrhagia, menstrual problem, pelvic pain, urgency, vaginal bleeding, vaginal discharge, vaginal pain, urinary incontinence, postcoital bleeding and vaginal odor.   Musculoskeletal:  Negative for back pain and myalgias.   Integumentary:  Negative for rash, breast mass and nipple discharge.   Neurological:  Negative for seizures and headaches.   Psychiatric/Behavioral:  Negative for depression and sleep disturbance. The patient is not nervous/anxious.    Breast: Negative for mass, mastodynia and nipple discharge         Objective:     Physical Exam:   Constitutional: She appears well-developed and well-nourished. No distress.    HENT:   Head: Normocephalic and atraumatic.    Eyes: EOM are normal.      Cardiovascular:  Normal rate.             Pulmonary/Chest: Effort normal. No respiratory distress.        Abdominal: Soft. She exhibits no distension. There is no abdominal tenderness. There is no rebound and no guarding.             Musculoskeletal: Normal range of motion.       Neurological: She is alert.    Skin: Skin is warm and dry.    Psychiatric: She has a normal mood and affect.         Assessment:     1. 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  We discussed management for ovarian cysts  Would try low dose OCP, understanding risks and benefits  Microgestin 1/20 to start today  Back in 2-3 months for follow-up    She needs to stop smoking/vaping TODAY      I will START or STAY ON the medications listed below when I get home from the hospital:    oxyCODONE 5 mg/5 mL oral solution  -- 1.5 milliliter(s) by mouth every 6 hours, As needed, Severe Pain (7 - 10) MDD:6 mL  -- Indication: For Severe pain     Advil Children's 100 mg/5 mL oral suspension  -- 5 milliliter(s) by mouth every 6 hours, As Needed   -- Do not take this drug if you are pregnant.  It is very important that you take or use this exactly as directed.  Do not skip doses or discontinue unless directed by your doctor.  May cause drowsiness or dizziness.  Obtain medical advice before taking any non-prescription drugs as some may affect the action of this medication.  Shake well before use.  Take with food or milk.    -- Indication: For mild-moderate pain    acetaminophen 160 mg/5 mL oral suspension  -- 5 milliliter(s) by mouth every 6 hours, As needed, Mild Pain (1 - 3)  -- Indication: For mild-moderate pain

## 2024-01-31 DIAGNOSIS — D57.00 SICKLE CELL PAIN CRISIS: Primary | ICD-10-CM

## 2024-01-31 RX ORDER — TRIAMCINOLONE ACETONIDE 1 MG/G
CREAM TOPICAL 2 TIMES DAILY
Qty: 80 G | Refills: 1 | Status: SHIPPED | OUTPATIENT
Start: 2024-01-31 | End: 2024-03-01

## 2024-01-31 RX ORDER — HYDROCODONE BITARTRATE AND ACETAMINOPHEN 7.5; 325 MG/1; MG/1
1 TABLET ORAL EVERY 6 HOURS PRN
Qty: 28 TABLET | Refills: 0 | Status: SHIPPED | OUTPATIENT
Start: 2024-01-31 | End: 2024-05-08 | Stop reason: SDUPTHER

## 2024-02-06 ENCOUNTER — PATIENT MESSAGE (OUTPATIENT)
Dept: OBSTETRICS AND GYNECOLOGY | Facility: CLINIC | Age: 29
End: 2024-02-06
Payer: MEDICAID

## 2024-02-19 PROBLEM — Z00.00 PREVENTATIVE HEALTH CARE: Status: RESOLVED | Noted: 2022-11-23 | Resolved: 2024-02-19

## 2024-02-22 ENCOUNTER — HOSPITAL ENCOUNTER (EMERGENCY)
Facility: HOSPITAL | Age: 29
Discharge: HOME | End: 2024-02-22
Attending: EMERGENCY MEDICINE
Payer: MEDICAID

## 2024-02-22 ENCOUNTER — TELEPHONE (OUTPATIENT)
Dept: HEMATOLOGY/ONCOLOGY | Facility: CLINIC | Age: 29
End: 2024-02-22
Payer: MEDICAID

## 2024-02-22 VITALS
DIASTOLIC BLOOD PRESSURE: 50 MMHG | SYSTOLIC BLOOD PRESSURE: 102 MMHG | RESPIRATION RATE: 22 BRPM | OXYGEN SATURATION: 98 % | TEMPERATURE: 99 F | HEART RATE: 110 BPM

## 2024-02-22 DIAGNOSIS — N30.00 ACUTE CYSTITIS WITHOUT HEMATURIA: ICD-10-CM

## 2024-02-22 DIAGNOSIS — D57.00 SICKLE CELL PAIN CRISIS: Primary | ICD-10-CM

## 2024-02-22 LAB
ABO + RH BLD: NORMAL
ALBUMIN SERPL BCP-MCNC: 4 G/DL (ref 3.5–5.2)
ALP SERPL-CCNC: 84 U/L (ref 55–135)
ALT SERPL W/O P-5'-P-CCNC: 28 U/L (ref 10–44)
ANION GAP SERPL CALC-SCNC: 11 MMOL/L (ref 8–16)
AST SERPL-CCNC: 37 U/L (ref 10–40)
BACTERIA #/AREA URNS HPF: ABNORMAL /HPF
BASOPHILS # BLD AUTO: ABNORMAL K/UL (ref 0–0.2)
BASOPHILS NFR BLD: 1 % (ref 0–1.9)
BILIRUB SERPL-MCNC: 2.1 MG/DL (ref 0.1–1)
BILIRUB UR QL STRIP: NEGATIVE
BLD GP AB SCN CELLS X3 SERPL QL: NORMAL
BUN SERPL-MCNC: 11 MG/DL (ref 6–20)
CALCIUM SERPL-MCNC: 9.2 MG/DL (ref 8.7–10.5)
CHLORIDE SERPL-SCNC: 107 MMOL/L (ref 95–110)
CLARITY UR: CLEAR
CO2 SERPL-SCNC: 21 MMOL/L (ref 23–29)
COLOR UR: YELLOW
CREAT SERPL-MCNC: 0.9 MG/DL (ref 0.5–1.4)
D DIMER PPP IA.FEU-MCNC: 1.91 MG/L FEU
DIFFERENTIAL METHOD BLD: ABNORMAL
EOSINOPHIL # BLD AUTO: ABNORMAL K/UL (ref 0–0.5)
EOSINOPHIL NFR BLD: 2 % (ref 0–8)
ERYTHROCYTE [DISTWIDTH] IN BLOOD BY AUTOMATED COUNT: 22.3 % (ref 11.5–14.5)
EST. GFR  (NO RACE VARIABLE): >60 ML/MIN/1.73 M^2
GLUCOSE SERPL-MCNC: 94 MG/DL (ref 70–110)
GLUCOSE UR QL STRIP: NEGATIVE
HCT VFR BLD AUTO: 19.1 % (ref 37–48.5)
HGB BLD-MCNC: 6.8 G/DL (ref 12–16)
HGB UR QL STRIP: NEGATIVE
IMM GRANULOCYTES # BLD AUTO: ABNORMAL K/UL (ref 0–0.04)
IMM GRANULOCYTES NFR BLD AUTO: ABNORMAL % (ref 0–0.5)
KETONES UR QL STRIP: NEGATIVE
LEUKOCYTE ESTERASE UR QL STRIP: ABNORMAL
LYMPHOCYTES # BLD AUTO: ABNORMAL K/UL (ref 1–4.8)
LYMPHOCYTES NFR BLD: 29 % (ref 18–48)
MCH RBC QN AUTO: 34.2 PG (ref 27–31)
MCHC RBC AUTO-ENTMCNC: 35.6 G/DL (ref 32–36)
MCV RBC AUTO: 96 FL (ref 82–98)
MICROSCOPIC COMMENT: ABNORMAL
MONOCYTES # BLD AUTO: ABNORMAL K/UL (ref 0.3–1)
MONOCYTES NFR BLD: 5 % (ref 4–15)
MYELOCYTES NFR BLD MANUAL: 1 %
NEUTROPHILS NFR BLD: 52 % (ref 38–73)
NEUTS BAND NFR BLD MANUAL: 10 %
NITRITE UR QL STRIP: NEGATIVE
NRBC BLD-RTO: 3 /100 WBC
PH UR STRIP: 7 [PH] (ref 5–8)
PLATELET # BLD AUTO: 324 K/UL (ref 150–450)
PLATELET BLD QL SMEAR: ABNORMAL
PMV BLD AUTO: 10.3 FL (ref 9.2–12.9)
POTASSIUM SERPL-SCNC: 3.3 MMOL/L (ref 3.5–5.1)
PROT SERPL-MCNC: 7.8 G/DL (ref 6–8.4)
PROT UR QL STRIP: NEGATIVE
RBC # BLD AUTO: 1.99 M/UL (ref 4–5.4)
RETICS/RBC NFR AUTO: 15 % (ref 0.5–2.5)
SODIUM SERPL-SCNC: 139 MMOL/L (ref 136–145)
SP GR UR STRIP: 1.01 (ref 1–1.03)
SPECIMEN OUTDATE: NORMAL
SPHEROCYTES BLD QL SMEAR: ABNORMAL
SQUAMOUS #/AREA URNS HPF: 1 /HPF
URN SPEC COLLECT METH UR: ABNORMAL
UROBILINOGEN UR STRIP-ACNC: NEGATIVE EU/DL
WBC # BLD AUTO: 13.7 K/UL (ref 3.9–12.7)
WBC #/AREA URNS HPF: 25 /HPF (ref 0–5)
WBC CLUMPS URNS QL MICRO: ABNORMAL

## 2024-02-22 PROCEDURE — 87077 CULTURE AEROBIC IDENTIFY: CPT | Performed by: EMERGENCY MEDICINE

## 2024-02-22 PROCEDURE — 96361 HYDRATE IV INFUSION ADD-ON: CPT

## 2024-02-22 PROCEDURE — 87086 URINE CULTURE/COLONY COUNT: CPT | Performed by: EMERGENCY MEDICINE

## 2024-02-22 PROCEDURE — 81000 URINALYSIS NONAUTO W/SCOPE: CPT | Performed by: EMERGENCY MEDICINE

## 2024-02-22 PROCEDURE — 96365 THER/PROPH/DIAG IV INF INIT: CPT

## 2024-02-22 PROCEDURE — 85007 BL SMEAR W/DIFF WBC COUNT: CPT | Performed by: EMERGENCY MEDICINE

## 2024-02-22 PROCEDURE — 85045 AUTOMATED RETICULOCYTE COUNT: CPT | Performed by: EMERGENCY MEDICINE

## 2024-02-22 PROCEDURE — 25500020 PHARM REV CODE 255: Performed by: EMERGENCY MEDICINE

## 2024-02-22 PROCEDURE — 96376 TX/PRO/DX INJ SAME DRUG ADON: CPT

## 2024-02-22 PROCEDURE — 99205 OFFICE O/P NEW HI 60 MIN: CPT | Mod: ,,, | Performed by: STUDENT IN AN ORGANIZED HEALTH CARE EDUCATION/TRAINING PROGRAM

## 2024-02-22 PROCEDURE — 80053 COMPREHEN METABOLIC PANEL: CPT | Performed by: EMERGENCY MEDICINE

## 2024-02-22 PROCEDURE — 25000003 PHARM REV CODE 250: Performed by: EMERGENCY MEDICINE

## 2024-02-22 PROCEDURE — 86901 BLOOD TYPING SEROLOGIC RH(D): CPT | Performed by: EMERGENCY MEDICINE

## 2024-02-22 PROCEDURE — 87088 URINE BACTERIA CULTURE: CPT | Performed by: EMERGENCY MEDICINE

## 2024-02-22 PROCEDURE — 99285 EMERGENCY DEPT VISIT HI MDM: CPT | Mod: 25

## 2024-02-22 PROCEDURE — 96375 TX/PRO/DX INJ NEW DRUG ADDON: CPT | Mod: 59

## 2024-02-22 PROCEDURE — 85027 COMPLETE CBC AUTOMATED: CPT | Performed by: EMERGENCY MEDICINE

## 2024-02-22 PROCEDURE — 87186 SC STD MICRODIL/AGAR DIL: CPT | Performed by: EMERGENCY MEDICINE

## 2024-02-22 PROCEDURE — 63600175 PHARM REV CODE 636 W HCPCS: Performed by: EMERGENCY MEDICINE

## 2024-02-22 PROCEDURE — 85379 FIBRIN DEGRADATION QUANT: CPT | Performed by: EMERGENCY MEDICINE

## 2024-02-22 RX ORDER — HYDROMORPHONE HYDROCHLORIDE 1 MG/ML
1 INJECTION, SOLUTION INTRAMUSCULAR; INTRAVENOUS; SUBCUTANEOUS
Status: COMPLETED | OUTPATIENT
Start: 2024-02-22 | End: 2024-02-22

## 2024-02-22 RX ORDER — ALUMINUM HYDROXIDE, MAGNESIUM HYDROXIDE, AND SIMETHICONE 1200; 120; 1200 MG/30ML; MG/30ML; MG/30ML
30 SUSPENSION ORAL ONCE
Status: COMPLETED | OUTPATIENT
Start: 2024-02-22 | End: 2024-02-22

## 2024-02-22 RX ORDER — SULFAMETHOXAZOLE AND TRIMETHOPRIM 800; 160 MG/1; MG/1
1 TABLET ORAL 2 TIMES DAILY
Qty: 10 TABLET | Refills: 0 | Status: SHIPPED | OUTPATIENT
Start: 2024-02-22 | End: 2024-02-27

## 2024-02-22 RX ORDER — ONDANSETRON HYDROCHLORIDE 2 MG/ML
4 INJECTION, SOLUTION INTRAVENOUS
Status: COMPLETED | OUTPATIENT
Start: 2024-02-22 | End: 2024-02-22

## 2024-02-22 RX ORDER — OXYCODONE AND ACETAMINOPHEN 5; 325 MG/1; MG/1
1 TABLET ORAL EVERY 4 HOURS PRN
Qty: 18 TABLET | Refills: 0 | Status: SHIPPED | OUTPATIENT
Start: 2024-02-22 | End: 2024-02-25

## 2024-02-22 RX ORDER — KETOROLAC TROMETHAMINE 30 MG/ML
15 INJECTION, SOLUTION INTRAMUSCULAR; INTRAVENOUS
Status: COMPLETED | OUTPATIENT
Start: 2024-02-22 | End: 2024-02-22

## 2024-02-22 RX ORDER — FAMOTIDINE 10 MG/ML
40 INJECTION INTRAVENOUS
Status: COMPLETED | OUTPATIENT
Start: 2024-02-22 | End: 2024-02-22

## 2024-02-22 RX ADMIN — HYDROMORPHONE HYDROCHLORIDE 1 MG: 1 INJECTION, SOLUTION INTRAMUSCULAR; INTRAVENOUS; SUBCUTANEOUS at 10:02

## 2024-02-22 RX ADMIN — HYDROMORPHONE HYDROCHLORIDE 1 MG: 1 INJECTION, SOLUTION INTRAMUSCULAR; INTRAVENOUS; SUBCUTANEOUS at 12:02

## 2024-02-22 RX ADMIN — HYDROMORPHONE HYDROCHLORIDE 1 MG: 1 INJECTION, SOLUTION INTRAMUSCULAR; INTRAVENOUS; SUBCUTANEOUS at 05:02

## 2024-02-22 RX ADMIN — ALUMINUM HYDROXIDE, MAGNESIUM HYDROXIDE, AND DIMETHICONE 30 ML: 200; 20; 200 SUSPENSION ORAL at 05:02

## 2024-02-22 RX ADMIN — SODIUM CHLORIDE 1000 ML: 9 INJECTION, SOLUTION INTRAVENOUS at 11:02

## 2024-02-22 RX ADMIN — CEFTRIAXONE SODIUM 1 G: 1 INJECTION, POWDER, FOR SOLUTION INTRAMUSCULAR; INTRAVENOUS at 02:02

## 2024-02-22 RX ADMIN — IOHEXOL 75 ML: 350 INJECTION, SOLUTION INTRAVENOUS at 01:02

## 2024-02-22 RX ADMIN — KETOROLAC TROMETHAMINE 15 MG: 30 INJECTION, SOLUTION INTRAMUSCULAR; INTRAVENOUS at 11:02

## 2024-02-22 RX ADMIN — ONDANSETRON 4 MG: 2 INJECTION INTRAMUSCULAR; INTRAVENOUS at 05:02

## 2024-02-22 RX ADMIN — ONDANSETRON 4 MG: 2 INJECTION INTRAMUSCULAR; INTRAVENOUS at 04:02

## 2024-02-22 RX ADMIN — FAMOTIDINE 40 MG: 10 INJECTION INTRAVENOUS at 05:02

## 2024-02-22 NOTE — ED PROVIDER NOTES
Encounter Date: 2/22/2024       History     Chief Complaint   Patient presents with    Sickle Cell Pain Crisis     Pt reports to ED for sickle cell pain crisis, to bilateral legs and arms anfd states it is traveling around her chest. Pt took [place] 0830, states it is not working. Pt in tears in triage.      28-year-old female with history of sickle cell disease presenting with sickle cell crisis.  Patient notes pain in her back slightly worse with movement.  Denies fevers, chills, chest pain, shortness of breath, nausea, vomiting, headache, numbness, weakness.        Review of patient's allergies indicates:   Allergen Reactions    Rocephin [ceftriaxone] Shortness Of Breath, Itching and Anxiety     Nausea, vomiting. No hives, swelling, or anaphylaxis. Can tolerate if necessary, but would avoid if possible.     Past Medical History:   Diagnosis Date    Chronic kidney disease (CKD) 4/9/2018    Emesis 11/23/2022    LGSIL on Pap smear of cervix 6/1/2017    LLQ abdominal pain 3/2/2023    Sickle cell anemia     Sickle cell disease     UTI (urinary tract infection) 3/2/2023     Past Surgical History:   Procedure Laterality Date    BREAST LUMPECTOMY      CARDIAC SURGERY      NEPHRECTOMY       Family History   Problem Relation Age of Onset    Sickle cell trait Father     Sickle cell trait Mother     Sickle cell trait Sister     Sickle cell trait Sister     Breast cancer Neg Hx     Colon cancer Neg Hx     Ovarian cancer Neg Hx      Social History     Tobacco Use    Smoking status: Every Day     Types: Vaping with nicotine    Smokeless tobacco: Never   Substance Use Topics    Alcohol use: No    Drug use: No     Review of Systems   Constitutional:  Negative for chills and fever.   HENT:  Negative for sore throat.    Respiratory:  Negative for shortness of breath.    Cardiovascular:  Negative for chest pain.   Gastrointestinal:  Negative for nausea.   Genitourinary:  Negative for dysuria.   Musculoskeletal:  Positive for  arthralgias and back pain.   Skin:  Negative for rash.   Neurological:  Negative for weakness.       Physical Exam     Initial Vitals   BP Pulse Resp Temp SpO2   02/22/24 1009 02/22/24 1009 02/22/24 1009 02/22/24 1009 02/22/24 1100   (!) 188/67 (!) 137 (!) 25 97.9 °F (36.6 °C) 100 %      MAP       --                Physical Exam    Nursing note and vitals reviewed.  Constitutional: She appears well-developed and well-nourished. She is not diaphoretic. No distress.   HENT:   Head: Normocephalic and atraumatic.   Nose: Nose normal.   Eyes: EOM are normal. Pupils are equal, round, and reactive to light. No scleral icterus.   Neck: Neck supple.   Normal range of motion.  Cardiovascular:  Normal rate, regular rhythm, normal heart sounds and intact distal pulses.     Exam reveals no gallop and no friction rub.       No murmur heard.  Pulmonary/Chest: Breath sounds normal. No stridor. No respiratory distress. She has no wheezes. She has no rhonchi. She has no rales.   Abdominal: Abdomen is soft. Bowel sounds are normal. She exhibits no distension. There is no abdominal tenderness. There is no rebound and no guarding.   Musculoskeletal:         General: No tenderness or edema. Normal range of motion.      Cervical back: Normal range of motion and neck supple.     Neurological: She is alert and oriented to person, place, and time. No cranial nerve deficit. GCS score is 15. GCS eye subscore is 4. GCS verbal subscore is 5. GCS motor subscore is 6.   Skin: Skin is warm and dry. No rash noted.   Psychiatric: She has a normal mood and affect. Her behavior is normal.         ED Course   Procedures  Labs Reviewed   CBC W/ AUTO DIFFERENTIAL - Abnormal; Notable for the following components:       Result Value    WBC 13.70 (*)     RBC 1.99 (*)     Hemoglobin 6.8 (*)     Hematocrit 19.1 (*)     MCH 34.2 (*)     RDW 22.3 (*)     nRBC 3 (*)     All other components within normal limits    Narrative:     HCT critical result(s) called and  verbal readback obtained from   RIGO YEUNG by JCT3 02/22/2024 12:45   COMPREHENSIVE METABOLIC PANEL - Abnormal; Notable for the following components:    Potassium 3.3 (*)     CO2 21 (*)     Total Bilirubin 2.1 (*)     All other components within normal limits   RETICULOCYTES - Abnormal; Notable for the following components:    Retic 15.0 (*)     All other components within normal limits   URINALYSIS, REFLEX TO URINE CULTURE - Abnormal; Notable for the following components:    Leukocytes, UA 2+ (*)     All other components within normal limits    Narrative:     Specimen Source->Urine   D DIMER, QUANTITATIVE - Abnormal; Notable for the following components:    D-Dimer 1.91 (*)     All other components within normal limits   URINALYSIS MICROSCOPIC - Abnormal; Notable for the following components:    WBC, UA 25 (*)     Bacteria Few (*)     All other components within normal limits    Narrative:     Specimen Source->Urine   CULTURE, URINE   TYPE & SCREEN          Imaging Results              CTA Chest Non-Coronary (PE Studies) (Final result)  Result time 02/22/24 13:52:54      Final result by Carmina Valadez MD (02/22/24 13:52:54)                   Impression:      No evidence of acute pulmonary embolus.    Calcified mediastinal nodes could be seen with a prior granulomatous process.    Cholecystectomy.      Electronically signed by: Carmina Valadez MD  Date:    02/22/2024  Time:    13:52               Narrative:    EXAMINATION:  CTA CHEST NON CORONARY (PE STUDIES)    CLINICAL HISTORY:  Pulmonary embolism (PE) suspected, positive D-dimer;    TECHNIQUE:  Low dose axial images, sagittal and coronal reformations were obtained from the thoracic inlet to the lung bases following the IV administration of 75 mL of Omnipaque 350.  Contrast timing was optimized to evaluate the pulmonary arteries.  MIP images were performed.    COMPARISON:  None    FINDINGS:  Vasculature: There is good opacification of the pulmonary  arterial system.  No acute pulmonary embolus identified. Thoracic aorta normal caliber. No evidence of dissection.    Lungs: The tracheobronchial tree is clear. No lung consolidation. No emphysematous lung changes.No pleural effusion.    Mediastinum: Calcified mediastinal and bilateral hilar nodes can be seen with prior granulomatous process.  The cardiac silhouette is normal in size.No coronary artery calcification.  No pericardial effusion.The esophagus course normally.    Upper abdomen (visualized portion):Cholecystectomy.  The spleen is absent.    Bones/chest wall: Subtle concavity at the superior endplate of T2, T4.  Mild biconcave deformity at the T6 vertebrae.  No acute fracture identified.  Small area of sclerosis at the right humeral head could represent early changes avascular necrosis.                                       X-Ray Chest AP Portable (Final result)  Result time 02/22/24 11:18:25      Final result by Dinesh Forde MD (02/22/24 11:18:25)                   Impression:      No acute abnormality.      Electronically signed by: Dinesh Forde  Date:    02/22/2024  Time:    11:18               Narrative:    EXAMINATION:  XR CHEST AP PORTABLE    CLINICAL HISTORY:  sickle cell pain;    TECHNIQUE:  Single frontal view of the chest was performed.    COMPARISON:  Chest radiograph 04/27/2023    FINDINGS:  Lines and tubes: None.    Heart and mediastinum: Unremarkable.    Pleura: No pleural effusion or pneumothorax.    Lungs: Lungs are well inflated. No focal consolidations or evidence of pulmonary edema.    Soft tissue/bone: Cholecystectomy.  Bones are unremarkable.                                       Medications   HYDROmorphone injection 1 mg (has no administration in time range)   HYDROmorphone injection 1 mg (1 mg Intravenous Given 2/22/24 1047)   sodium chloride 0.9% bolus 1,000 mL 1,000 mL (0 mLs Intravenous Stopped 2/22/24 1300)   ketorolac injection 15 mg (15 mg Intravenous Given 2/22/24 1114)    HYDROmorphone injection 1 mg (1 mg Intravenous Given 2/22/24 1233)   iohexoL (OMNIPAQUE 350) injection 75 mL (75 mLs Intravenous Given 2/22/24 1319)   cefTRIAXone (Rocephin) 1 g in dextrose 5 % in water (D5W) 100 mL IVPB (MB+) (0 g Intravenous Stopped 2/22/24 1608)   ondansetron injection 4 mg (4 mg Intravenous Given 2/22/24 1601)   aluminum-magnesium hydroxide-simethicone 200-200-20 mg/5 mL suspension 30 mL (30 mLs Oral Given 2/22/24 1726)   ondansetron injection 4 mg (4 mg Intravenous Given 2/22/24 1725)   famotidine (PF) injection 40 mg (40 mg Intravenous Given 2/22/24 1726)     Medical Decision Making  Amount and/or Complexity of Data Reviewed  Labs: ordered.  Radiology: ordered.    Risk  OTC drugs.  Prescription drug management.                          Medical Decision Making:   Initial Assessment:   28-year-old male with history of sickle cell pain crises.  Presenting with pain in his back and limbs consistent with prior crisis.  Denies fevers chills nausea vomiting shortness of breath, severe headache, numbness, weakness, or other unusual symptoms. Patient does not have pain medication at home.  Attempted only Tylenol at home.  Has been seen here multiple times recently for similar presentation.  On exam, patient well-appearing and a minimal if any distress. Pleasant, polite.  Has some scleral icterus.  Exam otherwise unremarkable. We will get labs, give pain control, fluid hydration with Toradol and saline, and reassess.    ED Management:  Pain improved but still present. Will attempt additional dose.     Patient pain improved though now nauseous and vomiting.  Patient thinks she may be able to be discharged home with prescription for pain medication.  However will monitor given recent episode of vomiting.  Patient will be signed out to Dr. Kendall with expected discharge though admission for pain control not out of the question.    Assumed care patient pending clinical improvement.  She appears well upon  reassessment, no further vomiting episodes.  Will be continued on Bactrim outpatient, and she will need close follow-up in clinic.  Additional 1 dose pain medication was given, pain medication script sent into pharmacy for patient to continue treatment at home.  She appears stable, not septic with normal vital signs.  Discharged home improved and stable.               Clinical Impression:  Final diagnoses:  [D57.00] Sickle cell pain crisis (Primary)  [N30.00] Acute cystitis without hematuria          ED Disposition Condition    Discharge Stable          ED Prescriptions       Medication Sig Dispense Start Date End Date Auth. Provider    sulfamethoxazole-trimethoprim 800-160mg (BACTRIM DS) 800-160 mg Tab Take 1 tablet by mouth 2 (two) times daily. for 5 days 10 tablet 2/22/2024 2/27/2024 López Thibodeaux MD    oxyCODONE-acetaminophen (PERCOCET) 5-325 mg per tablet Take 1 tablet by mouth every 4 (four) hours as needed for Pain. 18 tablet 2/22/2024 2/25/2024 López Thibodeaux MD          Follow-up Information    None          López Thibodeaux MD  02/22/24 [id number]       Napoleon Kendall MD  02/22/24 [id number]

## 2024-02-22 NOTE — ED NOTES
Patient identifiers verified and correct for patient   C/C: sickle cell crisis  APPEARANCE: awake and alert, crying, grimacing, guarded in fetal position  SKIN: warm, dry and intact. No breakdown or bruising.  MUSCULOSKELETAL: Patient moving all extremities spontaneously, no obvious swelling or deformities noted. Ambulates independently.Pain to chest, radiating to back, bl elbows, left knee  RESPIRATORY: Denies shortness of breath.Respirations unlabored and clear  CARDIAC: reports CP,  distal pulses; no peripheral edema, sinus tach on cardiac monitor.  ABDOMEN: denies abdominal pain and n/v/d   : voids spontaneously, denies difficulty  Neurologic: AAO x 4; follows commands equal strength in all extremities; denies numbness/tingling. Denies dizziness

## 2024-02-22 NOTE — DISCHARGE INSTRUCTIONS
You were seen in the emergency department for pain similar to your prior sickle cell pain.  You're labs and x-ray are reassuring and at or near your baseline.  We gave me some pain medication and you felt better. Please follow up with your primary care provider this week. Please return for any new or worsening pain, fever, difficulty breathing, dizziness, weakness, changes in vision, severe headache, or you become concerned in any other way.  We have given you prescription for pain medication that can make you sleepy. Please do not drink, drive, make important decisions, operate heavy machinery, or supervise children by yourself while on this medication.

## 2024-02-23 NOTE — ED NOTES
Pain returned to bl knees at discharge. MD ordered pain medication. Patient still requested to go home and try treating pain at home with rx pain medications. MD notified.

## 2024-02-23 NOTE — ED NOTES
Patient wishes to stay in Er a little longer to see if pain can be controlled without admission to hospital.

## 2024-02-23 NOTE — CONSULTS
Hematology- Oncology Consult Note :     2/22/2024    Reason for consult: Sickle cell pain crisis    HPI  Pt is a 28 y.o. female who  has a past medical history of Chronic kidney disease (CKD) (4/9/2018), Emesis (11/23/2022), LGSIL on Pap smear of cervix (6/1/2017), LLQ abdominal pain (3/2/2023), Sickle cell anemia, Sickle cell disease, and UTI (urinary tract infection) (3/2/2023)..who presented to  ED for sickle cell pain crisis.  Pt endorsed worsening back pain but denied any other issues including fevers, chills, N/V SOB.  Hematology consulted for sickle cell pain crisis.        Past Medical History:   Diagnosis Date    Chronic kidney disease (CKD) 4/9/2018    Emesis 11/23/2022    LGSIL on Pap smear of cervix 6/1/2017    LLQ abdominal pain 3/2/2023    Sickle cell anemia     Sickle cell disease     UTI (urinary tract infection) 3/2/2023      Past Surgical History:   Procedure Laterality Date    BREAST LUMPECTOMY      CARDIAC SURGERY      NEPHRECTOMY        (Not in a hospital admission)    Review of patient's allergies indicates:   Allergen Reactions    Rocephin [ceftriaxone] Shortness Of Breath, Itching and Anxiety     Nausea, vomiting. No hives, swelling, or anaphylaxis. Can tolerate if necessary, but would avoid if possible.      Social History     Tobacco Use    Smoking status: Every Day     Types: Vaping with nicotine    Smokeless tobacco: Never   Substance Use Topics    Alcohol use: No      Family History   Problem Relation Age of Onset    Sickle cell trait Father     Sickle cell trait Mother     Sickle cell trait Sister     Sickle cell trait Sister     Breast cancer Neg Hx     Colon cancer Neg Hx     Ovarian cancer Neg Hx         Review of Systems :  Review of Systems   Constitutional:  Positive for malaise/fatigue. Negative for chills, fever and weight loss.   HENT:  Negative for congestion, hearing loss and nosebleeds.    Eyes:  Negative for blurred vision.   Respiratory:  Negative for cough, sputum  production and shortness of breath.    Cardiovascular:  Negative for chest pain, claudication and leg swelling.   Gastrointestinal:  Negative for abdominal pain, blood in stool, constipation, diarrhea, heartburn, nausea and vomiting.   Genitourinary:  Negative for dysuria.   Musculoskeletal:  Positive for back pain and myalgias.   Skin:  Negative for itching and rash.   Neurological:  Negative for dizziness, sensory change, speech change, focal weakness and weakness.   Endo/Heme/Allergies:  Does not bruise/bleed easily.   Psychiatric/Behavioral:  Negative for depression. The patient is not nervous/anxious.        Physical Exam :  Wt Readings from Last 3 Encounters:   01/23/24 46 kg (101 lb 6.6 oz)   11/30/23 47.2 kg (104 lb)   11/15/23 47.4 kg (104 lb 8 oz)     Temp Readings from Last 3 Encounters:   02/22/24 97.9 °F (36.6 °C)   11/30/23 98 °F (36.7 °C)   04/27/23 98.4 °F (36.9 °C) (Oral)     BP Readings from Last 3 Encounters:   02/22/24 (!) 103/52   01/23/24 110/66   11/30/23 (!) 100/59     Pulse Readings from Last 3 Encounters:   02/22/24 102   11/30/23 92   11/15/23 88     There is no height or weight on file to calculate BMI.    Physical Exam  Vitals reviewed.   HENT:      Head: Normocephalic and atraumatic.      Nose: Nose normal.   Eyes:      Pupils: Pupils are equal, round, and reactive to light.   Cardiovascular:      Rate and Rhythm: Normal rate and regular rhythm.   Pulmonary:      Effort: Pulmonary effort is normal.   Abdominal:      General: Abdomen is flat.   Musculoskeletal:         General: Normal range of motion.      Cervical back: Normal range of motion.   Skin:     General: Skin is warm.   Neurological:      Mental Status: She is alert.   Psychiatric:         Mood and Affect: Mood normal.         Behavior: Behavior normal.           Pertinent Diagnostic studies:    Recent Results (from the past 24 hour(s))   CBC Auto Differential    Collection Time: 02/22/24  9:57 AM   Result Value Ref Range     WBC 13.70 (H) 3.90 - 12.70 K/uL    RBC 1.99 (L) 4.00 - 5.40 M/uL    Hemoglobin 6.8 (L) 12.0 - 16.0 g/dL    Hematocrit 19.1 (LL) 37.0 - 48.5 %    MCV 96 82 - 98 fL    MCH 34.2 (H) 27.0 - 31.0 pg    MCHC 35.6 32.0 - 36.0 g/dL    RDW 22.3 (H) 11.5 - 14.5 %    Platelets 324 150 - 450 K/uL    MPV 10.3 9.2 - 12.9 fL    Immature Granulocytes CANCELED 0.0 - 0.5 %    Immature Grans (Abs) CANCELED 0.00 - 0.04 K/uL    Lymph # CANCELED 1.0 - 4.8 K/uL    Mono # CANCELED 0.3 - 1.0 K/uL    Eos # CANCELED 0.0 - 0.5 K/uL    Baso # CANCELED 0.00 - 0.20 K/uL    nRBC 3 (A) 0 /100 WBC    Gran % 52.0 38.0 - 73.0 %    Lymph % 29.0 18.0 - 48.0 %    Mono % 5.0 4.0 - 15.0 %    Eosinophil % 2.0 0.0 - 8.0 %    Basophil % 1.0 0.0 - 1.9 %    Bands 10.0 %    Myelocytes 1.0 %    Platelet Estimate Appears normal     Spherocytes Occasional     Differential Method Manual    Comprehensive Metabolic Panel    Collection Time: 02/22/24  9:57 AM   Result Value Ref Range    Sodium 139 136 - 145 mmol/L    Potassium 3.3 (L) 3.5 - 5.1 mmol/L    Chloride 107 95 - 110 mmol/L    CO2 21 (L) 23 - 29 mmol/L    Glucose 94 70 - 110 mg/dL    BUN 11 6 - 20 mg/dL    Creatinine 0.9 0.5 - 1.4 mg/dL    Calcium 9.2 8.7 - 10.5 mg/dL    Total Protein 7.8 6.0 - 8.4 g/dL    Albumin 4.0 3.5 - 5.2 g/dL    Total Bilirubin 2.1 (H) 0.1 - 1.0 mg/dL    Alkaline Phosphatase 84 55 - 135 U/L    AST 37 10 - 40 U/L    ALT 28 10 - 44 U/L    eGFR >60 >60 mL/min/1.73 m^2    Anion Gap 11 8 - 16 mmol/L   Reticulocytes    Collection Time: 02/22/24  9:57 AM   Result Value Ref Range    Retic 15.0 (H) 0.5 - 2.5 %   D dimer, quantitative    Collection Time: 02/22/24  9:57 AM   Result Value Ref Range    D-Dimer 1.91 (H) <0.50 mg/L FEU   Urinalysis, Reflex to Urine Culture Urine, Clean Catch    Collection Time: 02/22/24  1:54 PM    Specimen: Urine   Result Value Ref Range    Specimen UA Urine, Clean Catch     Color, UA Yellow Yellow, Straw, Faith    Appearance, UA Clear Clear    pH, UA 7.0 5.0 - 8.0     Specific Gravity, UA 1.010 1.005 - 1.030    Protein, UA Negative Negative    Glucose, UA Negative Negative    Ketones, UA Negative Negative    Bilirubin (UA) Negative Negative    Occult Blood UA Negative Negative    Nitrite, UA Negative Negative    Urobilinogen, UA Negative <2.0 EU/dL    Leukocytes, UA 2+ (A) Negative   Urinalysis Microscopic    Collection Time: 02/22/24  1:54 PM   Result Value Ref Range    WBC, UA 25 (H) 0 - 5 /hpf    WBC Clumps, UA Rare None-Rare    Bacteria Few (A) None-Occ /hpf    Squam Epithel, UA 1 /hpf    Microscopic Comment SEE COMMENT    Type & Screen    Collection Time: 02/22/24  2:22 PM   Result Value Ref Range    Group & Rh O POS     Indirect Benji NEG     Specimen Outdate 02/25/2024 23:59        Assessment/Plan :     Hx of Sickle Cell SS/Pain crisis  -titrate pain meds to control pain  -Pain under better control at time of exam now at 2/10  -CTA/ CXR unremarkable  -incentive spirometer ordered  -Consider gentle hydration  -Monitor for fevers/chills  -pts hb on arrival 6.8, typical range is 6-9  -Can consider pRBC transfusion if pain crisis worsens or Hb drops below 6      Time spent on case: 30 minutes    Thank you for this consult, please contact us for any questions or concerns.    Quang Mar MD   Hematology/oncology,[facility] [place]

## 2024-02-25 LAB — BACTERIA UR CULT: ABNORMAL

## 2024-02-26 RX ORDER — AMOXICILLIN AND CLAVULANATE POTASSIUM 875; 125 MG/1; MG/1
1 TABLET, FILM COATED ORAL 2 TIMES DAILY
Qty: 10 TABLET | Refills: 0 | Status: SHIPPED | OUTPATIENT
Start: 2024-02-26 | End: 2024-03-02

## 2024-02-27 ENCOUNTER — TELEPHONE (OUTPATIENT)
Dept: PRIMARY CARE CLINIC | Facility: CLINIC | Age: 29
End: 2024-02-27
Payer: MEDICAID

## 2024-02-28 ENCOUNTER — TELEPHONE (OUTPATIENT)
Dept: PRIMARY CARE CLINIC | Facility: CLINIC | Age: 29
End: 2024-02-28
Payer: MEDICAID

## 2024-05-08 DIAGNOSIS — D57.00 SICKLE CELL PAIN CRISIS: ICD-10-CM

## 2024-05-08 RX ORDER — HYDROCODONE BITARTRATE AND ACETAMINOPHEN 7.5; 325 MG/1; MG/1
1 TABLET ORAL EVERY 6 HOURS PRN
Qty: 28 TABLET | Refills: 0 | Status: SHIPPED | OUTPATIENT
Start: 2024-05-08

## 2024-05-29 ENCOUNTER — OFFICE VISIT (OUTPATIENT)
Dept: PRIMARY CARE CLINIC | Facility: CLINIC | Age: 29
End: 2024-05-29

## 2024-05-29 VITALS
RESPIRATION RATE: 14 BRPM | OXYGEN SATURATION: 98 % | SYSTOLIC BLOOD PRESSURE: 104 MMHG | WEIGHT: 105.81 LBS | HEIGHT: 61 IN | HEART RATE: 70 BPM | DIASTOLIC BLOOD PRESSURE: 62 MMHG | BODY MASS INDEX: 19.98 KG/M2

## 2024-05-29 DIAGNOSIS — D57.1 HEMOGLOBIN SS DISEASE WITHOUT CRISIS: ICD-10-CM

## 2024-05-29 DIAGNOSIS — D57.00 SICKLE CELL PAIN CRISIS: ICD-10-CM

## 2024-05-29 DIAGNOSIS — R62.7 POOR WEIGHT GAIN IN ADULT: ICD-10-CM

## 2024-05-29 DIAGNOSIS — E87.6 HYPOKALEMIA: ICD-10-CM

## 2024-05-29 DIAGNOSIS — E55.9 VITAMIN D DEFICIENCY: ICD-10-CM

## 2024-05-29 DIAGNOSIS — Z00.00 PREVENTATIVE HEALTH CARE: Primary | ICD-10-CM

## 2024-05-29 DIAGNOSIS — N83.202 LEFT OVARIAN CYST: ICD-10-CM

## 2024-05-29 PROBLEM — Z30.44 ENCOUNTER FOR SURVEILLANCE OF VAGINAL RING HORMONAL CONTRACEPTIVE DEVICE: Status: RESOLVED | Noted: 2023-11-15 | Resolved: 2024-05-29

## 2024-05-29 PROCEDURE — 99999 PR PBB SHADOW E&M-EST. PATIENT-LVL IV: CPT | Mod: PBBFAC,,, | Performed by: INTERNAL MEDICINE

## 2024-05-29 PROCEDURE — 99214 OFFICE O/P EST MOD 30 MIN: CPT | Mod: PBBFAC,PN | Performed by: INTERNAL MEDICINE

## 2024-05-29 PROCEDURE — 99395 PREV VISIT EST AGE 18-39: CPT | Mod: S$PBB,,, | Performed by: INTERNAL MEDICINE

## 2024-05-29 RX ORDER — HYDROCODONE BITARTRATE AND ACETAMINOPHEN 10; 325 MG/1; MG/1
1 TABLET ORAL EVERY 6 HOURS PRN
Qty: 60 TABLET | Refills: 0 | Status: SHIPPED | OUTPATIENT
Start: 2024-05-29

## 2024-05-29 RX ORDER — HYDROXYUREA 500 MG/1
1500 CAPSULE ORAL DAILY
Qty: 90 CAPSULE | Refills: 6 | Status: SHIPPED | OUTPATIENT
Start: 2024-05-29

## 2024-05-29 RX ORDER — POTASSIUM CHLORIDE 20 MEQ/1
20 TABLET, EXTENDED RELEASE ORAL DAILY
Qty: 30 TABLET | Refills: 3 | Status: SHIPPED | OUTPATIENT
Start: 2024-05-29

## 2024-05-29 RX ORDER — IBUPROFEN 400 MG/1
TABLET ORAL
Qty: 90 TABLET | Refills: 3 | Status: SHIPPED | OUTPATIENT
Start: 2024-05-29

## 2024-05-29 RX ORDER — FOLIC ACID 1 MG/1
1 TABLET ORAL DAILY
Qty: 90 TABLET | Refills: 3 | Status: SHIPPED | OUTPATIENT
Start: 2024-05-29

## 2024-05-29 RX ORDER — ASPIRIN 325 MG
50000 TABLET, DELAYED RELEASE (ENTERIC COATED) ORAL
Qty: 12 CAPSULE | Refills: 3 | Status: SHIPPED | OUTPATIENT
Start: 2024-05-29

## 2024-05-29 NOTE — PROGRESS NOTES
[facility] Progress Note      Chief Complaint     Annual Exam      Subjective:      History of Present Illness:  Lana Chou is a 29 y.o. female last went to ER for  HbSS pain crisis on 2/2024 for pelvic pain with K 3.3 , H/H 6.8/19.1; she did not get PRBCs.  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 No evidence of ovarian torsion.   -Dr. Mehta, her GYN  placed her on Microgestin 1/20 daily which did improve her pelvic pain but she stopped it 2 wks ago since she said it made her feel really tired but plans to restart today since the pain recurred and was unbearable.     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;  plans to start  school at [employer] in 8/2024; sexually active unprotected with partner x 7 years; lives in [place] with mom - wants to leave the state       Past Medical History:  Past Medical History:   Diagnosis Date    Chronic kidney disease (CKD) 04/09/2018    Emesis 11/23/2022    Encounter for surveillance of vaginal ring hormonal contraceptive device 11/15/2023    LGSIL on Pap smear of cervix 06/01/2017    LLQ abdominal pain 03/02/2023    Sickle cell anemia     Sickle cell disease     UTI (urinary tract infection) 03/02/2023       Past Surgical History:  Past Surgical History:    Procedure Laterality Date    BREAST LUMPECTOMY      CARDIAC SURGERY      NEPHRECTOMY         Allergies:  Review of patient's allergies indicates:   Allergen Reactions    Rocephin [ceftriaxone] Shortness Of Breath, Itching and Anxiety     Nausea, vomiting. No hives, swelling, or anaphylaxis. Can tolerate if necessary, but would avoid if possible.       Home Medications:  Current Outpatient Medications   Medication Sig Dispense Refill    HYDROcodone-acetaminophen (NORCO) 7.5-325 mg per tablet Take 1 tablet by mouth every 6 (six) hours as needed for Pain. 28 tablet 0    norethindrone-ethinyl estradiol (MICROGESTIN 1/20) 1-20 mg-mcg per tablet Take 1 tablet by mouth once daily. 30 tablet 11    ondansetron (ZOFRAN) 4 MG tablet Take 1 tablet (4 mg total) by mouth every 6 (six) hours. 12 tablet 0    cholecalciferol, vitamin D3, 1,250 mcg (50,000 unit) capsule Take 1 capsule (50,000 Units total) by mouth every 7 days. 12 capsule 3    folic acid (FOLVITE) 1 MG tablet Take 1 tablet (1 mg total) by mouth once daily. 90 tablet 3    HYDROcodone-acetaminophen (NORCO)  mg per tablet Take 1 tablet by mouth every 6 (six) hours as needed for Pain. 60 tablet 0    hydroxyurea (HYDREA) 500 mg Cap Take 3 capsules (1,500 mg total) by mouth once daily. 90 capsule 6    ibuprofen (ADVIL,MOTRIN) 400 MG tablet Take one tab po tid with food prn 90 tablet 3    potassium chloride SA (K-DUR,KLOR-CON) 20 MEQ tablet Take 1 tablet (20 mEq total) by mouth once daily. 30 tablet 3    triamcinolone acetonide 0.1% (KENALOG) 0.1 % cream Apply topically 2 (two) times daily. Apply bid x 7-10 days 80 g 1     No current facility-administered medications for this visit.        Family History:  Family History   Problem Relation Name Age of Onset    Sickle cell trait Father      Sickle cell trait Mother      Sickle cell trait Sister      Sickle cell trait Sister      Breast cancer Neg Hx      Colon cancer Neg Hx      Ovarian cancer Neg Hx         Social  "History:  Social History     Tobacco Use    Smoking status: Some Days     Types: Vaping with nicotine     Start date: 6/16/2022    Smokeless tobacco: Never   Substance Use Topics    Alcohol use: No    Drug use: No       Review of Systems:  Pertinent positives and negatives listed in HPI. All other systems are reviewed and are negative.    Health Maintaince :   Health Maintenance Topics with due status: Not Due       Topic Last Completion Date    TETANUS VACCINE 11/23/2022           Eye: [facility] in [place]; myopia   Dental:     Immunizations:   Tdap: UTD.  Influenza: UTD.  Pneumovax: UTD  Shingrex x 2: n/a  COVID: needs  Hepatitis C:   Cancer Screening:  PAP: UTD 5/2023 by Dr. Mehta  The ASCVD Risk score (Mikhail DK, et al., 2019) failed to calculate for the following reasons:    The 2019 ASCVD risk score is only valid for ages 40 to 79      Objective:   /62   Pulse 70   Resp 14   Ht 5' 1" (1.549 m)   Wt 48 kg (105 lb 13.1 oz)   SpO2 98%   BMI 19.99 kg/m²      Body mass index is 19.99 kg/m².       Physical Examination:  General: Alert and awake in no apparent distress  HEENT: Normocephalic and atraumatic; Tms WNL  Eyes:  PERRL; EOMi with anicteric sclera and clear conjunctivae  Mouth:  Oropharynx clear and without exudate; moist mucous membranes  Neck:   Cervical nodes not enlarged; supple; no bruits  Cardio:  Regular rate and rhythm with normal S1 and S2; no murmurs or rubs  Resp:  CTAB; respirations unlabored; no wheezes, crackles or rhonchi  Abdom: Soft, NTND with normoactive bowel sounds; negative HSM  Extrem: Warm and well-perfused with no clubbing, cyanosis or edema  Skin:  No rashes, lesions, or color changes  Pulses:  2+ and symmetric distally  Neuro:  AAOx3; cooperative and pleasant with no focal deficits    Laboratory:      Most Recent Data:  Lab Results   Component Value Date    WBC 13.70 (H) 02/22/2024    HGB 6.8 (L) 02/22/2024    HCT 19.1 (LL) 02/22/2024     02/22/2024    " CHOL 117 (L) 06/07/2023    TRIG 118 06/07/2023    HDL 40 06/07/2023    ALT 28 02/22/2024    AST 37 02/22/2024     02/22/2024    K 3.3 (L) 02/22/2024     02/22/2024    BUN 11 02/22/2024    CO2 21 (L) 02/22/2024              CBC:   WBC   Date Value Ref Range Status   02/22/2024 13.70 (H) 3.90 - 12.70 K/uL Final     Hemoglobin   Date Value Ref Range Status   02/22/2024 6.8 (L) 12.0 - 16.0 g/dL Final     POC Hematocrit   Date Value Ref Range Status   08/30/2021 19 (LL) 36 - 54 %PCV Final     Hematocrit   Date Value Ref Range Status   02/22/2024 19.1 (LL) 37.0 - 48.5 % Final     Comment:     HCT critical result(s) called and verbal readback obtained from   RIGO YEUNG by JCT3 02/22/2024 12:45       Platelets   Date Value Ref Range Status   02/22/2024 324 150 - 450 K/uL Final     MCV   Date Value Ref Range Status   02/22/2024 96 82 - 98 fL Final     RDW   Date Value Ref Range Status   02/22/2024 22.3 (H) 11.5 - 14.5 % Final     BMP:   Sodium   Date Value Ref Range Status   02/22/2024 139 136 - 145 mmol/L Final     Potassium   Date Value Ref Range Status   02/22/2024 3.3 (L) 3.5 - 5.1 mmol/L Final     Chloride   Date Value Ref Range Status   02/22/2024 107 95 - 110 mmol/L Final     CO2   Date Value Ref Range Status   02/22/2024 21 (L) 23 - 29 mmol/L Final     BUN   Date Value Ref Range Status   02/22/2024 11 6 - 20 mg/dL Final     Creatinine   Date Value Ref Range Status   02/22/2024 0.9 0.5 - 1.4 mg/dL Final     Glucose   Date Value Ref Range Status   02/22/2024 94 70 - 110 mg/dL Final     Calcium   Date Value Ref Range Status   02/22/2024 9.2 8.7 - 10.5 mg/dL Final     LFTs:   Total Protein   Date Value Ref Range Status   02/22/2024 7.8 6.0 - 8.4 g/dL Final     Albumin   Date Value Ref Range Status   02/22/2024 4.0 3.5 - 5.2 g/dL Final     Total Bilirubin   Date Value Ref Range Status   02/22/2024 2.1 (H) 0.1 - 1.0 mg/dL Final     Comment:     For infants and newborns, interpretation of results should be  "based  on gestational age, weight and in agreement with clinical  observations.    Premature Infant recommended reference ranges:  Up to 24 hours.............<8.0 mg/dL  Up to 48 hours............<12.0 mg/dL  3-5 days..................<15.0 mg/dL  6-29 days.................<15.0 mg/dL       AST   Date Value Ref Range Status   02/22/2024 37 10 - 40 U/L Final     Alkaline Phosphatase   Date Value Ref Range Status   02/22/2024 84 55 - 135 U/L Final     ALT   Date Value Ref Range Status   02/22/2024 28 10 - 44 U/L Final     Coags: No results found for: "INR", "PROTIME", "PTT"  FLP:      Lab Results   Component Value Date    CHOL 117 (L) 06/07/2023    CHOL 113 (L) 11/25/2022    CHOL 103 07/01/2022     Lab Results   Component Value Date    HDL 40 06/07/2023    HDL 39 (L) 11/25/2022    HDL 39 (L) 07/01/2022     Lab Results   Component Value Date    LDLCALC 53.4 (L) 06/07/2023    LDLCALC 49.4 (L) 11/25/2022    LDLCALC 49 07/01/2022     Lab Results   Component Value Date    TRIG 118 06/07/2023    TRIG 123 11/25/2022    TRIG 96 07/01/2022     Lab Results   Component Value Date    CHOLHDL 34.2 06/07/2023    CHOLHDL 34.5 11/25/2022    CHOLHDL 2.64 07/01/2022      DM:      LDL Cholesterol   Date Value Ref Range Status   06/07/2023 53.4 (L) 63.0 - 159.0 mg/dL Final     Comment:     The National Cholesterol Education Program (NCEP) has set the  following guidelines (reference values) for LDL Cholesterol:  Optimal.......................<130 mg/dL  Borderline High...............130-159 mg/dL  High..........................160-189 mg/dL  Very High.....................>190 mg/dL       Creatinine   Date Value Ref Range Status   02/22/2024 0.9 0.5 - 1.4 mg/dL Final     Thyroid: No results found for: "TSH", "FREET4", "I6HVQPW", "L0ZHWQP", "THYROIDAB"  Anemia:   Iron   Date Value Ref Range Status   03/03/2023 145 30 - 160 ug/dL Final     TIBC   Date Value Ref Range Status   03/03/2023 204 (L) 250 - 450 ug/dL Final     Ferritin   Date " "Value Ref Range Status   12/28/2023 448 (H) 16 - 154 ng/mL Final     Vitamin B-12   Date Value Ref Range Status   11/23/2022 429 210 - 950 pg/mL Final     Folate   Date Value Ref Range Status   12/28/2023 9.3 ng/mL Final     Comment:                                Reference Range                             Low:           <3.4                             Borderline:    3.4-5.4                             Normal:        >5.4          Cardiac: No results found for: "TROPONINI", "CKTOTAL", "CKMB", "BNP"  Urinalysis:   Urine Culture, Routine   Date Value Ref Range Status   02/22/2024 ESCHERICHIA COLI ESBL  >100,000 cfu/ml   (A)  Final     Color, UA   Date Value Ref Range Status   02/22/2024 Yellow Yellow, Straw, Faith Final     Specific Gravity, UA   Date Value Ref Range Status   02/22/2024 1.010 1.005 - 1.030 Final     Nitrite, UA   Date Value Ref Range Status   02/22/2024 Negative Negative Final     Ketones, UA   Date Value Ref Range Status   02/22/2024 Negative Negative Final     Urobilinogen, UA   Date Value Ref Range Status   02/22/2024 Negative <2.0 EU/dL Final     WBC, UA   Date Value Ref Range Status   02/22/2024 25 (H) 0 - 5 /hpf Final       Other Results:  EKG (my interpretation):     Radiology:  CTA Chest Non-Coronary (PE Studies)  Narrative: EXAMINATION:  CTA CHEST NON CORONARY (PE STUDIES)    CLINICAL HISTORY:  Pulmonary embolism (PE) suspected, positive D-dimer;    TECHNIQUE:  Low dose axial images, sagittal and coronal reformations were obtained from the thoracic inlet to the lung bases following the IV administration of 75 mL of Omnipaque 350.  Contrast timing was optimized to evaluate the pulmonary arteries.  MIP images were performed.    COMPARISON:  None    FINDINGS:  Vasculature: There is good opacification of the pulmonary arterial system.  No acute pulmonary embolus identified. Thoracic aorta normal caliber. No evidence of dissection.    Lungs: The tracheobronchial tree is clear. No lung " consolidation. No emphysematous lung changes.No pleural effusion.    Mediastinum: Calcified mediastinal and bilateral hilar nodes can be seen with prior granulomatous process.  The cardiac silhouette is normal in size.No coronary artery calcification.  No pericardial effusion.The esophagus course normally.    Upper abdomen (visualized portion):Cholecystectomy.  The spleen is absent.    Bones/chest wall: Subtle concavity at the superior endplate of T2, T4.  Mild biconcave deformity at the T6 vertebrae.  No acute fracture identified.  Small area of sclerosis at the right humeral head could represent early changes avascular necrosis.  Impression: No evidence of acute pulmonary embolus.    Calcified mediastinal nodes could be seen with a prior granulomatous process.    Cholecystectomy.    Electronically signed by: Carmina Valadez MD  Date:    02/22/2024  Time:    13:52  X-Ray Chest AP Portable  Narrative: EXAMINATION:  XR CHEST AP PORTABLE    CLINICAL HISTORY:  sickle cell pain;    TECHNIQUE:  Single frontal view of the chest was performed.    COMPARISON:  Chest radiograph 04/27/2023    FINDINGS:  Lines and tubes: None.    Heart and mediastinum: Unremarkable.    Pleura: No pleural effusion or pneumothorax.    Lungs: Lungs are well inflated. No focal consolidations or evidence of pulmonary edema.    Soft tissue/bone: Cholecystectomy.  Bones are unremarkable.  Impression: No acute abnormality.    Electronically signed by: Dinesh Forde  Date:    02/22/2024  Time:    11:18          Assessment/Plan     Lana Chou is a 29 y.o. female with:  1. Preventative health care  Assessment & Plan:  Labs after pt gets her OHP insurance   Get Menveo/Bexsero now  Refill all meds     Need quantiferon gold for school     Green leafy veggies (cooked spinach/broccoli), fresh fruits especially bananas/oranges/cantaloupe/honeydew/grapefruit; dried fruits ie prunes, raisins, dates;  sweet potatoes, zucchini, mu    Orders:  -     Lipid  Panel; Future; Expected date: 05/29/2024  -     QUANTIFERON GOLD TB; Future; Expected date: 05/29/2024    2. Sickle cell pain crisis  -     ibuprofen (ADVIL,MOTRIN) 400 MG tablet; Take one tab po tid with food prn  Dispense: 90 tablet; Refill: 3  -     HYDROcodone-acetaminophen (NORCO)  mg per tablet; Take 1 tablet by mouth every 6 (six) hours as needed for Pain.  Dispense: 60 tablet; Refill: 0    3. Vitamin D deficiency  Assessment & Plan:  Refill Vit D 50,000 IU and check level    Orders:  -     cholecalciferol, vitamin D3, 1,250 mcg (50,000 unit) capsule; Take 1 capsule (50,000 Units total) by mouth every 7 days.  Dispense: 12 capsule; Refill: 3  -     Vitamin D; Future; Expected date: 05/29/2024    4. Left ovarian cyst  Assessment & Plan:  Restart Micronor 1/20 at bedtime      5. 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  Dispense: 90 capsule; Refill: 6  -     folic acid (FOLVITE) 1 MG tablet; Take 1 tablet (1 mg total) by mouth once daily.  Dispense: 90 tablet; Refill: 3  -     Urinalysis; Future; Expected date: 05/29/2024  -     CBC Auto Differential; Future; Expected date: 05/29/2024  -     Microalbumin/Creatinine Ratio, Urine; Future; Expected date: 05/29/2024  -     RETICULOCYTES; Future; Expected date: 05/29/2024  -     FERRITIN; Future; Expected date: 05/29/2024  -     Vitamin B12; Future; Expected date: 05/29/2024  -     C. trachomatis/N. gonorrhoeae by AMP DNA [facility]; Urine; Future; Expected date: 05/29/2024  -     (In Office Administered)  Meningococcal Conjugate - MCV4O (MENVEO) 1 VIAL Ages 10 Years-55 Years    6. Hypokalemia  Assessment & Plan:  Green leafy veggies (cooked spinach/broccoli), fresh fruits especially bananas/oranges/cantaloupe/honeydew/grapefruit; dried fruits ie prunes, raisins, dates;  sweet potatoes, zucchini, mushrooms, potatoes and sweet potatoes, coconut water     Repeat labs after pt gets her OHP insurance    Orders:  -     Comprehensive Metabolic Panel; Future; Expected date: 05/29/2024  -     Magnesium; Future; Expected date: 05/29/2024  -     potassium chloride SA (K-DUR,KLOR-CON) 20 MEQ tablet; Take 1 tablet (20 mEq total) by mouth once daily.  Dispense: 30 tablet; Refill: 3    7. Poor weight gain in adult  Assessment & Plan:  Take MVI daily  Drink ensure up to tid      Other orders  -     (In Office Administered) Meningococcal B, O[facility] Vaccine (BEXSERO)             I spent a total of 33 minutes on the day of the visit.This includes face to face time and non-face to face time preparing to see the patient (eg, review of tests), obtaining and/or reviewing separately obtained history, documenting clinical information in the electronic or other health record, independently interpreting results and communicating results to the patient/family/caregiver, or care coordinator.   Code Status:     Luann Loza MD

## 2024-05-29 NOTE — PATIENT INSTRUCTIONS
Labs  Get Menveo/Bexsero now  Refill all meds     Need quantiferon gold     Green leafy veggies (cooked spinach/broccoli), fresh fruits especially bananas/oranges/cantaloupe/honeydew/grapefruit; dried fruits ie prunes, raisins, dates;  sweet potatoes, zucchini, mushrooms, potatoes and sweet potatoes, coconut water

## 2024-06-06 ENCOUNTER — HOSPITAL ENCOUNTER (EMERGENCY)
Facility: HOSPITAL | Age: 29
Discharge: HOME | End: 2024-06-07
Attending: EMERGENCY MEDICINE

## 2024-06-06 DIAGNOSIS — N30.00 ACUTE CYSTITIS WITHOUT HEMATURIA: Primary | ICD-10-CM

## 2024-06-06 DIAGNOSIS — R42 DIZZINESS: ICD-10-CM

## 2024-06-06 LAB
ALBUMIN SERPL BCP-MCNC: 4.9 G/DL (ref 3.5–5.2)
ALP SERPL-CCNC: 79 U/L (ref 55–135)
ALT SERPL W/O P-5'-P-CCNC: 25 U/L (ref 10–44)
ANION GAP SERPL CALC-SCNC: 10 MMOL/L (ref 8–16)
AST SERPL-CCNC: 54 U/L (ref 10–40)
BASOPHILS # BLD AUTO: 0.11 K/UL (ref 0–0.2)
BASOPHILS NFR BLD: 0.7 % (ref 0–1.9)
BILIRUB SERPL-MCNC: 5.5 MG/DL (ref 0.1–1)
BUN SERPL-MCNC: 14 MG/DL (ref 6–20)
CALCIUM SERPL-MCNC: 10.9 MG/DL (ref 8.7–10.5)
CHLORIDE SERPL-SCNC: 106 MMOL/L (ref 95–110)
CO2 SERPL-SCNC: 21 MMOL/L (ref 23–29)
CREAT SERPL-MCNC: 1 MG/DL (ref 0.5–1.4)
DIFFERENTIAL METHOD BLD: ABNORMAL
EOSINOPHIL # BLD AUTO: 0.1 K/UL (ref 0–0.5)
EOSINOPHIL NFR BLD: 0.5 % (ref 0–8)
ERYTHROCYTE [DISTWIDTH] IN BLOOD BY AUTOMATED COUNT: 21.1 % (ref 11.5–14.5)
EST. GFR  (NO RACE VARIABLE): >60 ML/MIN/1.73 M^2
GLUCOSE SERPL-MCNC: 89 MG/DL (ref 70–110)
HCT VFR BLD AUTO: 20.7 % (ref 37–48.5)
HGB BLD-MCNC: 7.4 G/DL (ref 12–16)
IMM GRANULOCYTES # BLD AUTO: 0.19 K/UL (ref 0–0.04)
IMM GRANULOCYTES NFR BLD AUTO: 1.1 % (ref 0–0.5)
LYMPHOCYTES # BLD AUTO: 3 K/UL (ref 1–4.8)
LYMPHOCYTES NFR BLD: 17.9 % (ref 18–48)
MAGNESIUM SERPL-MCNC: 1.9 MG/DL (ref 1.6–2.6)
MCH RBC QN AUTO: 31.8 PG (ref 27–31)
MCHC RBC AUTO-ENTMCNC: 35.7 G/DL (ref 32–36)
MCV RBC AUTO: 89 FL (ref 82–98)
MONOCYTES # BLD AUTO: 1.8 K/UL (ref 0.3–1)
MONOCYTES NFR BLD: 11 % (ref 4–15)
NEUTROPHILS # BLD AUTO: 11.4 K/UL (ref 1.8–7.7)
NEUTROPHILS NFR BLD: 68.8 % (ref 38–73)
NRBC BLD-RTO: 0 /100 WBC
PLATELET # BLD AUTO: 343 K/UL (ref 150–450)
PMV BLD AUTO: 9.4 FL (ref 9.2–12.9)
POTASSIUM SERPL-SCNC: 4.1 MMOL/L (ref 3.5–5.1)
PROT SERPL-MCNC: 8.8 G/DL (ref 6–8.4)
RBC # BLD AUTO: 2.33 M/UL (ref 4–5.4)
SODIUM SERPL-SCNC: 137 MMOL/L (ref 136–145)
TROPONIN I SERPL DL<=0.01 NG/ML-MCNC: 0.01 NG/ML (ref 0–0.03)
WBC # BLD AUTO: 16.53 K/UL (ref 3.9–12.7)

## 2024-06-06 PROCEDURE — 99285 EMERGENCY DEPT VISIT HI MDM: CPT | Mod: 25

## 2024-06-06 PROCEDURE — 85025 COMPLETE CBC W/AUTO DIFF WBC: CPT | Performed by: EMERGENCY MEDICINE

## 2024-06-06 PROCEDURE — 83735 ASSAY OF MAGNESIUM: CPT | Performed by: EMERGENCY MEDICINE

## 2024-06-06 PROCEDURE — 25000003 PHARM REV CODE 250: Performed by: EMERGENCY MEDICINE

## 2024-06-06 PROCEDURE — 81025 URINE PREGNANCY TEST: CPT | Performed by: EMERGENCY MEDICINE

## 2024-06-06 PROCEDURE — 80053 COMPREHEN METABOLIC PANEL: CPT | Performed by: EMERGENCY MEDICINE

## 2024-06-06 PROCEDURE — 93005 ELECTROCARDIOGRAM TRACING: CPT

## 2024-06-06 PROCEDURE — 96360 HYDRATION IV INFUSION INIT: CPT

## 2024-06-06 PROCEDURE — 84484 ASSAY OF TROPONIN QUANT: CPT | Performed by: EMERGENCY MEDICINE

## 2024-06-06 PROCEDURE — 93010 ELECTROCARDIOGRAM REPORT: CPT | Mod: ,,, | Performed by: INTERNAL MEDICINE

## 2024-06-06 RX ADMIN — SODIUM CHLORIDE 500 ML: 9 INJECTION, SOLUTION INTRAVENOUS at 11:06

## 2024-06-07 VITALS
HEART RATE: 92 BPM | DIASTOLIC BLOOD PRESSURE: 58 MMHG | SYSTOLIC BLOOD PRESSURE: 106 MMHG | OXYGEN SATURATION: 97 % | RESPIRATION RATE: 18 BRPM | TEMPERATURE: 97 F

## 2024-06-07 LAB
B-HCG UR QL: NEGATIVE
BACTERIA #/AREA URNS AUTO: ABNORMAL /HPF
BILIRUB UR QL STRIP: NEGATIVE
CLARITY UR REFRACT.AUTO: ABNORMAL
COLOR UR AUTO: YELLOW
CTP QC/QA: YES
GLUCOSE UR QL STRIP: NEGATIVE
HGB UR QL STRIP: NEGATIVE
HYALINE CASTS UR QL AUTO: 1 /LPF
KETONES UR QL STRIP: NEGATIVE
LEUKOCYTE ESTERASE UR QL STRIP: ABNORMAL
MICROSCOPIC COMMENT: ABNORMAL
NITRITE UR QL STRIP: NEGATIVE
PH UR STRIP: 6 [PH] (ref 5–8)
PROT UR QL STRIP: NEGATIVE
RBC #/AREA URNS AUTO: 0 /HPF (ref 0–4)
SP GR UR STRIP: 1.01 (ref 1–1.03)
SQUAMOUS #/AREA URNS AUTO: 3 /HPF
URN SPEC COLLECT METH UR: ABNORMAL
WBC #/AREA URNS AUTO: 25 /HPF (ref 0–5)

## 2024-06-07 PROCEDURE — 87077 CULTURE AEROBIC IDENTIFY: CPT | Performed by: EMERGENCY MEDICINE

## 2024-06-07 PROCEDURE — 81001 URINALYSIS AUTO W/SCOPE: CPT | Performed by: EMERGENCY MEDICINE

## 2024-06-07 PROCEDURE — 87088 URINE BACTERIA CULTURE: CPT | Performed by: EMERGENCY MEDICINE

## 2024-06-07 PROCEDURE — 87086 URINE CULTURE/COLONY COUNT: CPT | Performed by: EMERGENCY MEDICINE

## 2024-06-07 PROCEDURE — 87186 SC STD MICRODIL/AGAR DIL: CPT | Performed by: EMERGENCY MEDICINE

## 2024-06-07 PROCEDURE — 25000003 PHARM REV CODE 250: Performed by: EMERGENCY MEDICINE

## 2024-06-07 PROCEDURE — 96361 HYDRATE IV INFUSION ADD-ON: CPT

## 2024-06-07 RX ORDER — AMOXICILLIN AND CLAVULANATE POTASSIUM 875; 125 MG/1; MG/1
1 TABLET, FILM COATED ORAL 2 TIMES DAILY
Qty: 13 TABLET | Refills: 0 | Status: SHIPPED | OUTPATIENT
Start: 2024-06-07 | End: 2024-06-14

## 2024-06-07 RX ORDER — AMOXICILLIN AND CLAVULANATE POTASSIUM 875; 125 MG/1; MG/1
1 TABLET, FILM COATED ORAL
Status: COMPLETED | OUTPATIENT
Start: 2024-06-07 | End: 2024-06-07

## 2024-06-07 RX ADMIN — SODIUM CHLORIDE 500 ML: 0.9 INJECTION, SOLUTION INTRAVENOUS at 12:06

## 2024-06-07 RX ADMIN — AMOXICILLIN AND CLAVULANATE POTASSIUM 1 TABLET: 875; 125 TABLET, FILM COATED ORAL at 12:06

## 2024-06-07 NOTE — ED TRIAGE NOTES
Pt works at [employer], states when she started her shift at 18:45 she noticed she felt lightheaded and believed she was going to pass out. Pt states she had to sit down and when her charge nurse checked her HR it was in the 140's. Pt also reports she had 2 episodes of yellow emesis at work prior to coming to ED. Pt reports she only feels lightheaded with position changes. Denies dysuria, fever, chills, CP, SOB.    LOC: Lana Chou, 29 y.o. female verified via two identifiers.  The patient is awake, alert, oriented and speaking appropriately at this time.  APPEARANCE: Patient resting comfortably and appears to be in no acute distress at this time. Patient is clean and well groomed, patient's clothing is properly fastened.  SKIN:The skin is warm and dry, color consistent with ethnicity, patient has normal skin turgor and moist mucus membranes, skin intact, no breakdown or brusing noted.  MUSCULOSKELETAL: Patient moving all extremities well, no obvious swelling or deformities noted.  RESPIRATORY: Airway is open and patent, respirations are spontaneous, patient has a normal effort and rate, no accessory muscle use noted.  CARDIAC: Patient has a normal rate and rhythm, no periphreal edema noted in any extremity, capillary refill < 3 seconds in all extremities  ABDOMEN: Soft and non tender to palpation, no abdominal distention noted. Bowel sounds present in all four quadrants.  NEUROLOGIC: Eyes open spontaneously, behavior appropriate to situation, follows commands, facial expression symmetrical, bilateral hand grasp equal and even, purposeful motor response noted, normal sensation in all extremities when touched with a finger.

## 2024-06-07 NOTE — ED PROVIDER NOTES
Encounter Date: 6/6/2024       History     Chief Complaint   Patient presents with    Dizziness     Dizzy and lightheaded at work, one episode of emesis. States she feels like she is going to pass out. 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 Reports decreased PO intake at baseline and even further reduced today. Has a hx of pain crises but denies pain now. Denies CP, SOB, lateralizing extremity weakness, fever, back pain, NVD, confusion. Does report increased urinary frequency.       Review of patient's allergies indicates:   Allergen Reactions    Rocephin [ceftriaxone] Shortness Of Breath, Itching and Anxiety     Nausea, vomiting. No hives, swelling, or anaphylaxis. Can tolerate if necessary, but would avoid if possible.     Past Medical History:   Diagnosis Date    Chronic kidney disease (CKD) 04/09/2018    Emesis 11/23/2022    Encounter for surveillance of vaginal ring hormonal contraceptive device 11/15/2023    LGSIL on Pap smear of cervix 06/01/2017    LLQ abdominal pain 03/02/2023    Sickle cell anemia     Sickle cell disease     UTI (urinary tract infection) 03/02/2023     Past Surgical History:   Procedure Laterality Date    BREAST LUMPECTOMY      CARDIAC SURGERY      NEPHRECTOMY       Family History   Problem Relation Name Age of Onset    Sickle cell trait Father      Sickle cell trait Mother      Sickle cell trait Sister      Sickle cell trait Sister      Breast cancer Neg Hx      Colon cancer Neg Hx      Ovarian cancer Neg  Hx       Social History     Tobacco Use    Smoking status: Some Days     Types: Vaping with nicotine     Start date: 6/16/2022    Smokeless tobacco: Never   Substance Use Topics    Alcohol use: No    Drug use: No     Review of Systems   Genitourinary:  Positive for frequency.       Physical Exam     Initial Vitals [06/06/24 2122]   BP Pulse Resp Temp SpO2   109/72 102 18 98.1 °F (36.7 °C) 100 %      MAP       --         Physical Exam    Nursing note and vitals reviewed.  Constitutional: Vital signs are normal. She appears well-nourished.   HENT:   Head: Normocephalic and atraumatic.   Eyes: Conjunctivae and EOM are normal.   Neck: Neck supple. No thyroid mass present.   Normal range of motion.  Cardiovascular:  Normal rate, regular rhythm and normal heart sounds.     Exam reveals no gallop and no friction rub.       No murmur heard.  Pulmonary/Chest: Breath sounds normal. She has no wheezes. She has no rhonchi. She has no rales.   Abdominal: Abdomen is soft. Bowel sounds are normal. There is no abdominal tenderness.   Musculoskeletal:         General: No edema. Normal range of motion.      Cervical back: Normal range of motion and neck supple.     Neurological: She is alert and oriented to person, place, and time. She has normal strength. No cranial nerve deficit or sensory deficit.   Skin: Skin is warm and dry. No rash noted. No erythema.   Psychiatric: She has a normal mood and affect. Her speech is normal. Cognition and memory are normal.         ED Course   Procedures  Labs Reviewed   URINALYSIS, REFLEX TO URINE CULTURE - Abnormal; Notable for the following components:       Result Value    Appearance, UA Hazy (*)     Leukocytes, UA 2+ (*)     All other components within normal limits    Narrative:     Specimen Source->Urine   CBC W/ AUTO DIFFERENTIAL - Abnormal; Notable for the following components:    WBC 16.53 (*)     RBC 2.33 (*)     Hemoglobin 7.4 (*)     Hematocrit 20.7 (*)     MCH 31.8 (*)     RDW 21.1  (*)     Immature Granulocytes 1.1 (*)     Gran # (ANC) 11.4 (*)     Immature Grans (Abs) 0.19 (*)     Mono # 1.8 (*)     Lymph % 17.9 (*)     All other components within normal limits   COMPREHENSIVE METABOLIC PANEL - Abnormal; Notable for the following components:    CO2 21 (*)     Calcium 10.9 (*)     Total Protein 8.8 (*)     Total Bilirubin 5.5 (*)     AST 54 (*)     All other components within normal limits   URINALYSIS MICROSCOPIC - Abnormal; Notable for the following components:    WBC, UA 25 (*)     Bacteria Many (*)     All other components within normal limits    Narrative:     Specimen Source->Urine   CULTURE, URINE   TROPONIN I   MAGNESIUM   POCT URINE PREGNANCY     EKG Readings: (Independently Interpreted)   Initial Reading: No STEMI.   Normal sinus rhythm, 86 beats per minute, normal axis, normal intervals, no STEMI       Imaging Results              X-Ray Chest PA And Lateral (Final result)  Result time 06/06/24 23:45:39      Final result by Amos Canales MD (06/06/24 23:45:39)                   Impression:      No acute cardiopulmonary process.      Electronically signed by: Amos Canales MD  Date:    06/06/2024  Time:    23:45               Narrative:    EXAMINATION:  XR CHEST PA AND LATERAL    CLINICAL HISTORY:  SOB;    TECHNIQUE:  PA and lateral views of the chest were performed.    COMPARISON:  02/22/2024.    FINDINGS:  Cholecystectomy clips right upper quadrant.    There is no consolidation, effusion, or pneumothorax.    Cardiomediastinal silhouette is unremarkable.    Regional osseous structures are unremarkable.                                       Medications   sodium chloride 0.9% bolus 500 mL 500 mL (0 mLs Intravenous Stopped 6/7/24 0009)   sodium chloride 0.9% bolus 500 mL 500 mL (0 mLs Intravenous Stopped 6/7/24 0039)   amoxicillin-clavulanate 875-125mg per tablet 1 tablet (1 tablet Oral Given 6/7/24 0050)     Medical Decision Making  Pt rapidly assessed. VS sig for mild  hypotension on arrival. Alert and fully neurointact. Reports symptomatic improvement after IVF. W/u shows stable blood levels for her baseline. UA c/f UTI with PMH ESBL s/t augmentin. Educated about supp care and the need to rest and increase PO intake greatly. Educated that  infections can induce SS pain crises. Asked to f/u with PCP asap or return to the ED immed for any new, worsening or concerning symptoms. Pt agreeable and dc'd in stable condition.    Amount and/or Complexity of Data Reviewed  Labs: ordered.  Radiology: ordered.    Risk  Prescription drug management.                          Medical Decision Making:   Differential Diagnosis:   DDx include but not limited to UTI, ACS, PE, PNA, PTX, CVA/TIA, CNS infection, sepsis, symptomatic anemia, pyelo,  other shock, kidney stone, septic stone             Clinical Impression:  Final diagnoses:  [R42] Dizziness  [N30.00] Acute cystitis without hematuria (Primary)          ED Disposition Condition    Discharge Stable          ED Prescriptions       Medication Sig Dispense Start Date End Date Auth. Provider    amoxicillin-clavulanate 875-125mg (AUGMENTIN) 875-125 mg per tablet Take 1 tablet by mouth 2 (two) times daily. for 7 days 13 tablet 6/7/2024 6/14/2024 Rodriguez Solano MD          Follow-up Information       Follow up With Specialties Details Why Contact Info    Luann Loza MD Internal Medicine Schedule an appointment as soon as possible for a visit   [address] [place] [address]  [phone]               Rodriguez Solano MD  06/07/24 8961

## 2024-06-08 LAB
OHS QRS DURATION: 80 MS
OHS QTC CALCULATION: 435 MS

## 2024-06-09 LAB — BACTERIA UR CULT: ABNORMAL

## 2024-06-14 RX ORDER — NITROFURANTOIN 25; 75 MG/1; MG/1
100 CAPSULE ORAL 2 TIMES DAILY
Qty: 10 CAPSULE | Refills: 0 | Status: SHIPPED | OUTPATIENT
Start: 2024-06-14 | End: 2024-06-19

## 2024-06-26 DIAGNOSIS — D57.1 HEMOGLOBIN SS DISEASE WITHOUT CRISIS: Primary | ICD-10-CM

## 2024-06-26 DIAGNOSIS — Z34.90 EARLY STAGE OF PREGNANCY: ICD-10-CM

## 2024-06-26 RX ORDER — HYDROXYUREA 500 MG/1
1500 CAPSULE ORAL DAILY
Qty: 90 CAPSULE | Refills: 6 | Status: SHIPPED | OUTPATIENT
Start: 2024-06-26

## 2024-07-08 ENCOUNTER — PATIENT MESSAGE (OUTPATIENT)
Dept: OBSTETRICS AND GYNECOLOGY | Facility: CLINIC | Age: 29
End: 2024-07-08
Payer: COMMERCIAL

## 2024-07-10 ENCOUNTER — PATIENT MESSAGE (OUTPATIENT)
Dept: OBSTETRICS AND GYNECOLOGY | Facility: CLINIC | Age: 29
End: 2024-07-10

## 2024-07-10 ENCOUNTER — CLINICAL SUPPORT (OUTPATIENT)
Dept: OBSTETRICS AND GYNECOLOGY | Facility: CLINIC | Age: 29
End: 2024-07-10

## 2024-07-10 DIAGNOSIS — N91.2 AMENORRHEA: Primary | ICD-10-CM

## 2024-07-10 PROCEDURE — 99999 PR PBB SHADOW E&M-EST. PATIENT-LVL II: CPT | Mod: PBBFAC,,,

## 2024-07-10 PROCEDURE — 99212 OFFICE O/P EST SF 10 MIN: CPT | Mod: PBBFAC

## 2024-07-10 NOTE — PROGRESS NOTES
Spoke with patient for a total of 20 minutes during virtual visit.  Updated chart to reflect up to date patient demographics.  Allergies, medications, pharmacy, medical history and OB history updated.  Patient was guided through expectations of care during pregnancy.  Initial OB & first routine OB appts scheduled.    Education provided & questions answered. Encouraged to send message or call office with any questions/concerns. Verbalized understanding.     LMP- 5/21/24    7w1d based on LMP  Patient with history of sickle cell disease    Discussed with pt:    Encouraged to begin taking PNV   C/o feeling very tired  C/o some nausea and vomiting  C/o some abdominal cramping  Precautions discussed  Referred to [email or web address] for Preg A to Z guide & class schedule  Discussed benefits of breastfeeding - plans to breastfeed  Discussed need for pediatrician  Discussed caffeine usage- she states she drinks a lot of Red Bull because she works overnight. Encouraged to keep caffeine under 200 mg/day.

## 2024-07-18 ENCOUNTER — HOSPITAL ENCOUNTER (EMERGENCY)
Facility: HOSPITAL | Age: 29
Discharge: HOME | End: 2024-07-18
Attending: EMERGENCY MEDICINE
Payer: COMMERCIAL

## 2024-07-18 ENCOUNTER — OFFICE VISIT (OUTPATIENT)
Dept: OBSTETRICS AND GYNECOLOGY | Facility: CLINIC | Age: 29
End: 2024-07-18
Payer: COMMERCIAL

## 2024-07-18 ENCOUNTER — PATIENT MESSAGE (OUTPATIENT)
Dept: OBSTETRICS AND GYNECOLOGY | Facility: CLINIC | Age: 29
End: 2024-07-18

## 2024-07-18 ENCOUNTER — LAB VISIT (OUTPATIENT)
Dept: LAB | Facility: HOSPITAL | Age: 29
End: 2024-07-18
Attending: OBSTETRICS & GYNECOLOGY
Payer: COMMERCIAL

## 2024-07-18 ENCOUNTER — TELEPHONE (OUTPATIENT)
Dept: OBSTETRICS AND GYNECOLOGY | Facility: CLINIC | Age: 29
End: 2024-07-18
Payer: COMMERCIAL

## 2024-07-18 VITALS
SYSTOLIC BLOOD PRESSURE: 110 MMHG | BODY MASS INDEX: 20.14 KG/M2 | WEIGHT: 106.69 LBS | HEIGHT: 61 IN | DIASTOLIC BLOOD PRESSURE: 62 MMHG

## 2024-07-18 VITALS
HEART RATE: 72 BPM | DIASTOLIC BLOOD PRESSURE: 51 MMHG | BODY MASS INDEX: 19.83 KG/M2 | WEIGHT: 105 LBS | RESPIRATION RATE: 16 BRPM | SYSTOLIC BLOOD PRESSURE: 89 MMHG | OXYGEN SATURATION: 98 % | TEMPERATURE: 98 F | HEIGHT: 61 IN

## 2024-07-18 DIAGNOSIS — J06.9 VIRAL URI WITH COUGH: Primary | ICD-10-CM

## 2024-07-18 DIAGNOSIS — D57.1 HEMOGLOBIN SS DISEASE WITHOUT CRISIS: ICD-10-CM

## 2024-07-18 DIAGNOSIS — Z32.00 VISIT FOR CONFIRMATION OF PREGNANCY TEST RESULT WITH PHYSICAL EXAM: ICD-10-CM

## 2024-07-18 DIAGNOSIS — Z32.00 VISIT FOR CONFIRMATION OF PREGNANCY TEST RESULT WITH PHYSICAL EXAM: Primary | ICD-10-CM

## 2024-07-18 LAB
ABO + RH BLD: NORMAL
ACANTHOCYTES BLD QL SMEAR: PRESENT
ALBUMIN SERPL BCP-MCNC: 4.8 G/DL (ref 3.5–5.2)
ALP SERPL-CCNC: 61 U/L (ref 55–135)
ALT SERPL W/O P-5'-P-CCNC: 18 U/L (ref 10–44)
ANION GAP SERPL CALC-SCNC: 8 MMOL/L (ref 8–16)
ANISOCYTOSIS BLD QL SMEAR: ABNORMAL
AST SERPL-CCNC: 42 U/L (ref 10–40)
B-HCG UR QL: POSITIVE
B-HCG UR QL: POSITIVE
BASOPHILS # BLD AUTO: 0.07 K/UL (ref 0–0.2)
BASOPHILS NFR BLD: 0.6 % (ref 0–1.9)
BILIRUB SERPL-MCNC: 3.5 MG/DL (ref 0.1–1)
BLD GP AB SCN CELLS X3 SERPL QL: NORMAL
BUN SERPL-MCNC: 10 MG/DL (ref 6–20)
BURR CELLS BLD QL SMEAR: ABNORMAL
CALCIUM SERPL-MCNC: 9.9 MG/DL (ref 8.7–10.5)
CHLORIDE SERPL-SCNC: 107 MMOL/L (ref 95–110)
CO2 SERPL-SCNC: 19 MMOL/L (ref 23–29)
CREAT SERPL-MCNC: 0.9 MG/DL (ref 0.5–1.4)
CREAT UR-MCNC: 131.9 MG/DL (ref 15–325)
CTP QC/QA: YES
DIFFERENTIAL METHOD BLD: ABNORMAL
EOSINOPHIL # BLD AUTO: 0.1 K/UL (ref 0–0.5)
EOSINOPHIL NFR BLD: 0.9 % (ref 0–8)
ERYTHROCYTE [DISTWIDTH] IN BLOOD BY AUTOMATED COUNT: 18.3 % (ref 11.5–14.5)
EST. GFR  (NO RACE VARIABLE): >60 ML/MIN/1.73 M^2
GLUCOSE SERPL-MCNC: 87 MG/DL (ref 70–110)
HBV SURFACE AG SERPL QL IA: NORMAL
HCT VFR BLD AUTO: 17.2 % (ref 37–48.5)
HCV AB SERPL QL IA: NORMAL
HGB BLD-MCNC: 6.1 G/DL (ref 12–16)
HIV 1+2 AB+HIV1 P24 AG SERPL QL IA: NORMAL
HOWELL-JOLLY BOD BLD QL SMEAR: ABNORMAL
HYPOCHROMIA BLD QL SMEAR: ABNORMAL
IMM GRANULOCYTES # BLD AUTO: 0.07 K/UL (ref 0–0.04)
IMM GRANULOCYTES NFR BLD AUTO: 0.6 % (ref 0–0.5)
LYMPHOCYTES # BLD AUTO: 2.7 K/UL (ref 1–4.8)
LYMPHOCYTES NFR BLD: 21.4 % (ref 18–48)
MCH RBC QN AUTO: 31 PG (ref 27–31)
MCHC RBC AUTO-ENTMCNC: 35.5 G/DL (ref 32–36)
MCV RBC AUTO: 87 FL (ref 82–98)
MOLECULAR STREP A: NEGATIVE
MONOCYTES # BLD AUTO: 2.3 K/UL (ref 0.3–1)
MONOCYTES NFR BLD: 18.2 % (ref 4–15)
NEUTROPHILS # BLD AUTO: 7.2 K/UL (ref 1.8–7.7)
NEUTROPHILS NFR BLD: 58.3 % (ref 38–73)
NRBC BLD-RTO: 1 /100 WBC
OVALOCYTES BLD QL SMEAR: ABNORMAL
PLATELET # BLD AUTO: 247 K/UL (ref 150–450)
PLATELET BLD QL SMEAR: ABNORMAL
PMV BLD AUTO: 10 FL (ref 9.2–12.9)
POC MOLECULAR INFLUENZA A AGN: NEGATIVE
POC MOLECULAR INFLUENZA B AGN: NEGATIVE
POIKILOCYTOSIS BLD QL SMEAR: SLIGHT
POTASSIUM SERPL-SCNC: 3.6 MMOL/L (ref 3.5–5.1)
PROT SERPL-MCNC: 8.2 G/DL (ref 6–8.4)
PROT UR-MCNC: 16 MG/DL
PROT/CREAT UR: 0.12 MG/G{CREAT} (ref 0–0.2)
RBC # BLD AUTO: 1.97 M/UL (ref 4–5.4)
SARS-COV-2 RDRP RESP QL NAA+PROBE: NEGATIVE
SICKLE CELLS BLD QL SMEAR: ABNORMAL
SODIUM SERPL-SCNC: 134 MMOL/L (ref 136–145)
SPECIMEN OUTDATE: NORMAL
TREPONEMA PALLIDUM IGG+IGM AB [PRESENCE] IN SERUM OR PLASMA BY IMMUNOASSAY: NONREACTIVE
WBC # BLD AUTO: 12.39 K/UL (ref 3.9–12.7)

## 2024-07-18 PROCEDURE — 87491 CHLMYD TRACH DNA AMP PROBE: CPT | Performed by: OBSTETRICS & GYNECOLOGY

## 2024-07-18 PROCEDURE — 84156 ASSAY OF PROTEIN URINE: CPT | Performed by: OBSTETRICS & GYNECOLOGY

## 2024-07-18 PROCEDURE — 80053 COMPREHEN METABOLIC PANEL: CPT | Performed by: OBSTETRICS & GYNECOLOGY

## 2024-07-18 PROCEDURE — 85660 RBC SICKLE CELL TEST: CPT | Performed by: OBSTETRICS & GYNECOLOGY

## 2024-07-18 PROCEDURE — 87651 STREP A DNA AMP PROBE: CPT

## 2024-07-18 PROCEDURE — 86901 BLOOD TYPING SEROLOGIC RH(D): CPT | Performed by: OBSTETRICS & GYNECOLOGY

## 2024-07-18 PROCEDURE — 99283 EMERGENCY DEPT VISIT LOW MDM: CPT | Mod: 25

## 2024-07-18 PROCEDURE — 81025 URINE PREGNANCY TEST: CPT | Mod: S$GLB,,, | Performed by: OBSTETRICS & GYNECOLOGY

## 2024-07-18 PROCEDURE — 87340 HEPATITIS B SURFACE AG IA: CPT | Performed by: OBSTETRICS & GYNECOLOGY

## 2024-07-18 PROCEDURE — 25000003 PHARM REV CODE 250

## 2024-07-18 PROCEDURE — 86900 BLOOD TYPING SEROLOGIC ABO: CPT | Performed by: OBSTETRICS & GYNECOLOGY

## 2024-07-18 PROCEDURE — 87077 CULTURE AEROBIC IDENTIFY: CPT | Performed by: OBSTETRICS & GYNECOLOGY

## 2024-07-18 PROCEDURE — 87502 INFLUENZA DNA AMP PROBE: CPT

## 2024-07-18 PROCEDURE — 81025 URINE PREGNANCY TEST: CPT | Performed by: PHYSICIAN ASSISTANT

## 2024-07-18 PROCEDURE — 87186 SC STD MICRODIL/AGAR DIL: CPT | Performed by: OBSTETRICS & GYNECOLOGY

## 2024-07-18 PROCEDURE — 83020 HEMOGLOBIN ELECTROPHORESIS: CPT | Performed by: OBSTETRICS & GYNECOLOGY

## 2024-07-18 PROCEDURE — 87389 HIV-1 AG W/HIV-1&-2 AB AG IA: CPT | Performed by: OBSTETRICS & GYNECOLOGY

## 2024-07-18 PROCEDURE — 99999 PR PBB SHADOW E&M-EST. PATIENT-LVL III: CPT | Mod: PBBFAC,,, | Performed by: OBSTETRICS & GYNECOLOGY

## 2024-07-18 PROCEDURE — 83021 HEMOGLOBIN CHROMOTOGRAPHY: CPT | Performed by: OBSTETRICS & GYNECOLOGY

## 2024-07-18 PROCEDURE — 86803 HEPATITIS C AB TEST: CPT | Performed by: OBSTETRICS & GYNECOLOGY

## 2024-07-18 PROCEDURE — 86762 RUBELLA ANTIBODY: CPT | Performed by: OBSTETRICS & GYNECOLOGY

## 2024-07-18 PROCEDURE — 87088 URINE BACTERIA CULTURE: CPT | Performed by: OBSTETRICS & GYNECOLOGY

## 2024-07-18 PROCEDURE — 99203 OFFICE O/P NEW LOW 30 MIN: CPT | Mod: S$GLB,,, | Performed by: OBSTETRICS & GYNECOLOGY

## 2024-07-18 PROCEDURE — 86850 RBC ANTIBODY SCREEN: CPT | Performed by: OBSTETRICS & GYNECOLOGY

## 2024-07-18 PROCEDURE — 87635 SARS-COV-2 COVID-19 AMP PRB: CPT

## 2024-07-18 PROCEDURE — 87086 URINE CULTURE/COLONY COUNT: CPT | Performed by: OBSTETRICS & GYNECOLOGY

## 2024-07-18 PROCEDURE — 99459 PELVIC EXAMINATION: CPT | Mod: S$GLB,,, | Performed by: OBSTETRICS & GYNECOLOGY

## 2024-07-18 PROCEDURE — 86593 SYPHILIS TEST NON-TREP QUANT: CPT | Performed by: OBSTETRICS & GYNECOLOGY

## 2024-07-18 PROCEDURE — 25000003 PHARM REV CODE 250: Performed by: PHYSICIAN ASSISTANT

## 2024-07-18 PROCEDURE — 85025 COMPLETE CBC W/AUTO DIFF WBC: CPT | Performed by: OBSTETRICS & GYNECOLOGY

## 2024-07-18 PROCEDURE — 99213 OFFICE O/P EST LOW 20 MIN: CPT | Mod: PBBFAC | Performed by: OBSTETRICS & GYNECOLOGY

## 2024-07-18 RX ORDER — ACETAMINOPHEN 500 MG
1000 TABLET ORAL
Status: COMPLETED | OUTPATIENT
Start: 2024-07-18 | End: 2024-07-18

## 2024-07-18 RX ORDER — LIDOCAINE HYDROCHLORIDE 20 MG/ML
5 SOLUTION OROPHARYNGEAL
Status: COMPLETED | OUTPATIENT
Start: 2024-07-18 | End: 2024-07-18

## 2024-07-18 RX ORDER — GUAIFENESIN 100 MG/5ML
100-200 SOLUTION ORAL EVERY 4 HOURS PRN
Qty: 60 ML | Refills: 0 | Status: SHIPPED | OUTPATIENT
Start: 2024-07-18 | End: 2024-07-28

## 2024-07-18 RX ORDER — ACETAMINOPHEN 500 MG
500 TABLET ORAL EVERY 6 HOURS PRN
Qty: 20 TABLET | Refills: 0 | Status: SHIPPED | OUTPATIENT
Start: 2024-07-18

## 2024-07-18 RX ADMIN — LIDOCAINE HYDROCHLORIDE 5 ML: 20 SOLUTION ORAL at 07:07

## 2024-07-18 RX ADMIN — ACETAMINOPHEN 1000 MG: 500 TABLET ORAL at 06:07

## 2024-07-18 NOTE — Clinical Note
"Lana Gantyamilet Chou was seen and treated in our emergency department on 7/18/2024.  She may return to work on 07/22/2024.       If you have any questions or concerns, please don't hesitate to call.      Shashank Jensen PA-C"

## 2024-07-18 NOTE — ED PROVIDER NOTES
Encounter Date: 7/18/2024    SCRIBE #1 NOTE: I, Oralia Holloway, am scribing for, and in the presence of,  Shashank Jensen PA-C. I have scribed the following portions of the note - Other sections scribed: HPI, ROS, PE.       History     Chief Complaint   Patient presents with    Sore Throat     Pt c/o sore throat x2 days, worse today. She also c/o cough, nasal congestion x2 days. Denies fever, chills, N/V, abdominal pain.      Patient is a 29 y.o. female, 8w2d gravid by LMP 05/21/24 followed by Dr. Landers and with a past medical history of sickle cell anemia and CKD who presents to the Emergency Department for evaluation of URI symptoms x 2 days.  Symptoms include dry cough and sore throat.  Patient reports cough was initially productive with green sputum but has become more dry. She has not taken any medications for the symptoms. She reports known sick contacts. Denies history of HTN, DM, or HIV.  She denies headache, chest pain, shortness of breath, abdominal pain.  Denies urinary symptoms.  Denies vaginal bleeding, vaginal discharge.  Denies fever, chills, or otalgia.      The history is provided by the patient. No  was used.     Review of patient's allergies indicates:   Allergen Reactions    Rocephin [ceftriaxone] Shortness Of Breath, Itching and Anxiety     Nausea, vomiting. No hives, swelling, or anaphylaxis. Can tolerate if necessary, but would avoid if possible.     Past Medical History:   Diagnosis Date    Chronic kidney disease (CKD) 04/09/2018    Emesis 11/23/2022    Encounter for surveillance of vaginal ring hormonal contraceptive device 11/15/2023    LGSIL on Pap smear of cervix 06/01/2017    LLQ abdominal pain 03/02/2023    Sickle cell anemia     Sickle cell disease     Sickle-cell disease without crisis     UTI (urinary tract infection) 03/02/2023     Past Surgical History:   Procedure Laterality Date    BREAST LUMPECTOMY      CARDIAC SURGERY      NEPHRECTOMY       Family History    Problem Relation Name Age of Onset    Sickle cell trait Father      Sickle cell trait Mother      Sickle cell trait Sister      Sickle cell trait Sister      Breast cancer Neg Hx      Colon cancer Neg Hx      Ovarian cancer Neg Hx       Social History     Tobacco Use    Smoking status: Former     Types: Vaping with nicotine     Start date: 2022     Quit date: 2024     Years since quittin.0    Smokeless tobacco: Never   Substance Use Topics    Alcohol use: Not Currently    Drug use: No     Review of Systems   Constitutional:  Negative for chills and fever.   HENT:  Positive for sore throat. Negative for congestion, ear pain, rhinorrhea and trouble swallowing.    Respiratory:  Positive for cough. Negative for shortness of breath.    Cardiovascular:  Negative for chest pain.   Gastrointestinal:  Negative for anal bleeding, nausea and vomiting.   Genitourinary:  Negative for dysuria, flank pain, hematuria, vaginal bleeding and vaginal discharge.   Musculoskeletal:  Negative for back pain, neck pain and neck stiffness.   Neurological:  Negative for dizziness, light-headedness and headaches.       Physical Exam     Initial Vitals [24 1711]   BP Pulse Resp Temp SpO2   (!) 110/54 (!) 111 19 98.3 °F (36.8 °C) 100 %      MAP       --         Physical Exam    Nursing note and vitals reviewed.  Constitutional: She appears well-developed and well-nourished.   HENT:   Head: Normocephalic and atraumatic.   Right Ear: External ear normal.   Left Ear: External ear normal.   There is posterior oropharyngeal erythema with postnasal drip, no tonsillar swelling, no oropharyngeal exudates, uvula is midline. Normal dentition. No trismus.  No muffled voice. No submandibular swelling. Patient is tolerating secretions without difficulty.  Patient is speaking in full sentences on exam without difficulty.  Bilateral tympanic membranes are pearly gray without erythema, bulging, perforation.  There is no postauricular  swelling, or overlying erythema or tenderness to palpation over mastoids bilaterally.  There is mild anterior cervical lymphadenopathy present.  Normal range of motion of neck.   Neck: Carotid bruit is not present.   Normal ROM of neck.    Normal range of motion.  Cardiovascular:  Normal rate, regular rhythm, normal heart sounds and intact distal pulses.     Exam reveals no gallop and no friction rub.       No murmur heard.  Pulmonary/Chest: Breath sounds normal. No respiratory distress. She has no wheezes. She has no rhonchi. She has no rales.   Abdominal: Abdomen is soft. Bowel sounds are normal. She exhibits no distension. There is no abdominal tenderness. There is no rebound and no guarding.   Musculoskeletal:         General: Normal range of motion.      Cervical back: Normal range of motion.     Lymphadenopathy:     She has cervical adenopathy (mild; anterior).   Neurological: She is alert and oriented to person, place, and time. GCS score is 15. GCS eye subscore is 4. GCS verbal subscore is 5. GCS motor subscore is 6.   Psychiatric: She has a normal mood and affect.         ED Course   Procedures  Labs Reviewed   POCT URINE PREGNANCY - Abnormal; Notable for the following components:       Result Value    POC Preg Test, Ur Positive (*)     All other components within normal limits   POCT STREP A MOLECULAR   SARS-COV-2 RDRP GENE   POCT INFLUENZA A/B MOLECULAR          Imaging Results    None          Medications   acetaminophen tablet 1,000 mg (1,000 mg Oral Given 7/18/24 1800)   LIDOcaine viscous HCl 2% oral solution 5 mL (5 mLs Oral Given 7/18/24 1910)     Medical Decision Making  This is an emergent evaluation of a 29 y.o. female, 8w2d gravid by LMP 05/21/24 followed by Dr. Landers and with a past medical history of sickle cell anemia and CKD who presents to the Emergency Department for evaluation of URI symptoms x 2 days.  Symptoms include dry cough and sore throat.    Patient looks well clinically. There is  posterior oropharyngeal erythema with postnasal drip, no tonsillar swelling, no oropharyngeal exudates, uvula is midline. Normal dentition. No trismus.  No muffled voice. No submandibular swelling. Patient is tolerating secretions without difficulty.  Patient is speaking in full sentences on exam without difficulty.  Bilateral tympanic membranes are pearly gray without erythema, bulging, perforation.  There is no postauricular swelling, or overlying erythema or tenderness to palpation over mastoids bilaterally.  There is mild anterior cervical lymphadenopathy present.  Normal range of motion of neck. Regular rate rhythm without murmurs.  No carotid bruits appreciated on exam. Lungs are clear to auscultation bilaterally.  Abdomen is soft, nontender, non distended, with normal bowel sounds.     Differential diagnosis includes but is not limited to COVID, flu, strep, other viral syndrome.    Workup initiated with viral swabs.  Ordered viscous lidocaine.  Vital signs, chart, labs, and/or imaging were all reviewed.  See ED course below and interpretations above. My overall impression is viral URI. Will discharge home with OBGYN follow-up. Patient is very well appearing, and in no acute distress. Vital signs are reassuring here in the emergency department, patient is afebrile, breathing comfortable, satting 100 % on room air. Patient/Caregiver is stable for discharge at this time.  Patient/Caregiver was informed of results and plan of care. Patient/Caregiver verbalized understanding of care plan. All questions and concerns were addressed. Discussed strict return precautions with the patient/caregiver. Instructed follow up with primary care provider within 1 week.      Shashank Jensen PA-C    DISCLAIMER: This note was prepared with [employer] voice recognition transcription software. Garbled syntax, mangled pronouns, and other bizarre constructions may be attributed to that software system.       Amount and/or Complexity of  Data Reviewed  Labs: ordered. Decision-making details documented in ED Course.    Risk  OTC drugs.  Prescription drug management.            Scribe Attestation:   Scribe #1: I performed the above scribed service and the documentation accurately describes the services I performed. I attest to the accuracy of the note.        ED Course as of 07/18/24 1924   Thu Jul 18, 2024   1804 POCT urine pregnancy(!)  UPT positive.  Patient denies abdominal pain.  Vaginal bleeding or discharge.  She is followed by Dr. Landers.  Had bedside ultrasound with Dr. Landers but has upcoming traditional US. [TM]   1805 POCT Strep A, Molecular  Strep negative. [TM]   1814 Upon chart review, I noticed patient had labs done outside of ER today where her CBC showed hemoglobin/hematocrit of 6.1/17.2. I confronted this patient about this as she did not mention this to me. She states that she has a history of Sickle Cell and commonly needs transfusions. She reports she is feeling fine other than sore throat. She denies any light headedness or excessive fatigue. She reports she did not mention this to me as she is not agreeable to admission or for transfusion at this time. She understands the risk of this. She would not like repeat labs here in the ED, would like to follow up with her regular doctor. Clinically looks well.  Her blood pressure is 110/54, but her normal baseline.  Heart rate of 80.  She reports her baseline hemoglobin is around 7-7.5 with a history of sickle cell.  Reports Dr. Landers sent her in a prescription for iron, she is not began taking this.  She would like to just follow-up with Dr. Landers for her lab results. [TM]   1839 POCT COVID-19 Rapid Screening  COVID negative. [TM]   1839 POCT Strep A, Molecular  Strep negative. [TM]   1851 Ordered viscous lidocaine for sore throat. Patient is earlier on during her pregnancy, I believe this is appropriate for her symptoms. [TM]   1923 POCT Influenza A/B Molecular  Flu negative. [TM]    1923 Will discharge home with Robitussin, Tylenol with OBGYN follow-up [TM]      ED Course User Index  [TM] Shashank Jensen PA-C I, Trevor Marler PA-C, personally performed the services described in this documentation. All medical record entries made by the scribe were at my direction and in my presence. I have reviewed the chart and agree that the record reflects my personal performance and is accurate and complete.    Clinical Impression:  Final diagnoses:  [J06.9] Viral URI with cough (Primary)          ED Disposition Condition    Discharge Stable          ED Prescriptions       Medication Sig Dispense Start Date End Date Auth. Provider    acetaminophen (TYLENOL) 500 MG tablet Take 1 tablet (500 mg total) by mouth every 6 (six) hours as needed. 20 tablet 7/18/2024 -- Shashank Jensen PA-C    guaiFENesin 100 mg/5 ml (ROBITUSSIN) 100 mg/5 mL syrup Take 5-10 mLs (100-200 mg total) by mouth every 4 (four) hours as needed for Cough. 60 mL 7/18/2024 7/28/2024 Shashank Jensen PA-C          Follow-up Information       Follow up With Specialties Details Why Contact Info    [facility] [place] [facility] Emergency Medicine Go to  As needed, If symptoms worsen, or new symptoms develop [address]  [facility] [place] [id number] [phone]    Primary care doctor  Schedule an appointment as soon as possible for a visit in 3 days      Tyson Landers MD Obstetrics and Gynecology, Obstetrics and Gynecology   [address]  [address]  [facility] [address]  [phone]               Shashank Jensen PA-C  07/18/24 1924

## 2024-07-18 NOTE — ED NOTES
Patient coming in for sore throat x2 days, reports coughing up green sputum. Denies any other symptoms, denies taking medications.

## 2024-07-19 ENCOUNTER — PATIENT MESSAGE (OUTPATIENT)
Dept: OBSTETRICS AND GYNECOLOGY | Facility: CLINIC | Age: 29
End: 2024-07-19
Payer: COMMERCIAL

## 2024-07-19 DIAGNOSIS — O23.11 ACUTE CYSTITIS DURING PREGNANCY IN FIRST TRIMESTER: Primary | ICD-10-CM

## 2024-07-19 LAB
RUBV IGG SER-ACNC: 15.5 IU/ML
RUBV IGG SER-IMP: REACTIVE

## 2024-07-19 RX ORDER — NITROFURANTOIN 25; 75 MG/1; MG/1
100 CAPSULE ORAL 2 TIMES DAILY
Qty: 14 CAPSULE | Refills: 0 | Status: SHIPPED | OUTPATIENT
Start: 2024-07-19 | End: 2024-07-26

## 2024-07-19 NOTE — ED NOTES
BP 80s/50s while patient sitting. Patient denies HA, SOB, light headedness. Provider notified. Per provider recheck BP in 15 min.

## 2024-07-19 NOTE — ED NOTES
Repeat BP 15 min later is 89/51. Patient remains asymptomatic for hypotension. Provider Shashank notified. States patient okay for discharge and should follow up with OBGYN. Patient educated on provider instructions.

## 2024-07-19 NOTE — DISCHARGE INSTRUCTIONS
You were seen in the emergency department today for viral illness.  Please take all medications as prescribed and as we discussed.  Follow-up with specialist if instructed to do so.  It is important to remember that some problems are difficult to diagnose and may not be found during your Emergency Department visit. Be sure to follow up with your primary care doctor and review all labs/imaging/tests that were performed during this visit with them. Some labs/tests may be outside of the normal range and require non-emergent follow-up and further investigation to help diagnose/exclude/prevent complications or other medical conditions. Return to the emergency department for any new or worsening symptoms. Thank you for allowing me to care for you today, it was my pleasure. I hope you get to feeling better soon!

## 2024-07-20 LAB — BACTERIA UR CULT: ABNORMAL

## 2024-07-22 LAB
C TRACH DNA SPEC QL NAA+PROBE: NOT DETECTED
N GONORRHOEA DNA SPEC QL NAA+PROBE: NOT DETECTED

## 2024-07-23 LAB
HB ELECTROPHORESIS INTERP CANCEL: ABNORMAL
HB ELECTROPHORESIS INTERPRETATION: ABNORMAL
HB ELECTROPHORESIS SUMMARY INTERPRETATION: NORMAL
HGB A MFR BLD ELPH: 0 % (ref 95.8–98)
HGB A2 MFR BLD: 3.1 % (ref 2–3.3)
HGB A2+XXX MFR BLD ELPH: ABNORMAL %
HGB F MFR BLD: 7.4 % (ref 0–0.9)
HGB S BLD QL: POSITIVE
HGB XXX MFR BLD ELPH: ABNORMAL %
HPLC HB VARIANT: ABNORMAL
PROVIDER SIGNING NAME: NORMAL

## 2024-07-26 DIAGNOSIS — O23.11 ACUTE CYSTITIS DURING PREGNANCY IN FIRST TRIMESTER: ICD-10-CM

## 2024-07-27 NOTE — TELEPHONE ENCOUNTER
Refill Routing Note   Medication(s) are not appropriate for processing by [facility] for the following reason(s):        Outside of protocol    [facility] action(s):  Route               Appointments  past 12m or future 3m with PCP    Date Provider   Last Visit   7/18/2024 Tyson Landers MD   Next Visit   8/20/2024 Tyson Landers MD   ED visits in past 90 days: 2        Note composed:9:22 AM 07/27/2024

## 2024-07-28 RX ORDER — NITROFURANTOIN 25; 75 MG/1; MG/1
100 CAPSULE ORAL 2 TIMES DAILY
Qty: 14 CAPSULE | Refills: 0 | Status: SHIPPED | OUTPATIENT
Start: 2024-07-28 | End: 2024-08-08

## 2024-08-01 ENCOUNTER — PROCEDURE VISIT (OUTPATIENT)
Dept: MATERNAL FETAL MEDICINE | Facility: CLINIC | Age: 29
End: 2024-08-01
Payer: COMMERCIAL

## 2024-08-01 ENCOUNTER — PATIENT MESSAGE (OUTPATIENT)
Dept: MATERNAL FETAL MEDICINE | Facility: CLINIC | Age: 29
End: 2024-08-01

## 2024-08-01 DIAGNOSIS — Z36.82 ENCOUNTER FOR (NT) NUCHAL TRANSLUCENCY SCAN: Primary | ICD-10-CM

## 2024-08-01 DIAGNOSIS — D57.1 HEMOGLOBIN SS DISEASE WITHOUT CRISIS: ICD-10-CM

## 2024-08-01 DIAGNOSIS — Z32.00 VISIT FOR CONFIRMATION OF PREGNANCY TEST RESULT WITH PHYSICAL EXAM: ICD-10-CM

## 2024-08-01 PROCEDURE — 76801 OB US < 14 WKS SINGLE FETUS: CPT | Mod: S$GLB,,, | Performed by: OBSTETRICS & GYNECOLOGY

## 2024-08-20 ENCOUNTER — PROCEDURE VISIT (OUTPATIENT)
Dept: MATERNAL FETAL MEDICINE | Facility: CLINIC | Age: 29
End: 2024-08-20
Payer: COMMERCIAL

## 2024-08-20 ENCOUNTER — OFFICE VISIT (OUTPATIENT)
Dept: MATERNAL FETAL MEDICINE | Facility: CLINIC | Age: 29
End: 2024-08-20
Attending: OBSTETRICS & GYNECOLOGY
Payer: COMMERCIAL

## 2024-08-20 ENCOUNTER — PATIENT MESSAGE (OUTPATIENT)
Dept: ADMINISTRATIVE | Facility: OTHER | Age: 29
End: 2024-08-20
Payer: COMMERCIAL

## 2024-08-20 ENCOUNTER — OFFICE VISIT (OUTPATIENT)
Dept: MATERNAL FETAL MEDICINE | Facility: CLINIC | Age: 29
End: 2024-08-20
Payer: COMMERCIAL

## 2024-08-20 ENCOUNTER — INITIAL PRENATAL (OUTPATIENT)
Dept: OBSTETRICS AND GYNECOLOGY | Facility: CLINIC | Age: 29
End: 2024-08-20
Payer: COMMERCIAL

## 2024-08-20 VITALS
SYSTOLIC BLOOD PRESSURE: 104 MMHG | DIASTOLIC BLOOD PRESSURE: 60 MMHG | BODY MASS INDEX: 20.08 KG/M2 | WEIGHT: 106.25 LBS

## 2024-08-20 VITALS
HEIGHT: 61 IN | DIASTOLIC BLOOD PRESSURE: 60 MMHG | SYSTOLIC BLOOD PRESSURE: 104 MMHG | BODY MASS INDEX: 19.98 KG/M2 | WEIGHT: 105.81 LBS

## 2024-08-20 DIAGNOSIS — Z32.00 VISIT FOR CONFIRMATION OF PREGNANCY TEST RESULT WITH PHYSICAL EXAM: ICD-10-CM

## 2024-08-20 DIAGNOSIS — O99.019 MATERNAL SICKLE CELL ANEMIA AFFECTING PREGNANCY, ANTEPARTUM: Primary | ICD-10-CM

## 2024-08-20 DIAGNOSIS — Z3A.13 13 WEEKS GESTATION OF PREGNANCY: Primary | ICD-10-CM

## 2024-08-20 DIAGNOSIS — Z36.82 ENCOUNTER FOR (NT) NUCHAL TRANSLUCENCY SCAN: ICD-10-CM

## 2024-08-20 DIAGNOSIS — D57.1 MATERNAL SICKLE CELL ANEMIA AFFECTING PREGNANCY, ANTEPARTUM: Primary | ICD-10-CM

## 2024-08-20 DIAGNOSIS — Z34.90 PREGNANCY, UNSPECIFIED GESTATIONAL AGE: ICD-10-CM

## 2024-08-20 DIAGNOSIS — D57.1 HEMOGLOBIN SS DISEASE WITHOUT CRISIS: ICD-10-CM

## 2024-08-20 DIAGNOSIS — Z90.5 H/O UNILATERAL NEPHRECTOMY: ICD-10-CM

## 2024-08-20 DIAGNOSIS — D57.1 HEMOGLOBIN SS DISEASE WITHOUT CRISIS: Primary | ICD-10-CM

## 2024-08-20 PROCEDURE — 3074F SYST BP LT 130 MM HG: CPT | Mod: CPTII,S$GLB,, | Performed by: OBSTETRICS & GYNECOLOGY

## 2024-08-20 PROCEDURE — 76801 OB US < 14 WKS SINGLE FETUS: CPT | Mod: S$GLB,,, | Performed by: OBSTETRICS & GYNECOLOGY

## 2024-08-20 PROCEDURE — 96040 PR GENETIC COUNSELING, EACH 30 MIN: CPT | Mod: S$GLB,,, | Performed by: GENETIC COUNSELOR, MS

## 2024-08-20 PROCEDURE — 99999 PR PBB SHADOW E&M-EST. PATIENT-LVL III: CPT | Mod: PBBFAC,,, | Performed by: OBSTETRICS & GYNECOLOGY

## 2024-08-20 PROCEDURE — 99214 OFFICE O/P EST MOD 30 MIN: CPT | Mod: 25,S$GLB,, | Performed by: OBSTETRICS & GYNECOLOGY

## 2024-08-20 PROCEDURE — 1160F RVW MEDS BY RX/DR IN RCRD: CPT | Mod: CPTII,S$GLB,, | Performed by: OBSTETRICS & GYNECOLOGY

## 2024-08-20 PROCEDURE — 0502F SUBSEQUENT PRENATAL CARE: CPT | Mod: CPTII,S$GLB,, | Performed by: OBSTETRICS & GYNECOLOGY

## 2024-08-20 PROCEDURE — 99999 PR PBB SHADOW E&M-EST. PATIENT-LVL I: CPT | Mod: PBBFAC,,, | Performed by: GENETIC COUNSELOR, MS

## 2024-08-20 PROCEDURE — 1159F MED LIST DOCD IN RCRD: CPT | Mod: CPTII,S$GLB,, | Performed by: OBSTETRICS & GYNECOLOGY

## 2024-08-20 PROCEDURE — 3078F DIAST BP <80 MM HG: CPT | Mod: CPTII,S$GLB,, | Performed by: OBSTETRICS & GYNECOLOGY

## 2024-08-20 PROCEDURE — 3008F BODY MASS INDEX DOCD: CPT | Mod: CPTII,S$GLB,, | Performed by: OBSTETRICS & GYNECOLOGY

## 2024-08-20 NOTE — ASSESSMENT & PLAN NOTE
Sickle Cell Disease  Overall her symptomatology has not been too severe in recent years. She has never had acute chest. 1-2 episodes of pain crisis/year at which times she gets simple transfusions. She follows with Dr. Loza (internist) for her SS. She rarely takes oxycodone for pain. Triggers mainly are changes in weather/temperature.     The patient was counseled about sickle cell anemia in pregnancy. The patient was counseled regarding maternal risks, which include but are not limited to: sickle cell crisis and acute chest syndrome, antepartum hospitalization, venous thromboembolic disease, stroke, preeclampsia/eclampsia,  delivery, infectious morbidity,  delivery, and death. The patient was counseled regarding fetal risks which include but are not limited to: miscarriage, fetal growth restriction, stillbirth, low birthweight, and prematurity. The patient was counseled regarding the inheritance risk. Her partner is positive for sickle cell trait. She understands the potential outcomes for the fetus.    Recommendations:  Will follow up with genetic counselor following our visit  Given her low hematocrit (although asymptomatic), we discussed that improvement is indicated for adequate fetal oxygenation. Given the several units she will likely require, favor an exchange transfusion with goal of hemoglobin 25 or greater. Will coordinate for this at some point this week.     Laboratory evaluation  Baseline labs: CBC, CMP, P/C ratio or 24 hour urine protein  Urine culture q trimester  CBC monthly  Medications:  Folic acid 4 mg  Aspirin 81 mg, starting at 12 weeks gestation  Narcotics per heme/onc-avoid abrupt cessation and avoid NSAIDs, particularly after 32 weeks  Avoid iron supplementation due to risks of iron overload - d/c today  Prophylactic lovenox or heparin while admitted to the hospital antepartum or postpartum  Vaccinations (per primary physician):  Hep B, pneumovax, flu vaccination annually, and  COVID vaccination as appropriate  Baseline maternal evaluation (ordered by primary OB):  Maternal echo  EKG  Pulmonary function studies, if not performed previously  Ophthalmic exam  Ultrasounds (with MFM):  Nuchal translucency scan, if desired  Detailed anatomic survey at 19-20 weeks  Serial growth ultrasounds starting at 26-28 weeks, to be performed every 4-6 weeks  Antepartum testing twice weekly at 32 weeks (this can be ordered by primary OB)  Delivery timin weeks, unless indicated earlier

## 2024-08-20 NOTE — PROGRESS NOTES
MATERNAL-FETAL MEDICINE   CONSULT NOTE    Provider requesting consultation: ***    SUBJECTIVE:     Ms. Miss Lana Chou is a 29 y.o.  female with IUP at 13w0d who is seen in consultation by MFM for evaluation and management of:  No problems updated.         Medication List with Changes/Refills   Current Medications    ACETAMINOPHEN (TYLENOL) 500 MG TABLET    Take 1 tablet (500 mg total) by mouth every 6 (six) hours as needed.    CHOLECALCIFEROL, VITAMIN D3, 1,250 MCG (50,000 UNIT) CAPSULE    Take 1 capsule (50,000 Units total) by mouth every 7 days.    CITRANATAL HARMONY, IRON FUM, 27 MG IRON-1 MG -50 MG-260 MG CAP    Take 1 capsule by mouth once daily.    FOLIC ACID (FOLVITE) 1 MG TABLET    Take 1 tablet (1 mg total) by mouth once daily.    HYDROCODONE-ACETAMINOPHEN (NORCO)  MG PER TABLET    Take 1 tablet by mouth every 6 (six) hours as needed for Pain.    HYDROXYUREA (HYDREA) 500 MG CAP    Take 3 capsules (1,500 mg total) by mouth once daily.    IRON,CARBON,GLUC-FA-B12-C-DSS (FERRALET 90 DUAL-IRON DELIVERY) 90-1-12-50 MG-MG-MCG-MG TAB    Take 1 tablet by mouth once daily.    ONDANSETRON (ZOFRAN) 4 MG TABLET    Take 1 tablet (4 mg total) by mouth every 6 (six) hours.    POTASSIUM CHLORIDE SA (K-DUR,KLOR-CON) 20 MEQ TABLET    Take 1 tablet by mouth daily    PRENATAL VIT NO.130-IRON-FOLIC 27 MG IRON- 800 MCG TAB    TAKE 1 TABLET BY MOUTH ONCE DAILY    TRIAMCINOLONE ACETONIDE 0.1% (KENALOG) 0.1 % CREAM    Apply topically to affected area 2 times per day for 7 to 10 days       Review of patient's allergies indicates:   Allergen Reactions    Rocephin [ceftriaxone] Shortness Of Breath, Itching and Anxiety     Nausea, vomiting. No hives, swelling, or anaphylaxis. Can tolerate if necessary, but would avoid if possible.       PMH:  Past Medical History:   Diagnosis Date    Chronic kidney disease (CKD) 2018    Emesis 2022    Encounter for surveillance of vaginal ring hormonal contraceptive device  "11/15/2023    LGSIL on Pap smear of cervix 2017    LLQ abdominal pain 2023    Sickle cell anemia     Sickle cell disease     Sickle-cell disease without crisis     UTI (urinary tract infection) 2023       PObHx:  OB History    Para Term  AB Living   1 0 0 0 0 0   SAB IAB Ectopic Multiple Live Births   0 0 0 0 0      # Outcome Date GA Lbr Ehsan/2nd Weight Sex Type Anes PTL Lv   1 Current                PSH:  Past Surgical History:   Procedure Laterality Date    BREAST LUMPECTOMY      CARDIAC SURGERY      port insertion    NEPHRECTOMY         Family history:family history includes Sickle cell trait in her father, mother, sister, and sister.    Social history: reports that she quit smoking about 7 weeks ago. Her smoking use included vaping with nicotine. She started smoking about 2 years ago. She has never used smokeless tobacco. She reports that she does not currently use alcohol. She reports that she does not use drugs.    Genetic history: *** The patient denies any inherited genetic diseases or birth defects in herself or her partner's personal history or family.    Objective:   /60   Ht 5' 1" (1.549 m)   Wt 48 kg (105 lb 13.1 oz)   LMP 2024 (Exact Date)   BMI 19.99 kg/m²     ***Physical Exam    ***Ultrasound performed. See viewpoint for full ultrasound report.  ***    Significant labs/imaging:  ***    ASSESSMENT/PLAN:     29 y.o.  female with IUP at 13w0d     Maternal sickle cell anemia affecting pregnancy, antepartum  Sickle Cell Disease  The patient was counseled about sickle cell anemia in pregnancy. The patient was counseled regarding maternal risks, which include but are not limited to: sickle cell crisis and acute chest syndrome, antepartum hospitalization, venous thromboembolic disease, stroke, preeclampsia/eclampsia,  delivery, infectious morbidity,  delivery, and death. The patient was counseled regarding fetal risks which include but are " not limited to: miscarriage, fetal growth restriction, stillbirth, low birthweight, and prematurity. The patient was counseled regarding the inheritance risk. Sickle cell disease is inherited in an autosomal recessive fashion. The carrier rate in the  population is 1:12.     Recommendations:  Laboratory evaluation  For partner: Hemoglobin electrophoresis and CBC (and additional testing for alpha or beta thalassemia as indicated) ( screening to be performed after birth)  Prenatal diagnosis is available if desired/indicated  Baseline labs: CBC, CMP, P/C ratio or 24 hour urine protein  Urine culture q trimester  CBC monthly  Medications:  Folic acid 4 mg  Aspirin 81 mg, starting at 12 weeks gestation  Narcotics per heme/onc-avoid abrupt cessation and avoid NSAIDs, particularly after 32 weeks  Avoid iron supplementation due to risks of iron overload  Prophylactic lovenox or heparin while admitted to the hospital antepartum or postpartum  Vaccinations (per primary physician):  Hep B, pneumovax, flu vaccination annually, and COVID vaccination as appropriate  Baseline maternal evaluation (ordered by primary OB):  Maternal echo  EKG  Pulmonary function studies, if not performed previously  Ophthalmic exam  Ultrasounds (with MFM):  Nuchal translucency scan, if desired  Detailed anatomic survey at 19-20 weeks  Serial growth ultrasounds starting at 26-28 weeks, to be performed every 4-6 weeks  Antepartum testing twice weekly at 32 weeks (this can be ordered by primary OB)  Delivery timin weeks, unless indicated earlier        FOLLOW UP:   ***No further ultrasounds or visits were scheduled  F/u in {NUMBER 1-5:47863} weeks for US/MFM visit      *** minutes of total time spent on the encounter, which includes face to face time and non-face to face time preparing to see the patient (eg, review of tests), obtaining and/or reviewing separately obtained history, documenting clinical information in the  electronic or other health record, independently interpreting results (not separately reported) and communicating results to the patient/family/caregiver, or care coordination (not separately reported).      David Kevin  Maternal-Fetal Medicine    Electronically Signed by David Kevin August 20, 2024

## 2024-08-20 NOTE — PROGRESS NOTES
Office Visit - Genetic Counseling Evaluation   Miss Lana Chou  : 1995  MRN: [id number]  REFERRING PROVIDER: Dr. Tyson Landers  PARTNER NAME: Miguel    DATE OF SERVICE: 24  TIME SPENT: 30 min    REASON FOR CONSULT:  Miss Lana Chou, a 29 y.o. female with Sickle Cell Disease and a partner with trait was referred for genetic counseling to discuss diagnostic testing for her pregnancy. She came to the appointment alone.     OBSETRIC HISTORY   AGE AT ALEJANDRA: 29y  ALEJANDRA: 27  GESTATION: [place]  GESTATIONAL AGE:13w-d    PREGNANCY HISTORY  G1: Current      MEDICAL HISTORY:  MEDICATIONS/EXPOSURES: Not discussed    FAMILY HISTORY:  Please see scanned pedigree in patient's chart under media. Patient's ancestry is unknown. FOB ancestry is unknown. Consanguinity was denied.     This is the first pregnancy for Gertrudis. Miguel has trait and a history of seizures. He has one healthy 5y child.     Lana has two full sisters with trait who are healthy. Her niece and nephew are also well. She has one paternal half brother who  of diabetic ketoacidosis at 26y. Her mother and father are both healthy and are obligate carriers.     Miguel has two healthy siblings. His mother is well and his father's health information is not known to Lana.     Additional history negative for multiple miscarriages/stillbirth, developmental delay/intellectual disability, and known genetic disorders. Complete pedigree will be linked to this encounter and can be viewed under the Media tab. The information provided is based on the patient and/or their reproductive partner's recollection of the family history and in the absence of complete medical records. If the family history changes or if more information is obtained, they were asked to contact us as this may alter the recommendations or impression of the family history.     PAST TESTING  Patient carrier screening: Electrophoresis- positive for SCD  Reproductive partner  carrier screening: positive for trait per pt report  Parental Karyotypes: NA    PREGNANCY TESTING  cfDNA for aneuploidy: Pt to have drawn today  Diagnostic testing: NA  Fetal karyotype: NA    COUNSELING:   Sickle Cell Disease  Lana is known to be affected with Sickle Cell Disease. She reports that her partner is also a known carrier for trait. As a result Lana and her partner have a 50% chance of having a child with Sickle Cell Disease.     Sickle cell disease (SCD) is characterized by intermittent vaso-occlusive events and chronic hemolytic anemia. Vaso-occlusive events result in tissue ischemia leading to acute and chronic pain as well as organ damage that can affect any organ system, including the bones, spleen, liver, brain, lungs, kidneys, and joints. SCD encompasses a group of disorders characterized by the presence of at least one hemoglobin S allele (HbS; p.Zlo2Jbv in HBB) and a second HBB pathogenic variant resulting in abnormal hemoglobin polymerization. Hb S/S (homozygous p.Kdp7Hfy in HBB) accounts for 60%-70% of SCD in the [place]. Other forms of SCD result from coinheritance of HbS with other abnormal ?-globin chain variants.      We then reviewed diagnostic testing options. Unlike screening tests, diagnostic tests provide definitive answers regarding the presence of fetal chromosome abnormalities. In addition to assessing for the presence of the common fetal aneuploidies, diagnostic tests can assess for abnormalities in the number and structure of all chromosomes. While both procedures are generally quite safe, there is a risk of miscarriage associated with these procedures.For genetic amniocentesis, the estimated risk of miscarriage attributable to the procedure is 1 in 900.     Lana does not want to proceed with diagnostic testing at this time. She will let us know if she changes her mind.     DISCUSSION & IMPRESSION:  Lana is a 29 y.o. female with sickle cell disease and a  partner with trait. She has considered amniocentesis but does not think she would like to know at this time if the baby has SCD. She feels that it would bring her additional anxiety during the pregnancy to know that a baby might be affected. She does not want to end an affected pregnancy. We discussed the timing for amniocentesis and she understands that she could elect to have the testing in the future. We briefly reviewed the option of PGD/IVF if she decides to have more children in the future.     TESTING OPTIONS  Diagnostic Testing: Amniocentesis  Carrier Screening: NA  Pregnancy Options: Termination discussed briefly  Recurrence Risk: 50% chance of SCD with this partner; 100% chance of trait in any of Lana's children  Procedures/labs DECLINED today: Amniocentesis    Lana stated that she was glad to have this information going forward and feels good about the plan to proceed with no testing, but is aware that she can change her mind.     We reviewed Lana's medical and family history. We discussed basics of genetics and diagnostic prenatal testing for Sickle Cell disease. Lana was understanding of the information discussed in clinic and all questions were answered.     Please see Dr. Kevin's note for detailed information regarding ultrasound findings, plan of care and diagnostic considerations.    RECOMMENDATIONS:  None    Alison Goss MS, [facility] [place]  Licensed, Certified Genetic Counselor  [facility]

## 2024-08-20 NOTE — PROGRESS NOTES
"Maternal Fetal Medicine Follow Up Consult    SUBJECTIVE:   Lana Chou is a 29 y.o.  female with IUP at 13w0d who is seen in follow up consultation by MFM.  Pregnancy complications include:   Problem   Maternal Sickle Cell Anemia Affecting Pregnancy, Antepartum     Interval history since last MFM visit: none    Previous notes reviewed. No changes to medical, surgical, family, social, or obstetric history.  Medications:  Current Outpatient Medications   Medication Instructions    acetaminophen (TYLENOL) 500 mg, Oral, Every 6 hours PRN    cholecalciferol (vitamin D3) 50,000 Units, Oral, Every 7 days    CITRANATAL HARMONY, IRON FUM, 27 mg iron-1 mg -50 mg-260 mg Cap 1 capsule, Oral, Daily    folic acid (FOLVITE) 1 mg, Oral, Daily    HYDROcodone-acetaminophen (NORCO)  mg per tablet 1 tablet, Oral, Every 6 hours PRN    hydroxyurea (HYDREA) 1,500 mg, Oral, Daily    iron,carbon,gluc-FA-B12-C-dss (FERRALET 90 DUAL-IRON DELIVERY) 90-1-12-50 mg-mg-mcg-mg Tab 1 tablet, Oral, Daily    ondansetron (ZOFRAN) 4 mg, Oral, Every 6 hours    potassium chloride SA (K-DUR,KLOR-CON) 20 MEQ tablet Take 1 tablet by mouth daily    prenatal vit no.130-iron-folic 27 mg iron- 800 mcg Tab TAKE 1 TABLET BY MOUTH ONCE DAILY    triamcinolone acetonide 0.1% (KENALOG) 0.1 % cream Apply topically to affected area 2 times per day for 7 to 10 days       Care team members:  Anshul - Primary OB  Lo - Internist    OBJECTIVE:   /60   Ht 5' 1" (1.549 m)   Wt 48 kg (105 lb 13.1 oz)   LMP 2024 (Exact Date)   BMI 19.99 kg/m²     Ultrasound performed. See viewpoint for full ultrasound report.  Fetal size is consistent with established dating, and normal fetal heart motion is seen. The nuchal translucency is normal.  Patient will proceed with NIPT    Significant labs/imaging:  Lab Results   Component Value Date    WBC 12.39 2024    HGB 6.1 (L) 2024    HCT 17.2 (LL) 2024    MCV 87 2024     2024 "      Latest Reference Range & Units 24 10:30   Hemoglobin Variant 1 0.0 % 89.5 Hb S !       ASSESSMENT/PLAN:   29 y.o.  female with IUP at 13w0d    Maternal sickle cell anemia affecting pregnancy, antepartum  Sickle Cell Disease  Overall her symptomatology has not been too severe in recent years. She has never had acute chest. 1-2 episodes of pain crisis/year at which times she gets simple transfusions. She follows with Dr. Loza (internist) for her SS. She rarely takes oxycodone for pain. Triggers mainly are changes in weather/temperature.     The patient was counseled about sickle cell anemia in pregnancy. The patient was counseled regarding maternal risks, which include but are not limited to: sickle cell crisis and acute chest syndrome, antepartum hospitalization, venous thromboembolic disease, stroke, preeclampsia/eclampsia,  delivery, infectious morbidity,  delivery, and death. The patient was counseled regarding fetal risks which include but are not limited to: miscarriage, fetal growth restriction, stillbirth, low birthweight, and prematurity. The patient was counseled regarding the inheritance risk. Her partner is positive for sickle cell trait. She understands the potential outcomes for the fetus.    Recommendations:  Will follow up with genetic counselor following our visit  Given her low hematocrit (although asymptomatic), we discussed that improvement is indicated for adequate fetal oxygenation. Given the several units she will likely require, favor an exchange transfusion with goal of hemoglobin 25 or greater. Will coordinate for this at some point this week.     Laboratory evaluation  Baseline labs: CBC, CMP, P/C ratio or 24 hour urine protein  Urine culture q trimester  CBC monthly  Medications:  Folic acid 4 mg  Aspirin 81 mg, starting at 12 weeks gestation  Narcotics per heme/onc-avoid abrupt cessation and avoid NSAIDs, particularly after 32 weeks  Avoid iron  supplementation due to risks of iron overload - d/c today  Prophylactic lovenox or heparin while admitted to the hospital antepartum or postpartum  Vaccinations (per primary physician):  Hep B, pneumovax, flu vaccination annually, and COVID vaccination as appropriate  Baseline maternal evaluation (ordered by primary OB):  Maternal echo  EKG  Pulmonary function studies, if not performed previously  Ophthalmic exam  Ultrasounds (with MFM):  Nuchal translucency scan, if desired  Detailed anatomic survey at 19-20 weeks  Serial growth ultrasounds starting at 26-28 weeks, to be performed every 4-6 weeks  Antepartum testing twice weekly at 32 weeks (this can be ordered by primary OB)  Delivery timin weeks, unless indicated earlier      F/u in 4 weeks for early anatomy/MD visit  Will coordinate transfusion for later this week    TESSIE Moe MD  Maternal Fetal Medicine Fellow   PGY-6

## 2024-08-20 NOTE — PROGRESS NOTES
13w0  Here for prenatal care  No complaint.  Just tired.  Not gaining weight  Still nauseated  Taking prenatal vitamins and iron.    Past Medical History:   Diagnosis Date    Chronic kidney disease (CKD) 2018    Emesis 2022    Encounter for surveillance of vaginal ring hormonal contraceptive device 11/15/2023    LGSIL on Pap smear of cervix 2017    LLQ abdominal pain 2023    Sickle cell anemia     Sickle cell disease     Sickle-cell disease without crisis     UTI (urinary tract infection) 2023       Past Surgical History:   Procedure Laterality Date    BREAST LUMPECTOMY      CARDIAC SURGERY      NEPHRECTOMY         Family History   Problem Relation Name Age of Onset    Sickle cell trait Father      Sickle cell trait Mother      Sickle cell trait Sister      Sickle cell trait Sister      Breast cancer Neg Hx      Colon cancer Neg Hx      Ovarian cancer Neg Hx         Social History     Socioeconomic History    Marital status: Single   Tobacco Use    Smoking status: Former     Types: Vaping with nicotine     Start date: 2022     Quit date: 2024     Years since quittin.1    Smokeless tobacco: Never   Substance and Sexual Activity    Alcohol use: Not Currently    Drug use: No    Sexual activity: Yes     Partners: Male     Birth control/protection: Condom   Social History Narrative    Together since 2021    He is  a     She worked at [employer].  [employer]    Graduated from [employer].  Now working on our unit.     Social Determinants of Health     Financial Resource Strain: Low Risk  (2023)    Overall Financial Resource Strain (CARDIA)     Difficulty of Paying Living Expenses: Not hard at all   Food Insecurity: No Food Insecurity (2023)    Hunger Vital Sign     Worried About Running Out of Food in the Last Year: Never true     Ran Out of Food in the Last Year: Never true   Transportation Needs: No Transportation Needs (2023)    PRAPARE -  Transportation     Lack of Transportation (Medical): No     Lack of Transportation (Non-Medical): No   Physical Activity: Inactive (11/14/2023)    Exercise Vital Sign     Days of Exercise per Week: 0 days     Minutes of Exercise per Session: 0 min   Stress: No Stress Concern Present (11/14/2023)    Citizen of [place] [employer] - Occupational Stress Questionnaire     Feeling of Stress : Not at all   Housing Stability: Low Risk  (11/14/2023)    Housing Stability Vital Sign     Unable to Pay for Housing in the Last Year: No     Number of Places Lived in the Last Year: 1     Unstable Housing in the Last Year: No       Current Outpatient Medications   Medication Sig Dispense Refill    acetaminophen (TYLENOL) 500 MG tablet Take 1 tablet (500 mg total) by mouth every 6 (six) hours as needed. 20 tablet 0    cholecalciferol, vitamin D3, 1,250 mcg (50,000 unit) capsule Take 1 capsule (50,000 Units total) by mouth every 7 days. 12 capsule 3    CITRANATAL HARMONY, IRON FUM, 27 mg iron-1 mg -50 mg-260 mg Cap Take 1 capsule by mouth once daily. 30 capsule 8    folic acid (FOLVITE) 1 MG tablet Take 1 tablet (1 mg total) by mouth once daily. 90 tablet 3    HYDROcodone-acetaminophen (NORCO)  mg per tablet Take 1 tablet by mouth every 6 (six) hours as needed for Pain. 60 tablet 0    hydroxyurea (HYDREA) 500 mg Cap Take 3 capsules (1,500 mg total) by mouth once daily. 90 capsule 6    iron,carbon,gluc-FA-B12-C-dss (FERRALET 90 DUAL-IRON DELIVERY) 90-1-12-50 mg-mg-mcg-mg Tab Take 1 tablet by mouth once daily. 30 tablet 6    ondansetron (ZOFRAN) 4 MG tablet Take 1 tablet (4 mg total) by mouth every 6 (six) hours. 12 tablet 0    potassium chloride SA (K-DUR,KLOR-CON) 20 MEQ tablet Take 1 tablet by mouth daily 30 tablet 3    prenatal vit no.130-iron-folic 27 mg iron- 800 mcg Tab TAKE 1 TABLET BY MOUTH ONCE DAILY 30 tablet 7    triamcinolone acetonide 0.1% (KENALOG) 0.1 % cream Apply topically to affected area 2 times per  day for 7 to 10 days 15 g 1     No current facility-administered medications for this visit.       Review of patient's allergies indicates:   Allergen Reactions    Rocephin [ceftriaxone] Shortness Of Breath, Itching and Anxiety     Nausea, vomiting. No hives, swelling, or anaphylaxis. Can tolerate if necessary, but would avoid if possible.     Review of Systems   Constitutional: Positive for fatigue. Negative for fever, chills, appetite change and unexpected weight change.   HENT: Positive for congestion. Negative for hearing loss, ear pain, sore throat, rhinorrhea, sneezing, mouth sores, neck pain, neck stiffness, voice change and postnasal drip.   Eyes: Negative for photophobia, itching and visual disturbance.   Respiratory: Negative for cough, chest tightness, shortness of breath and wheezing.   Cardiovascular: Negative for chest pain, palpitations and leg swelling.   Gastrointestinal: Positive for nausea, vomiting, abdominal pain and constipation. Negative for diarrhea, blood in stool and abdominal distention.   Genitourinary: Positive for vaginal discharge and pelvic pain. Negative for dysuria, urgency, frequency, hematuria, flank pain, decreased urine volume, vaginal bleeding, difficulty urinating, genital sores, vaginal pain, menstrual problem and dyspareunia.   Musculoskeletal: Positive for back pain. Negative for myalgias and joint swelling.   Skin: Negative for rash and wound.   Neurological: Positive for headaches. Negative for dizziness, tremors, syncope, speech difficulty, weakness, light-headedness and numbness.   Hematological: Negative for adenopathy. Does not bruise/bleed easily.   Psychiatric/Behavioral: Negative for suicidal ideas, hallucinations, confusion, sleep disturbance, dysphoric mood, decreased concentration and agitation. The patient is not nervous/anxious and is not hyperactive.     Exam normal  FHT at 169    Prenatal profile reviewed  Sickle cell disease  Prenatal vitamins  Iron  supplement  Start baby aspirin soon    Discussed [facility].    Program explained with risks and benefits.  Patient opted in.  Orders in.  Equipments will be sent to her along with specific instructions.    Discussed MFM evaluation today  Back in 4 weeks.  Sooner if she would need us    Vitals signs, FHTs, urine dip, and PE findings documented, reviewed and available in OB flow chart.   I spent a total of 20 minutes on the day of the visit. This includes face to face time and non-face to face time preparing to see the patient (eg, review of tests and charts), Obtaining and/or reviewing separately obtained history, Documenting clinical information in the electronic or other health record, Independently interpreting results and communicating results to the patient/family/caregiver, or Care coordination.

## 2024-08-21 ENCOUNTER — TELEPHONE (OUTPATIENT)
Dept: INFUSION THERAPY | Facility: OTHER | Age: 29
End: 2024-08-21
Payer: COMMERCIAL

## 2024-08-21 ENCOUNTER — PATIENT MESSAGE (OUTPATIENT)
Dept: INFUSION THERAPY | Facility: OTHER | Age: 29
End: 2024-08-21
Payer: COMMERCIAL

## 2024-08-21 DIAGNOSIS — O99.019 MATERNAL SICKLE CELL ANEMIA AFFECTING PREGNANCY, ANTEPARTUM: Primary | ICD-10-CM

## 2024-08-21 DIAGNOSIS — D57.1 MATERNAL SICKLE CELL ANEMIA AFFECTING PREGNANCY, ANTEPARTUM: Primary | ICD-10-CM

## 2024-08-21 PROCEDURE — 86902 BLOOD TYPE ANTIGEN DONOR EA: CPT | Performed by: STUDENT IN AN ORGANIZED HEALTH CARE EDUCATION/TRAINING PROGRAM

## 2024-08-22 ENCOUNTER — LAB VISIT (OUTPATIENT)
Dept: LAB | Facility: OTHER | Age: 29
End: 2024-08-22
Attending: STUDENT IN AN ORGANIZED HEALTH CARE EDUCATION/TRAINING PROGRAM
Payer: COMMERCIAL

## 2024-08-22 ENCOUNTER — TELEPHONE (OUTPATIENT)
Dept: MATERNAL FETAL MEDICINE | Facility: CLINIC | Age: 29
End: 2024-08-22
Payer: COMMERCIAL

## 2024-08-22 ENCOUNTER — TELEPHONE (OUTPATIENT)
Dept: OBSTETRICS AND GYNECOLOGY | Facility: CLINIC | Age: 29
End: 2024-08-22
Payer: COMMERCIAL

## 2024-08-22 ENCOUNTER — HOSPITAL ENCOUNTER (EMERGENCY)
Facility: OTHER | Age: 29
Discharge: HOME | End: 2024-08-22
Attending: OBSTETRICS & GYNECOLOGY
Payer: COMMERCIAL

## 2024-08-22 VITALS
TEMPERATURE: 98 F | DIASTOLIC BLOOD PRESSURE: 55 MMHG | RESPIRATION RATE: 17 BRPM | OXYGEN SATURATION: 99 % | SYSTOLIC BLOOD PRESSURE: 99 MMHG | HEART RATE: 92 BPM

## 2024-08-22 DIAGNOSIS — D57.1 MATERNAL SICKLE CELL ANEMIA AFFECTING PREGNANCY, ANTEPARTUM: ICD-10-CM

## 2024-08-22 DIAGNOSIS — D57.1 SICKLE CELL DISEASE WITHOUT CRISIS: Primary | ICD-10-CM

## 2024-08-22 DIAGNOSIS — Z3A.13 13 WEEKS GESTATION OF PREGNANCY: ICD-10-CM

## 2024-08-22 DIAGNOSIS — O99.019 MATERNAL SICKLE CELL ANEMIA AFFECTING PREGNANCY, ANTEPARTUM: ICD-10-CM

## 2024-08-22 LAB
ABO + RH BLD: NORMAL
ANISOCYTOSIS BLD QL SMEAR: SLIGHT
BASOPHILS # BLD AUTO: 0.06 K/UL (ref 0–0.2)
BASOPHILS NFR BLD: 0.3 % (ref 0–1.9)
BLD GP AB SCN CELLS X3 SERPL QL: NORMAL
BLD PROD TYP BPU: NORMAL
BLD PROD TYP BPU: NORMAL
BLOOD UNIT EXPIRATION DATE: NORMAL
BLOOD UNIT EXPIRATION DATE: NORMAL
BLOOD UNIT TYPE CODE: 9500
BLOOD UNIT TYPE CODE: 9500
BLOOD UNIT TYPE: NORMAL
BLOOD UNIT TYPE: NORMAL
CODING SYSTEM: NORMAL
CODING SYSTEM: NORMAL
CROSSMATCH INTERPRETATION: NORMAL
CROSSMATCH INTERPRETATION: NORMAL
DIFFERENTIAL METHOD BLD: ABNORMAL
DISPENSE STATUS: NORMAL
DISPENSE STATUS: NORMAL
EOSINOPHIL # BLD AUTO: 0.1 K/UL (ref 0–0.5)
EOSINOPHIL NFR BLD: 0.5 % (ref 0–8)
ERYTHROCYTE [DISTWIDTH] IN BLOOD BY AUTOMATED COUNT: 18.2 % (ref 11.5–14.5)
HCT VFR BLD AUTO: 14.5 % (ref 37–48.5)
HGB BLD-MCNC: 5.3 G/DL (ref 12–16)
HYPOCHROMIA BLD QL SMEAR: ABNORMAL
IMM GRANULOCYTES # BLD AUTO: 0.12 K/UL (ref 0–0.04)
IMM GRANULOCYTES NFR BLD AUTO: 0.6 % (ref 0–0.5)
LYMPHOCYTES # BLD AUTO: 2.5 K/UL (ref 1–4.8)
LYMPHOCYTES NFR BLD: 12.9 % (ref 18–48)
MCH RBC QN AUTO: 31.2 PG (ref 27–31)
MCHC RBC AUTO-ENTMCNC: 36.6 G/DL (ref 32–36)
MCV RBC AUTO: 85 FL (ref 82–98)
MONOCYTES # BLD AUTO: 2.9 K/UL (ref 0.3–1)
MONOCYTES NFR BLD: 14.6 % (ref 4–15)
NEUTROPHILS # BLD AUTO: 13.9 K/UL (ref 1.8–7.7)
NEUTROPHILS NFR BLD: 71.1 % (ref 38–73)
NRBC BLD-RTO: 1 /100 WBC
NUM UNITS TRANS PACKED RBC: NORMAL
NUM UNITS TRANS PACKED RBC: NORMAL
OVALOCYTES BLD QL SMEAR: ABNORMAL
PLATELET # BLD AUTO: 353 K/UL (ref 150–450)
PLATELET BLD QL SMEAR: ABNORMAL
PMV BLD AUTO: 9.3 FL (ref 9.2–12.9)
POIKILOCYTOSIS BLD QL SMEAR: SLIGHT
POLYCHROMASIA BLD QL SMEAR: ABNORMAL
RBC # BLD AUTO: 1.7 M/UL (ref 4–5.4)
SICKLE CELLS BLD QL SMEAR: ABNORMAL
SPECIMEN OUTDATE: NORMAL
STOMATOCYTES BLD QL SMEAR: PRESENT
TARGETS BLD QL SMEAR: ABNORMAL
WBC # BLD AUTO: 19.55 K/UL (ref 3.9–12.7)

## 2024-08-22 PROCEDURE — 99284 EMERGENCY DEPT VISIT MOD MDM: CPT | Mod: ,,, | Performed by: OBSTETRICS & GYNECOLOGY

## 2024-08-22 PROCEDURE — 86901 BLOOD TYPING SEROLOGIC RH(D): CPT | Performed by: STUDENT IN AN ORGANIZED HEALTH CARE EDUCATION/TRAINING PROGRAM

## 2024-08-22 PROCEDURE — 27201040 HC RC 50 FILTER: Performed by: STUDENT IN AN ORGANIZED HEALTH CARE EDUCATION/TRAINING PROGRAM

## 2024-08-22 PROCEDURE — 85025 COMPLETE CBC W/AUTO DIFF WBC: CPT | Performed by: STUDENT IN AN ORGANIZED HEALTH CARE EDUCATION/TRAINING PROGRAM

## 2024-08-22 PROCEDURE — P9038 RBC IRRADIATED: HCPCS | Performed by: STUDENT IN AN ORGANIZED HEALTH CARE EDUCATION/TRAINING PROGRAM

## 2024-08-22 PROCEDURE — P9040 RBC LEUKOREDUCED IRRADIATED: HCPCS | Performed by: STUDENT IN AN ORGANIZED HEALTH CARE EDUCATION/TRAINING PROGRAM

## 2024-08-22 PROCEDURE — 86900 BLOOD TYPING SEROLOGIC ABO: CPT | Performed by: STUDENT IN AN ORGANIZED HEALTH CARE EDUCATION/TRAINING PROGRAM

## 2024-08-22 PROCEDURE — 83020 HEMOGLOBIN ELECTROPHORESIS: CPT | Performed by: STUDENT IN AN ORGANIZED HEALTH CARE EDUCATION/TRAINING PROGRAM

## 2024-08-22 PROCEDURE — 86850 RBC ANTIBODY SCREEN: CPT | Performed by: STUDENT IN AN ORGANIZED HEALTH CARE EDUCATION/TRAINING PROGRAM

## 2024-08-22 PROCEDURE — 86920 COMPATIBILITY TEST SPIN: CPT | Performed by: STUDENT IN AN ORGANIZED HEALTH CARE EDUCATION/TRAINING PROGRAM

## 2024-08-22 PROCEDURE — 83021 HEMOGLOBIN CHROMOTOGRAPHY: CPT | Performed by: STUDENT IN AN ORGANIZED HEALTH CARE EDUCATION/TRAINING PROGRAM

## 2024-08-22 PROCEDURE — 99285 EMERGENCY DEPT VISIT HI MDM: CPT | Mod: 25

## 2024-08-22 PROCEDURE — 85660 RBC SICKLE CELL TEST: CPT | Mod: 91 | Performed by: STUDENT IN AN ORGANIZED HEALTH CARE EDUCATION/TRAINING PROGRAM

## 2024-08-22 PROCEDURE — 86902 BLOOD TYPE ANTIGEN DONOR EA: CPT | Performed by: STUDENT IN AN ORGANIZED HEALTH CARE EDUCATION/TRAINING PROGRAM

## 2024-08-22 PROCEDURE — 85660 RBC SICKLE CELL TEST: CPT | Performed by: STUDENT IN AN ORGANIZED HEALTH CARE EDUCATION/TRAINING PROGRAM

## 2024-08-22 PROCEDURE — 36415 COLL VENOUS BLD VENIPUNCTURE: CPT | Performed by: STUDENT IN AN ORGANIZED HEALTH CARE EDUCATION/TRAINING PROGRAM

## 2024-08-22 PROCEDURE — 36430 TRANSFUSION BLD/BLD COMPNT: CPT

## 2024-08-22 RX ORDER — HYDROCODONE BITARTRATE AND ACETAMINOPHEN 500; 5 MG/1; MG/1
TABLET ORAL ONCE
Status: DISCONTINUED | OUTPATIENT
Start: 2024-08-22 | End: 2024-08-22 | Stop reason: HOSPADM

## 2024-08-22 NOTE — DISCHARGE INSTRUCTIONS
Call clinic [phone] or L & D after hours at [phone] for vaginal bleeding, leakage of fluids, contractions 4-5 in one hour, decreased fetal movements ( 10 kicks in 2 hours), headache not relieved by Tylenol, blurry vision, or temp of 100.4 or greater.  Begin doing fetal kick counts, at least 10 movements in 2 hours starting at 28 weeks gestation.  Keep next clinic appointment

## 2024-08-22 NOTE — TELEPHONE ENCOUNTER
Rajinder from lab call was transferred to me. Pt had a critical lab of H&H. Message was placed on Dr. Landers  desk.

## 2024-08-22 NOTE — ED PROVIDER NOTES
Encounter Date: 2024       History     Chief Complaint   Patient presents with    Transfusion     Ms. Chou is a 30yo  at 13w0d with an IUP complicated by maternal sickle cell disease, presenting to [facility] from [facility] clinic for transfusion.  She is followed by [facility] for her SCD, and was noted today on CBC to have an H/h of 5.3/14.5.  In the [facility], she denies any issues on RoS.  She denies SoB nor chest pain.  She denies body aches.  She denies sick contacts.  She denies PV bleeding/spotting, LoF nor ctxs.  She has no other concerns at this time and felt she was at her baseline.        Review of patient's allergies indicates:   Allergen Reactions    Rocephin [ceftriaxone] Shortness Of Breath, Itching and Anxiety     Nausea, vomiting. No hives, swelling, or anaphylaxis. Can tolerate if necessary, but would avoid if possible.     Past Medical History:   Diagnosis Date    Chronic kidney disease (CKD) 2018    Emesis 2022    Encounter for surveillance of vaginal ring hormonal contraceptive device 11/15/2023    LGSIL on Pap smear of cervix 2017    LLQ abdominal pain 2023    Sickle cell anemia     Sickle cell disease     Sickle-cell disease without crisis     UTI (urinary tract infection) 2023     Past Surgical History:   Procedure Laterality Date    BREAST LUMPECTOMY      CARDIAC SURGERY      port insertion    NEPHRECTOMY       Family History   Problem Relation Name Age of Onset    Sickle cell trait Father      Sickle cell trait Mother      Sickle cell trait Sister      Sickle cell trait Sister      Breast cancer Neg Hx      Colon cancer Neg Hx      Ovarian cancer Neg Hx       Social History     Tobacco Use    Smoking status: Former     Types: Vaping with nicotine     Start date: 2022     Quit date: 2024     Years since quittin.1    Smokeless tobacco: Never   Substance Use Topics    Alcohol use: Not Currently    Drug use: No     Review of Systems   Constitutional:  Negative for  activity change, chills, fatigue and fever.   Respiratory:  Negative for shortness of breath.    Cardiovascular:  Negative for chest pain.   Gastrointestinal:  Negative for abdominal pain, constipation, diarrhea, nausea and vomiting.   Genitourinary:  Negative for dysuria, flank pain, hematuria, vaginal bleeding and vaginal discharge.     Physical Exam     Initial Vitals   BP Pulse Resp Temp SpO2   24 1530 24 1529 24 1532 24 1530 24 1529   (!) 108/53 (!) 131 16 98.1 °F (36.7 °C) 97 %      MAP       --              Temp:  [98.1 °F (36.7 °C)-98.4 °F (36.9 °C)] 98.1 °F (36.7 °C)  Pulse:  [] 92  Resp:  [16-17] 17  SpO2:  [96 %-100 %] 99 %  BP: ()/(50-64) 99/55    Physical Exam    Nursing note and vitals reviewed.  Constitutional: She appears well-developed and well-nourished. She is not diaphoretic. No distress.   HENT:   Head: Normocephalic and atraumatic.   Eyes: Pupils are equal, round, and reactive to light.   Neck:   Normal range of motion.  Cardiovascular:  Normal rate.           Pulmonary/Chest: Breath sounds normal. No respiratory distress.   Musculoskeletal:         General: Normal range of motion.      Cervical back: Normal range of motion.     Neurological: She is alert and oriented to person, place, and time. She has normal strength.   Skin: Skin is warm and dry.   Psychiatric: She has a normal mood and affect. Her behavior is normal. Judgment and thought content normal.       ED Course   Procedures  Labs Reviewed - No data to display       Imaging Results    None          Medications - No data to display  Medical Decision Making  28yo  at 13w0d with an IUP complicated by maternal sickle cell disease, presenting to day from [facility] clinic for transfusion    VSS  FHTs documented    Asymptomatic  2u PRBCs (leukoreduced, sickle neg) transfused w/o issue; pt observed for any signs of transfusion related rxn    Stable and amenable to discharge.  Return precautions given;  pt voiced understanding.  Has next primary OB appt 9/17 and MFM appt 9/20              Attending Attestation:   Physician Attestation Statement for Resident:  As the supervising MD   Physician Attestation Statement: I have personally seen and examined this patient.   I agree with the above history.  -:   As the supervising MD I agree with the above PE.     As the supervising MD I agree with the above treatment, course, plan, and disposition.   -: I agree with the above edited resident note. Pt seen and examined, chart and labs reviewed.    Briefly, 30 yo G1 at 13w2d presents for blood transfusion. Pt followed by [facility] for Hgb SS disease, had labs today for planned transfusion tomorrow and H&H resulted critically low at 5.3/14.5. Called by [facility] to present to [facility] for 2 units pRBCs today. Asymptomatic for S/S of anemia, pt felt she was at her baseline. Afebrile, VSS. FHT heard on doppler. Transfused 2 units pRBCs (sickle neg, leukoreduced) and well tolerated.     All questions answered. Stable for d/c home with outpatient follow up.     Vashti Mann MD  OB Hospitalist  8/23/2024     I was personally present during the critical portions of the procedure(s) performed by the resident and was immediately available in the ED to provide services and assistance as needed during the entire procedure.  I have reviewed and agree with the residents interpretation of the following: lab data.  I have reviewed the following: old records at this facility.                                       Clinical Impression:  Final diagnoses:  [D57.1] Sickle cell disease without crisis (Primary)  [Z3A.13] 13 weeks gestation of pregnancy          ED Disposition Condition    Discharge Stable          ED Prescriptions    None       Follow-up Information    None          Chetan Pinto MD  Resident  08/23/24 0617       Vashti Mann MD  08/23/24 1111

## 2024-08-22 NOTE — TELEPHONE ENCOUNTER
Called patient to discuss h/h. I instructed her to report to OB ED for transfusion now instead of outpatient tomorrow. She denies any symptoms. She agrees and will report to OB ED for 2u simple transfusion.     TESSIE Moe MD  Maternal Fetal Medicine Fellow   PGY-6    within normal limits

## 2024-08-23 ENCOUNTER — APPOINTMENT (OUTPATIENT)
Dept: INFUSION THERAPY | Facility: OTHER | Age: 29
End: 2024-08-23
Payer: COMMERCIAL

## 2024-08-23 ENCOUNTER — TELEPHONE (OUTPATIENT)
Dept: OBSTETRICS AND GYNECOLOGY | Facility: CLINIC | Age: 29
End: 2024-08-23
Payer: COMMERCIAL

## 2024-08-23 ENCOUNTER — PATIENT MESSAGE (OUTPATIENT)
Dept: OBSTETRICS AND GYNECOLOGY | Facility: CLINIC | Age: 29
End: 2024-08-23
Payer: COMMERCIAL

## 2024-08-23 DIAGNOSIS — O99.019 MATERNAL SICKLE CELL ANEMIA AFFECTING PREGNANCY, ANTEPARTUM: ICD-10-CM

## 2024-08-23 DIAGNOSIS — D57.1 MATERNAL SICKLE CELL ANEMIA AFFECTING PREGNANCY, ANTEPARTUM: ICD-10-CM

## 2024-08-25 DIAGNOSIS — O99.019 MATERNAL SICKLE CELL ANEMIA AFFECTING PREGNANCY, ANTEPARTUM: Primary | ICD-10-CM

## 2024-08-25 DIAGNOSIS — D57.1 MATERNAL SICKLE CELL ANEMIA AFFECTING PREGNANCY, ANTEPARTUM: Primary | ICD-10-CM

## 2024-08-26 ENCOUNTER — LAB VISIT (OUTPATIENT)
Dept: LAB | Facility: HOSPITAL | Age: 29
End: 2024-08-26
Attending: STUDENT IN AN ORGANIZED HEALTH CARE EDUCATION/TRAINING PROGRAM
Payer: COMMERCIAL

## 2024-08-26 DIAGNOSIS — O99.019 MATERNAL SICKLE CELL ANEMIA AFFECTING PREGNANCY, ANTEPARTUM: ICD-10-CM

## 2024-08-26 DIAGNOSIS — D57.1 MATERNAL SICKLE CELL ANEMIA AFFECTING PREGNANCY, ANTEPARTUM: ICD-10-CM

## 2024-08-26 LAB
BASOPHILS # BLD AUTO: 0.05 K/UL (ref 0–0.2)
BASOPHILS NFR BLD: 0.3 % (ref 0–1.9)
DIFFERENTIAL METHOD BLD: ABNORMAL
EOSINOPHIL # BLD AUTO: 0.1 K/UL (ref 0–0.5)
EOSINOPHIL NFR BLD: 0.4 % (ref 0–8)
ERYTHROCYTE [DISTWIDTH] IN BLOOD BY AUTOMATED COUNT: 17.4 % (ref 11.5–14.5)
HB ELECTROPHORESIS INTERP CANCEL: ABNORMAL
HB ELECTROPHORESIS INTERPRETATION: ABNORMAL
HB ELECTROPHORESIS SUMMARY INTERPRETATION: NORMAL
HCT VFR BLD AUTO: 22 % (ref 37–48.5)
HGB A MFR BLD ELPH: 0 % (ref 95.8–98)
HGB A2 MFR BLD: 3 % (ref 2–3.3)
HGB A2+XXX MFR BLD ELPH: ABNORMAL %
HGB BLD-MCNC: 7.6 G/DL (ref 12–16)
HGB F MFR BLD: 8.6 % (ref 0–0.9)
HGB S BLD QL: POSITIVE
HGB XXX MFR BLD ELPH: ABNORMAL %
HPLC HB VARIANT: ABNORMAL
IMM GRANULOCYTES # BLD AUTO: 0.12 K/UL (ref 0–0.04)
IMM GRANULOCYTES NFR BLD AUTO: 0.7 % (ref 0–0.5)
LYMPHOCYTES # BLD AUTO: 1.4 K/UL (ref 1–4.8)
LYMPHOCYTES NFR BLD: 8 % (ref 18–48)
MCH RBC QN AUTO: 30.5 PG (ref 27–31)
MCHC RBC AUTO-ENTMCNC: 34.5 G/DL (ref 32–36)
MCV RBC AUTO: 88 FL (ref 82–98)
MONOCYTES # BLD AUTO: 2.2 K/UL (ref 0.3–1)
MONOCYTES NFR BLD: 12.8 % (ref 4–15)
NEUTROPHILS # BLD AUTO: 13.5 K/UL (ref 1.8–7.7)
NEUTROPHILS NFR BLD: 77.8 % (ref 38–73)
NRBC BLD-RTO: 0 /100 WBC
PLATELET # BLD AUTO: 341 K/UL (ref 150–450)
PMV BLD AUTO: 9.1 FL (ref 9.2–12.9)
PROVIDER SIGNING NAME: NORMAL
RBC # BLD AUTO: 2.49 M/UL (ref 4–5.4)
WBC # BLD AUTO: 17.33 K/UL (ref 3.9–12.7)

## 2024-08-26 PROCEDURE — 83020 HEMOGLOBIN ELECTROPHORESIS: CPT | Performed by: STUDENT IN AN ORGANIZED HEALTH CARE EDUCATION/TRAINING PROGRAM

## 2024-08-26 PROCEDURE — 83020 HEMOGLOBIN ELECTROPHORESIS: CPT | Mod: 91 | Performed by: STUDENT IN AN ORGANIZED HEALTH CARE EDUCATION/TRAINING PROGRAM

## 2024-08-26 PROCEDURE — 36415 COLL VENOUS BLD VENIPUNCTURE: CPT | Performed by: STUDENT IN AN ORGANIZED HEALTH CARE EDUCATION/TRAINING PROGRAM

## 2024-08-26 PROCEDURE — 85025 COMPLETE CBC W/AUTO DIFF WBC: CPT | Performed by: STUDENT IN AN ORGANIZED HEALTH CARE EDUCATION/TRAINING PROGRAM

## 2024-08-26 PROCEDURE — 83021 HEMOGLOBIN CHROMOTOGRAPHY: CPT | Performed by: STUDENT IN AN ORGANIZED HEALTH CARE EDUCATION/TRAINING PROGRAM

## 2024-08-27 LAB
HGB A2 MFR BLD HPLC: 2.7 % (ref 2.2–3.2)
HGB FRACT BLD ELPH PH6.0-IMP: NORMAL
HGB FRACT BLD ELPH-IMP: NORMAL
HGB FRACT BLD ELPH-IMP: NORMAL

## 2024-08-28 DIAGNOSIS — O99.019 MATERNAL SICKLE CELL ANEMIA AFFECTING PREGNANCY, ANTEPARTUM: Primary | ICD-10-CM

## 2024-08-28 DIAGNOSIS — D57.1 MATERNAL SICKLE CELL ANEMIA AFFECTING PREGNANCY, ANTEPARTUM: Primary | ICD-10-CM

## 2024-08-29 ENCOUNTER — OFFICE VISIT (OUTPATIENT)
Dept: MATERNAL FETAL MEDICINE | Facility: CLINIC | Age: 29
End: 2024-08-29
Attending: OBSTETRICS & GYNECOLOGY
Payer: COMMERCIAL

## 2024-08-29 DIAGNOSIS — O99.019 MATERNAL SICKLE CELL ANEMIA AFFECTING PREGNANCY, ANTEPARTUM: Primary | ICD-10-CM

## 2024-08-29 DIAGNOSIS — D57.1 MATERNAL SICKLE CELL ANEMIA AFFECTING PREGNANCY, ANTEPARTUM: Primary | ICD-10-CM

## 2024-08-29 NOTE — PROGRESS NOTES
The patient location is: [facility]  The chief complaint leading to consultation is: Sickle cell disease complicating pregnancy    Visit type: audiovisual    Face to Face time with patient: 12 min  20 minutes of total time spent on the encounter, which includes face to face time and non-face to face time preparing to see the patient (eg, review of tests), Obtaining and/or reviewing separately obtained history, Documenting clinical information in the electronic or other health record, Independently interpreting results (not separately reported) and communicating results to the patient/family/caregiver, or Care coordination (not separately reported).         Each patient to whom he or she provides medical services by telemedicine is:  (1) informed of the relationship between the physician and patient and the respective role of any other health care provider with respect to management of the patient; and (2) notified that he or she may decline to receive medical services by telemedicine and may withdraw from such care at any time.    Notes:       Maternal Fetal Medicine follow up consult    SUBJECTIVE:     Lana Chou is a 29 y.o.  female with IUP at 14w2d who is seen in follow up consultation by [facility].  Pregnancy complications include:   No problems updated.    Previous notes reviewed.   No changes to medical, surgical, family, social, or obstetric history.    Interval history since last [facility] visit: Had a transfusion of 2 units PRBC.  She also c/o nosebleeds.    Medications reviewed.    Care team members:  Dr. Tyson Landers - Primary OB       OBJECTIVE:   LMP 2024 (Exact Date)       Significant labs/imaging:  F/U CBC and Hgb electrophoresis     Last two CBC:  Lab Results   Component Value Date    WBC 17.33 (H) 2024    WBC 19.55 (H) 2024    HGB 7.6 (L) 2024    HGB 5.3 (LL) 2024    HCT 22.0 (L) 2024    HCT 14.5 (LL) 2024    MCV 88 2024    MCV 85 2024      2024     2024             ASSESSMENT/PLAN:     29 y.o.  female with IUP at 14w2d    We discussed her epistaxis in the role of the increasing blood volume pregnancy in making surface vessels more fragile.  I suggested she yet a humidifier and saline nose spray which may help with this.  I discussed the follow-up labs dictating at 38% hemoglobin a and change in hemoglobin hematocrit as indicated above.  We would like to keep her hemoglobin a close to 50% in her hematocrit above 25%.  I will schedule her for another 2 unit transfusion with follow-up labs in see her back at her 20 week ultrasound scan.    The patient was given an opportunity to ask questions about management and the diease process.  She expressed an understanding of and agreement to the above impression and plan. All questions were answered to her satisfaction.  She was given contact information to the [facility] clinic to address further concerns.

## 2024-08-30 ENCOUNTER — DOCUMENTATION ONLY (OUTPATIENT)
Dept: MATERNAL FETAL MEDICINE | Facility: CLINIC | Age: 29
End: 2024-08-30
Payer: COMMERCIAL

## 2024-08-30 ENCOUNTER — PATIENT MESSAGE (OUTPATIENT)
Dept: MATERNAL FETAL MEDICINE | Facility: CLINIC | Age: 29
End: 2024-08-30
Payer: COMMERCIAL

## 2024-08-30 NOTE — PROGRESS NOTES
Patient may be coming into OB ED over 8/30 weekend for 2u blood transfusion, as outpatient infusion center is booked for the next week.     She needs 2u sickle and cmv negative blood. No need for post CBC if she remains asymptomatic. Please remind her to get labs done a few days after transfusion (already ordered).    TESSIE Moe MD  Maternal Fetal Medicine Fellow   PGY-6

## 2024-09-02 PROBLEM — Z00.00 PREVENTATIVE HEALTH CARE: Status: RESOLVED | Noted: 2022-11-23 | Resolved: 2024-09-02

## 2024-09-03 ENCOUNTER — TELEPHONE (OUTPATIENT)
Dept: MATERNAL FETAL MEDICINE | Facility: CLINIC | Age: 29
End: 2024-09-03
Payer: COMMERCIAL

## 2024-09-03 ENCOUNTER — TELEPHONE (OUTPATIENT)
Dept: INFUSION THERAPY | Facility: OTHER | Age: 29
End: 2024-09-03
Payer: COMMERCIAL

## 2024-09-04 ENCOUNTER — HOSPITAL ENCOUNTER (EMERGENCY)
Facility: OTHER | Age: 29
Discharge: HOME | End: 2024-09-04
Attending: OBSTETRICS & GYNECOLOGY
Payer: COMMERCIAL

## 2024-09-04 VITALS
SYSTOLIC BLOOD PRESSURE: 88 MMHG | TEMPERATURE: 98 F | OXYGEN SATURATION: 100 % | HEART RATE: 80 BPM | DIASTOLIC BLOOD PRESSURE: 54 MMHG | RESPIRATION RATE: 17 BRPM

## 2024-09-04 DIAGNOSIS — D57.1 SICKLE CELL DISEASE WITHOUT CRISIS: ICD-10-CM

## 2024-09-04 DIAGNOSIS — Z3A.15 15 WEEKS GESTATION OF PREGNANCY: ICD-10-CM

## 2024-09-04 DIAGNOSIS — D64.9 ANEMIA, UNSPECIFIED TYPE: Primary | ICD-10-CM

## 2024-09-04 DIAGNOSIS — Z51.89 ENCOUNTER FOR BLOOD TRANSFUSION: ICD-10-CM

## 2024-09-04 LAB
ABO + RH BLD: NORMAL
BASOPHILS # BLD AUTO: 0.05 K/UL (ref 0–0.2)
BASOPHILS NFR BLD: 0.3 % (ref 0–1.9)
BLD GP AB SCN CELLS X3 SERPL QL: NORMAL
BLD PROD TYP BPU: NORMAL
BLOOD UNIT EXPIRATION DATE: NORMAL
BLOOD UNIT TYPE CODE: 9500
BLOOD UNIT TYPE: NORMAL
CODING SYSTEM: NORMAL
CROSSMATCH INTERPRETATION: NORMAL
DIFFERENTIAL METHOD BLD: ABNORMAL
DISPENSE STATUS: NORMAL
EOSINOPHIL # BLD AUTO: 0.1 K/UL (ref 0–0.5)
EOSINOPHIL NFR BLD: 0.3 % (ref 0–8)
ERYTHROCYTE [DISTWIDTH] IN BLOOD BY AUTOMATED COUNT: 17.5 % (ref 11.5–14.5)
HCT VFR BLD AUTO: 15.4 % (ref 37–48.5)
HGB BLD-MCNC: 5.3 G/DL (ref 12–16)
IMM GRANULOCYTES # BLD AUTO: 0.14 K/UL (ref 0–0.04)
IMM GRANULOCYTES NFR BLD AUTO: 0.7 % (ref 0–0.5)
LYMPHOCYTES # BLD AUTO: 1.5 K/UL (ref 1–4.8)
LYMPHOCYTES NFR BLD: 8 % (ref 18–48)
MCH RBC QN AUTO: 29.4 PG (ref 27–31)
MCHC RBC AUTO-ENTMCNC: 34.4 G/DL (ref 32–36)
MCV RBC AUTO: 86 FL (ref 82–98)
MONOCYTES # BLD AUTO: 1.7 K/UL (ref 0.3–1)
MONOCYTES NFR BLD: 8.9 % (ref 4–15)
NEUTROPHILS # BLD AUTO: 15.5 K/UL (ref 1.8–7.7)
NEUTROPHILS NFR BLD: 81.8 % (ref 38–73)
NRBC BLD-RTO: 0 /100 WBC
NUM UNITS TRANS PACKED RBC: NORMAL
PLATELET # BLD AUTO: 472 K/UL (ref 150–450)
PMV BLD AUTO: 9.2 FL (ref 9.2–12.9)
RBC # BLD AUTO: 1.8 M/UL (ref 4–5.4)
SPECIMEN OUTDATE: NORMAL
WBC # BLD AUTO: 18.97 K/UL (ref 3.9–12.7)

## 2024-09-04 PROCEDURE — 86920 COMPATIBILITY TEST SPIN: CPT

## 2024-09-04 PROCEDURE — P9038 RBC IRRADIATED: HCPCS

## 2024-09-04 PROCEDURE — 27201040 HC RC 50 FILTER

## 2024-09-04 PROCEDURE — 99285 EMERGENCY DEPT VISIT HI MDM: CPT | Mod: 25

## 2024-09-04 PROCEDURE — 86900 BLOOD TYPING SEROLOGIC ABO: CPT

## 2024-09-04 PROCEDURE — 86850 RBC ANTIBODY SCREEN: CPT

## 2024-09-04 PROCEDURE — 86902 BLOOD TYPE ANTIGEN DONOR EA: CPT

## 2024-09-04 PROCEDURE — 85660 RBC SICKLE CELL TEST: CPT

## 2024-09-04 PROCEDURE — 36430 TRANSFUSION BLD/BLD COMPNT: CPT

## 2024-09-04 PROCEDURE — 25000003 PHARM REV CODE 250

## 2024-09-04 PROCEDURE — 99284 EMERGENCY DEPT VISIT MOD MDM: CPT | Mod: ,,, | Performed by: OBSTETRICS & GYNECOLOGY

## 2024-09-04 PROCEDURE — 86901 BLOOD TYPING SEROLOGIC RH(D): CPT

## 2024-09-04 PROCEDURE — 86644 CMV ANTIBODY: CPT

## 2024-09-04 PROCEDURE — 85025 COMPLETE CBC W/AUTO DIFF WBC: CPT

## 2024-09-04 PROCEDURE — P9040 RBC LEUKOREDUCED IRRADIATED: HCPCS

## 2024-09-04 RX ORDER — HYDROCODONE BITARTRATE AND ACETAMINOPHEN 500; 5 MG/1; MG/1
TABLET ORAL
Status: DISCONTINUED | OUTPATIENT
Start: 2024-09-04 | End: 2024-09-05 | Stop reason: HOSPADM

## 2024-09-04 RX ORDER — ACETAMINOPHEN 325 MG/1
650 TABLET ORAL ONCE
Status: COMPLETED | OUTPATIENT
Start: 2024-09-04 | End: 2024-09-04

## 2024-09-04 RX ORDER — DIPHENHYDRAMINE HCL 25 MG
25 CAPSULE ORAL ONCE
Status: COMPLETED | OUTPATIENT
Start: 2024-09-04 | End: 2024-09-04

## 2024-09-04 RX ADMIN — DIPHENHYDRAMINE HYDROCHLORIDE 25 MG: 25 CAPSULE ORAL at 04:09

## 2024-09-04 RX ADMIN — SODIUM CHLORIDE: 9 INJECTION, SOLUTION INTRAVENOUS at 04:09

## 2024-09-04 RX ADMIN — ACETAMINOPHEN 650 MG: 325 TABLET, FILM COATED ORAL at 04:09

## 2024-09-04 NOTE — ED PROVIDER NOTES
Encounter Date: 2024       History     Chief Complaint   Patient presents with    Transfusion     HPI  Lana Chou is a 29 y.o. [id number] at 15w1d presents for blood transfusion. She was found to be anemic with H/H 7., however, denies any symptoms of anemia at this time including lightheadedness, shortness of breath, or chest pain.     This IUP is complicated by sickle cell disease.  Patient denies contractions, denies vaginal bleeding, denies LOF.   Fetal Movement:  not yet feeling fetal movement .    Review of patient's allergies indicates:   Allergen Reactions    Rocephin [ceftriaxone] Shortness Of Breath, Itching and Anxiety     Nausea, vomiting. No hives, swelling, or anaphylaxis. Can tolerate if necessary, but would avoid if possible.     Past Medical History:   Diagnosis Date    Chronic kidney disease (CKD) 2018    Emesis 2022    Encounter for surveillance of vaginal ring hormonal contraceptive device 11/15/2023    LGSIL on Pap smear of cervix 2017    LLQ abdominal pain 2023    Sickle cell anemia     Sickle cell disease     Sickle-cell disease without crisis     UTI (urinary tract infection) 2023     Past Surgical History:   Procedure Laterality Date    BREAST LUMPECTOMY      CARDIAC SURGERY      port insertion    NEPHRECTOMY       Family History   Problem Relation Name Age of Onset    Sickle cell trait Father      Sickle cell trait Mother      Sickle cell trait Sister      Sickle cell trait Sister      Breast cancer Neg Hx      Colon cancer Neg Hx      Ovarian cancer Neg Hx       Social History     Tobacco Use    Smoking status: Former     Types: Vaping with nicotine     Start date: 2022     Quit date: 2024     Years since quittin.1    Smokeless tobacco: Never   Substance Use Topics    Alcohol use: Not Currently    Drug use: No     Review of Systems   Constitutional:  Negative for chills and fever.   HENT:  Negative for sore throat.    Eyes:  Negative for  visual disturbance.   Respiratory:  Negative for shortness of breath.    Cardiovascular:  Negative for chest pain.   Gastrointestinal:  Negative for abdominal pain, nausea and vomiting.   Genitourinary:  Negative for dysuria, vaginal bleeding and vaginal discharge.   Musculoskeletal:  Negative for back pain.   Skin:  Negative for rash.   Neurological:  Negative for weakness and headaches.   Hematological:  Does not bruise/bleed easily.   Psychiatric/Behavioral:  The patient is not nervous/anxious.        Physical Exam     Initial Vitals [09/04/24 1348]   BP Pulse Resp Temp SpO2   (!) 106/48 104 17 98.4 °F (36.9 °C) 100 %      MAP       --         Physical Exam    Vitals reviewed.  Constitutional: She appears well-developed.   HENT:   Head: Normocephalic.   Eyes: EOM are normal.   Cardiovascular:  Normal rate.           Pulmonary/Chest: No respiratory distress.   Abdominal: Abdomen is soft. There is no abdominal tenderness.     Neurological: She is alert and oriented to person, place, and time.   Skin: Skin is warm. Capillary refill takes less than 2 seconds.   Psychiatric: Her behavior is normal.         ED Course   Procedures  Labs Reviewed   CBC W/ AUTO DIFFERENTIAL - Abnormal       Result Value    WBC 18.97 (*)     RBC 1.80 (*)     Hemoglobin 5.3 (*)     Hematocrit 15.4 (*)     MCV 86      MCH 29.4      MCHC 34.4      RDW 17.5 (*)     Platelets 472 (*)     MPV 9.2      Immature Granulocytes 0.7 (*)     Gran # (ANC) 15.5 (*)     Immature Grans (Abs) 0.14 (*)     Lymph # 1.5      Mono # 1.7 (*)     Eos # 0.1      Baso # 0.05      nRBC 0      Gran % 81.8 (*)     Lymph % 8.0 (*)     Mono % 8.9      Eosinophil % 0.3      Basophil % 0.3      Differential Method Automated      Narrative:        HGB & HCT critical result(s) called and verbal readback obtained   from Reena Boyce RN.  by NKY2 09/04/2024 14:21   TYPE & SCREEN    Group & Rh O POS      Indirect Benji NEG      Specimen Outdate 09/07/2024 23:59      PREPARE RBC SOFT    UNIT NUMBER [id number]      Product Code H0162N84      DISPENSE STATUS TRANSFUSED      CODING SYSTEM ZRNM596      Unit Blood Type Code 9500      Unit Blood Type O NEG      Unit Expiration [id number]      CROSSMATCH INTERPRETATION Compatible     PREPARE RBC SOFT    UNIT NUMBER [id number]      Product Code I2761K33      DISPENSE STATUS TRANSFUSED      CODING SYSTEM DTMC950      Unit Blood Type Code 9500      Unit Blood Type O NEG      Unit Expiration [id number]      CROSSMATCH INTERPRETATION Compatible      UNIT NUMBER [id number]      Product Code L9311X47      DISPENSE STATUS TRANSFUSED      CODING SYSTEM GPPH158      Unit Blood Type Code 9500      Unit Blood Type O NEG      Unit Expiration [id number]      CROSSMATCH INTERPRETATION Compatible            Imaging Results    None          Medications   acetaminophen tablet 650 mg (650 mg Oral Given 24)   diphenhydrAMINE capsule 25 mg (25 mg Oral Given 24)     Medical Decision Making  Lana Chou is a 29 y.o. [id number] at 15w1d presents for blood transfusion.    Anemia  - VSS, Asymptomatic   - H/H: 5.3/15.4   - received 3 u pRBC, patient continued to be asymptomatic    Patient stable and amendable to discharge. Strict ED return precautions given. Patient expressed understanding.     Christianaa Giraldo [facility]   OBGYN PGY1           Amount and/or Complexity of Data Reviewed  Labs: ordered.    Risk  Prescription drug management.              Attending Attestation:   Physician Attestation Statement for Resident:  As the supervising MD   Physician Attestation Statement: I have personally seen and examined this patient.   I agree with the above history.  -:   As the supervising MD I agree with the above PE.     As the supervising MD I agree with the above treatment, course, plan, and disposition.   I was personally present during the critical portions of the procedure(s) performed by the resident and  was immediately available in the ED to provide services and assistance as needed during the entire procedure.  I have reviewed and agree with the residents interpretation of the following: lab data.  I have reviewed the following: old records at this facility.            Attending ED Notes:   I saw and examined the patient myself along with the resident. I have personally reviewed pertinent prior records, labs, imaging, and procedures. I have reviewed the documentation and agree with the findings and plan as documented.     G1 @ 15w1d with sickle cell disease presenting for blood transfusion as recommended by [facility] for severe anemia H/H 5.3/15.4. Asymptomatic, no bleeding, no OB complaints. Appropriate FHT. 3u pRBC transfused, tolerated well and discharged home in stable condition.    Laurel Mccallum MD FACOG  OB Hospitalist                                 Clinical Impression:  Final diagnoses:  [D64.9] Anemia, unspecified type (Primary)  [Z51.89] Encounter for blood transfusion  [Z3A.15] 15 weeks gestation of pregnancy  [D57.1] Sickle cell disease without crisis          ED Disposition Condition    Discharge Stable          ED Prescriptions    None       Follow-up Information       Follow up With Specialties Details Why Contact Info    [facility] - Emergency Dept (Obstetrics) Emergency Medicine  If symptoms worsen [address] [facility] [address] [phone]    Tyson Landers MD Obstetrics and Gynecology, Obstetrics and Gynecology  As needed, as scheduled [address]  [address]  [facility] [id number] [phone]               Laurel Mccallum MD  09/08/24 2770

## 2024-09-05 NOTE — DISCHARGE INSTRUCTIONS
Contact your primary OB or after hours at [phone] if you have any problems or experience any of the following:    Contractions every 7-10 minutes for 1 or more hours.   A sudden gush or constant leaking of fluid.  Heavy vaginal bleeding.   If you experience a constant headache, blurry vision, pain underneath your right rib, or sudden swelling of your hands, feet, and face.   If you are 28 weeks pregnant or greater, you can measure kick counts with a goal of 10 or more movements within 2 hours.     Remember to stay hydrated; drink 8-10 bottles of water a day.     Maintain all follow-up appointments.

## 2024-09-09 ENCOUNTER — LAB VISIT (OUTPATIENT)
Dept: LAB | Facility: HOSPITAL | Age: 29
End: 2024-09-09
Attending: STUDENT IN AN ORGANIZED HEALTH CARE EDUCATION/TRAINING PROGRAM
Payer: COMMERCIAL

## 2024-09-09 DIAGNOSIS — O99.019 MATERNAL SICKLE CELL ANEMIA AFFECTING PREGNANCY, ANTEPARTUM: ICD-10-CM

## 2024-09-09 DIAGNOSIS — D57.1 MATERNAL SICKLE CELL ANEMIA AFFECTING PREGNANCY, ANTEPARTUM: ICD-10-CM

## 2024-09-09 LAB
BASOPHILS # BLD AUTO: 0.05 K/UL (ref 0–0.2)
BASOPHILS NFR BLD: 0.4 % (ref 0–1.9)
DIFFERENTIAL METHOD BLD: ABNORMAL
EOSINOPHIL # BLD AUTO: 0.1 K/UL (ref 0–0.5)
EOSINOPHIL NFR BLD: 0.5 % (ref 0–8)
ERYTHROCYTE [DISTWIDTH] IN BLOOD BY AUTOMATED COUNT: 16.7 % (ref 11.5–14.5)
HCT VFR BLD AUTO: 27.9 % (ref 37–48.5)
HGB BLD-MCNC: 9.2 G/DL (ref 12–16)
IMM GRANULOCYTES # BLD AUTO: 0.06 K/UL (ref 0–0.04)
IMM GRANULOCYTES NFR BLD AUTO: 0.4 % (ref 0–0.5)
LYMPHOCYTES # BLD AUTO: 1.8 K/UL (ref 1–4.8)
LYMPHOCYTES NFR BLD: 13.3 % (ref 18–48)
MCH RBC QN AUTO: 29.4 PG (ref 27–31)
MCHC RBC AUTO-ENTMCNC: 33 G/DL (ref 32–36)
MCV RBC AUTO: 89 FL (ref 82–98)
MONOCYTES # BLD AUTO: 1.4 K/UL (ref 0.3–1)
MONOCYTES NFR BLD: 10.8 % (ref 4–15)
NEUTROPHILS # BLD AUTO: 10 K/UL (ref 1.8–7.7)
NEUTROPHILS NFR BLD: 74.6 % (ref 38–73)
NRBC BLD-RTO: 0 /100 WBC
PLATELET # BLD AUTO: 494 K/UL (ref 150–450)
PMV BLD AUTO: 9.1 FL (ref 9.2–12.9)
RBC # BLD AUTO: 3.13 M/UL (ref 4–5.4)
WBC # BLD AUTO: 13.39 K/UL (ref 3.9–12.7)

## 2024-09-09 PROCEDURE — 83020 HEMOGLOBIN ELECTROPHORESIS: CPT | Performed by: STUDENT IN AN ORGANIZED HEALTH CARE EDUCATION/TRAINING PROGRAM

## 2024-09-09 PROCEDURE — 85025 COMPLETE CBC W/AUTO DIFF WBC: CPT | Performed by: STUDENT IN AN ORGANIZED HEALTH CARE EDUCATION/TRAINING PROGRAM

## 2024-09-09 PROCEDURE — 36415 COLL VENOUS BLD VENIPUNCTURE: CPT | Performed by: STUDENT IN AN ORGANIZED HEALTH CARE EDUCATION/TRAINING PROGRAM

## 2024-09-10 ENCOUNTER — PATIENT MESSAGE (OUTPATIENT)
Dept: OTHER | Facility: OTHER | Age: 29
End: 2024-09-10
Payer: COMMERCIAL

## 2024-09-10 LAB
HGB FRACT BLD ELPH-IMP: NORMAL
HGB S MFR BLD ELPH: 24 %

## 2024-09-17 ENCOUNTER — LAB VISIT (OUTPATIENT)
Dept: LAB | Facility: HOSPITAL | Age: 29
End: 2024-09-17
Attending: OBSTETRICS & GYNECOLOGY
Payer: COMMERCIAL

## 2024-09-17 ENCOUNTER — ROUTINE PRENATAL (OUTPATIENT)
Dept: OBSTETRICS AND GYNECOLOGY | Facility: CLINIC | Age: 29
End: 2024-09-17
Payer: COMMERCIAL

## 2024-09-17 VITALS
WEIGHT: 103.63 LBS | DIASTOLIC BLOOD PRESSURE: 60 MMHG | BODY MASS INDEX: 19.58 KG/M2 | SYSTOLIC BLOOD PRESSURE: 102 MMHG

## 2024-09-17 DIAGNOSIS — D57.1 HEMOGLOBIN SS DISEASE WITHOUT CRISIS: ICD-10-CM

## 2024-09-17 DIAGNOSIS — O99.012 MATERNAL SICKLE CELL ANEMIA COMPLICATING PREGNANCY IN SECOND TRIMESTER: ICD-10-CM

## 2024-09-17 DIAGNOSIS — D57.1 MATERNAL SICKLE CELL ANEMIA COMPLICATING PREGNANCY IN SECOND TRIMESTER: ICD-10-CM

## 2024-09-17 DIAGNOSIS — Z3A.17 17 WEEKS GESTATION OF PREGNANCY: Primary | ICD-10-CM

## 2024-09-17 DIAGNOSIS — Z3A.17 17 WEEKS GESTATION OF PREGNANCY: ICD-10-CM

## 2024-09-17 DIAGNOSIS — Z90.5 H/O UNILATERAL NEPHRECTOMY: ICD-10-CM

## 2024-09-17 LAB
BASOPHILS # BLD AUTO: 0.06 K/UL (ref 0–0.2)
BASOPHILS NFR BLD: 0.4 % (ref 0–1.9)
DIFFERENTIAL METHOD BLD: ABNORMAL
EOSINOPHIL # BLD AUTO: 0.1 K/UL (ref 0–0.5)
EOSINOPHIL NFR BLD: 0.8 % (ref 0–8)
ERYTHROCYTE [DISTWIDTH] IN BLOOD BY AUTOMATED COUNT: 17 % (ref 11.5–14.5)
HCT VFR BLD AUTO: 27.5 % (ref 37–48.5)
HGB BLD-MCNC: 8.5 G/DL (ref 12–16)
IMM GRANULOCYTES # BLD AUTO: 0.06 K/UL (ref 0–0.04)
IMM GRANULOCYTES NFR BLD AUTO: 0.4 % (ref 0–0.5)
LYMPHOCYTES # BLD AUTO: 2.1 K/UL (ref 1–4.8)
LYMPHOCYTES NFR BLD: 14.1 % (ref 18–48)
MCH RBC QN AUTO: 29.2 PG (ref 27–31)
MCHC RBC AUTO-ENTMCNC: 30.9 G/DL (ref 32–36)
MCV RBC AUTO: 95 FL (ref 82–98)
MONOCYTES # BLD AUTO: 1.4 K/UL (ref 0.3–1)
MONOCYTES NFR BLD: 9.7 % (ref 4–15)
NEUTROPHILS # BLD AUTO: 10.9 K/UL (ref 1.8–7.7)
NEUTROPHILS NFR BLD: 74.6 % (ref 38–73)
NRBC BLD-RTO: 0 /100 WBC
PLATELET # BLD AUTO: 364 K/UL (ref 150–450)
PMV BLD AUTO: 9.9 FL (ref 9.2–12.9)
RBC # BLD AUTO: 2.91 M/UL (ref 4–5.4)
WBC # BLD AUTO: 14.64 K/UL (ref 3.9–12.7)

## 2024-09-17 PROCEDURE — 85025 COMPLETE CBC W/AUTO DIFF WBC: CPT | Performed by: OBSTETRICS & GYNECOLOGY

## 2024-09-17 PROCEDURE — 36415 COLL VENOUS BLD VENIPUNCTURE: CPT | Performed by: OBSTETRICS & GYNECOLOGY

## 2024-09-17 PROCEDURE — 82105 ALPHA-FETOPROTEIN SERUM: CPT | Performed by: OBSTETRICS & GYNECOLOGY

## 2024-09-17 PROCEDURE — 99999 PR PBB SHADOW E&M-EST. PATIENT-LVL III: CPT | Mod: PBBFAC,,, | Performed by: OBSTETRICS & GYNECOLOGY

## 2024-09-17 PROCEDURE — 0502F SUBSEQUENT PRENATAL CARE: CPT | Mod: CPTII,S$GLB,, | Performed by: OBSTETRICS & GYNECOLOGY

## 2024-09-17 NOTE — PROGRESS NOTES
17w0  Patient comes in today for follow-up prenatal care  No new complaint.  No vaginal bleeding, contractions, or rupture of membranes.  Good fetal movements.    Eating well without significant nausea/vomiting  Not gaining weight  Taking prenatal vitamins, folic acid 4 mg, and baby aspirin  Taking hydroxyurea and vitamin D     Doing well with [facility]    Discussed with patient MFM's findings and recommendations  Discussed care for sickle cell anemia patients in pregnancy  CBC.  Consider blood transfusion of hematocrit <25%  Normal [id number].  Needs AFP today    Back in 2 weeks

## 2024-09-20 ENCOUNTER — PROCEDURE VISIT (OUTPATIENT)
Dept: MATERNAL FETAL MEDICINE | Facility: CLINIC | Age: 29
End: 2024-09-20
Attending: OBSTETRICS & GYNECOLOGY
Payer: COMMERCIAL

## 2024-09-20 VITALS
SYSTOLIC BLOOD PRESSURE: 104 MMHG | HEART RATE: 73 BPM | BODY MASS INDEX: 20.08 KG/M2 | HEIGHT: 61 IN | DIASTOLIC BLOOD PRESSURE: 46 MMHG | WEIGHT: 106.38 LBS

## 2024-09-20 DIAGNOSIS — O35.DXX0 ECHOGENIC FOCUS OF BOWEL OF FETUS AFFECTING ANTEPARTUM CARE OF MOTHER, SINGLE OR UNSPECIFIED FETUS: Primary | ICD-10-CM

## 2024-09-20 DIAGNOSIS — Z36.2 ENCOUNTER FOR FOLLOW-UP ULTRASOUND OF FETAL ANATOMY: Primary | ICD-10-CM

## 2024-09-20 DIAGNOSIS — O99.019 MATERNAL SICKLE CELL ANEMIA AFFECTING PREGNANCY, ANTEPARTUM: ICD-10-CM

## 2024-09-20 DIAGNOSIS — O28.3 ECHOGENIC INTRACARDIAC FOCUS OF FETUS ON PRENATAL ULTRASOUND: ICD-10-CM

## 2024-09-20 DIAGNOSIS — D57.1 MATERNAL SICKLE CELL ANEMIA AFFECTING PREGNANCY, ANTEPARTUM: ICD-10-CM

## 2024-09-20 LAB
# FETUSES US: NORMAL
AFP INTERPRETATION: NORMAL
AFP MOM SERPL: 0.76
AFP SERPL-MCNC: 37.2 NG/ML
AFP SERPL-MCNC: NEGATIVE NG/ML
AGE AT DELIVERY: 27
GA (DAYS): 0 D
GA (WEEKS): 17 WK
GESTATIONAL AGE METHOD: NORMAL
IDDM PATIENT QL: NORMAL
SMOKING STATUS FTND: NO

## 2024-09-20 PROCEDURE — 99999 PR PBB SHADOW E&M-EST. PATIENT-LVL III: CPT | Mod: PBBFAC,,, | Performed by: OBSTETRICS & GYNECOLOGY

## 2024-09-20 NOTE — PROGRESS NOTES
"Maternal Fetal Medicine follow up consult    SUBJECTIVE:     Lana Chou is a 29 y.o.  female with IUP at 17w3d who is seen in follow up consultation by MFM.  Pregnancy complications include:   Problem   Echogenic Focus of Bowel, Fetal, Affecting Care of Mother, Antepartum   Echogenic Intracardiac Focus of Fetus On Prenatal Ultrasound   Maternal Sickle Cell Anemia Affecting Pregnancy, Antepartum     Previous notes reviewed.   No changes to medical, surgical, family, social, or obstetric history.    Interval history since last MFM visit:  Has had 3 transfusions since last visit.  No recent crises.    Medications reviewed.    Care team members:  Dr. Landers - Primary OB       OBJECTIVE:   BP (!) 104/46 (BP Location: Left arm, Patient Position: Sitting, BP Method: Small (Automatic))   Pulse 73   Ht 5' 1" (1.549 m)   Wt 48.2 kg (106 lb 6 oz)   LMP 2024 (Exact Date)   BMI 20.10 kg/m²       Ultrasound performed. See viewpoint for full ultrasound report.  A detailed fetal anatomic ultrasound examination was performed for the following high risk indication: EIF, bright bowel.   No fetal structural malformations are identified; however, fetal imaging is incomplete today.   A follow-up study will be scheduled to complete the fetal anatomic survey.   Fetal size today is consistent with established gestational age.   Cervical length by TA scanning is normal.   Placental location is anterior without evidence of previa.     Significant labs/imaging:  Hgb S Quant % 24.0     Hemoglobin 12.0 - 16.0 g/dL 8.5 Low  9.2 Low  5.3 Low Panic  CM 7.6 Low  5.3 Low Panic  CM 6.1 Low  7.4 Low    Hematocrit 37.0 - 48.5 % 27.5 Low              ASSESSMENT/PLAN:     29 y.o.  female with IUP at 17w3d  We discussed the significance of an echogenic focus (EIF) in the ventricle of the fetal heart. This is not a true structural abnormality and is not associated with congenital heart disease or abnormal cardiac function. It is " seen as a normal variant in about 3-5% of pregnancies. Down syndrome fetuses have a higher incidence of echogenic foci than normal fetuses, therefore it is considered to be a potential marker for fetal Down syndrome.     In a woman with other risk factors for Down syndrome (advanced maternal age, elevated quad screen risk or presence of other markers), the presence of an echogenic focus may increase the relative risk of Down syndrome modestly. In a younger woman (< age 35) or in a woman without other risk factors or markers for Down syndrome, the significance of an isolated echogenic focus is uncertain and often not significant. The overwhelming majority (99%) of these fetuses will not have Down syndrome.    -cfDNA testing- negative    Echogenic bowel (EB)  Isolated echogenic fetal bowel (EB) is noted on ultrasound today.  This is a non-specific, and often benign ultrasound finding. In many cases (72%), EB resolves on follow-up scans, and no fetal abnormalities are found.  However, it can be an indicator of fetal infection (CMV, toxoplasmosis, parvovirus), cystic fibrosis, aneuploidy, or eventual development of fetal growth restriction. It may also be associated with identification of anomalies (primarily GI) later in pregnancy or after birth and with an increased risk for  mortality.     When HEB is an isolated finding, the incidence of fetal abnormalities and pregnancy complications is as follows:  Chromosome abnormalities - 3.3% (most are Trisomy 21 and sex chromosome aneuploidy)- NIPD negative  Cystic fibrosis - 2.2%  Congenital infection (primarily CMV; but also, toxoplasmosis and parvovirus) - 2.2%  Anomalies on f/u scan - 1.8% (primarily GI)  Anomalies not found until after birth - 2.1% (primarily GI)  FGR - 12.6%  IUFD - ~1% in cases without other abnormalities (primarily secondary to severe FGR)      We discussed testing strategies for each of these complications/conditions. The testing options  included amniocentesis, parental carrier screening for cystic fibrosis, maternal serologic testing for CMV, toxoplasmosis, and parvovirus, cell-free DNA testing, and serial ultrasound evaluation for fetal growth, as well as for surveillance for development of bowel obstruction or meconium peritonitis.  After discussion, the patient decided to proceed with CF screening  A f/u ultrasound exam is recommended and will be scheduled between 27 - 30 weeks.  In the absence of FGR, other anomalies, fetal infection, or genetic abnormality, prenatal testing is not indicated.     The patient was given an opportunity to ask questions about management and the diease process.  She expressed an understanding of and agreement to the above impression and plan. All questions were answered to her satisfaction.  She was given contact information to the [facility] clinic to address further concerns.

## 2024-09-23 ENCOUNTER — LAB VISIT (OUTPATIENT)
Dept: LAB | Facility: HOSPITAL | Age: 29
End: 2024-09-23
Attending: OBSTETRICS & GYNECOLOGY
Payer: COMMERCIAL

## 2024-09-23 DIAGNOSIS — O35.DXX0 ECHOGENIC FOCUS OF BOWEL OF FETUS AFFECTING ANTEPARTUM CARE OF MOTHER, SINGLE OR UNSPECIFIED FETUS: ICD-10-CM

## 2024-09-23 PROCEDURE — 81220 CFTR GENE COM VARIANTS: CPT | Performed by: OBSTETRICS & GYNECOLOGY

## 2024-09-23 PROCEDURE — 36415 COLL VENOUS BLD VENIPUNCTURE: CPT | Performed by: OBSTETRICS & GYNECOLOGY

## 2024-09-24 ENCOUNTER — OFFICE VISIT (OUTPATIENT)
Dept: HEMATOLOGY/ONCOLOGY | Facility: CLINIC | Age: 29
End: 2024-09-24
Payer: COMMERCIAL

## 2024-09-24 VITALS
HEIGHT: 61 IN | RESPIRATION RATE: 15 BRPM | HEART RATE: 77 BPM | OXYGEN SATURATION: 99 % | WEIGHT: 108.69 LBS | SYSTOLIC BLOOD PRESSURE: 103 MMHG | BODY MASS INDEX: 20.52 KG/M2 | DIASTOLIC BLOOD PRESSURE: 49 MMHG

## 2024-09-24 DIAGNOSIS — Z34.90 PREGNANCY, UNSPECIFIED GESTATIONAL AGE: ICD-10-CM

## 2024-09-24 DIAGNOSIS — D57.1 SICKLE CELL DISEASE WITHOUT CRISIS: Primary | ICD-10-CM

## 2024-09-24 DIAGNOSIS — D57.1 HEMOGLOBIN SS DISEASE WITHOUT CRISIS: ICD-10-CM

## 2024-09-24 PROCEDURE — 1159F MED LIST DOCD IN RCRD: CPT | Mod: CPTII,S$GLB,, | Performed by: INTERNAL MEDICINE

## 2024-09-24 PROCEDURE — 3074F SYST BP LT 130 MM HG: CPT | Mod: CPTII,S$GLB,, | Performed by: INTERNAL MEDICINE

## 2024-09-24 PROCEDURE — 3008F BODY MASS INDEX DOCD: CPT | Mod: CPTII,S$GLB,, | Performed by: INTERNAL MEDICINE

## 2024-09-24 PROCEDURE — 3078F DIAST BP <80 MM HG: CPT | Mod: CPTII,S$GLB,, | Performed by: INTERNAL MEDICINE

## 2024-09-24 PROCEDURE — 99204 OFFICE O/P NEW MOD 45 MIN: CPT | Mod: S$GLB,,, | Performed by: INTERNAL MEDICINE

## 2024-09-24 PROCEDURE — 99999 PR PBB SHADOW E&M-EST. PATIENT-LVL V: CPT | Mod: PBBFAC,,, | Performed by: INTERNAL MEDICINE

## 2024-09-24 NOTE — PROGRESS NOTES
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 Pregnancy, unspecified gestational age        Chief Complaint: Sickle Cell Anemia    Subjective:      History of Present Illness: Ms. Chou is a 29 y.o. female who presents for evaluation and management of SCD.  Ms Chou is a very pleasant 28yo  currently pregnant 18W0D with significant h/o HgbSS dz, history of painful crises--denies history of stroke or ACS. She has been on hydroxyurea and notes that she is still on it currently. She states that as an adult she "barely has pain episodes--" once every month or two, whether on hydrea or not. When she was a kid she had pain episodes 3-4/month. She started taking hydrea around 2019. So far this pregnancy she has already required 5 units of PRBC. She notes some nosebleeds but no other bleeding issues such as melena, hematochezia, hematuria, vaginal bleeding. Both of her parents have sickle cell trait as do two of her sisters and she is the only one affected by SCD.       Past medical, surgical, family, and social histories have been reviewed and updated below.    Past Medical History:   Past Medical History:   Diagnosis Date    Chronic kidney disease (CKD) 2018    Emesis 2022    Encounter for surveillance of vaginal ring hormonal contraceptive device 11/15/2023    LGSIL on Pap smear of cervix 2017    LLQ abdominal pain 2023    Sickle cell anemia     Sickle cell disease     Sickle-cell disease without crisis     UTI (urinary tract infection) 2023       Past Surgical History:   Past Surgical History:   Procedure Laterality Date    BREAST LUMPECTOMY      CARDIAC SURGERY      port insertion    NEPHRECTOMY         Family History:   Family History   Problem Relation Name Age of Onset    Sickle cell trait Father      Sickle cell trait Mother      Sickle cell trait Sister      Sickle cell trait Sister      Breast cancer Neg Hx   " "   Colon cancer Neg Hx      Ovarian cancer Neg Hx         Social History:  reports that she quit smoking about 2 months ago. Her smoking use included vaping with nicotine. She started smoking about 2 years ago. She has never used smokeless tobacco. She reports that she does not currently use alcohol. She reports that she does not use drugs.    Allergies:  Review of patient's allergies indicates:   Allergen Reactions    Rocephin [ceftriaxone] Shortness Of Breath, Itching and Anxiety     Nausea, vomiting. No hives, swelling, or anaphylaxis. Can tolerate if necessary, but would avoid if possible.       Medications:  Current Outpatient Medications   Medication Sig Dispense Refill    acetaminophen (TYLENOL) 500 MG tablet Take 1 tablet (500 mg total) by mouth every 6 (six) hours as needed. 20 tablet 0    aspirin 81 mg Cap Take 81 mg by mouth.      cholecalciferol, vitamin D3, 1,250 mcg (50,000 unit) capsule Take 1 capsule (50,000 Units total) by mouth every 7 days. 12 capsule 3    folic acid (FOLVITE) 1 MG tablet Take 1 tablet (1 mg total) by mouth once daily. 90 tablet 3    hydroxyurea (HYDREA) 500 mg Cap Take 3 capsules (1,500 mg total) by mouth once daily. 90 capsule 6    ondansetron (ZOFRAN) 4 MG tablet Take 1 tablet (4 mg total) by mouth every 6 (six) hours. 12 tablet 0    triamcinolone acetonide 0.1% (KENALOG) 0.1 % cream Apply topically to affected area 2 times per day for 7 to 10 days 15 g 1    iron,carbon,gluc-FA-B12-C-dss (FERRALET 90 DUAL-IRON DELIVERY) 90-1-12-50 mg-mg-mcg-mg Tab Take 1 tablet by mouth once daily. 30 tablet 6     No current facility-administered medications for this visit.       Review of Systems    ECOG Performance Status:   ECOG SCORE             Objective:      Vitals:   Vitals:    09/24/24 1114   BP: (!) 103/49   BP Location: Right arm   Patient Position: Sitting   BP Method: Small (Automatic)   Pulse: 77   Resp: 15   SpO2: 99%   Weight: 49.3 kg (108 lb 11 oz)   Height: 5' 1" (1.549 m) "     BMI: Body mass index is 20.54 kg/m².    Physical Exam  Vitals and nursing note reviewed.   Constitutional:       General: She is not in acute distress.     Appearance: Normal appearance. She is not toxic-appearing.   HENT:      Head: Normocephalic and atraumatic.   Eyes:      General: No scleral icterus.     Conjunctiva/sclera: Conjunctivae normal.   Pulmonary:      Effort: Pulmonary effort is normal. No respiratory distress.   Abdominal:      General: There is no distension.   Musculoskeletal:         General: No swelling or deformity.      Cervical back: Neck supple.   Skin:     Coloration: Skin is not jaundiced.      Findings: No erythema.   Neurological:      General: No focal deficit present.      Mental Status: She is alert and oriented to person, place, and time.      Motor: No weakness.   Psychiatric:         Mood and Affect: Mood normal.         Behavior: Behavior normal.         Laboratory Data:  Results    Collected Updated Procedure    09/17/2024 1030 09/17/2024 1045 CBC Auto Differential [[id number]]    (Abnormal)   Blood    Component Value Units   WBC 14.64 High  K/uL   RBC 2.91 Low  M/uL   Hemoglobin 8.5 Low  g/dL   Hematocrit 27.5 Low  %   MCV 95 fL   MCH 29.2 pg   MCHC 30.9 Low  g/dL   RDW 17.0 High  %   Platelets 364 K/uL   MPV 9.9 fL   Immature Granulocytes 0.4 %   Gran # (ANC) 10.9 High  K/uL   Immature Grans (Abs) 0.06 High   K/uL   Lymph # 2.1 K/uL   Mono # 1.4 High  K/uL   Eos # 0.1 K/uL   Baso # 0.06 K/uL   nRBC 0 /100 WBC   Gran % 74.6 High  %   Lymph % 14.1 Low  %   Mono % 9.7 %   Eosinophil % 0.8 %   Basophil % 0.4 %   Differential Method Automated           09/09/2024 1142 09/10/2024 0820 HEMOGLOBIN S QUANTITATION BY ELECTROPHORESIS [[id number]]    Blood    Component Value Units   Hgb S Quant 24.0 %   Hemoglobin Electrophoresis Interp See comment            09/04/2024 1358 09/04/2024 1422 CBC auto differential [[id number]]    (Abnormal)   Blood    Component Value Units   WBC 18.97  High  K/uL   RBC 1.80 Low  M/uL   Hemoglobin 5.3 Low Panic   g/dL   Hematocrit 15.4 Low Panic   %   MCV 86 fL   MCH 29.4 pg   MCHC 34.4 g/dL   RDW 17.5 High  %   Platelets 472 High  K/uL   MPV 9.2 fL   Immature Granulocytes 0.7 High  %   Gran # (ANC) 15.5 High  K/uL   Immature Grans (Abs) 0.14 High   K/uL   Lymph # 1.5 K/uL   Mono # 1.7 High  K/uL   Eos # 0.1 K/uL   Baso # 0.05 K/uL   nRBC 0 /100 WBC   Gran % 81.8 High  %   Lymph % 8.0 Low  %   Mono % 8.9 %   Eosinophil % 0.3 %   Basophil % 0.3 %   Differential Method Automated           08/26/2024 1056 08/27/2024 1043 Hemoglobin Electrophoresis,Hgb A2 [place]. [[id number]]    Blood    Component Value Units   Hgb A2 Quant 2.7 %   Hemoglobin Bands Hb A , Hb S , Hb F , Hb A2    Hemoglobin Electrophoresis Interp See comment            08/26/2024 1056 08/26/2024 1110 CBC Auto Differential [[id number]]    (Abnormal)   Blood    Component Value Units   WBC 17.33 High  K/uL   RBC 2.49 Low  M/uL   Hemoglobin 7.6 Low  g/dL   Hematocrit 22.0 Low  %   MCV 88 fL   MCH 30.5 pg   MCHC 34.5 g/dL   RDW 17.4 High  %   Platelets 341 K/uL   MPV 9.1 Low  fL   Immature Granulocytes 0.7 High  %   Gran # (ANC) 13.5 High  K/uL   Immature Grans (Abs) 0.12 High   K/uL   Lymph # 1.4 K/uL   Mono # 2.2 High  K/uL   Eos # 0.1 K/uL   Baso # 0.05 K/uL   nRBC 0 /100 WBC   Gran % 77.8 High  %   Lymph % 8.0 Low  %   Mono % 12.8 %   Eosinophil % 0.4 %   Basophil % 0.3 %   Differential Method Automated           08/22/2024 1200 08/26/2024 1527 Hemoglobin Electrophoresis [place], Blood [[id number]]   (Abnormal)   Blood    Component Value Units   Hb Electrophoresis Interpretation SEE BELOW     Hb Electrophoresis Interp Cancel Test Not Performed    HPLC Hb Variant See Interpretation     Hemoglobin A 0.0 Low  %   Fetal Hemoglobin 8.6 High  %   Hgb A2 Quant 3.0 %   Hemoglobin Variant 1 88.4 Hb S Abnormal   %   Hemoglobin Variant 2 Test Not Performed    Hemoglobin Variant 3 Test Not Performed    HgB Variant, A2  and F Interpretation Test Not Performed           08/22/2024 1200 08/22/2024 1346 CBC Auto Differential [[id number]]    (Abnormal)   Blood    Component Value Units   WBC 19.55 High  K/uL   RBC 1.70 Low  M/uL   Hemoglobin 5.3 Low Panic   g/dL   Hematocrit 14.5 Low Panic   %   MCV 85 fL   MCH 31.2 High  pg   MCHC 36.6 High  g/dL   RDW 18.2 High  %   Platelets 353 K/uL   MPV 9.3 fL   Immature Granulocytes 0.6 High  %   Gran # (ANC) 13.9 High  K/uL   Immature Grans (Abs) 0.12 High   K/uL   Lymph # 2.5 K/uL   Mono # 2.9 High  K/uL   Eos # 0.1 K/uL   Baso # 0.06 K/uL   nRBC 1 Abnormal  /100 WBC   Gran % 71.1 %   Lymph % 12.9 Low  %   Mono % 14.6 %   Eosinophil % 0.5 %   Basophil % 0.3 %   Platelet Estimate Appears normal    Aniso Slight    Poik Slight    Poly Occasional    Hypo Occasional    Ovalocytes Occasional    Target Cells Occasional    Stomatocytes Present    Sickle Cells Moderate Abnormal     Differential Method Automated                 Assessment/Plan:       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 Pregnancy, unspecified gestational age      28yo woman with HgbSS dz, on chronic hydroxyure (and folic acid) therapy. Currently 18w0d pregnant. She has required 5 units of blood alredy this admission an remains anemia--expect she likley will need more. Discussed risks/benefit of hydrea during pregnancy--advised that she should stop that while pregnant. She has already required 5 units of PRBC this pregnancy and will remain at risk for needing additional transfusions for remainder of pregnancy. Advised she be stated on exchange transfusions to keep hemoglobin S fraction as low a possible as practically can be done.         Orders Placed This Encounter    Ambulatory referral/consult to Blood Bank    Ambulatory referral/consult to Interventional RAD    Ambulatory referral/consult to Apheresis          Reji Reyes MD, FACP  Hematology/Oncology  [facility] - [address]  [address]  [place] [address]  Phone: [phone]  Fax: [phone]

## 2024-09-25 ENCOUNTER — PATIENT MESSAGE (OUTPATIENT)
Dept: HEMATOLOGY/ONCOLOGY | Facility: CLINIC | Age: 29
End: 2024-09-25
Payer: COMMERCIAL

## 2024-09-25 PROBLEM — D57.1 SICKLE CELL DISEASE WITHOUT CRISIS: Status: ACTIVE | Noted: 2024-09-25

## 2024-09-25 RX ORDER — HYDROCODONE BITARTRATE AND ACETAMINOPHEN 500; 5 MG/1; MG/1
TABLET ORAL ONCE
OUTPATIENT
Start: 2024-09-25 | End: 2024-09-25

## 2024-09-25 RX ORDER — ACETAMINOPHEN 325 MG/1
650 TABLET ORAL
OUTPATIENT
Start: 2024-09-25

## 2024-09-25 RX ORDER — DIPHENHYDRAMINE HCL 25 MG
25 CAPSULE ORAL
OUTPATIENT
Start: 2024-09-25

## 2024-09-27 ENCOUNTER — PATIENT MESSAGE (OUTPATIENT)
Dept: MATERNAL FETAL MEDICINE | Facility: CLINIC | Age: 29
End: 2024-09-27
Payer: COMMERCIAL

## 2024-09-27 LAB — CFTR MUT ANL BLD/T: NORMAL

## 2024-09-28 ENCOUNTER — HOSPITAL ENCOUNTER (INPATIENT)
Facility: HOSPITAL | Age: 29
LOS: 2 days | Discharge: HOME | DRG: 832 | End: 2024-09-30
Attending: EMERGENCY MEDICINE | Admitting: OBSTETRICS & GYNECOLOGY
Payer: COMMERCIAL

## 2024-09-28 DIAGNOSIS — O23.02 PYELONEPHRITIS AFFECTING PREGNANCY IN SECOND TRIMESTER: ICD-10-CM

## 2024-09-28 DIAGNOSIS — N30.90 CYSTITIS: ICD-10-CM

## 2024-09-28 DIAGNOSIS — D57.1 HEMOGLOBIN SS DISEASE WITHOUT CRISIS: ICD-10-CM

## 2024-09-28 DIAGNOSIS — R50.9 FEVER, UNSPECIFIED FEVER CAUSE: ICD-10-CM

## 2024-09-28 DIAGNOSIS — Z3A.18 PREGNANCY WITH 18 COMPLETED WEEKS GESTATION: Primary | ICD-10-CM

## 2024-09-28 DIAGNOSIS — R00.0 SINUS TACHYCARDIA: ICD-10-CM

## 2024-09-28 DIAGNOSIS — D57.00 SICKLE CELL DISEASE WITH CRISIS: ICD-10-CM

## 2024-09-28 LAB
ABO + RH BLD: NORMAL
ALBUMIN SERPL BCP-MCNC: 3.2 G/DL (ref 3.5–5.2)
ALLENS TEST: NORMAL
ALP SERPL-CCNC: 94 U/L (ref 55–135)
ALT SERPL W/O P-5'-P-CCNC: 12 U/L (ref 10–44)
ANION GAP SERPL CALC-SCNC: 12 MMOL/L (ref 8–16)
AST SERPL-CCNC: 27 U/L (ref 10–40)
BACTERIA #/AREA URNS HPF: ABNORMAL /HPF
BASOPHILS # BLD AUTO: 0.04 K/UL (ref 0–0.2)
BASOPHILS NFR BLD: 0.2 % (ref 0–1.9)
BILIRUB SERPL-MCNC: 1.8 MG/DL (ref 0.1–1)
BILIRUB UR QL STRIP: NEGATIVE
BLD GP AB SCN CELLS X3 SERPL QL: NORMAL
BUN SERPL-MCNC: 9 MG/DL (ref 6–20)
CALCIUM SERPL-MCNC: 9.6 MG/DL (ref 8.7–10.5)
CHLORIDE SERPL-SCNC: 102 MMOL/L (ref 95–110)
CLARITY UR: ABNORMAL
CO2 SERPL-SCNC: 19 MMOL/L (ref 23–29)
COLOR UR: YELLOW
CREAT SERPL-MCNC: 1.1 MG/DL (ref 0.5–1.4)
DIFFERENTIAL METHOD BLD: ABNORMAL
EOSINOPHIL # BLD AUTO: 0.1 K/UL (ref 0–0.5)
EOSINOPHIL NFR BLD: 0.5 % (ref 0–8)
ERYTHROCYTE [DISTWIDTH] IN BLOOD BY AUTOMATED COUNT: 16 % (ref 11.5–14.5)
EST. GFR  (NO RACE VARIABLE): >60 ML/MIN/1.73 M^2
GLUCOSE SERPL-MCNC: 86 MG/DL (ref 70–110)
GLUCOSE UR QL STRIP: NEGATIVE
HCT VFR BLD AUTO: 22.1 % (ref 37–48.5)
HGB BLD-MCNC: 7.2 G/DL (ref 12–16)
HGB UR QL STRIP: ABNORMAL
IMM GRANULOCYTES # BLD AUTO: 0.15 K/UL (ref 0–0.04)
IMM GRANULOCYTES NFR BLD AUTO: 0.8 % (ref 0–0.5)
KETONES UR QL STRIP: NEGATIVE
LDH SERPL L TO P-CCNC: 1.44 MMOL/L (ref 0.5–2.2)
LEUKOCYTE ESTERASE UR QL STRIP: ABNORMAL
LYMPHOCYTES # BLD AUTO: 1.7 K/UL (ref 1–4.8)
LYMPHOCYTES NFR BLD: 8.3 % (ref 18–48)
MCH RBC QN AUTO: 29.5 PG (ref 27–31)
MCHC RBC AUTO-ENTMCNC: 32.6 G/DL (ref 32–36)
MCV RBC AUTO: 91 FL (ref 82–98)
MICROSCOPIC COMMENT: ABNORMAL
MONOCYTES # BLD AUTO: 2.2 K/UL (ref 0.3–1)
MONOCYTES NFR BLD: 11 % (ref 4–15)
NEUTROPHILS # BLD AUTO: 15.7 K/UL (ref 1.8–7.7)
NEUTROPHILS NFR BLD: 79.2 % (ref 38–73)
NITRITE UR QL STRIP: NEGATIVE
NRBC BLD-RTO: 0 /100 WBC
PH UR STRIP: 7 [PH] (ref 5–8)
PLATELET # BLD AUTO: 345 K/UL (ref 150–450)
PMV BLD AUTO: 9.7 FL (ref 9.2–12.9)
POTASSIUM SERPL-SCNC: 3 MMOL/L (ref 3.5–5.1)
PROCALCITONIN SERPL IA-MCNC: 0.2 NG/ML
PROT SERPL-MCNC: 8.3 G/DL (ref 6–8.4)
PROT UR QL STRIP: ABNORMAL
RBC # BLD AUTO: 2.44 M/UL (ref 4–5.4)
RBC #/AREA URNS HPF: 49 /HPF (ref 0–4)
RETICS/RBC NFR AUTO: 4.4 % (ref 0.5–2.5)
SAMPLE: NORMAL
SITE: NORMAL
SODIUM SERPL-SCNC: 133 MMOL/L (ref 136–145)
SP GR UR STRIP: <1.005 (ref 1–1.03)
SQUAMOUS #/AREA URNS HPF: 2 /HPF
URN SPEC COLLECT METH UR: ABNORMAL
UROBILINOGEN UR STRIP-ACNC: NEGATIVE EU/DL
WBC # BLD AUTO: 19.84 K/UL (ref 3.9–12.7)
WBC #/AREA URNS HPF: >100 /HPF (ref 0–5)
WBC CLUMPS URNS QL MICRO: ABNORMAL

## 2024-09-28 PROCEDURE — 83605 ASSAY OF LACTIC ACID: CPT

## 2024-09-28 PROCEDURE — 86999 UNLISTED TRANSFUSION MED PX: CPT | Performed by: EMERGENCY MEDICINE

## 2024-09-28 PROCEDURE — 87186 SC STD MICRODIL/AGAR DIL: CPT | Mod: 59 | Performed by: EMERGENCY MEDICINE

## 2024-09-28 PROCEDURE — 87186 SC STD MICRODIL/AGAR DIL: CPT

## 2024-09-28 PROCEDURE — 96374 THER/PROPH/DIAG INJ IV PUSH: CPT | Mod: 59

## 2024-09-28 PROCEDURE — 25000003 PHARM REV CODE 250: Performed by: EMERGENCY MEDICINE

## 2024-09-28 PROCEDURE — 85045 AUTOMATED RETICULOCYTE COUNT: CPT

## 2024-09-28 PROCEDURE — 96375 TX/PRO/DX INJ NEW DRUG ADDON: CPT

## 2024-09-28 PROCEDURE — 63600175 PHARM REV CODE 636 W HCPCS: Performed by: EMERGENCY MEDICINE

## 2024-09-28 PROCEDURE — 96365 THER/PROPH/DIAG IV INF INIT: CPT

## 2024-09-28 PROCEDURE — 20000000 HC ICU ROOM

## 2024-09-28 PROCEDURE — 63600175 PHARM REV CODE 636 W HCPCS: Performed by: OBSTETRICS & GYNECOLOGY

## 2024-09-28 PROCEDURE — 81000 URINALYSIS NONAUTO W/SCOPE: CPT

## 2024-09-28 PROCEDURE — 86900 BLOOD TYPING SEROLOGIC ABO: CPT | Performed by: EMERGENCY MEDICINE

## 2024-09-28 PROCEDURE — 99285 EMERGENCY DEPT VISIT HI MDM: CPT | Mod: 25

## 2024-09-28 PROCEDURE — 96361 HYDRATE IV INFUSION ADD-ON: CPT | Mod: 59

## 2024-09-28 PROCEDURE — 84145 PROCALCITONIN (PCT): CPT | Performed by: EMERGENCY MEDICINE

## 2024-09-28 PROCEDURE — 86850 RBC ANTIBODY SCREEN: CPT | Performed by: EMERGENCY MEDICINE

## 2024-09-28 PROCEDURE — 96366 THER/PROPH/DIAG IV INF ADDON: CPT

## 2024-09-28 PROCEDURE — 86920 COMPATIBILITY TEST SPIN: CPT | Performed by: OBSTETRICS & GYNECOLOGY

## 2024-09-28 PROCEDURE — 87086 URINE CULTURE/COLONY COUNT: CPT

## 2024-09-28 PROCEDURE — 87088 URINE BACTERIA CULTURE: CPT

## 2024-09-28 PROCEDURE — 86901 BLOOD TYPING SEROLOGIC RH(D): CPT | Performed by: EMERGENCY MEDICINE

## 2024-09-28 PROCEDURE — 87040 BLOOD CULTURE FOR BACTERIA: CPT | Mod: 59 | Performed by: EMERGENCY MEDICINE

## 2024-09-28 PROCEDURE — 80053 COMPREHEN METABOLIC PANEL: CPT

## 2024-09-28 PROCEDURE — 85025 COMPLETE CBC W/AUTO DIFF WBC: CPT

## 2024-09-28 PROCEDURE — 99900035 HC TECH TIME PER 15 MIN (STAT)

## 2024-09-28 PROCEDURE — 87154 CUL TYP ID BLD PTHGN 6+ TRGT: CPT | Performed by: EMERGENCY MEDICINE

## 2024-09-28 PROCEDURE — 85660 RBC SICKLE CELL TEST: CPT | Performed by: EMERGENCY MEDICINE

## 2024-09-28 RX ORDER — HYDROMORPHONE HYDROCHLORIDE 1 MG/ML
1 INJECTION, SOLUTION INTRAMUSCULAR; INTRAVENOUS; SUBCUTANEOUS
Status: COMPLETED | OUTPATIENT
Start: 2024-09-28 | End: 2024-09-28

## 2024-09-28 RX ORDER — OXYCODONE AND ACETAMINOPHEN 5; 325 MG/1; MG/1
1 TABLET ORAL EVERY 6 HOURS PRN
Qty: 20 TABLET | Refills: 0 | Status: SHIPPED | OUTPATIENT
Start: 2024-09-28 | End: 2024-09-28

## 2024-09-28 RX ORDER — NITROFURANTOIN 25; 75 MG/1; MG/1
100 CAPSULE ORAL 2 TIMES DAILY
Qty: 14 CAPSULE | Refills: 0 | Status: SHIPPED | OUTPATIENT
Start: 2024-09-28 | End: 2024-09-28

## 2024-09-28 RX ORDER — ENOXAPARIN SODIUM 100 MG/ML
40 INJECTION SUBCUTANEOUS EVERY 24 HOURS
Status: DISCONTINUED | OUTPATIENT
Start: 2024-09-28 | End: 2024-09-30 | Stop reason: HOSPADM

## 2024-09-28 RX ORDER — DIPHENHYDRAMINE HYDROCHLORIDE 50 MG/ML
50 INJECTION INTRAMUSCULAR; INTRAVENOUS
Status: COMPLETED | OUTPATIENT
Start: 2024-09-28 | End: 2024-09-28

## 2024-09-28 RX ORDER — PROCHLORPERAZINE EDISYLATE 5 MG/ML
10 INJECTION INTRAMUSCULAR; INTRAVENOUS
Status: COMPLETED | OUTPATIENT
Start: 2024-09-28 | End: 2024-09-28

## 2024-09-28 RX ORDER — MORPHINE SULFATE 4 MG/ML
2 INJECTION, SOLUTION INTRAMUSCULAR; INTRAVENOUS
Status: COMPLETED | OUTPATIENT
Start: 2024-09-28 | End: 2024-09-28

## 2024-09-28 RX ORDER — DEXTROSE, SODIUM CHLORIDE, SODIUM LACTATE, POTASSIUM CHLORIDE, AND CALCIUM CHLORIDE 5; .6; .31; .03; .02 G/100ML; G/100ML; G/100ML; G/100ML; G/100ML
INJECTION, SOLUTION INTRAVENOUS
Status: ACTIVE | OUTPATIENT
Start: 2024-09-28 | End: 2024-09-29

## 2024-09-28 RX ORDER — ACETAMINOPHEN 500 MG
1000 TABLET ORAL
Status: COMPLETED | OUTPATIENT
Start: 2024-09-28 | End: 2024-09-28

## 2024-09-28 RX ORDER — OXYCODONE AND ACETAMINOPHEN 5; 325 MG/1; MG/1
1 TABLET ORAL EVERY 6 HOURS PRN
Qty: 20 TABLET | Refills: 0 | Status: SHIPPED | OUTPATIENT
Start: 2024-09-28 | End: 2024-09-30

## 2024-09-28 RX ORDER — NITROFURANTOIN 25; 75 MG/1; MG/1
100 CAPSULE ORAL 2 TIMES DAILY
Qty: 14 CAPSULE | Refills: 0 | Status: SHIPPED | OUTPATIENT
Start: 2024-09-28 | End: 2024-09-30

## 2024-09-28 RX ORDER — ACETAMINOPHEN 325 MG/1
650 TABLET ORAL EVERY 6 HOURS PRN
Status: DISCONTINUED | OUTPATIENT
Start: 2024-09-28 | End: 2024-09-30 | Stop reason: HOSPADM

## 2024-09-28 RX ORDER — HYDROCODONE BITARTRATE AND ACETAMINOPHEN 500; 5 MG/1; MG/1
TABLET ORAL
Status: DISCONTINUED | OUTPATIENT
Start: 2024-09-28 | End: 2024-09-30 | Stop reason: HOSPADM

## 2024-09-28 RX ADMIN — MORPHINE SULFATE 2 MG: 4 INJECTION, SOLUTION INTRAMUSCULAR; INTRAVENOUS at 02:09

## 2024-09-28 RX ADMIN — VANCOMYCIN HYDROCHLORIDE 1000 MG: 1 INJECTION, POWDER, LYOPHILIZED, FOR SOLUTION INTRAVENOUS at 05:09

## 2024-09-28 RX ADMIN — ACETAMINOPHEN 1000 MG: 500 TABLET ORAL at 05:09

## 2024-09-28 RX ADMIN — SODIUM CHLORIDE, POTASSIUM CHLORIDE, SODIUM LACTATE AND CALCIUM CHLORIDE 1000 ML: 600; 310; 30; 20 INJECTION, SOLUTION INTRAVENOUS at 07:09

## 2024-09-28 RX ADMIN — SODIUM CHLORIDE 2000 ML: 9 INJECTION, SOLUTION INTRAVENOUS at 08:09

## 2024-09-28 RX ADMIN — HYDROMORPHONE HYDROCHLORIDE 1 MG: 1 INJECTION, SOLUTION INTRAMUSCULAR; INTRAVENOUS; SUBCUTANEOUS at 08:09

## 2024-09-28 RX ADMIN — SODIUM CHLORIDE 1000 ML: 9 INJECTION, SOLUTION INTRAVENOUS at 01:09

## 2024-09-28 RX ADMIN — DIPHENHYDRAMINE HYDROCHLORIDE 50 MG: 50 INJECTION INTRAMUSCULAR; INTRAVENOUS at 08:09

## 2024-09-28 RX ADMIN — ENOXAPARIN SODIUM 40 MG: 40 INJECTION SUBCUTANEOUS at 09:09

## 2024-09-28 RX ADMIN — ERTAPENEM 1 G: 1 INJECTION INTRAMUSCULAR; INTRAVENOUS at 12:09

## 2024-09-28 RX ADMIN — PROCHLORPERAZINE EDISYLATE 10 MG: 5 INJECTION INTRAMUSCULAR; INTRAVENOUS at 08:09

## 2024-09-28 NOTE — ED PROVIDER NOTES
Encounter Date: 9/28/2024    SCRIBE #1 NOTE: I, Nicole Diaz, am scribing for, and in the presence of,  Bertram Somers MD. I have scribed the following portions of the note - Other sections scribed: HPI, ROS.       History     Chief Complaint   Patient presents with    Sickle Cell Pain Crisis     Pt to ER with reports of neck, head, back pain starting last night. Pt also reports feels her blood count is low due to increased weakness, fatigue. Pt denies SOB or CP at this time. Pt currently 18weeks pregnant      HPI: 29 year old female at 18 weeks gestation, with a PMHx of CKD, Sickle cell anemia, presents to the ED for evaluation of sickle cell pain crisis, symptoms onset last night. Reports associated thoracolumbar pain, left shoulder pain, frontal headache, and bilateral flank pain greater on the right than the left when she empties her bladder.  States that last night she felt weak and lightheaded before the onset of her symptoms which she attributes to a low blood count. States in the last one month she's had 5 transfusions. No other alleviating or exacerbating factors. Patient denies fall.  Patient denies melena, hematochezia,  cough, shortness of breath, chest pain, fever, chills, abdominal pain, nausea, vomiting, diarrhea, dysuria,  congestion, sore throat, arm or leg trouble, eye pain, ear pain, rash, or other associated symptoms. She attempted Tylenol as treatment/medication last night without relief. This is the extent of the patient's complaints in the ED.     The history is provided by the patient. No  was used.     Review of patient's allergies indicates:   Allergen Reactions    Rocephin [ceftriaxone] Shortness Of Breath, Itching and Anxiety     Nausea, vomiting. No hives, swelling, or anaphylaxis. Can tolerate if necessary, but would avoid if possible.     Past Medical History:   Diagnosis Date    Chronic kidney disease (CKD) 04/09/2018    Emesis 11/23/2022    Encounter for  surveillance of vaginal ring hormonal contraceptive device 11/15/2023    LGSIL on Pap smear of cervix 2017    LLQ abdominal pain 2023    Sickle cell anemia     Sickle cell disease     Sickle-cell disease without crisis     UTI (urinary tract infection) 2023     Past Surgical History:   Procedure Laterality Date    BREAST LUMPECTOMY      CARDIAC SURGERY      port insertion    NEPHRECTOMY       Family History   Problem Relation Name Age of Onset    Sickle cell trait Father      Sickle cell trait Mother      Sickle cell trait Sister      Sickle cell trait Sister      Breast cancer Neg Hx      Colon cancer Neg Hx      Ovarian cancer Neg Hx       Social History     Tobacco Use    Smoking status: Former     Types: Vaping with nicotine     Start date: 2022     Quit date: 2024     Years since quittin.2    Smokeless tobacco: Never   Substance Use Topics    Alcohol use: Not Currently    Drug use: No     Review of Systems   Constitutional:  Negative for chills, diaphoresis and fever.   HENT:  Negative for ear pain and sore throat.    Eyes:  Negative for pain.   Respiratory:  Negative for cough and shortness of breath.    Cardiovascular:  Negative for chest pain.   Gastrointestinal:  Negative for abdominal pain, blood in stool, diarrhea, nausea and vomiting.        (-) melena    Genitourinary:  Positive for flank pain (bilateral). Negative for dysuria.   Musculoskeletal:  Positive for arthralgias (left shoulder) and back pain (thoracolumbar).        (-) Arm or leg trouble.    Skin:  Negative for rash.   Neurological:  Positive for headaches (frontal).   Psychiatric/Behavioral:  Negative for confusion.        Physical Exam     Initial Vitals [24 0751]   BP Pulse Resp Temp SpO2   (!) 91/55 (!) 130 18 97.6 °F (36.4 °C) 100 %      MAP       --         Physical Exam  The patient was examined specifically for the following:   General:No significant distress, Good color, Warm and dry. Head and  neck:Scalp atraumatic, Neck supple. Neurological:Appropriate conversation, Gross motor deficits. Eyes:Conjugate gaze, Clear corneas. ENT: No epistaxis. Cardiac: Regular rate and rhythm, Grossly normal heart tones. Pulmonary: Wheezing, Rales. Gastrointestinal: Abdominal tenderness, Abdominal distention. Musculoskeletal: Extremity deformity, Apparent pain with range of motion of the joints. Skin: Rash.   The findings on examination were normal except for the following:  The patient's heart rate is 130 the patient's blood pressure is 91/55.  Oxygen saturations are 100%.  Lungs are clear.  The heart tones are normal.  The the patient does seem pale.  She has some tenderness of the thoracolumbar back.  The abdomen is nontender the patient is afebrile.  ED Course   Procedures  Labs Reviewed   URINALYSIS, REFLEX TO URINE CULTURE - Abnormal       Result Value    Specimen UA Urine, Clean Catch      Color, UA Yellow      Appearance, UA Hazy (*)     pH, UA 7.0      Specific Gravity, UA <1.005 (*)     Protein, UA Trace (*)     Glucose, UA Negative      Ketones, UA Negative      Bilirubin (UA) Negative      Occult Blood UA 1+ (*)     Nitrite, UA Negative      Urobilinogen, UA Negative      Leukocytes, UA 3+ (*)     Narrative:     Specimen Source->Urine   CBC W/ AUTO DIFFERENTIAL - Abnormal    WBC 19.84 (*)     RBC 2.44 (*)     Hemoglobin 7.2 (*)     Hematocrit 22.1 (*)     MCV 91      MCH 29.5      MCHC 32.6      RDW 16.0 (*)     Platelets 345      MPV 9.7      Immature Granulocytes 0.8 (*)     Gran # (ANC) 15.7 (*)     Immature Grans (Abs) 0.15 (*)     Lymph # 1.7      Mono # 2.2 (*)     Eos # 0.1      Baso # 0.04      nRBC 0      Gran % 79.2 (*)     Lymph % 8.3 (*)     Mono % 11.0      Eosinophil % 0.5      Basophil % 0.2      Differential Method Automated     COMPREHENSIVE METABOLIC PANEL - Abnormal    Sodium 133 (*)     Potassium 3.0 (*)     Chloride 102      CO2 19 (*)     Glucose 86      BUN 9      Creatinine 1.1       Calcium 9.6      Total Protein 8.3      Albumin 3.2 (*)     Total Bilirubin 1.8 (*)     Alkaline Phosphatase 94      AST 27      ALT 12      eGFR >60      Anion Gap 12     RETICULOCYTES - Abnormal    Retic 4.4 (*)    URINALYSIS MICROSCOPIC - Abnormal    RBC, UA 49 (*)     WBC, UA >100 (*)     WBC Clumps, UA Moderate (*)     Bacteria Many (*)     Squam Epithel, UA 2      Microscopic Comment SEE COMMENT      Narrative:     Specimen Source->Urine   CULTURE, URINE   CULTURE, BLOOD   CULTURE, BLOOD   PROCALCITONIN    Procalcitonin 0.20     LACTIC ACID, PLASMA   TYPE AND SCREEN PREOP    Group & Rh O POS      Indirect Benji NEG     ISTAT LACTATE    POC Lactate 1.44      Sample VENOUS      Site Other      Allens Test N/A            Imaging Results              X-Ray Chest 1 View (Final result)  Result time 09/28/24 13:29:48      Final result by Yessenia Muñiz MD (09/28/24 13:29:48)                   Impression:      1. There are no definitive findings of pneumonia for on this exam however the prominence of the right lower lobe may suggest early pneumonia.  A PA and lateral radiograph would be very helpful to determine if this is a real finding or is related to the patient positioning/technique.      Electronically signed by: Yessenia Muñiz MD  Date:    09/28/2024  Time:    13:29               Narrative:    EXAMINATION:  XR CHEST 1 VIEW    CLINICAL HISTORY:  Fever, unspecified    TECHNIQUE:  Single frontal view of the chest was performed.    COMPARISON:  06/06/2024 02/22/2024, 05/14/2022    FINDINGS:  The lungs are symmetrically inflated with no mass, nodule, pneumothorax, airspace consolidation or pleural effusion.  There is prominence of the pulmonary vasculature particularly the inter lobar vessels on the right.  In addition to this prominence, there appears to be patchy airspace opacification in the right lower lobe.  There is mild prominence of the cardiac silhouette however this may be related to the AP  technique.  The mediastinum, Osseous and soft tissue structures are normal.                                       Medications   sodium chloride 0.9% bolus 2,000 mL 2,000 mL (0 mLs Intravenous Stopped 9/28/24 1447)   HYDROmorphone injection 1 mg (1 mg Intravenous Given 9/28/24 0837)   diphenhydrAMINE injection 50 mg (50 mg Intravenous Given 9/28/24 0838)   prochlorperazine injection Soln 10 mg (10 mg Intravenous Given 9/28/24 0838)   ertapenem (INVANZ) 1 g in 0.9% NaCl 100 mL IVPB (MB+) (0 g Intravenous Stopped 9/28/24 1547)   sodium chloride 0.9% bolus 1,000 mL 1,000 mL (0 mLs Intravenous Stopped 9/28/24 1447)   morphine injection 2 mg (2 mg Intravenous Given 9/28/24 1447)     Medical Decision Making  Amount and/or Complexity of Data Reviewed  Labs: ordered. Decision-making details documented in ED Course.    Risk  Prescription drug management.    Given the above this patient presents with a history of sickle cell disease with some vague pain in her back.  She was treated with IV narcotics and fluids in his pain resolved.  She was discovered to have blood and white cells in her urine it seems likely that she has a urinary tract infection.  This is complicating a pregnancy at 18 weeks.  The case was reviewed in detail with Dr. Giles, recommended treatment with Macrobid.  She asked for fetal heart tones.  The patient was treated with a g of ertapenem in the emergency room.  I will discharge on Macrobid as directed.  There was 1 temperature reading of 100.4 otherwise the patient has been completely afebrile.  She has had a tachycardia.  At rest before discharge her heart rate is 110.  The patient would prefer to be discharged.  I will discharge her.  I will advise lots of liquids I will treat her for pain.  I will have her return if she gets worse or if new problems develop.   Just before discharge the patient develop a temperature of a 102.6°.  The patient will be admitted to OBGYN.        Scribe Attestation:   Cedrick  #1: I performed the above scribed service and the documentation accurately describes the services I performed. I attest to the accuracy of the note.                               Clinical Impression:  Final diagnoses:  [Z3A.18] Pregnancy with 18 completed weeks gestation (Primary)  [N30.90] Cystitis  [D57.00] Sickle cell disease with crisis  [R00.0] Sinus tachycardia          ED Disposition Condition    Discharge Stable          Please note that the documentation on this chart was provided by the scribe above on the date of service noted above, and that the documentation in the chart accurately reflects the work and decisions made by me alone.  Signed, Dr. Somers  ED Prescriptions       Medication Sig Dispense Start Date End Date Auth. Provider    nitrofurantoin, macrocrystal-monohydrate, (MACROBID) 100 MG capsule  (Status: Discontinued) Take 1 capsule (100 mg total) by mouth 2 (two) times daily. for 10 days 14 capsule 9/28/2024 9/28/2024 Bertram Somers MD    oxyCODONE-acetaminophen (PERCOCET) 5-325 mg per tablet  (Status: Discontinued) Take 1 tablet by mouth every 6 (six) hours as needed. 20 tablet 9/28/2024 9/28/2024 Bertram Somers MD    nitrofurantoin, macrocrystal-monohydrate, (MACROBID) 100 MG capsule Take 1 capsule (100 mg total) by mouth 2 (two) times daily. for 10 days 14 capsule 9/28/2024 10/8/2024 Bertram Somers MD    oxyCODONE-acetaminophen (PERCOCET) 5-325 mg per tablet Take 1 tablet by mouth every 6 (six) hours as needed for Pain. 20 tablet 9/28/2024 -- Bertram Somers MD          Follow-up Information       Follow up With Specialties Details Why Contact Info    Tyson Landers MD Obstetrics and Gynecology, Obstetrics and Gynecology In 2 days Follow up on Monday. [address]  [address]  [facility] [address]  [phone]               Bertram Somers MD  09/28/24 [id number]       Bertram Somers MD  09/28/24 7260       Bertram Somers MD  09/28/24 6500

## 2024-09-28 NOTE — ED TRIAGE NOTES
Pt reports sickle cell pain starting about 2200 last night.  Pt took tylenol without relief.  Pt is currently pregnant.   Pt in legs, hips, left shoulder and neck

## 2024-09-28 NOTE — DISCHARGE INSTRUCTIONS
Please return immediately if you get worse or if new problems develop, especially fever or worsening back pain..  Please follow-up with your OBGYN doctor this week.  Rest.  Percocet for pain.  Macrobid as directed.  Lots of liquids.  Rest.

## 2024-09-29 PROBLEM — O23.02 PYELONEPHRITIS AFFECTING PREGNANCY IN SECOND TRIMESTER: Status: ACTIVE | Noted: 2024-09-29

## 2024-09-29 LAB
ACINETOBACTER CALCOACETICUS/BAUMANNII COMPLEX: NOT DETECTED
ALBUMIN SERPL BCP-MCNC: 2.3 G/DL (ref 3.5–5.2)
ALP SERPL-CCNC: 88 U/L (ref 55–135)
ALT SERPL W/O P-5'-P-CCNC: 11 U/L (ref 10–44)
ANION GAP SERPL CALC-SCNC: 10 MMOL/L (ref 8–16)
AST SERPL-CCNC: 23 U/L (ref 10–40)
BACTEROIDES FRAGILIS: NOT DETECTED
BASOPHILS # BLD AUTO: 0.05 K/UL (ref 0–0.2)
BASOPHILS NFR BLD: 0.2 % (ref 0–1.9)
BILIRUB SERPL-MCNC: 1.7 MG/DL (ref 0.1–1)
BLD PROD TYP BPU: NORMAL
BLD PROD TYP BPU: NORMAL
BLOOD UNIT EXPIRATION DATE: NORMAL
BLOOD UNIT EXPIRATION DATE: NORMAL
BLOOD UNIT TYPE CODE: 5100
BLOOD UNIT TYPE CODE: 5100
BLOOD UNIT TYPE: NORMAL
BLOOD UNIT TYPE: NORMAL
BUN SERPL-MCNC: 6 MG/DL (ref 6–20)
CALCIUM SERPL-MCNC: 8.2 MG/DL (ref 8.7–10.5)
CANDIDA ALBICANS: NOT DETECTED
CANDIDA AURIS: NOT DETECTED
CANDIDA GLABRATA: NOT DETECTED
CANDIDA KRUSEI: NOT DETECTED
CANDIDA PARAPSILOSIS: NOT DETECTED
CANDIDA TROPICALIS: NOT DETECTED
CHLORIDE SERPL-SCNC: 113 MMOL/L (ref 95–110)
CO2 SERPL-SCNC: 14 MMOL/L (ref 23–29)
CODING SYSTEM: NORMAL
CODING SYSTEM: NORMAL
CREAT SERPL-MCNC: 1 MG/DL (ref 0.5–1.4)
CROSSMATCH INTERPRETATION: NORMAL
CROSSMATCH INTERPRETATION: NORMAL
CRYPTOCOCCUS NEOFORMANS/GATTII: NOT DETECTED
CTX-M GENE (ESBL PRODUCER): DETECTED
DIFFERENTIAL METHOD BLD: ABNORMAL
DISPENSE STATUS: NORMAL
DISPENSE STATUS: NORMAL
ENTEROBACTER CLOACAE COMPLEX: NOT DETECTED
ENTEROBACTERALES: ABNORMAL
ENTEROCOCCUS FAECALIS: NOT DETECTED
ENTEROCOCCUS FAECIUM: NOT DETECTED
EOSINOPHIL # BLD AUTO: 0.1 K/UL (ref 0–0.5)
EOSINOPHIL NFR BLD: 0.4 % (ref 0–8)
ERYTHROCYTE [DISTWIDTH] IN BLOOD BY AUTOMATED COUNT: 16.8 % (ref 11.5–14.5)
ESCHERICHIA COLI: DETECTED
EST. GFR  (NO RACE VARIABLE): >60 ML/MIN/1.73 M^2
GLUCOSE SERPL-MCNC: 106 MG/DL (ref 70–110)
HAEMOPHILUS INFLUENZAE: NOT DETECTED
HCT VFR BLD AUTO: 18.5 % (ref 37–48.5)
HGB BLD-MCNC: 5.9 G/DL (ref 12–16)
IMM GRANULOCYTES # BLD AUTO: 0.19 K/UL (ref 0–0.04)
IMM GRANULOCYTES NFR BLD AUTO: 0.8 % (ref 0–0.5)
IMP GENE (CARBAPENEM RESISTANT): NOT DETECTED
KLEBSIELLA AEROGENES: NOT DETECTED
KLEBSIELLA OXYTOCA: NOT DETECTED
KLEBSIELLA PNEUMONIAE GROUP: NOT DETECTED
KPC RESISTANCE GENE (CARBAPENEM): NOT DETECTED
LISTERIA MONOCYTOGENES: NOT DETECTED
LYMPHOCYTES # BLD AUTO: 1.2 K/UL (ref 1–4.8)
LYMPHOCYTES NFR BLD: 5.1 % (ref 18–48)
MAGNESIUM SERPL-MCNC: 1.5 MG/DL (ref 1.6–2.6)
MCH RBC QN AUTO: 29.2 PG (ref 27–31)
MCHC RBC AUTO-ENTMCNC: 31.9 G/DL (ref 32–36)
MCR-1: NOT DETECTED
MCV RBC AUTO: 92 FL (ref 82–98)
MEC A/C AND MREJ (MRSA): ABNORMAL
MEC A/C: ABNORMAL
MONOCYTES # BLD AUTO: 2.4 K/UL (ref 0.3–1)
MONOCYTES NFR BLD: 10.2 % (ref 4–15)
NDM GENE (CARBAPENEM RESISTANT): NOT DETECTED
NEISSERIA MENINGITIDIS: NOT DETECTED
NEUTROPHILS # BLD AUTO: 19.8 K/UL (ref 1.8–7.7)
NEUTROPHILS NFR BLD: 83.3 % (ref 38–73)
NRBC BLD-RTO: 0 /100 WBC
NUM UNITS TRANS PACKED RBC: NORMAL
NUM UNITS TRANS PACKED RBC: NORMAL
OXA-48-LIKE (CARBAPENEM RESISTANT): NOT DETECTED
PLATELET # BLD AUTO: 251 K/UL (ref 150–450)
PMV BLD AUTO: 9.8 FL (ref 9.2–12.9)
POTASSIUM SERPL-SCNC: 3 MMOL/L (ref 3.5–5.1)
PROT SERPL-MCNC: 6.2 G/DL (ref 6–8.4)
PROTEUS SPECIES: NOT DETECTED
PSEUDOMONAS AERUGINOSA: NOT DETECTED
RBC # BLD AUTO: 2.02 M/UL (ref 4–5.4)
SALMONELLA SP: NOT DETECTED
SERRATIA MARCESCENS: NOT DETECTED
SODIUM SERPL-SCNC: 137 MMOL/L (ref 136–145)
STAPHYLOCOCCUS AUREUS: NOT DETECTED
STAPHYLOCOCCUS EPIDERMIDIS: NOT DETECTED
STAPHYLOCOCCUS LUGDUNESIS: NOT DETECTED
STAPHYLOCOCCUS SPECIES: NOT DETECTED
STENOTROPHOMONAS MALTOPHILIA: NOT DETECTED
STREPTOCOCCUS AGALACTIAE: NOT DETECTED
STREPTOCOCCUS PNEUMONIAE: NOT DETECTED
STREPTOCOCCUS PYOGENES: NOT DETECTED
STREPTOCOCCUS SPECIES: NOT DETECTED
VAN A/B (VRE GENE): ABNORMAL
VIM GENE (CARBAPENEM RESISTANT): NOT DETECTED
WBC # BLD AUTO: 23.72 K/UL (ref 3.9–12.7)

## 2024-09-29 PROCEDURE — 36415 COLL VENOUS BLD VENIPUNCTURE: CPT | Performed by: INTERNAL MEDICINE

## 2024-09-29 PROCEDURE — 83735 ASSAY OF MAGNESIUM: CPT | Performed by: INTERNAL MEDICINE

## 2024-09-29 PROCEDURE — 85025 COMPLETE CBC W/AUTO DIFF WBC: CPT | Performed by: INTERNAL MEDICINE

## 2024-09-29 PROCEDURE — 63600175 PHARM REV CODE 636 W HCPCS: Performed by: OBSTETRICS & GYNECOLOGY

## 2024-09-29 PROCEDURE — P9016 RBC LEUKOCYTES REDUCED: HCPCS | Performed by: OBSTETRICS & GYNECOLOGY

## 2024-09-29 PROCEDURE — 25000003 PHARM REV CODE 250: Performed by: INTERNAL MEDICINE

## 2024-09-29 PROCEDURE — 99223 1ST HOSP IP/OBS HIGH 75: CPT | Mod: ,,, | Performed by: OBSTETRICS & GYNECOLOGY

## 2024-09-29 PROCEDURE — 11000001 HC ACUTE MED/SURG PRIVATE ROOM

## 2024-09-29 PROCEDURE — 99900035 HC TECH TIME PER 15 MIN (STAT)

## 2024-09-29 PROCEDURE — 94761 N-INVAS EAR/PLS OXIMETRY MLT: CPT

## 2024-09-29 PROCEDURE — 86920 COMPATIBILITY TEST SPIN: CPT | Performed by: OBSTETRICS & GYNECOLOGY

## 2024-09-29 PROCEDURE — 36430 TRANSFUSION BLD/BLD COMPNT: CPT

## 2024-09-29 PROCEDURE — 86920 COMPATIBILITY TEST SPIN: CPT | Performed by: EMERGENCY MEDICINE

## 2024-09-29 PROCEDURE — P9016 RBC LEUKOCYTES REDUCED: HCPCS | Performed by: EMERGENCY MEDICINE

## 2024-09-29 PROCEDURE — 27000221 HC OXYGEN, UP TO 24 HOURS

## 2024-09-29 PROCEDURE — 80053 COMPREHEN METABOLIC PANEL: CPT | Performed by: INTERNAL MEDICINE

## 2024-09-29 PROCEDURE — 25000003 PHARM REV CODE 250: Performed by: OBSTETRICS & GYNECOLOGY

## 2024-09-29 PROCEDURE — 30233N1 TRANSFUSION OF NONAUTOLOGOUS RED BLOOD CELLS INTO PERIPHERAL VEIN, PERCUTANEOUS APPROACH: ICD-10-PCS | Performed by: OBSTETRICS & GYNECOLOGY

## 2024-09-29 RX ORDER — MORPHINE SULFATE 4 MG/ML
1 INJECTION, SOLUTION INTRAMUSCULAR; INTRAVENOUS EVERY 4 HOURS PRN
Status: DISCONTINUED | OUTPATIENT
Start: 2024-09-29 | End: 2024-09-30 | Stop reason: HOSPADM

## 2024-09-29 RX ORDER — DIPHENHYDRAMINE HCL 25 MG
25 CAPSULE ORAL
Status: DISCONTINUED | OUTPATIENT
Start: 2024-09-29 | End: 2024-09-30 | Stop reason: HOSPADM

## 2024-09-29 RX ORDER — PRENATAL WITH FERROUS FUM AND FOLIC ACID 3080; 920; 120; 400; 22; 1.84; 3; 20; 10; 1; 12; 200; 27; 25; 2 [IU]/1; [IU]/1; MG/1; [IU]/1; MG/1; MG/1; MG/1; MG/1; MG/1; MG/1; UG/1; MG/1; MG/1; MG/1; MG/1
1 TABLET ORAL DAILY
Status: DISCONTINUED | OUTPATIENT
Start: 2024-09-29 | End: 2024-09-30 | Stop reason: HOSPADM

## 2024-09-29 RX ORDER — DEXTROSE, SODIUM CHLORIDE, SODIUM LACTATE, POTASSIUM CHLORIDE, AND CALCIUM CHLORIDE 5; .6; .31; .03; .02 G/100ML; G/100ML; G/100ML; G/100ML; G/100ML
INJECTION, SOLUTION INTRAVENOUS CONTINUOUS
Status: DISCONTINUED | OUTPATIENT
Start: 2024-09-29 | End: 2024-09-30 | Stop reason: HOSPADM

## 2024-09-29 RX ORDER — HYDROCODONE BITARTRATE AND ACETAMINOPHEN 5; 325 MG/1; MG/1
1 TABLET ORAL EVERY 4 HOURS PRN
Status: DISCONTINUED | OUTPATIENT
Start: 2024-09-29 | End: 2024-09-30 | Stop reason: HOSPADM

## 2024-09-29 RX ORDER — POTASSIUM CHLORIDE 20 MEQ/1
40 TABLET, EXTENDED RELEASE ORAL ONCE
Status: COMPLETED | OUTPATIENT
Start: 2024-09-29 | End: 2024-09-29

## 2024-09-29 RX ORDER — HYDROCODONE BITARTRATE AND ACETAMINOPHEN 500; 5 MG/1; MG/1
TABLET ORAL ONCE
Status: COMPLETED | OUTPATIENT
Start: 2024-09-29 | End: 2024-09-29

## 2024-09-29 RX ORDER — ACETAMINOPHEN 325 MG/1
650 TABLET ORAL
Status: DISCONTINUED | OUTPATIENT
Start: 2024-09-29 | End: 2024-09-30 | Stop reason: HOSPADM

## 2024-09-29 RX ORDER — HYDROCODONE BITARTRATE AND ACETAMINOPHEN 500; 5 MG/1; MG/1
TABLET ORAL
Status: DISCONTINUED | OUTPATIENT
Start: 2024-09-29 | End: 2024-09-30 | Stop reason: HOSPADM

## 2024-09-29 RX ORDER — SODIUM CHLORIDE, SODIUM LACTATE, POTASSIUM CHLORIDE, CALCIUM CHLORIDE 600; 310; 30; 20 MG/100ML; MG/100ML; MG/100ML; MG/100ML
INJECTION, SOLUTION INTRAVENOUS CONTINUOUS
Status: DISCONTINUED | OUTPATIENT
Start: 2024-09-29 | End: 2024-09-30 | Stop reason: HOSPADM

## 2024-09-29 RX ORDER — TALC
2 POWDER (GRAM) TOPICAL ONCE
Status: COMPLETED | OUTPATIENT
Start: 2024-09-29 | End: 2024-09-29

## 2024-09-29 RX ADMIN — SODIUM CHLORIDE, SODIUM LACTATE, POTASSIUM CHLORIDE, AND CALCIUM CHLORIDE: .6; .31; .03; .02 INJECTION, SOLUTION INTRAVENOUS at 05:09

## 2024-09-29 RX ADMIN — POTASSIUM CHLORIDE 40 MEQ: 1500 TABLET, EXTENDED RELEASE ORAL at 07:09

## 2024-09-29 RX ADMIN — ERTAPENEM 1 G: 1 INJECTION INTRAMUSCULAR; INTRAVENOUS at 12:09

## 2024-09-29 RX ADMIN — MORPHINE SULFATE 1 MG: 4 INJECTION, SOLUTION INTRAMUSCULAR; INTRAVENOUS at 12:09

## 2024-09-29 RX ADMIN — Medication 500 MG: at 07:09

## 2024-09-29 RX ADMIN — PRENATAL VIT W/ FE FUMARATE-FA TAB 27-0.8 MG 1 TABLET: 27-0.8 TAB at 09:09

## 2024-09-29 RX ADMIN — HYDROCODONE BITARTRATE AND ACETAMINOPHEN 1 TABLET: 5; 325 TABLET ORAL at 09:09

## 2024-09-29 RX ADMIN — FOLIC ACID 4 MG: 5 INJECTION, SOLUTION INTRAMUSCULAR; INTRAVENOUS; SUBCUTANEOUS at 08:09

## 2024-09-29 RX ADMIN — ENOXAPARIN SODIUM 40 MG: 40 INJECTION SUBCUTANEOUS at 06:09

## 2024-09-29 RX ADMIN — MORPHINE SULFATE 1 MG: 4 INJECTION, SOLUTION INTRAMUSCULAR; INTRAVENOUS at 04:09

## 2024-09-29 RX ADMIN — SODIUM CHLORIDE: 9 INJECTION, SOLUTION INTRAVENOUS at 09:09

## 2024-09-29 RX ADMIN — MORPHINE SULFATE 1 MG: 4 INJECTION, SOLUTION INTRAMUSCULAR; INTRAVENOUS at 06:09

## 2024-09-29 RX ADMIN — SODIUM CHLORIDE, SODIUM LACTATE, POTASSIUM CHLORIDE, CALCIUM CHLORIDE AND DEXTROSE MONOHYDRATE: 5; 600; 310; 30; 20 INJECTION, SOLUTION INTRAVENOUS at 06:09

## 2024-09-29 NOTE — SUBJECTIVE & OBJECTIVE
Obstetric HPI:  Patient reports None contractions, + fluttering.  No vaginal bleeding , No loss of fluid     This pregnancy has been complicated by:  sickle cell disease: no longer on hydroxyurea, on folic acid  CKD.   Sickle christian anemia: s/p 5 pRBCs for this pregnancy, goal is to keep Hct @25%    OB History    Para Term  AB Living   1 0 0 0 0 0   SAB IAB Ectopic Multiple Live Births   0 0 0 0 0      # Outcome Date GA Lbr Ehsan/2nd Weight Sex Type Anes PTL Lv   1 Current              Past Medical History:   Diagnosis Date    Chronic kidney disease (CKD) 2018    Emesis 2022    Encounter for surveillance of vaginal ring hormonal contraceptive device 11/15/2023    LGSIL on Pap smear of cervix 2017    LLQ abdominal pain 2023    Sickle cell anemia     Sickle cell disease     Sickle-cell disease without crisis     UTI (urinary tract infection) 2023     Past Surgical History:   Procedure Laterality Date    BREAST LUMPECTOMY      CARDIAC SURGERY      port insertion    NEPHRECTOMY         PTA Medications   Medication Sig    acetaminophen (TYLENOL) 500 MG tablet Take 1 tablet (500 mg total) by mouth every 6 (six) hours as needed.    aspirin 81 mg Cap Take 81 mg by mouth.    cholecalciferol, vitamin D3, 1,250 mcg (50,000 unit) capsule Take 1 capsule (50,000 Units total) by mouth every 7 days.    folic acid (FOLVITE) 1 MG tablet Take 1 tablet (1 mg total) by mouth once daily.    hydroxyurea (HYDREA) 500 mg Cap Take 3 capsules (1,500 mg total) by mouth once daily.    ondansetron (ZOFRAN) 4 MG tablet Take 1 tablet (4 mg total) by mouth every 6 (six) hours.    triamcinolone acetonide 0.1% (KENALOG) 0.1 % cream Apply topically to affected area 2 times per day for 7 to 10 days       Review of patient's allergies indicates:   Allergen Reactions    Rocephin [ceftriaxone] Shortness Of Breath, Itching and Anxiety     Nausea, vomiting. No hives, swelling, or anaphylaxis. Can tolerate if  necessary, but would avoid if possible.        Family History       Problem Relation (Age of Onset)    Sickle cell trait Father, Mother, Sister, Sister          Tobacco Use    Smoking status: Former     Types: Vaping with nicotine     Start date: 2022     Quit date: 2024     Years since quittin.2    Smokeless tobacco: Never   Substance and Sexual Activity    Alcohol use: Not Currently    Drug use: No    Sexual activity: Yes     Partners: Male     Birth control/protection: None     Review of Systems   Constitutional:  Negative for appetite change, chills and fever.   Respiratory:  Negative for shortness of breath.    Cardiovascular:  Negative for chest pain.   Gastrointestinal:  Negative for abdominal pain, blood in stool, constipation, diarrhea and vomiting.   Endocrine: Negative for hair loss and hot flashes.   Genitourinary:  Negative for decreased libido, dysmenorrhea, dyspareunia, dysuria, genital sores, menorrhagia, menstrual problem, pelvic pain, vaginal discharge, vaginal pain, urinary incontinence and vaginal odor.   Musculoskeletal:  Positive for back pain.   Integumentary:  Negative for rash, acne, hair changes, breast mass, nipple discharge and breast skin changes.   Breast: Negative for mass, mastodynia, nipple discharge and skin changes     Objective:     Vital Signs (Most Recent):  Temp: 98.3 °F (36.8 °C) (24)  Pulse: 100 (24)  Resp: (!) 44 (24)  BP: (!) 95/54 (24)  SpO2: 99 % (24) Vital Signs (24h Range):  Temp:  [97.9 °F (36.6 °C)-102.6 °F (39.2 °C)] 98.3 °F (36.8 °C)  Pulse:  [] 100  Resp:  [18-44] 44  SpO2:  [77 %-100 %] 99 %  BP: ()/(38-71) 95/54     Weight: 53.1 kg (117 lb 1 oz)  Body mass index is 22.12 kg/m².    FHT: 170s bpm with doppler       Physical Exam:   Constitutional: She is oriented to person, place, and time. She appears well-developed and well-nourished.    HENT:   Head: Normocephalic and atraumatic.     Eyes: Pupils are equal, round, and reactive to light. EOM are normal.     Cardiovascular:       Exam reveals no clubbing and no cyanosis.        Pulmonary/Chest: Effort normal.        Abdominal: Soft. She exhibits no mass. There is no rebound and no guarding. No hernia.             Musculoskeletal: Normal range of motion and moves all extremeties.      Comments: No CVAT tenderness noted today  However pt reported it was much tender before       Neurological: She is alert and oriented to person, place, and time.    Skin: Skin is warm. No cyanosis. Nails show no clubbing.    Psychiatric: She has a normal mood and affect. Her behavior is normal. Thought content normal.        Cervix:  Dilation:  deferred     Significant Labs:  Lab Results   Component Value Date    GROUPTRH O POS 09/28/2024    HEPBSAG Non-reactive 07/18/2024       I have personallly reviewed all pertinent lab results from the last 24 hours.  Recent Lab Results  (Last 5 results in the past 24 hours)        09/29/24  0400   09/29/24  0341   09/28/24  1446   09/28/24  1255   09/28/24  0832        Unit Blood Type Code         5100  [P]                5100  [P]       Unit Expiration         [id number]  [P]                [id number]  [P]       Procalcitonin     0.20  Comment: A concentration < 0.25 ng/mL represents a low risk of bacterial   infection.  Procalcitonin may not be accurate among patients with localized   infection, recent trauma or major surgery, immunosuppressed state,   invasive fungal infection, renal dysfunction. Decisions regarding   initiation or continuation of antibiotic therapy should not be based   solely on procalcitonin levels.             Unit Blood Type         O POS  [P]                O POS  [P]       Albumin   2.3             ALP   88             Allens Test       N/A         ALT   11             Anion Gap   10             AST   23             Baso # 0.05               Basophil % 0.2               BILIRUBIN TOTAL    1.7  Comment: For infants and newborns, interpretation of results should be based  on gestational age, weight and in agreement with clinical  observations.    Premature Infant recommended reference ranges:  Up to 24 hours.............<8.0 mg/dL  Up to 48 hours............<12.0 mg/dL  3-5 days..................<15.0 mg/dL  6-29 days.................<15.0 mg/dL               Blood Culture, Routine     Gram stain aer bottle: Gram negative rods  [P]                Gram stain serge bottle: Gram negative rods  [P]                Results called to and read back by: SAPNA Ballesteros  [P]                09/29/2024 03:41 BML  [P]                No Growth to date  [P]           Site       Other         BUN   6             Calcium   8.2             Chloride   113             CO2   14             CODING SYSTEM         [id number]  [P]                [id number]  [P]       Creatinine   1.0             Crossmatch Interpretation         Compatible  [P]                Compatible  [P]       Differential Method Automated               DISPENSE STATUS         CROSSMATCHED  [P]                ISSUED  [P]       eGFR   >60             Eos # 0.1               Eos % 0.4               Glucose   106             Gran # (ANC) 19.8               Gran % 83.3               Group & Rh         O POS       Hematocrit 18.5  Comment: Hgb and Hct. Critical results called and verbal readback obtained   from SAPNA Reyes @ 0433 on 29Sept2024. BML by [facility] 09/29/2024 04:34                 Hemoglobin 5.9  Comment: Hgb and Hct. Critical results called and verbal readback obtained   from SAPNA Reyes @ 0433 on 29Sept2024. BML by [facility] 09/29/2024 04:34                 Immature Grans (Abs) 0.19  Comment: Mild elevation in immature granulocytes is non specific and   can be seen in a variety of conditions including stress response,   acute inflammation, trauma and pregnancy. Correlation with other   laboratory and clinical findings is essential.                 Immature Granulocytes 0.8                INDIRECT NIKOLAI         NEG       Lymph # 1.2               Lymph % 5.1               Magnesium    1.5             MCH 29.2               MCHC 31.9               MCV 92               Mono # 2.4               Mono % 10.2               MPV 9.8               nRBC 0               Platelet Count 251               POC Lactate       1.44         Potassium   3.0             Product Code         [id number]  [P]                [id number]  [P]       PROTEIN TOTAL   6.2             RBC 2.02               RDW 16.8               Sample       VENOUS         Sodium   137             UNIT NUMBER         [id number]  [P]                [id number]  [P]       WBC 23.72                                       [P] - Preliminary Result

## 2024-09-29 NOTE — PLAN OF CARE
Plan of care discussed with patient.  Patient verbalized understanding of plan of care and all of her questions were answered.

## 2024-09-29 NOTE — HPI
Pt is a 28 yo   @ 18w5d presented to ED last night with worsening back pain and lower abdominal pain.  She has had back pain throughout pregnancy but worsening about 2 days ago, felt different  She denies UTI  H/o sickle cell disease, associated with anemia and has already had 5 unit of pRBCs with this pregnancy.  She denies dysuria otherwise, denies fevers or chills on admission  CVAT tenderness noted negative in the ED  Initially her temp was 100.4 on admission but with serial temp all WNL. No antipyretic was given.   However she then spike a temp of 101.6.

## 2024-09-29 NOTE — NURSING
[facility] [place]  Nurses Note -- 4 Eyes      9/29/2024       Skin assessed on: Admit      [x] No Pressure Injuries Present    [x]Prevention Measures Documented    [] Yes LDA  for Pressure Injury Previously documented     [] Yes New Pressure Injury Discovered   [] LDA for New Pressure Injury Added      Attending RN:  Lesli aEton RN     Second RN:  Thelma ALEJO

## 2024-09-29 NOTE — EICU
Brief Eicu admission review note.         Full H&P   labs images reviewed.     Briefly 28 y/o with h/o sicle cell anemia, CKD at 18 weeks gestation presented with c/o HA, b/l flank pains,left shiolder pain, back pain, no pulmonary symptoms.  Tachycardic, hypotensive, febrile in ER. UA c/w UTI. She was given IVF, Morphine, Ertapenem dose and admitted, blood transfusions ordered.        Camera exam: NAD,    140/85  , , RR 18, sata 100% on RA      Problems:  Sickle cell pain crisis  UTI  CXR with  interstitial prominence in the RLL, procal 0.2  HypoK        Plan:   On D5LR@ 100 cc/h  Percocet and Morphine for pain control  Ertopenem given for UTI,  ( allergic to Cefriaxone),follow up cxs,  Monitor closely for ACS, Consider  repeating CXR, adding zithromax   any pulmonary symptoms,  will hold off for now  BRBC per blood bank requested [facility].  K, Mg supplementation     SUP:  n/a  DVT proph: Lovenox  D/w SAPNA

## 2024-09-29 NOTE — H&P
[facility] Intensive Care  Obstetrics  History & Physical    Patient Name: Lana Chou  MRN: [id number]  Admission Date: 2024  Primary Care Provider: Luann Loza MD    Subjective:     Principal Problem:Pyelonephritis affecting pregnancy in second trimester    History of Present Illness:  Pt is a 28 yo   @ 18w5d presented to ED last night with worsening back pain and lower abdominal pain.  She has had back pain throughout pregnancy but worsening about 2 days ago, felt different  She denies UTI  H/o sickle cell disease, associated with anemia and has already had 5 unit of pRBCs with this pregnancy.  She denies dysuria otherwise, denies fevers or chills on admission  CVAT tenderness noted negative in the ED  Initially her temp was 100.4 on admission but with serial temp all WNL. No antipyretic was given.   However she then spike a temp of 101.6.      Obstetric HPI:  Patient reports None contractions, + fluttering.  No vaginal bleeding , No loss of fluid     This pregnancy has been complicated by:  sickle cell disease: no longer on hydroxyurea, on folic acid  CKD.   Sickle christian anemia: s/p 5 pRBCs for this pregnancy, goal is to keep Hct @25%    OB History    Para Term  AB Living   1 0 0 0 0 0   SAB IAB Ectopic Multiple Live Births   0 0 0 0 0      # Outcome Date GA Lbr Ehsan/2nd Weight Sex Type Anes PTL Lv   1 Current              Past Medical History:   Diagnosis Date    Chronic kidney disease (CKD) 2018    Emesis 2022    Encounter for surveillance of vaginal ring hormonal contraceptive device 11/15/2023    LGSIL on Pap smear of cervix 2017    LLQ abdominal pain 2023    Sickle cell anemia     Sickle cell disease     Sickle-cell disease without crisis     UTI (urinary tract infection) 2023     Past Surgical History:   Procedure Laterality Date    BREAST LUMPECTOMY      CARDIAC SURGERY      port insertion    NEPHRECTOMY         PTA Medications   Medication Sig     acetaminophen (TYLENOL) 500 MG tablet Take 1 tablet (500 mg total) by mouth every 6 (six) hours as needed.    aspirin 81 mg Cap Take 81 mg by mouth.    cholecalciferol, vitamin D3, 1,250 mcg (50,000 unit) capsule Take 1 capsule (50,000 Units total) by mouth every 7 days.    folic acid (FOLVITE) 1 MG tablet Take 1 tablet (1 mg total) by mouth once daily.    hydroxyurea (HYDREA) 500 mg Cap Take 3 capsules (1,500 mg total) by mouth once daily.    ondansetron (ZOFRAN) 4 MG tablet Take 1 tablet (4 mg total) by mouth every 6 (six) hours.    triamcinolone acetonide 0.1% (KENALOG) 0.1 % cream Apply topically to affected area 2 times per day for 7 to 10 days       Review of patient's allergies indicates:   Allergen Reactions    Rocephin [ceftriaxone] Shortness Of Breath, Itching and Anxiety     Nausea, vomiting. No hives, swelling, or anaphylaxis. Can tolerate if necessary, but would avoid if possible.        Family History       Problem Relation (Age of Onset)    Sickle cell trait Father, Mother, Sister, Sister          Tobacco Use    Smoking status: Former     Types: Vaping with nicotine     Start date: 2022     Quit date: 2024     Years since quittin.2    Smokeless tobacco: Never   Substance and Sexual Activity    Alcohol use: Not Currently    Drug use: No    Sexual activity: Yes     Partners: Male     Birth control/protection: None     Review of Systems   Constitutional:  Negative for appetite change, chills and fever.   Respiratory:  Negative for shortness of breath.    Cardiovascular:  Negative for chest pain.   Gastrointestinal:  Negative for abdominal pain, blood in stool, constipation, diarrhea and vomiting.   Endocrine: Negative for hair loss and hot flashes.   Genitourinary:  Negative for decreased libido, dysmenorrhea, dyspareunia, dysuria, genital sores, menorrhagia, menstrual problem, pelvic pain, vaginal discharge, vaginal pain, urinary incontinence and vaginal odor.   Musculoskeletal:   Positive for back pain.   Integumentary:  Negative for rash, acne, hair changes, breast mass, nipple discharge and breast skin changes.   Breast: Negative for mass, mastodynia, nipple discharge and skin changes     Objective:     Vital Signs (Most Recent):  Temp: 98.3 °F (36.8 °C) (09/29/24 0701)  Pulse: 100 (09/29/24 0701)  Resp: (!) 44 (09/29/24 0701)  BP: (!) 95/54 (09/29/24 0701)  SpO2: 99 % (09/29/24 0701) Vital Signs (24h Range):  Temp:  [97.9 °F (36.6 °C)-102.6 °F (39.2 °C)] 98.3 °F (36.8 °C)  Pulse:  [] 100  Resp:  [18-44] 44  SpO2:  [77 %-100 %] 99 %  BP: ()/(38-71) 95/54     Weight: 53.1 kg (117 lb 1 oz)  Body mass index is 22.12 kg/m².    FHT: 170s bpm with doppler       Physical Exam:   Constitutional: She is oriented to person, place, and time. She appears well-developed and well-nourished.    HENT:   Head: Normocephalic and atraumatic.    Eyes: Pupils are equal, round, and reactive to light. EOM are normal.     Cardiovascular:       Exam reveals no clubbing and no cyanosis.        Pulmonary/Chest: Effort normal.        Abdominal: Soft. She exhibits no mass. There is no rebound and no guarding. No hernia.             Musculoskeletal: Normal range of motion and moves all extremeties.      Comments: No CVAT tenderness noted today  However pt reported it was much tender before       Neurological: She is alert and oriented to person, place, and time.    Skin: Skin is warm. No cyanosis. Nails show no clubbing.    Psychiatric: She has a normal mood and affect. Her behavior is normal. Thought content normal.        Cervix:  Dilation:  deferred     Significant Labs:  Lab Results   Component Value Date    GROUPTRH O POS 09/28/2024    HEPBSAG Non-reactive 07/18/2024       I have personallly reviewed all pertinent lab results from the last 24 hours.  Recent Lab Results  (Last 5 results in the past 24 hours)        09/29/24  0400   09/29/24  0341   09/28/24  1446   09/28/24  1255   09/28/24  0832         Unit Blood Type Code         5100  [P]                5100  [P]       Unit Expiration         [id number]  [P]                [id number]  [P]       Procalcitonin     0.20  Comment: A concentration < 0.25 ng/mL represents a low risk of bacterial   infection.  Procalcitonin may not be accurate among patients with localized   infection, recent trauma or major surgery, immunosuppressed state,   invasive fungal infection, renal dysfunction. Decisions regarding   initiation or continuation of antibiotic therapy should not be based   solely on procalcitonin levels.             Unit Blood Type         O POS  [P]                O POS  [P]       Albumin   2.3             ALP   88             Allens Test       N/A         ALT   11             Anion Gap   10             AST   23             Baso # 0.05               Basophil % 0.2               BILIRUBIN TOTAL   1.7  Comment: For infants and newborns, interpretation of results should be based  on gestational age, weight and in agreement with clinical  observations.    Premature Infant recommended reference ranges:  Up to 24 hours.............<8.0 mg/dL  Up to 48 hours............<12.0 mg/dL  3-5 days..................<15.0 mg/dL  6-29 days.................<15.0 mg/dL               Blood Culture, Routine     Gram stain aer bottle: Gram negative rods  [P]                Gram stain serge bottle: Gram negative rods  [P]                Results called to and read back by: SAPNA Ballesteros  [P]                09/29/2024 03:41 BML  [P]                No Growth to date  [P]           Site       Other         BUN   6             Calcium   8.2             Chloride   113             CO2   14             CODING SYSTEM         [id number]  [P]                [id number]  [P]       Creatinine   1.0             Crossmatch Interpretation         Compatible  [P]                Compatible  [P]       Differential Method Automated               DISPENSE STATUS         CROSSMATCHED  [P]                 ISSUED  [P]       eGFR   >60             Eos # 0.1               Eos % 0.4               Glucose   106             Gran # (ANC) 19.8               Gran % 83.3               Group & Rh         O POS       Hematocrit 18.5  Comment: Hgb and Hct. Critical results called and verbal readback obtained   from SAPNA Reyes @ 0433 on 2024. BML by 1 2024 04:34                 Hemoglobin 5.9  Comment: Hgb and Hct. Critical results called and verbal readback obtained   from Amy RN @ 0433 on 2024. BML by BL1 2024 04:34                 Immature Grans (Abs) 0.19  Comment: Mild elevation in immature granulocytes is non specific and   can be seen in a variety of conditions including stress response,   acute inflammation, trauma and pregnancy. Correlation with other   laboratory and clinical findings is essential.                 Immature Granulocytes 0.8               INDIRECT NIKOLAI         NEG       Lymph # 1.2               Lymph % 5.1               Magnesium    1.5             MCH 29.2               MCHC 31.9               MCV 92               Mono # 2.4               Mono % 10.2               MPV 9.8               nRBC 0               Platelet Count 251               POC Lactate       1.44         Potassium   3.0             Product Code         [id number]  [P]                [id number]  [P]       PROTEIN TOTAL   6.2             RBC 2.02               RDW 16.8               Sample       VENOUS         Sodium   137             UNIT NUMBER         [id number]  [P]                [id number]  [P]       WBC 23.72                                       [P] - Preliminary Result             Assessment/Plan:     29 y.o. 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

## 2024-09-29 NOTE — ED NOTES
Per MD Giles, pt to not be transfused until tomorrow. Requested MD Giles to consent pt for blood. MD Giles stated she will complete on tomorrow.

## 2024-09-29 NOTE — NURSING
Called blood bank to see if they received blood yet from [facility].  Suri from blood bank said they have to request it from [facility].  Suri also stated that ICU sent 1 unit of blood back to them.  Called Dr. Giles to confirm she wants just 1 more unit of PRBC.  Told Suri patient needs just 1 unit.

## 2024-09-29 NOTE — PLAN OF CARE
Case Management Assessment     PCP: Luann Loza (no appts on Wednesday and Thursdays)  Pharmacy: [facility]     Patient Arrived From: Home  Existing Help at Home: independent     Barriers to Discharge: none    Discharge Plan:    A. home   B. Home     MURALI conducted a chart review and met with patient at the bedside. Prior to admission patient was independent, driving and has adequate family support. Currently patient is in nursing school and 4 months pregnant. Patient agreed to drive home post discharge. No needs or concerns reported at this time     09/29/24 1051   Discharge Assessment   Assessment Type Discharge Planning Assessment   Confirmed/corrected address, phone number and insurance Yes   Confirmed Demographics Correct on Facesheet   Source of Information patient   Communicated ALEJANDRA with patient/caregiver Date not available/Unable to determine   People in Home   (family)   Do you expect to return to your current living situation? Yes   Do you have help at home or someone to help you manage your care at home? Yes   Prior to hospitilization cognitive status: Alert/Oriented   Current cognitive status: Alert/Oriented   Equipment Currently Used at Home none   Readmission within 30 days? No   Patient currently being followed by outpatient case management? No   Do you currently have service(s) that help you manage your care at home? No   Do you take prescription medications? Yes   Do you have prescription coverage? Yes   How do you get to doctors appointments? car, drives self   Are you on dialysis? No   Discharge Plan A Home   Discharge Plan B Home   DME Needed Upon Discharge  none

## 2024-09-29 NOTE — NURSING
[facility]  Nurses Note -- 4 Eyes      9/29/2024       Skin assessed on: Q Shift      [x] No Pressure Injuries Present    [x]Prevention Measures Documented    [] Yes LDA  for Pressure Injury Previously documented     [] Yes New Pressure Injury Discovered   [] LDA for New Pressure Injury Added      Attending RN:  Cindi Stanley RN     Second RN:  Lesli ALEJO

## 2024-09-29 NOTE — HOSPITAL COURSE
9/28/29.  She was admitted to OBGYN service for pyelonephritis.    While waiting in Ed, she was noted hypotensive with BP 74/38.  -110s. Pt responded well with IVF.   She was then transferred to ICU  IV abx with ertapenem was started when she was in ED    9/29/294.   Admission hgb of 7.2, repeat now at 5.9.   Pt feel well this morning, back no longer hurting    9/30/2024  Hospital course was otherwise benign.  She is tolerating oral-feeding well.  Exam was benign with patient afebrile, vitals stable. Feeling better.  Normal activities.  Patient discharged home on hospital day #3, 9/30/2024   Discharge medications include prenatal vitamins, Macrobid and Percocet.  She has appointment with Heme Onc in about a week for further transfusion if necessary  Follow-up with me in 2 weeks.    Tyson Landers MD.    
Eat healthy foods you enjoy. Apixaban/Eliquis DOES NOT have a special diet. Limit your alcohol intake.

## 2024-09-29 NOTE — ASSESSMENT & PLAN NOTE
Continue ertapenem 1 g Q 24 hours  Pt remained afebrile since on admission  Pain improved  Given pt with SCD and immunocompromised, will continue abx for 48 hours afebrile  Can transfer to mother baby unit later on today if stable

## 2024-09-29 NOTE — NURSING
Gram stain and blood culture results of gram negative rods reported to Dr. Buckner. No new orders noted.

## 2024-09-29 NOTE — NURSING TRANSFER
Nursing Transfer Note      9/29/2024   2:30 PM    Nurse giving handoff:stacy beth  Nurse receiving handoff:alisson beth    Reason patient is being transferred: stepdown    Transfer To: 244    Transfer via wheelchair    Transfer with all belongings    Transported by transporter    Transfer Vital Signs:  Blood Pressure:92/51  Heart Rate:103  O2:100  Temperature:98.1  Respirations:28    Telemetry: none  Order for Tele Monitor? No    Additional Lines: none    4eyes on Skin: yes    Medicines sent: none    Any special needs or follow-up needed: no    Patient belongings transferred with patient: Yes    Chart send with patient: Yes    Notified: patient notified family    Patient reassessed at: 09/29/2021 1400

## 2024-09-30 VITALS
HEART RATE: 97 BPM | WEIGHT: 117.06 LBS | OXYGEN SATURATION: 97 % | HEIGHT: 61 IN | SYSTOLIC BLOOD PRESSURE: 98 MMHG | RESPIRATION RATE: 18 BRPM | DIASTOLIC BLOOD PRESSURE: 55 MMHG | TEMPERATURE: 99 F | BODY MASS INDEX: 22.1 KG/M2

## 2024-09-30 DIAGNOSIS — D57.1 HEMOGLOBIN SS DISEASE WITHOUT CRISIS: Primary | ICD-10-CM

## 2024-09-30 PROBLEM — Z3A.18 PREGNANCY WITH 18 COMPLETED WEEKS GESTATION: Status: ACTIVE | Noted: 2024-09-30

## 2024-09-30 LAB
BACTERIA BLD CULT: ABNORMAL
BACTERIA UR CULT: ABNORMAL
BASOPHILS # BLD AUTO: 0.03 K/UL (ref 0–0.2)
BASOPHILS NFR BLD: 0.2 % (ref 0–1.9)
BLD PROD TYP BPU: NORMAL
BLOOD UNIT EXPIRATION DATE: NORMAL
BLOOD UNIT TYPE CODE: 5100
BLOOD UNIT TYPE: NORMAL
CODING SYSTEM: NORMAL
CROSSMATCH INTERPRETATION: NORMAL
DIFFERENTIAL METHOD BLD: ABNORMAL
DISPENSE STATUS: NORMAL
EOSINOPHIL # BLD AUTO: 0.1 K/UL (ref 0–0.5)
EOSINOPHIL NFR BLD: 1.1 % (ref 0–8)
ERYTHROCYTE [DISTWIDTH] IN BLOOD BY AUTOMATED COUNT: 16.5 % (ref 11.5–14.5)
HCT VFR BLD AUTO: 22.9 % (ref 37–48.5)
HGB BLD-MCNC: 7.8 G/DL (ref 12–16)
IMM GRANULOCYTES # BLD AUTO: 0.13 K/UL (ref 0–0.04)
IMM GRANULOCYTES NFR BLD AUTO: 1 % (ref 0–0.5)
LYMPHOCYTES # BLD AUTO: 1.2 K/UL (ref 1–4.8)
LYMPHOCYTES NFR BLD: 9 % (ref 18–48)
MCH RBC QN AUTO: 29.1 PG (ref 27–31)
MCHC RBC AUTO-ENTMCNC: 34.1 G/DL (ref 32–36)
MCV RBC AUTO: 85 FL (ref 82–98)
MONOCYTES # BLD AUTO: 1.4 K/UL (ref 0.3–1)
MONOCYTES NFR BLD: 10.5 % (ref 4–15)
NEUTROPHILS # BLD AUTO: 10.1 K/UL (ref 1.8–7.7)
NEUTROPHILS NFR BLD: 78.2 % (ref 38–73)
NRBC BLD-RTO: 0 /100 WBC
NUM UNITS TRANS PACKED RBC: NORMAL
PLATELET # BLD AUTO: 254 K/UL (ref 150–450)
PMV BLD AUTO: 9.5 FL (ref 9.2–12.9)
RBC # BLD AUTO: 2.68 M/UL (ref 4–5.4)
WBC # BLD AUTO: 12.95 K/UL (ref 3.9–12.7)

## 2024-09-30 PROCEDURE — 25000003 PHARM REV CODE 250: Performed by: OBSTETRICS & GYNECOLOGY

## 2024-09-30 PROCEDURE — 63600175 PHARM REV CODE 636 W HCPCS: Performed by: OBSTETRICS & GYNECOLOGY

## 2024-09-30 PROCEDURE — 85025 COMPLETE CBC W/AUTO DIFF WBC: CPT | Performed by: OBSTETRICS & GYNECOLOGY

## 2024-09-30 PROCEDURE — 36415 COLL VENOUS BLD VENIPUNCTURE: CPT | Performed by: OBSTETRICS & GYNECOLOGY

## 2024-09-30 PROCEDURE — 99238 HOSP IP/OBS DSCHRG MGMT 30/<: CPT | Mod: ,,, | Performed by: OBSTETRICS & GYNECOLOGY

## 2024-09-30 RX ORDER — NITROFURANTOIN 25; 75 MG/1; MG/1
100 CAPSULE ORAL 2 TIMES DAILY
Qty: 20 CAPSULE | Refills: 0 | Status: SHIPPED | OUTPATIENT
Start: 2024-09-30 | End: 2024-10-10

## 2024-09-30 RX ORDER — SULFAMETHOXAZOLE AND TRIMETHOPRIM 800; 160 MG/1; MG/1
1 TABLET ORAL 2 TIMES DAILY
Qty: 20 TABLET | Refills: 0 | Status: SHIPPED | OUTPATIENT
Start: 2024-09-30 | End: 2024-09-30 | Stop reason: HOSPADM

## 2024-09-30 RX ORDER — OXYCODONE AND ACETAMINOPHEN 5; 325 MG/1; MG/1
1 TABLET ORAL EVERY 6 HOURS PRN
Qty: 20 TABLET | Refills: 0 | Status: SHIPPED | OUTPATIENT
Start: 2024-09-30

## 2024-09-30 RX ADMIN — SODIUM CHLORIDE, SODIUM LACTATE, POTASSIUM CHLORIDE, CALCIUM CHLORIDE AND DEXTROSE MONOHYDRATE: 5; 600; 310; 30; 20 INJECTION, SOLUTION INTRAVENOUS at 04:09

## 2024-09-30 RX ADMIN — HYDROCODONE BITARTRATE AND ACETAMINOPHEN 1 TABLET: 5; 325 TABLET ORAL at 04:09

## 2024-09-30 RX ADMIN — ERTAPENEM 1 G: 1 INJECTION INTRAMUSCULAR; INTRAVENOUS at 12:09

## 2024-09-30 NOTE — PLAN OF CARE
Pt stable, VS WDL.  Ambulating and voiding independently.  Pain addressed with Norco5.  Administered Ivanz.  Received 1 unit of PRBC, tolerated well.  D5LR infusing @125ml/hr.  Tolerating regular diet.  B/P improved after blood administration.  CBC scheduled for 0600, results pending.  Pt doing well.

## 2024-09-30 NOTE — NURSING
Called and spoke to Martha to notify of her appointment on October 7, 2024 at 1000 with Dr. Landers. Also, informed  of positive blood and urine cx, and importance of completing course of abx. Notified rx of macrobid and percocet is ready for  at [facility]. Patient verbalized understanding.

## 2024-09-30 NOTE — DISCHARGE SUMMARY
[facility]  Obstetrics  Discharge Summary      Patient Name: Lana Chou  MRN: [id number]  Admission Date: 2024  Hospital Length of Stay: 2 days  Discharge Date and Time:  2024 7:12 AM  Attending Physician: Tyson Landers MD   Discharging Provider: Tyson Landers MD   Primary Care Provider: Luann Loza MD    HPI: Pt is a 28 yo   @ 18w5d presented to ED last night with worsening back pain and lower abdominal pain.  She has had back pain throughout pregnancy but worsening about 2 days ago, felt different  She denies UTI  H/o sickle cell disease, associated with anemia and has already had 5 unit of pRBCs with this pregnancy.  She denies dysuria otherwise, denies fevers or chills on admission  CVAT tenderness noted negative in the ED  Initially her temp was 100.4 on admission but with serial temp all WNL. No antipyretic was given.   However she then spike a temp of 101.6.      FHT: 150  * No surgery found *     Hospital Course:   29.  She was admitted to OBGYN service for pyelonephritis.    While waiting in Ed, she was noted hypotensive with BP 74/38.  -110s. Pt responded well with IVF.   She was then transferred to ICU  IV abx with ertapenem was started when she was in ED    .   Admission hgb of 7.2, repeat now at 5.9.   Pt feel well this morning, back no longer hurting    2024  Hospital course was otherwise benign.  She is tolerating oral-feeding well.  Exam was benign with patient afebrile, vitals stable. Feeling better.  Normal activities.  Patient discharged home on hospital day #3, 2024   Discharge medications include prenatal vitamins.  She has appointment with Heme Onc in about a week for further transfusion if necessary  Follow-up with me in 2 weeks.    Tyson Landers MD.           Final Active Diagnoses:    Diagnosis Date Noted POA    PRINCIPAL PROBLEM:  Pyelonephritis affecting pregnancy in second trimester [O23.02] 2024 Yes    Pregnancy with 18  completed weeks gestation [Z3A.18] 09/30/2024 Not Applicable    Sickle cell anemia [D57.1] 09/25/2024 Yes    Echogenic focus of bowel, fetal, affecting care of mother, antepartum [O35.DXX0] 09/20/2024 Yes    Echogenic intracardiac focus of fetus on prenatal ultrasound [O28.3] 09/20/2024 Yes    Maternal sickle cell anemia affecting pregnancy, antepartum [O99.019, D57.1] 08/20/2024 Yes    H/O unilateral nephrectomy [Z90.5] 07/23/2018 Not Applicable    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  Pending Diagnostic Studies:       None            Discharged Condition: good    Disposition: Home or Self Care    Follow Up:   Follow-up Information       Tsyon Landers MD In 2 days.    Specialties: Obstetrics and Gynecology, Obstetrics and Gynecology  Why: Follow up on Monday.  Contact information:  [address] [place] [address] [phone]                           Patient Instructions:      Ambulatory referral/consult to Outpatient Case Management   Referral Priority: Routine Referral Type: Consultation   Referral Reason: Specialty Services Required   Number of Visits Requested: 1     No dressing needed     Medications:  Current Discharge Medication List        START taking these medications    Details   nitrofurantoin, macrocrystal-monohydrate, (MACROBID) 100 MG capsule Take 1 capsule (100 mg total) by mouth 2 (two) times daily. for 10 days  Qty: 14 capsule, Refills: 0      oxyCODONE-acetaminophen (PERCOCET) 5-325 mg per tablet Take 1 tablet by mouth every 6 (six) hours as needed for Pain.  Qty: 20 tablet, Refills: 0    Comments: Quantity prescribed more than 7 d  Associated Diagnoses: Sickle cell disease with crisis      sulfamethoxazole-trimethoprim 800-160mg (BACTRIM DS) 800-160 mg Tab Take  1 tablet by mouth 2 (two) times daily.  Qty: 20 tablet, Refills: 0           CONTINUE these medications which have NOT CHANGED    Details   acetaminophen (TYLENOL) 500 MG tablet Take 1 tablet (500 mg total) by mouth every 6 (six) hours as needed.  Qty: 20 tablet, Refills: 0      aspirin 81 mg Cap Take 81 mg by mouth.      cholecalciferol, vitamin D3, 1,250 mcg (50,000 unit) capsule Take 1 capsule (50,000 Units total) by mouth every 7 days.  Qty: 12 capsule, Refills: 3    Associated Diagnoses: Vitamin D deficiency      folic acid (FOLVITE) 1 MG tablet Take 1 tablet (1 mg total) by mouth once daily.  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  Qty: 12 tablet, Refills: 0      triamcinolone acetonide 0.1% (KENALOG) 0.1 % cream Apply topically to affected area 2 times per day for 7 to 10 days  Qty: 15 g, Refills: 1             Tyson Landers MD  Obstetrics  [facility]

## 2024-09-30 NOTE — NURSING
Notify Dr. Landers of ESBL sensitively to Macrobid. Informed pt's follow up appointment with him on Monday 10/7/2024. Reviewed with MD that's lab results have indicated ESBL in urine since 2/2024. MD verbalized understanding.

## 2024-09-30 NOTE — NURSING
Patient can see neurologist if she has a neurology diagnosis I'm not sure why she's on Depakote so she must of had a seizure disorder in the past that would be why he would refer her        Encourage the family to try to cut back on phone calls recalling all the time.    I wanted them to have a home health nurse with a could ask questions in the home with the nurse there they've refused in the past   Patient expressed strong desire to be discharged. Nurse will follow up with MD.

## 2024-09-30 NOTE — NURSING
Bertram from microbiology called with positive blood and urin culture for ESBL. Called Dr. Landers to notify. Checked with microbiology to see if macrobid is sensitive to ESBL. Bertram states ESBL is sensitive to macrobid. Will notify MD.

## 2024-09-30 NOTE — DISCHARGE SUMMARY
[facility]  Obstetrics  Discharge Summary      Patient Name: Lana Chou  MRN: [id number]  Admission Date: 2024  Hospital Length of Stay: 2 days  Discharge Date and Time:  2024 7:32 AM  Attending Physician: Tyson Landers MD   Discharging Provider: Tyson Landers MD   Primary Care Provider: Luann Loza MD    HPI: Pt is a 30 yo   @ 18w5d presented to ED last night with worsening back pain and lower abdominal pain.  She has had back pain throughout pregnancy but worsening about 2 days ago, felt different  She denies UTI  H/o sickle cell disease, associated with anemia and has already had 5 unit of pRBCs with this pregnancy.  She denies dysuria otherwise, denies fevers or chills on admission  CVAT tenderness noted negative in the ED  Initially her temp was 100.4 on admission but with serial temp all WNL. No antipyretic was given.   However she then spike a temp of 101.6.          * No surgery found *     Hospital Course:   29.  She was admitted to OBGYN service for pyelonephritis.    While waiting in Ed, she was noted hypotensive with BP 74/38.  -110s. Pt responded well with IVF.   She was then transferred to ICU  IV abx with ertapenem was started when she was in ED    .   Admission hgb of 7.2, repeat now at 5.9.   Pt feel well this morning, back no longer hurting    2024  Hospital course was otherwise benign.  She is tolerating oral-feeding well.  Exam was benign with patient afebrile, vitals stable. Feeling better.  Normal activities.  Patient discharged home on hospital day #3, 2024   Discharge medications include prenatal vitamins, Macrobid and Percocet.  She has appointment with Heme Onc in about a week for further transfusion if necessary  Follow-up with me in 2 weeks.    Tyson Landers MD.           Final Active Diagnoses:    Diagnosis Date Noted POA    PRINCIPAL PROBLEM:  Pyelonephritis affecting pregnancy in second trimester [O23.02] 2024 Yes     Pregnancy with 18 completed weeks gestation [Z3A.18] 09/30/2024 Not Applicable    Sickle cell anemia [D57.1] 09/25/2024 Yes    Echogenic focus of bowel, fetal, affecting care of mother, antepartum [O35.DXX0] 09/20/2024 Yes    Echogenic intracardiac focus of fetus on prenatal ultrasound [O28.3] 09/20/2024 Yes    Maternal sickle cell anemia affecting pregnancy, antepartum [O99.019, D57.1] 08/20/2024 Yes    H/O unilateral nephrectomy [Z90.5] 07/23/2018 Not Applicable    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  Pending Diagnostic Studies:       None            Discharged Condition: good    Disposition: Home or Self Care    Follow Up:   Follow-up Information       Tyson Landers MD In 2 days.    Specialties: Obstetrics and Gynecology, Obstetrics and Gynecology  Why: Follow up on Monday.  Contact information:  [address] [place] [address] [phone]                           Patient Instructions:      Ambulatory referral/consult to Outpatient Case Management   Referral Priority: Routine Referral Type: Consultation   Referral Reason: Specialty Services Required   Number of Visits Requested: 1     No dressing needed     Medications:  Current Discharge Medication List        START taking these medications    Details   nitrofurantoin, macrocrystal-monohydrate, (MACROBID) 100 MG capsule Take 1 capsule (100 mg total) by mouth 2 (two) times daily. for 10 days  Qty: 20 capsule, Refills: 0      oxyCODONE-acetaminophen (PERCOCET) 5-325 mg per tablet Take 1 tablet by mouth every 6 (six) hours as needed for Pain.  Qty: 20 tablet, Refills: 0    Comments: Quantity prescribed more than 7 d  Associated Diagnoses: Sickle cell disease with crisis           CONTINUE these medications which have NOT  CHANGED    Details   acetaminophen (TYLENOL) 500 MG tablet Take 1 tablet (500 mg total) by mouth every 6 (six) hours as needed.  Qty: 20 tablet, Refills: 0      aspirin 81 mg Cap Take 81 mg by mouth.      cholecalciferol, vitamin D3, 1,250 mcg (50,000 unit) capsule Take 1 capsule (50,000 Units total) by mouth every 7 days.  Qty: 12 capsule, Refills: 3    Associated Diagnoses: Vitamin D deficiency      folic acid (FOLVITE) 1 MG tablet Take 1 tablet (1 mg total) by mouth once daily.  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  Qty: 12 tablet, Refills: 0      triamcinolone acetonide 0.1% (KENALOG) 0.1 % cream Apply topically to affected area 2 times per day for 7 to 10 days  Qty: 15 g, Refills: 1             Tyson Landers MD  Obstetrics  [facility]

## 2024-09-30 NOTE — NURSING
Patient discharged per order. VS stable with no signs of distress. Discharge instructions reviewed with patient. Reviewed urgent warning signs and instructed to go to ER or call physician if symptoms occur. Instructed on importance of completion of abx. And follow-up appointment with physician. Patient voiced understanding of all.

## 2024-10-01 ENCOUNTER — OUTPATIENT CASE MANAGEMENT (OUTPATIENT)
Dept: ADMINISTRATIVE | Facility: OTHER | Age: 29
End: 2024-10-01
Payer: COMMERCIAL

## 2024-10-02 ENCOUNTER — TELEPHONE (OUTPATIENT)
Dept: PRIMARY CARE CLINIC | Facility: CLINIC | Age: 29
End: 2024-10-02
Payer: COMMERCIAL

## 2024-10-02 LAB — BACTERIA BLD CULT: NORMAL

## 2024-10-07 ENCOUNTER — ROUTINE PRENATAL (OUTPATIENT)
Dept: OBSTETRICS AND GYNECOLOGY | Facility: CLINIC | Age: 29
End: 2024-10-07
Payer: COMMERCIAL

## 2024-10-07 ENCOUNTER — LAB VISIT (OUTPATIENT)
Dept: LAB | Facility: OTHER | Age: 29
End: 2024-10-07
Attending: INTERNAL MEDICINE
Payer: COMMERCIAL

## 2024-10-07 VITALS — WEIGHT: 109.13 LBS | SYSTOLIC BLOOD PRESSURE: 98 MMHG | DIASTOLIC BLOOD PRESSURE: 60 MMHG | BODY MASS INDEX: 20.62 KG/M2

## 2024-10-07 DIAGNOSIS — D57.1 HEMOGLOBIN SS DISEASE WITHOUT CRISIS: ICD-10-CM

## 2024-10-07 DIAGNOSIS — Z87.59 HISTORY OF PYELONEPHRITIS DURING PREGNANCY: ICD-10-CM

## 2024-10-07 DIAGNOSIS — Z87.440 HISTORY OF PYELONEPHRITIS DURING PREGNANCY: ICD-10-CM

## 2024-10-07 DIAGNOSIS — L30.9 DERMATITIS: ICD-10-CM

## 2024-10-07 DIAGNOSIS — Z3A.19 19 WEEKS GESTATION OF PREGNANCY: Primary | ICD-10-CM

## 2024-10-07 DIAGNOSIS — D57.00 SICKLE CELL DISEASE WITH CRISIS: ICD-10-CM

## 2024-10-07 LAB
ABO + RH BLD: NORMAL
BASOPHILS # BLD AUTO: 0.06 K/UL (ref 0–0.2)
BASOPHILS NFR BLD: 0.5 % (ref 0–1.9)
BLD GP AB SCN CELLS X3 SERPL QL: NORMAL
DIFFERENTIAL METHOD BLD: ABNORMAL
EOSINOPHIL # BLD AUTO: 0.1 K/UL (ref 0–0.5)
EOSINOPHIL NFR BLD: 0.8 % (ref 0–8)
ERYTHROCYTE [DISTWIDTH] IN BLOOD BY AUTOMATED COUNT: 16.7 % (ref 11.5–14.5)
HCT VFR BLD AUTO: 30.1 % (ref 37–48.5)
HGB BLD-MCNC: 9.5 G/DL (ref 12–16)
IMM GRANULOCYTES # BLD AUTO: 0.13 K/UL (ref 0–0.04)
IMM GRANULOCYTES NFR BLD AUTO: 1 % (ref 0–0.5)
LYMPHOCYTES # BLD AUTO: 1.8 K/UL (ref 1–4.8)
LYMPHOCYTES NFR BLD: 13.7 % (ref 18–48)
MCH RBC QN AUTO: 28.3 PG (ref 27–31)
MCHC RBC AUTO-ENTMCNC: 31.6 G/DL (ref 32–36)
MCV RBC AUTO: 90 FL (ref 82–98)
MONOCYTES # BLD AUTO: 1.4 K/UL (ref 0.3–1)
MONOCYTES NFR BLD: 10.7 % (ref 4–15)
NEUTROPHILS # BLD AUTO: 9.6 K/UL (ref 1.8–7.7)
NEUTROPHILS NFR BLD: 73.3 % (ref 38–73)
NRBC BLD-RTO: 1 /100 WBC
PLATELET # BLD AUTO: 404 K/UL (ref 150–450)
PMV BLD AUTO: 9.3 FL (ref 9.2–12.9)
RBC # BLD AUTO: 3.36 M/UL (ref 4–5.4)
SPECIMEN OUTDATE: NORMAL
WBC # BLD AUTO: 13.13 K/UL (ref 3.9–12.7)

## 2024-10-07 PROCEDURE — 86900 BLOOD TYPING SEROLOGIC ABO: CPT | Performed by: INTERNAL MEDICINE

## 2024-10-07 PROCEDURE — 36415 COLL VENOUS BLD VENIPUNCTURE: CPT | Performed by: INTERNAL MEDICINE

## 2024-10-07 PROCEDURE — 86920 COMPATIBILITY TEST SPIN: CPT | Performed by: INTERNAL MEDICINE

## 2024-10-07 PROCEDURE — 85025 COMPLETE CBC W/AUTO DIFF WBC: CPT | Performed by: INTERNAL MEDICINE

## 2024-10-07 PROCEDURE — 83020 HEMOGLOBIN ELECTROPHORESIS: CPT | Performed by: INTERNAL MEDICINE

## 2024-10-07 PROCEDURE — 99999 PR PBB SHADOW E&M-EST. PATIENT-LVL III: CPT | Mod: PBBFAC,,, | Performed by: OBSTETRICS & GYNECOLOGY

## 2024-10-07 PROCEDURE — 86901 BLOOD TYPING SEROLOGIC RH(D): CPT | Performed by: INTERNAL MEDICINE

## 2024-10-07 RX ORDER — TRIAMCINOLONE ACETONIDE 1 MG/G
CREAM TOPICAL
Qty: 15 G | Refills: 1 | Status: SHIPPED | OUTPATIENT
Start: 2024-10-07

## 2024-10-07 RX ORDER — NITROFURANTOIN 25; 75 MG/1; MG/1
100 CAPSULE ORAL NIGHTLY
Qty: 30 CAPSULE | Refills: 4 | Status: SHIPPED | OUTPATIENT
Start: 2024-10-07 | End: 2025-03-06

## 2024-10-07 NOTE — PROGRESS NOTES
19w6d  Patient comes in today for follow-up prenatal care  Was in the hospital for pyelonephritis.  Still with 3 more days of antibiotic  Feeling much better  No new complaint except for a rash in her groin.  No vaginal bleeding, contractions, or rupture of membranes.  Good fetal movements.    Eating well without significant nausea/vomiting  Gaining weight  Taking prenatal vitamins, folic acid 4 mg, and baby aspirin  Taking hydroxyurea and vitamin D     Doing well with Connected [facility]OM    Exam with irritative, thicker white diffuse lesion in groin.    Hct today over 30  Normal [id number] and maternal quad screen    Discussed with patient MFM's findings and recommendations  Discussed care for sickle cell anemia patients in pregnancy  Consider blood transfusion of hematocrit <25%  Triamcinolone ointment for dermatitis  Discussed blood transfusion  Discussed antibiotic prophylaxis for pyelonephritis    Back in 2 weeks

## 2024-10-08 ENCOUNTER — TELEPHONE (OUTPATIENT)
Dept: HEMATOLOGY/ONCOLOGY | Facility: CLINIC | Age: 29
End: 2024-10-08
Payer: COMMERCIAL

## 2024-10-08 ENCOUNTER — PATIENT MESSAGE (OUTPATIENT)
Dept: OTHER | Facility: OTHER | Age: 29
End: 2024-10-08
Payer: COMMERCIAL

## 2024-10-08 LAB
HGB FRACT BLD ELPH-IMP: NORMAL
HGB S MFR BLD ELPH: 14.5 %

## 2024-10-08 NOTE — TELEPHONE ENCOUNTER
Called patient to let her know that Dr. Reyes said that with the labs she had done she doesn't need transfusion this week. She will still need to check labs for possible transfusion next week. She verbalized understanding and canceled transfusion appointment.       ----- Message from Reji Reyes MD sent at 10/8/2024  8:06 AM CDT -----  With these numbers she doesn't need transfusion this week. She will still need to check labs for possible transfusion next week

## 2024-10-10 LAB
BLD PROD TYP BPU: NORMAL
BLOOD UNIT EXPIRATION DATE: NORMAL
BLOOD UNIT TYPE CODE: 9500
BLOOD UNIT TYPE: NORMAL
CODING SYSTEM: NORMAL
CROSSMATCH INTERPRETATION: NORMAL
DISPENSE STATUS: NORMAL
NUM UNITS TRANS PACKED RBC: NORMAL

## 2024-10-14 ENCOUNTER — LAB VISIT (OUTPATIENT)
Dept: LAB | Facility: HOSPITAL | Age: 29
End: 2024-10-14
Attending: INTERNAL MEDICINE
Payer: COMMERCIAL

## 2024-10-14 ENCOUNTER — ROUTINE PRENATAL (OUTPATIENT)
Dept: OBSTETRICS AND GYNECOLOGY | Facility: CLINIC | Age: 29
End: 2024-10-14
Payer: COMMERCIAL

## 2024-10-14 VITALS
BODY MASS INDEX: 21.58 KG/M2 | DIASTOLIC BLOOD PRESSURE: 60 MMHG | SYSTOLIC BLOOD PRESSURE: 110 MMHG | WEIGHT: 114.19 LBS

## 2024-10-14 DIAGNOSIS — D57.1 HEMOGLOBIN SS DISEASE WITHOUT CRISIS: ICD-10-CM

## 2024-10-14 DIAGNOSIS — Z87.59 HISTORY OF PYELONEPHRITIS DURING PREGNANCY: ICD-10-CM

## 2024-10-14 DIAGNOSIS — D57.00 SICKLE CELL DISEASE WITH CRISIS: ICD-10-CM

## 2024-10-14 DIAGNOSIS — Z3A.20 20 WEEKS GESTATION OF PREGNANCY: Primary | ICD-10-CM

## 2024-10-14 DIAGNOSIS — Z87.440 HISTORY OF PYELONEPHRITIS DURING PREGNANCY: ICD-10-CM

## 2024-10-14 LAB
BASOPHILS # BLD AUTO: 0.05 K/UL (ref 0–0.2)
BASOPHILS NFR BLD: 0.3 % (ref 0–1.9)
DIFFERENTIAL METHOD BLD: ABNORMAL
EOSINOPHIL # BLD AUTO: 0.1 K/UL (ref 0–0.5)
EOSINOPHIL NFR BLD: 0.9 % (ref 0–8)
ERYTHROCYTE [DISTWIDTH] IN BLOOD BY AUTOMATED COUNT: 16.7 % (ref 11.5–14.5)
HCT VFR BLD AUTO: 26.5 % (ref 37–48.5)
HGB BLD-MCNC: 8.8 G/DL (ref 12–16)
IMM GRANULOCYTES # BLD AUTO: 0.17 K/UL (ref 0–0.04)
IMM GRANULOCYTES NFR BLD AUTO: 1.2 % (ref 0–0.5)
LYMPHOCYTES # BLD AUTO: 1.7 K/UL (ref 1–4.8)
LYMPHOCYTES NFR BLD: 11.7 % (ref 18–48)
MCH RBC QN AUTO: 29.6 PG (ref 27–31)
MCHC RBC AUTO-ENTMCNC: 33.2 G/DL (ref 32–36)
MCV RBC AUTO: 89 FL (ref 82–98)
MONOCYTES # BLD AUTO: 1.9 K/UL (ref 0.3–1)
MONOCYTES NFR BLD: 12.9 % (ref 4–15)
NEUTROPHILS # BLD AUTO: 10.7 K/UL (ref 1.8–7.7)
NEUTROPHILS NFR BLD: 73 % (ref 38–73)
NRBC BLD-RTO: 1 /100 WBC
PLATELET # BLD AUTO: 363 K/UL (ref 150–450)
PMV BLD AUTO: 9.7 FL (ref 9.2–12.9)
RBC # BLD AUTO: 2.97 M/UL (ref 4–5.4)
WBC # BLD AUTO: 14.59 K/UL (ref 3.9–12.7)

## 2024-10-14 PROCEDURE — 0502F SUBSEQUENT PRENATAL CARE: CPT | Mod: CPTII,S$GLB,, | Performed by: OBSTETRICS & GYNECOLOGY

## 2024-10-14 PROCEDURE — 99999 PR PBB SHADOW E&M-EST. PATIENT-LVL III: CPT | Mod: PBBFAC,,, | Performed by: OBSTETRICS & GYNECOLOGY

## 2024-10-14 PROCEDURE — 36415 COLL VENOUS BLD VENIPUNCTURE: CPT | Performed by: INTERNAL MEDICINE

## 2024-10-14 PROCEDURE — 85025 COMPLETE CBC W/AUTO DIFF WBC: CPT | Performed by: INTERNAL MEDICINE

## 2024-10-14 NOTE — PROGRESS NOTES
20w6d  Patient comes in today for follow-up prenatal care  Was in the hospital for pyelonephritis.    Feeling much better today  No new complaint.  No vaginal bleeding, contractions, or rupture of membranes.  Good fetal movements.    Eating well without significant nausea/vomiting  Gaining weight  Taking prenatal vitamins, folic acid 4 mg, and baby aspirin  Taking hydroxyurea and vitamin D     Doing well with Connected [facility]OM  Terconazole cream help with Candida vaginitis    Hct today over 26.5  Normal [id number] and maternal quad screen    Discussed with patient MFM's findings and recommendations  Discussed care for sickle cell anemia patients in pregnancy  Consider blood transfusion of hematocrit <25%  Discussed blood transfusion  Discussed antibiotic prophylaxis for pyelonephritis    Back in 2 weeks

## 2024-10-15 ENCOUNTER — LAB VISIT (OUTPATIENT)
Dept: LAB | Facility: HOSPITAL | Age: 29
End: 2024-10-15
Attending: INTERNAL MEDICINE
Payer: COMMERCIAL

## 2024-10-15 ENCOUNTER — OFFICE VISIT (OUTPATIENT)
Dept: PRIMARY CARE CLINIC | Facility: CLINIC | Age: 29
End: 2024-10-15
Payer: COMMERCIAL

## 2024-10-15 VITALS
HEIGHT: 61 IN | OXYGEN SATURATION: 99 % | HEART RATE: 92 BPM | SYSTOLIC BLOOD PRESSURE: 98 MMHG | BODY MASS INDEX: 21.77 KG/M2 | WEIGHT: 115.31 LBS | DIASTOLIC BLOOD PRESSURE: 52 MMHG

## 2024-10-15 DIAGNOSIS — E87.6 HYPOKALEMIA: ICD-10-CM

## 2024-10-15 DIAGNOSIS — R60.0 BILATERAL LEG EDEMA: ICD-10-CM

## 2024-10-15 DIAGNOSIS — M25.472 BILATERAL SWELLING OF FEET AND ANKLES: ICD-10-CM

## 2024-10-15 DIAGNOSIS — E87.6 HYPOKALEMIA: Primary | ICD-10-CM

## 2024-10-15 DIAGNOSIS — D57.1 HEMOGLOBIN SS DISEASE WITHOUT CRISIS: ICD-10-CM

## 2024-10-15 DIAGNOSIS — R79.0 LOW MAGNESIUM LEVEL: ICD-10-CM

## 2024-10-15 DIAGNOSIS — M25.471 BILATERAL SWELLING OF FEET AND ANKLES: ICD-10-CM

## 2024-10-15 DIAGNOSIS — Z23 NEED FOR VACCINATION: ICD-10-CM

## 2024-10-15 DIAGNOSIS — D57.1 MATERNAL SICKLE CELL ANEMIA AFFECTING PREGNANCY, ANTEPARTUM: ICD-10-CM

## 2024-10-15 DIAGNOSIS — D84.821 DRUG-INDUCED IMMUNODEFICIENCY: ICD-10-CM

## 2024-10-15 DIAGNOSIS — M25.474 BILATERAL SWELLING OF FEET AND ANKLES: ICD-10-CM

## 2024-10-15 DIAGNOSIS — O23.02 PYELONEPHRITIS AFFECTING PREGNANCY IN SECOND TRIMESTER: ICD-10-CM

## 2024-10-15 DIAGNOSIS — D57.00 HEMOGLOBIN SS DISEASE WITH CRISIS: ICD-10-CM

## 2024-10-15 DIAGNOSIS — M25.475 BILATERAL SWELLING OF FEET AND ANKLES: ICD-10-CM

## 2024-10-15 DIAGNOSIS — Z87.59: Primary | ICD-10-CM

## 2024-10-15 DIAGNOSIS — O99.019 MATERNAL SICKLE CELL ANEMIA AFFECTING PREGNANCY, ANTEPARTUM: ICD-10-CM

## 2024-10-15 DIAGNOSIS — Z87.440: Primary | ICD-10-CM

## 2024-10-15 DIAGNOSIS — Z79.899 DRUG-INDUCED IMMUNODEFICIENCY: ICD-10-CM

## 2024-10-15 PROBLEM — Z3A.18 PREGNANCY WITH 18 COMPLETED WEEKS GESTATION: Status: RESOLVED | Noted: 2024-09-30 | Resolved: 2024-10-15

## 2024-10-15 PROBLEM — Z3A.21 21 WEEKS GESTATION OF PREGNANCY: Status: ACTIVE | Noted: 2024-10-15

## 2024-10-15 LAB
ANION GAP SERPL CALC-SCNC: 9 MMOL/L (ref 8–16)
BACTERIA #/AREA URNS AUTO: NORMAL /HPF
BILIRUB UR QL STRIP: NEGATIVE
BUN SERPL-MCNC: 8 MG/DL (ref 6–20)
CALCIUM SERPL-MCNC: 9.7 MG/DL (ref 8.7–10.5)
CHLORIDE SERPL-SCNC: 106 MMOL/L (ref 95–110)
CLARITY UR REFRACT.AUTO: CLEAR
CO2 SERPL-SCNC: 22 MMOL/L (ref 23–29)
COLOR UR AUTO: YELLOW
CREAT SERPL-MCNC: 0.8 MG/DL (ref 0.5–1.4)
EST. GFR  (NO RACE VARIABLE): >60 ML/MIN/1.73 M^2
GLUCOSE SERPL-MCNC: 74 MG/DL (ref 70–110)
GLUCOSE UR QL STRIP: NEGATIVE
HGB UR QL STRIP: NEGATIVE
KETONES UR QL STRIP: NEGATIVE
LEUKOCYTE ESTERASE UR QL STRIP: ABNORMAL
MAGNESIUM SERPL-MCNC: 1.7 MG/DL (ref 1.6–2.6)
MICROSCOPIC COMMENT: NORMAL
NITRITE UR QL STRIP: NEGATIVE
PH UR STRIP: 7 [PH] (ref 5–8)
POTASSIUM SERPL-SCNC: 3.3 MMOL/L (ref 3.5–5.1)
PROT UR QL STRIP: NEGATIVE
SODIUM SERPL-SCNC: 137 MMOL/L (ref 136–145)
SP GR UR STRIP: 1 (ref 1–1.03)
SQUAMOUS #/AREA URNS AUTO: 1 /HPF
URN SPEC COLLECT METH UR: ABNORMAL
WBC #/AREA URNS AUTO: 1 /HPF (ref 0–5)

## 2024-10-15 PROCEDURE — 83735 ASSAY OF MAGNESIUM: CPT | Performed by: INTERNAL MEDICINE

## 2024-10-15 PROCEDURE — 3008F BODY MASS INDEX DOCD: CPT | Mod: CPTII,S$GLB,, | Performed by: INTERNAL MEDICINE

## 2024-10-15 PROCEDURE — 87086 URINE CULTURE/COLONY COUNT: CPT | Performed by: INTERNAL MEDICINE

## 2024-10-15 PROCEDURE — 90472 IMMUNIZATION ADMIN EACH ADD: CPT | Mod: S$GLB,,, | Performed by: INTERNAL MEDICINE

## 2024-10-15 PROCEDURE — 99999 PR PBB SHADOW E&M-EST. PATIENT-LVL III: CPT | Mod: PBBFAC,,, | Performed by: INTERNAL MEDICINE

## 2024-10-15 PROCEDURE — 81001 URINALYSIS AUTO W/SCOPE: CPT | Performed by: INTERNAL MEDICINE

## 2024-10-15 PROCEDURE — 90656 IIV3 VACC NO PRSV 0.5 ML IM: CPT | Mod: S$GLB,,, | Performed by: INTERNAL MEDICINE

## 2024-10-15 PROCEDURE — 36415 COLL VENOUS BLD VENIPUNCTURE: CPT | Mod: PN | Performed by: INTERNAL MEDICINE

## 2024-10-15 PROCEDURE — 1159F MED LIST DOCD IN RCRD: CPT | Mod: CPTII,S$GLB,, | Performed by: INTERNAL MEDICINE

## 2024-10-15 PROCEDURE — 99215 OFFICE O/P EST HI 40 MIN: CPT | Mod: 25,S$GLB,, | Performed by: INTERNAL MEDICINE

## 2024-10-15 PROCEDURE — 3074F SYST BP LT 130 MM HG: CPT | Mod: CPTII,S$GLB,, | Performed by: INTERNAL MEDICINE

## 2024-10-15 PROCEDURE — 3078F DIAST BP <80 MM HG: CPT | Mod: CPTII,S$GLB,, | Performed by: INTERNAL MEDICINE

## 2024-10-15 PROCEDURE — 80048 BASIC METABOLIC PNL TOTAL CA: CPT | Performed by: INTERNAL MEDICINE

## 2024-10-15 PROCEDURE — 90471 IMMUNIZATION ADMIN: CPT | Mod: S$GLB,,, | Performed by: INTERNAL MEDICINE

## 2024-10-15 PROCEDURE — 90677 PCV20 VACCINE IM: CPT | Mod: S$GLB,,, | Performed by: INTERNAL MEDICINE

## 2024-10-15 PROCEDURE — 1111F DSCHRG MED/CURRENT MED MERGE: CPT | Mod: CPTII,S$GLB,, | Performed by: INTERNAL MEDICINE

## 2024-10-15 RX ORDER — HYDROCODONE BITARTRATE AND ACETAMINOPHEN 10; 325 MG/1; MG/1
1 TABLET ORAL EVERY 8 HOURS PRN
Qty: 30 TABLET | Refills: 0 | Status: SHIPPED | OUTPATIENT
Start: 2024-10-15

## 2024-10-15 RX ORDER — FOLIC ACID 1 MG/1
4 TABLET ORAL DAILY
Qty: 120 TABLET | Refills: 2 | Status: SHIPPED | OUTPATIENT
Start: 2024-10-15 | End: 2025-01-13

## 2024-10-15 RX ORDER — POTASSIUM CHLORIDE 20 MEQ/1
20 TABLET, EXTENDED RELEASE ORAL DAILY
Qty: 60 TABLET | Refills: 3 | Status: SHIPPED | OUTPATIENT
Start: 2024-10-15

## 2024-10-15 NOTE — PROGRESS NOTES
[facility]/Pediatric[facility] Progress Note    Chief Complaint     Preventative care, follow-up      Subjective:      History of Present Illness:  Lana Chou is a 29 y.o. female here for follow-up.    Currently 21 weeks pregnant  -currently takes aspirin    Recent hospitalization 9/28-9/30 for E.Coli urosepsis: She was initially treated with Vanc and Ertapenem x 3 days and then discharged on takes macrobid every night since finishing course of nitrofurantoin monohyd 100 mg BID x 10 days upon discharge   BC and UC (9/28) both E. 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 Reyes 9/24, has follow up 11/25/24.  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  -does not take iron, stopped ibuprofen and Hydrea 500mg tid due to pregnancy  -still taking folic acid 1mg daily with zofran prn   -needs refill of HC 10/325 1/2 tab prn     Hypokalemia: K 3.0 on 9/29 but not repeated    Low Mag: level 1.5 9/29      Vit D def'y: still takes Vit D 50,000 IU     Preventative care: would like flu shot today; also needs Prevnar 20; Tdap at 24 WGA; Needs COVID booster    Edema to bilateral ankles: started worsening a few weeks ago. Would like to discuss options to manage this.     Bilateral ovarian cyst pain during mid-cycle: TVUS: 3/2023  2.8 x 2.0 x 2.2 cm left ovarian hemorrhagic cyst. No evidence of ovarian torsion.     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; sexually active unprotected with partner x 7 years; lives in [place] with mom - wants to leave the state    Past Medical History:  Past Medical History:   Diagnosis Date    Bilateral leg edema 10/15/2024    Chronic kidney disease (CKD) 04/09/2018    Emesis 11/23/2022    Encounter for surveillance of vaginal ring hormonal contraceptive device 11/15/2023    LGSIL on  Pap smear of cervix 06/01/2017    LLQ abdominal pain 03/02/2023    Pregnancy with 18 completed weeks gestation 09/30/2024    Sickle cell anemia     Sickle cell disease     Sickle cell pain crisis 05/29/2024    Sickle-cell disease without crisis     UTI (urinary tract infection) 03/02/2023       Past Surgical History:  Past Surgical History:   Procedure Laterality Date    BREAST LUMPECTOMY      CARDIAC SURGERY      port insertion    NEPHRECTOMY         Allergies:  Review of patient's allergies indicates:   Allergen Reactions    Rocephin [ceftriaxone] Shortness Of Breath, Itching and Anxiety     Nausea, vomiting. No hives, swelling, or anaphylaxis. Can tolerate if necessary, but would avoid if possible.       Home Medications:  Current Outpatient Medications   Medication Sig Dispense Refill    acetaminophen (TYLENOL) 500 MG tablet Take 1 tablet (500 mg total) by mouth every 6 (six) hours as needed. 20 tablet 0    aspirin 81 mg Cap Take 81 mg by mouth.      cholecalciferol, vitamin D3, 1,250 mcg (50,000 unit) capsule Take 1 capsule (50,000 Units total) by mouth every 7 days. 12 capsule 3    ondansetron (ZOFRAN) 4 MG tablet Take 1 tablet (4 mg total) by mouth every 6 (six) hours. 12 tablet 0    triamcinolone acetonide 0.1% (KENALOG) 0.1 % cream Apply topically to affected area 2 times per day for 7 to 10 days 15 g 1    folic acid (FOLVITE) 1 MG tablet Take 4 tablets (4 mg total) by mouth once daily. 120 tablet 2    HYDROcodone-acetaminophen (NORCO)  mg per tablet Take 1 tablet by mouth every 8 (eight) hours as needed for Pain. 30 tablet 0     No current facility-administered medications for this visit.        Family History:  Family History   Problem Relation Name Age of Onset    Sickle cell trait Father      Sickle cell trait Mother      Sickle cell trait Sister      Sickle cell trait Sister      Breast cancer Neg Hx      Colon cancer Neg Hx      Ovarian cancer Neg Hx         Social History:  Social History  "    Tobacco Use    Smoking status: Former     Types: Vaping with nicotine     Start date: 2022     Quit date: 2024     Years since quittin.2    Smokeless tobacco: Never   Substance Use Topics    Alcohol use: Not Currently    Drug use: No       Review of Systems:  Pertinent positives and negatives listed in HPI. All other systems are reviewed and are negative.    Health Maintaince :   Health Maintenance Topics with due status: Not Due       Topic Last Completion Date    TETANUS VACCINE 2022    RSV Vaccine (Age 60+ and Pregnant patients) Not Due           Eye:   Dental:   Pap 2023 normal neg HPV     ImmunizationsThe ASCVD Risk score (Mikhail KAUR, et al., 2019) failed to calculate for the following reasons:    The 2019 ASCVD risk score is only valid for ages 40 to 79      Objective:   BP (!) 98/52   Pulse 92   Ht 5' 1" (1.549 m)   Wt 52.3 kg (115 lb 4.8 oz)   LMP 2024 (Exact Date)   SpO2 99%   BMI 21.79 kg/m²      Body mass index is 21.79 kg/m².       Physical Examination:  General: Alert and awake in no apparent distress  HEENT: Normocephalic and atraumatic; Tms WNL  Eyes:  PERRL; EOMi with anicteric sclera and clear conjunctivae  Mouth:  Oropharynx clear and without exudate; moist mucous membranes  Neck:   Cervical nodes not enlarged (No submental, submandibular, preauricular, posterior auricular or occipital adenopathy); supple; no bruits  Cardio:  Regular rate and rhythm with normal S1 and S2; no murmurs or rubs  Resp:  CTAB; respirations unlabored; no wheezes, crackles or rhonchi  Abdom: Soft, NTND with normoactive bowel sounds; negative HSM  Extrem: Warm and well-perfused with no clubbing, cyanosis. Edema to bilateral ankles and feet +2  Skin:  No rashes, lesions, or color changes  Pulses:  2+ and symmetric distally  Neuro:  AAOx3; cooperative and pleasant with no focal deficits    Laboratory:      Most Recent Data:  Lab Results   Component Value Date    WBC 14.59 (H) 10/14/2024    " HGB 8.8 (L) 10/14/2024    HCT 26.5 (L) 10/14/2024     10/14/2024    CHOL 117 (L) 06/07/2023    TRIG 118 06/07/2023    HDL 40 06/07/2023    ALT 11 09/29/2024    AST 23 09/29/2024     10/15/2024    K 3.3 (L) 10/15/2024     10/15/2024    BUN 8 10/15/2024    CO2 22 (L) 10/15/2024              CBC:   WBC   Date Value Ref Range Status   10/14/2024 14.59 (H) 3.90 - 12.70 K/uL Final     Hemoglobin   Date Value Ref Range Status   10/14/2024 8.8 (L) 12.0 - 16.0 g/dL Final     POC Hematocrit   Date Value Ref Range Status   08/30/2021 19 (LL) 36 - 54 %PCV Final     Hematocrit   Date Value Ref Range Status   10/14/2024 26.5 (L) 37.0 - 48.5 % Final     Platelets   Date Value Ref Range Status   10/14/2024 363 150 - 450 K/uL Final     MCV   Date Value Ref Range Status   10/14/2024 89 82 - 98 fL Final     RDW   Date Value Ref Range Status   10/14/2024 16.7 (H) 11.5 - 14.5 % Final     BMP:   Sodium   Date Value Ref Range Status   10/15/2024 137 136 - 145 mmol/L Final     Potassium   Date Value Ref Range Status   10/15/2024 3.3 (L) 3.5 - 5.1 mmol/L Final     Chloride   Date Value Ref Range Status   10/15/2024 106 95 - 110 mmol/L Final     CO2   Date Value Ref Range Status   10/15/2024 22 (L) 23 - 29 mmol/L Final     BUN   Date Value Ref Range Status   10/15/2024 8 6 - 20 mg/dL Final     Creatinine   Date Value Ref Range Status   10/15/2024 0.8 0.5 - 1.4 mg/dL Final     Glucose   Date Value Ref Range Status   10/15/2024 74 70 - 110 mg/dL Final     Calcium   Date Value Ref Range Status   10/15/2024 9.7 8.7 - 10.5 mg/dL Final     Magnesium   Date Value Ref Range Status   10/15/2024 1.7 1.6 - 2.6 mg/dL Final     LFTs:   Total Protein   Date Value Ref Range Status   09/29/2024 6.2 6.0 - 8.4 g/dL Final     Albumin   Date Value Ref Range Status   09/29/2024 2.3 (L) 3.5 - 5.2 g/dL Final     Total Bilirubin   Date Value Ref Range Status   09/29/2024 1.7 (H) 0.1 - 1.0 mg/dL Final     Comment:     For infants and newborns,  "interpretation of results should be based  on gestational age, weight and in agreement with clinical  observations.    Premature Infant recommended reference ranges:  Up to 24 hours.............<8.0 mg/dL  Up to 48 hours............<12.0 mg/dL  3-5 days..................<15.0 mg/dL  6-29 days.................<15.0 mg/dL       AST   Date Value Ref Range Status   09/29/2024 23 10 - 40 U/L Final     Alkaline Phosphatase   Date Value Ref Range Status   09/29/2024 88 55 - 135 U/L Final     ALT   Date Value Ref Range Status   09/29/2024 11 10 - 44 U/L Final     Coags: No results found for: "INR", "PROTIME", "PTT"  FLP:      Lab Results   Component Value Date    CHOL 117 (L) 06/07/2023    CHOL 113 (L) 11/25/2022    CHOL 103 07/01/2022     Lab Results   Component Value Date    HDL 40 06/07/2023    HDL 39 (L) 11/25/2022    HDL 39 (L) 07/01/2022     Lab Results   Component Value Date    LDLCALC 53.4 (L) 06/07/2023    LDLCALC 49.4 (L) 11/25/2022    LDLCALC 49 07/01/2022     Lab Results   Component Value Date    TRIG 118 06/07/2023    TRIG 123 11/25/2022    TRIG 96 07/01/2022     Lab Results   Component Value Date    CHOLHDL 34.2 06/07/2023    CHOLHDL 34.5 11/25/2022    CHOLHDL 2.64 07/01/2022      DM:      LDL Cholesterol   Date Value Ref Range Status   06/07/2023 53.4 (L) 63.0 - 159.0 mg/dL Final     Comment:     The National Cholesterol Education Program (NCEP) has set the  following guidelines (reference values) for LDL Cholesterol:  Optimal.......................<130 mg/dL  Borderline High...............130-159 mg/dL  High..........................160-189 mg/dL  Very High.....................>190 mg/dL       Creatinine   Date Value Ref Range Status   10/15/2024 0.8 0.5 - 1.4 mg/dL Final     Thyroid: No results found for: "TSH", "FREET4", "A5RWTLN", "C7YHPIS", "THYROIDAB"  Anemia:   Iron   Date Value Ref Range Status   03/03/2023 145 30 - 160 ug/dL Final     TIBC   Date Value Ref Range Status   03/03/2023 204 (L) 250 - 450 " ug/dL Final     Ferritin   Date Value Ref Range Status   12/28/2023 448 (H) 16 - 154 ng/mL Final     Vitamin B-12   Date Value Ref Range Status   11/23/2022 429 210 - 950 pg/mL Final     Folate   Date Value Ref Range Status   12/28/2023 9.3 ng/mL Final     Comment:                                Reference Range                             Low:           <3.4                             Borderline:    3.4-5.4                             Normal:        >5.4          Cardiac:   Troponin I   Date Value Ref Range Status   06/06/2024 0.006 0.000 - 0.026 ng/mL Final     Comment:     The reference interval for Troponin I represents the 99th percentile   cutoff   for our facility and is consistent with 3rd generation assay   performance.       Urinalysis:   Urine Culture, Routine   Date Value Ref Range Status   09/28/2024 ESCHERICHIA COLI ESBL  >100,000 cfu/ml   (A)  Final     Color, UA   Date Value Ref Range Status   10/15/2024 Yellow Yellow, Straw, Faith Final     Specific Gravity, UA   Date Value Ref Range Status   10/15/2024 1.005 1.005 - 1.030 Final     Nitrite, UA   Date Value Ref Range Status   10/15/2024 Negative Negative Final     Ketones, UA   Date Value Ref Range Status   10/15/2024 Negative Negative Final     Urobilinogen, UA   Date Value Ref Range Status   09/28/2024 Negative <2.0 EU/dL Final     WBC, UA   Date Value Ref Range Status   10/15/2024 1 0 - 5 /hpf Final       Other Results:  EKG (my interpretation):     Radiology:  X-Ray Chest 1 View  Narrative: EXAMINATION:  XR CHEST 1 VIEW    CLINICAL HISTORY:  Fever, unspecified    TECHNIQUE:  Single frontal view of the chest was performed.    COMPARISON:  06/06/2024 02/22/2024, 05/14/2022    FINDINGS:  The lungs are symmetrically inflated with no mass, nodule, pneumothorax, airspace consolidation or pleural effusion.  There is prominence of the pulmonary vasculature particularly the inter lobar vessels on the right.  In addition to this prominence, there appears  to be patchy airspace opacification in the right lower lobe.  There is mild prominence of the cardiac silhouette however this may be related to the AP technique.  The mediastinum, Osseous and soft tissue structures are normal.  Impression: 1. There are no definitive findings of pneumonia for on this exam however the prominence of the right lower lobe may suggest early pneumonia.  A PA and lateral radiograph would be very helpful to determine if this is a real finding or is related to the patient positioning/technique.    Electronically signed by: Yessenia Muñiz MD  Date:    09/28/2024  Time:    13:29          Assessment/Plan     Lana Chou is a 29 y.o. female with:  1. Maternal pyelonephritis, history of  -     CULTURE, URINE  -     Urinalysis  -     Microalbumin/Creatinine Ratio, Urine; Future; Expected date: 10/15/2024    2. Drug-induced immunodeficiency  Assessment & Plan:  Continue to see Hematologist, Dr. Reji Reyes, on 10/21/2024    Flu vaccine and Prevnar 20 today;   Should get Tdap at 24 wks  Get Covid vaccine       3. Hemoglobin SS disease with crisis  -     HYDROcodone-acetaminophen (NORCO)  mg per tablet; Take 1 tablet by mouth every 8 (eight) hours as needed for Pain.  Dispense: 30 tablet; Refill: 0    4. Bilateral swelling of feet and ankles  Assessment & Plan:  Elevate legs; wear compression socks  Cont ASA 81 mg daily to prevent pre-eclampsia  Minimize sodium intake  Check U/A and microalbumin today       5. Hypokalemia  Assessment & Plan:  Green leafy veggies (cooked spinach/broccoli), fresh fruits especially bananas/oranges/cantaloupe/honeydew/grapefruit; dried fruits ie prunes, raisins, dates;  sweet potatoes, zucchini, mushrooms, potatoes and sweet potatoes, coconut water     Check BMP, Mag today    Orders:  -     Basic Metabolic Panel; Future; Expected date: 10/15/2024  -     Magnesium; Future; Expected date: 10/15/2024    6. Need for vaccination  -     Influenza - Trivalent - PF  (ADULT)  -     (VFC) PCV20 (Prevnar 20) IM vaccine (>/= 6 wks)    7. Pyelonephritis affecting pregnancy in second trimester  Overview:  E. Coli urosepsis 9/28    Assessment & Plan:  Repeat UC today- pt is asymptomatic   BC and UC (9/28) both E. Coli      8. Bilateral leg edema  Assessment & Plan:  Elevate legs; wear compression socks  Cont ASA 81 mg daily   Minimize sodium intake      9. Maternal sickle cell anemia affecting pregnancy, antepartum  Assessment & Plan:  As per Dr. Moe, will increase folic acid 4mg daily  Check CBC every wk with PRBC prn   F/u with OB Dr. Landers weekly and with Hematology, Dr. Reyes 11/25/2024   Hydrate 6-8 glasses of water  Rest, take PNV daily   Refill HC 10/325 1/2 tab prn severe pain refractory to Tyelnol 1gm po tid     Orders:  -     folic acid (FOLVITE) 1 MG tablet; Take 4 tablets (4 mg total) by mouth once daily.  Dispense: 120 tablet; Refill: 2    10. 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 Low magnesium level  Assessment & Plan:  Check mag today       Other orders  -     Urinalysis Microscopic          Crystal Cha MS4  [employer] [employer]       I spent 40 min with this pt that includes face to face time and non-face to face time preparing to see the patient (eg, review of tests), obtaining and/or reviewing separately obtained history, documenting clinical information in the electronic or other health record, independently interpreting results and communicating results to the patient/family/caregiver, or care coordinator.   Code Status:     Luann Loza MD

## 2024-10-15 NOTE — ASSESSMENT & PLAN NOTE
Continue to see Hematologist, Dr. Reji Reyes, on 10/21/2024    Flu vaccine and Prevnar 20 today;   Should get Tdap at 24 wks  Get Covid vaccine

## 2024-10-16 LAB — BACTERIA UR CULT: NO GROWTH

## 2024-10-16 NOTE — ASSESSMENT & PLAN NOTE
Currently taking ASA 81 mg daily to prevent pre-eclampsia  Take PNV daily   Should be taking folic acid 4mg daily but only taking 1 mg daily - will confer with her OB Dr. Tyson Landers  Hydrating with 6-8 glasses of water daily  No longer taking iron, motrin, hydroxyurea - refill HC 10/325 1/2 tab prn severe pain refractory to Tylenol 1gm po tid.   Keep appt with H/O Dr. Reyes 11/25/2024    Check U/A, urine microalbumin today due to LE edema

## 2024-10-16 NOTE — ASSESSMENT & PLAN NOTE
Elevate legs; wear compression socks  Cont ASA 81 mg daily to prevent pre-eclampsia  Minimize sodium intake  Check U/A and microalbumin today

## 2024-10-16 NOTE — ASSESSMENT & PLAN NOTE
Green leafy veggies (cooked spinach/broccoli), fresh fruits especially bananas/oranges/cantaloupe/honeydew/grapefruit; dried fruits ie prunes, raisins, dates;  sweet potatoes, zucchini, mushrooms, potatoes and sweet potatoes, coconut water     Check BMP, Mag today

## 2024-10-16 NOTE — ASSESSMENT & PLAN NOTE
As per Dr. Moe, will increase folic acid 4mg daily  Check CBC every wk with PRBC prn   F/u with OB Dr. Landers weekly and with Hematology, Dr. Reyes 11/25/2024   Hydrate 6-8 glasses of water  Rest, take PNV daily   Refill HC 10/325 1/2 tab prn severe pain refractory to Tyelnol 1gm po tid

## 2024-10-18 ENCOUNTER — PATIENT MESSAGE (OUTPATIENT)
Dept: PRIMARY CARE CLINIC | Facility: CLINIC | Age: 29
End: 2024-10-18
Payer: COMMERCIAL

## 2024-10-18 ENCOUNTER — TELEPHONE (OUTPATIENT)
Dept: PRIMARY CARE CLINIC | Facility: CLINIC | Age: 29
End: 2024-10-18
Payer: COMMERCIAL

## 2024-10-18 NOTE — TELEPHONE ENCOUNTER
----- Message from Sony Nunez sent at 10/18/2024  1:03 PM CDT -----  Regarding: FW: return call  Contact: pt    ----- Message -----  From: Jody Sarkar  Sent: 10/18/2024  12:48 PM CDT  To: [facility] Primary [facility] Clinical Support Staff  Subject: return call                                      Type:  Patient Returning Call    Who Called: Patient  Who Left Message for Patient: Mini Edwards MA  Does the patient know what this is regarding?: LAURENCE  Would the patient rather a call back or a response via [facility]? Call back  Best Call Back Number:   [phone]  Additional Information: Please call, Thank You

## 2024-10-21 ENCOUNTER — LAB VISIT (OUTPATIENT)
Dept: LAB | Facility: HOSPITAL | Age: 29
End: 2024-10-21
Attending: INTERNAL MEDICINE
Payer: COMMERCIAL

## 2024-10-21 DIAGNOSIS — D57.1 HEMOGLOBIN SS DISEASE WITHOUT CRISIS: ICD-10-CM

## 2024-10-21 LAB
BASOPHILS # BLD AUTO: 0.04 K/UL (ref 0–0.2)
BASOPHILS NFR BLD: 0.3 % (ref 0–1.9)
DIFFERENTIAL METHOD BLD: ABNORMAL
EOSINOPHIL # BLD AUTO: 0.1 K/UL (ref 0–0.5)
EOSINOPHIL NFR BLD: 1 % (ref 0–8)
ERYTHROCYTE [DISTWIDTH] IN BLOOD BY AUTOMATED COUNT: 16.9 % (ref 11.5–14.5)
HCT VFR BLD AUTO: 25.5 % (ref 37–48.5)
HGB BLD-MCNC: 8.4 G/DL (ref 12–16)
IMM GRANULOCYTES # BLD AUTO: 0.1 K/UL (ref 0–0.04)
IMM GRANULOCYTES NFR BLD AUTO: 0.7 % (ref 0–0.5)
LYMPHOCYTES # BLD AUTO: 1.7 K/UL (ref 1–4.8)
LYMPHOCYTES NFR BLD: 12.5 % (ref 18–48)
MCH RBC QN AUTO: 28.6 PG (ref 27–31)
MCHC RBC AUTO-ENTMCNC: 32.9 G/DL (ref 32–36)
MCV RBC AUTO: 87 FL (ref 82–98)
MONOCYTES # BLD AUTO: 1.9 K/UL (ref 0.3–1)
MONOCYTES NFR BLD: 14 % (ref 4–15)
NEUTROPHILS # BLD AUTO: 9.6 K/UL (ref 1.8–7.7)
NEUTROPHILS NFR BLD: 71.5 % (ref 38–73)
NRBC BLD-RTO: 1 /100 WBC
PLATELET # BLD AUTO: 340 K/UL (ref 150–450)
PMV BLD AUTO: 9.5 FL (ref 9.2–12.9)
RBC # BLD AUTO: 2.94 M/UL (ref 4–5.4)
WBC # BLD AUTO: 13.41 K/UL (ref 3.9–12.7)

## 2024-10-21 PROCEDURE — 36415 COLL VENOUS BLD VENIPUNCTURE: CPT | Performed by: INTERNAL MEDICINE

## 2024-10-21 PROCEDURE — 85025 COMPLETE CBC W/AUTO DIFF WBC: CPT | Performed by: INTERNAL MEDICINE

## 2024-10-22 ENCOUNTER — PATIENT MESSAGE (OUTPATIENT)
Dept: PRIMARY CARE CLINIC | Facility: CLINIC | Age: 29
End: 2024-10-22
Payer: COMMERCIAL

## 2024-10-28 ENCOUNTER — LAB VISIT (OUTPATIENT)
Dept: LAB | Facility: HOSPITAL | Age: 29
End: 2024-10-28
Attending: INTERNAL MEDICINE
Payer: COMMERCIAL

## 2024-10-28 ENCOUNTER — TELEPHONE (OUTPATIENT)
Dept: PRIMARY CARE CLINIC | Facility: CLINIC | Age: 29
End: 2024-10-28
Payer: COMMERCIAL

## 2024-10-28 ENCOUNTER — TELEPHONE (OUTPATIENT)
Dept: MATERNAL FETAL MEDICINE | Facility: CLINIC | Age: 29
End: 2024-10-28
Payer: COMMERCIAL

## 2024-10-28 ENCOUNTER — TELEPHONE (OUTPATIENT)
Dept: HEMATOLOGY/ONCOLOGY | Facility: CLINIC | Age: 29
End: 2024-10-28
Payer: COMMERCIAL

## 2024-10-28 DIAGNOSIS — D57.1 HEMOGLOBIN SS DISEASE WITHOUT CRISIS: ICD-10-CM

## 2024-10-28 DIAGNOSIS — E87.6 HYPOKALEMIA: Primary | ICD-10-CM

## 2024-10-28 DIAGNOSIS — E87.6 HYPOKALEMIA: ICD-10-CM

## 2024-10-28 LAB
BASOPHILS # BLD AUTO: 0.06 K/UL (ref 0–0.2)
BASOPHILS NFR BLD: 0.4 % (ref 0–1.9)
DIFFERENTIAL METHOD BLD: ABNORMAL
EOSINOPHIL # BLD AUTO: 0.2 K/UL (ref 0–0.5)
EOSINOPHIL NFR BLD: 1.1 % (ref 0–8)
ERYTHROCYTE [DISTWIDTH] IN BLOOD BY AUTOMATED COUNT: 16.7 % (ref 11.5–14.5)
HCT VFR BLD AUTO: 23.3 % (ref 37–48.5)
HGB BLD-MCNC: 7.5 G/DL (ref 12–16)
IMM GRANULOCYTES # BLD AUTO: 0.28 K/UL (ref 0–0.04)
IMM GRANULOCYTES NFR BLD AUTO: 1.9 % (ref 0–0.5)
LYMPHOCYTES # BLD AUTO: 1.4 K/UL (ref 1–4.8)
LYMPHOCYTES NFR BLD: 9.4 % (ref 18–48)
MAGNESIUM SERPL-MCNC: 1.8 MG/DL (ref 1.6–2.6)
MCH RBC QN AUTO: 28.7 PG (ref 27–31)
MCHC RBC AUTO-ENTMCNC: 32.2 G/DL (ref 32–36)
MCV RBC AUTO: 89 FL (ref 82–98)
MONOCYTES # BLD AUTO: 2.1 K/UL (ref 0.3–1)
MONOCYTES NFR BLD: 14.5 % (ref 4–15)
NEUTROPHILS # BLD AUTO: 10.5 K/UL (ref 1.8–7.7)
NEUTROPHILS NFR BLD: 72.7 % (ref 38–73)
NRBC BLD-RTO: 1 /100 WBC
PLATELET # BLD AUTO: 282 K/UL (ref 150–450)
PLATELET BLD QL SMEAR: ABNORMAL
PMV BLD AUTO: 9.5 FL (ref 9.2–12.9)
RBC # BLD AUTO: 2.61 M/UL (ref 4–5.4)
SICKLE CELLS BLD QL SMEAR: ABNORMAL
WBC # BLD AUTO: 14.5 K/UL (ref 3.9–12.7)

## 2024-10-28 PROCEDURE — 85025 COMPLETE CBC W/AUTO DIFF WBC: CPT | Performed by: INTERNAL MEDICINE

## 2024-10-28 PROCEDURE — 83735 ASSAY OF MAGNESIUM: CPT | Performed by: INTERNAL MEDICINE

## 2024-10-28 PROCEDURE — 36415 COLL VENOUS BLD VENIPUNCTURE: CPT | Performed by: INTERNAL MEDICINE

## 2024-10-29 ENCOUNTER — LAB VISIT (OUTPATIENT)
Dept: LAB | Facility: HOSPITAL | Age: 29
End: 2024-10-29
Attending: INTERNAL MEDICINE
Payer: COMMERCIAL

## 2024-10-29 DIAGNOSIS — D57.1 HEMOGLOBIN SS DISEASE WITHOUT CRISIS: ICD-10-CM

## 2024-10-29 DIAGNOSIS — E87.6 HYPOKALEMIA: ICD-10-CM

## 2024-10-29 LAB
ABO + RH BLD: NORMAL
ANION GAP SERPL CALC-SCNC: 9 MMOL/L (ref 8–16)
BLD GP AB SCN CELLS X3 SERPL QL: NORMAL
BUN SERPL-MCNC: 9 MG/DL (ref 6–20)
CALCIUM SERPL-MCNC: 9.3 MG/DL (ref 8.7–10.5)
CHLORIDE SERPL-SCNC: 107 MMOL/L (ref 95–110)
CO2 SERPL-SCNC: 20 MMOL/L (ref 23–29)
CREAT SERPL-MCNC: 0.9 MG/DL (ref 0.5–1.4)
EST. GFR  (NO RACE VARIABLE): >60 ML/MIN/1.73 M^2
GLUCOSE SERPL-MCNC: 85 MG/DL (ref 70–110)
POTASSIUM SERPL-SCNC: 3.7 MMOL/L (ref 3.5–5.1)
SODIUM SERPL-SCNC: 136 MMOL/L (ref 136–145)
SPECIMEN OUTDATE: NORMAL

## 2024-10-29 PROCEDURE — 86999 UNLISTED TRANSFUSION MED PX: CPT | Performed by: INTERNAL MEDICINE

## 2024-10-29 PROCEDURE — 80048 BASIC METABOLIC PNL TOTAL CA: CPT | Performed by: INTERNAL MEDICINE

## 2024-10-29 PROCEDURE — 85660 RBC SICKLE CELL TEST: CPT | Performed by: INTERNAL MEDICINE

## 2024-10-29 PROCEDURE — 86900 BLOOD TYPING SEROLOGIC ABO: CPT | Performed by: INTERNAL MEDICINE

## 2024-10-29 PROCEDURE — 86901 BLOOD TYPING SEROLOGIC RH(D): CPT | Performed by: INTERNAL MEDICINE

## 2024-10-29 PROCEDURE — 36415 COLL VENOUS BLD VENIPUNCTURE: CPT | Performed by: INTERNAL MEDICINE

## 2024-10-29 PROCEDURE — 86920 COMPATIBILITY TEST SPIN: CPT | Performed by: INTERNAL MEDICINE

## 2024-10-30 ENCOUNTER — INFUSION (OUTPATIENT)
Dept: INFUSION THERAPY | Facility: HOSPITAL | Age: 29
End: 2024-10-30
Attending: INTERNAL MEDICINE
Payer: COMMERCIAL

## 2024-10-30 VITALS
RESPIRATION RATE: 16 BRPM | HEART RATE: 80 BPM | TEMPERATURE: 98 F | OXYGEN SATURATION: 100 % | DIASTOLIC BLOOD PRESSURE: 55 MMHG | SYSTOLIC BLOOD PRESSURE: 91 MMHG

## 2024-10-30 DIAGNOSIS — D57.1 HEMOGLOBIN SS DISEASE WITHOUT CRISIS: ICD-10-CM

## 2024-10-30 PROCEDURE — 36430 TRANSFUSION BLD/BLD COMPNT: CPT

## 2024-10-30 PROCEDURE — P9016 RBC LEUKOCYTES REDUCED: HCPCS | Performed by: INTERNAL MEDICINE

## 2024-10-30 RX ORDER — ACETAMINOPHEN 325 MG/1
650 TABLET ORAL
OUTPATIENT
Start: 2024-10-30

## 2024-10-30 RX ORDER — HYDROCODONE BITARTRATE AND ACETAMINOPHEN 500; 5 MG/1; MG/1
TABLET ORAL
Status: DISCONTINUED | OUTPATIENT
Start: 2024-10-30 | End: 2024-10-30 | Stop reason: HOSPADM

## 2024-10-30 RX ORDER — HYDROCODONE BITARTRATE AND ACETAMINOPHEN 500; 5 MG/1; MG/1
TABLET ORAL ONCE
OUTPATIENT
Start: 2024-10-30 | End: 2024-10-30

## 2024-10-30 RX ORDER — DIPHENHYDRAMINE HCL 25 MG
25 CAPSULE ORAL
OUTPATIENT
Start: 2024-10-30

## 2024-11-01 ENCOUNTER — HOSPITAL ENCOUNTER (OUTPATIENT)
Dept: CARDIOLOGY | Facility: HOSPITAL | Age: 29
Discharge: HOME | End: 2024-11-01
Attending: STUDENT IN AN ORGANIZED HEALTH CARE EDUCATION/TRAINING PROGRAM
Payer: COMMERCIAL

## 2024-11-01 VITALS — HEIGHT: 61 IN | WEIGHT: 115 LBS | BODY MASS INDEX: 21.71 KG/M2

## 2024-11-01 DIAGNOSIS — O99.019 MATERNAL SICKLE CELL ANEMIA AFFECTING PREGNANCY, ANTEPARTUM: ICD-10-CM

## 2024-11-01 DIAGNOSIS — D57.1 MATERNAL SICKLE CELL ANEMIA AFFECTING PREGNANCY, ANTEPARTUM: ICD-10-CM

## 2024-11-01 LAB
ASCENDING AORTA: 2.05 CM
AV INDEX (PROSTH): 0.7
AV MEAN GRADIENT: 4.6 MMHG
AV PEAK GRADIENT: 7.8 MMHG
AV VALVE AREA BY VELOCITY RATIO: 1.8 CM²
AV VALVE AREA: 1.8 CM²
AV VELOCITY RATIO: 0.71
BSA FOR ECHO PROCEDURE: 1.5 M2
CV ECHO LV RWT: 0.33 CM
DOP CALC AO PEAK VEL: 1.4 M/S
DOP CALC AO VTI: 26.2 CM
DOP CALC LVOT AREA: 2.5 CM2
DOP CALC LVOT DIAMETER: 1.8 CM
DOP CALC LVOT PEAK VEL: 1 M/S
DOP CALC LVOT STROKE VOLUME: 46.5 CM3
DOP CALC MV VTI: 18.6 CM
DOP CALCLVOT PEAK VEL VTI: 18.3 CM
E WAVE DECELERATION TIME: 164.15 MSEC
E/A RATIO: 2.46
E/E' RATIO: 5.06 M/S
ECHO LV POSTERIOR WALL: 0.7 CM (ref 0.6–1.1)
FRACTIONAL SHORTENING: 30.2 % (ref 28–44)
INTERVENTRICULAR SEPTUM: 0.5 CM (ref 0.6–1.1)
IVC DIAMETER: 1.2 CM
LA MAJOR: 3.83 CM
LA MINOR: 4.1 CM
LA WIDTH: 4.3 CM
LEFT ATRIUM SIZE: 3.15 CM
LEFT ATRIUM VOLUME INDEX: 30.6 ML/M2
LEFT ATRIUM VOLUME: 45.6 CM3
LEFT INTERNAL DIMENSION IN SYSTOLE: 3 CM (ref 2.1–4)
LEFT VENTRICLE DIASTOLIC VOLUME INDEX: 54.56 ML/M2
LEFT VENTRICLE DIASTOLIC VOLUME: 81.29 ML
LEFT VENTRICLE MASS INDEX: 48.9 G/M2
LEFT VENTRICLE SYSTOLIC VOLUME INDEX: 23.1 ML/M2
LEFT VENTRICLE SYSTOLIC VOLUME: 34.41 ML
LEFT VENTRICULAR INTERNAL DIMENSION IN DIASTOLE: 4.3 CM (ref 3.5–6)
LEFT VENTRICULAR MASS: 72.9 G
LV LATERAL E/E' RATIO: 4.53 M/S
LV SEPTAL E/E' RATIO: 5.73 M/S
LVED V (TEICH): 81.29 ML
LVES V (TEICH): 34.41 ML
LVOT MG: 2.34 MMHG
LVOT MV: 0.73 CM/S
MV MEAN GRADIENT: 1 MMHG
MV PEAK A VEL: 0.35 M/S
MV PEAK E VEL: 0.86 M/S
MV PEAK GRADIENT: 3 MMHG
MV STENOSIS PRESSURE HALF TIME: 47.6 MS
MV VALVE AREA BY CONTINUITY EQUATION: 2.5 CM2
MV VALVE AREA P 1/2 METHOD: 4.62 CM2
OHS CV RV/LV RATIO: 0.79 CM
PISA TR MAX VEL: 2.47 M/S
PULM VEIN S/D RATIO: 0.79
PV PEAK D VEL: 0.61 M/S
PV PEAK GRADIENT: 5 MMHG
PV PEAK S VEL: 0.48 M/S
PV PEAK VELOCITY: 1.12 M/S
RA MAJOR: 4.1 CM
RA PRESSURE ESTIMATED: 3 MMHG
RA WIDTH: 3.4 CM
RIGHT VENTRICLE DIASTOLIC BASEL DIMENSION: 3.4 CM
RIGHT VENTRICULAR END-DIASTOLIC DIMENSION: 3.39 CM
RV TB RVSP: 5 MMHG
RV TISSUE DOPPLER FREE WALL SYSTOLIC VELOCITY 1 (APICAL 4 CHAMBER VIEW): 19.25 CM/S
SINUS: 2.05 CM
STJ: 2.1 CM
TDI LATERAL: 0.19 M/S
TDI SEPTAL: 0.15 M/S
TDI: 0.17 M/S
TR MAX PG: 24 MMHG
TRICUSPID ANNULAR PLANE SYSTOLIC EXCURSION: 2.02 CM
TV REST PULMONARY ARTERY PRESSURE: 27 MMHG
Z-SCORE OF LEFT VENTRICULAR DIMENSION IN END DIASTOLE: -0.28
Z-SCORE OF LEFT VENTRICULAR DIMENSION IN END SYSTOLE: 0.7

## 2024-11-01 PROCEDURE — 93306 TTE W/DOPPLER COMPLETE: CPT | Mod: 26,,, | Performed by: INTERNAL MEDICINE

## 2024-11-01 PROCEDURE — 93306 TTE W/DOPPLER COMPLETE: CPT

## 2024-11-01 PROCEDURE — 93010 ELECTROCARDIOGRAM REPORT: CPT | Mod: ,,, | Performed by: INTERNAL MEDICINE

## 2024-11-01 PROCEDURE — 93005 ELECTROCARDIOGRAM TRACING: CPT

## 2024-11-02 LAB
OHS QRS DURATION: 82 MS
OHS QTC CALCULATION: 392 MS

## 2024-11-04 ENCOUNTER — LAB VISIT (OUTPATIENT)
Dept: LAB | Facility: HOSPITAL | Age: 29
End: 2024-11-04
Attending: INTERNAL MEDICINE
Payer: COMMERCIAL

## 2024-11-04 ENCOUNTER — PROCEDURE VISIT (OUTPATIENT)
Dept: MATERNAL FETAL MEDICINE | Facility: CLINIC | Age: 29
End: 2024-11-04
Payer: COMMERCIAL

## 2024-11-04 VITALS
SYSTOLIC BLOOD PRESSURE: 96 MMHG | BODY MASS INDEX: 21.72 KG/M2 | HEART RATE: 89 BPM | HEIGHT: 61 IN | DIASTOLIC BLOOD PRESSURE: 55 MMHG | WEIGHT: 115.06 LBS

## 2024-11-04 DIAGNOSIS — Z36.2 ENCOUNTER FOR FOLLOW-UP ULTRASOUND OF FETAL ANATOMY: ICD-10-CM

## 2024-11-04 DIAGNOSIS — D57.1 SICKLE CELL DISEASE WITHOUT CRISIS: Primary | ICD-10-CM

## 2024-11-04 DIAGNOSIS — O23.02 PYELONEPHRITIS AFFECTING PREGNANCY IN SECOND TRIMESTER: ICD-10-CM

## 2024-11-04 DIAGNOSIS — Z36.89 ENCOUNTER FOR ULTRASOUND TO ASSESS FETAL GROWTH: Primary | ICD-10-CM

## 2024-11-04 DIAGNOSIS — D57.1 HEMOGLOBIN SS DISEASE WITHOUT CRISIS: ICD-10-CM

## 2024-11-04 LAB
ABO + RH BLD: NORMAL
BASOPHILS # BLD AUTO: 0.04 K/UL (ref 0–0.2)
BASOPHILS NFR BLD: 0.3 % (ref 0–1.9)
BLD GP AB SCN CELLS X3 SERPL QL: NORMAL
DIFFERENTIAL METHOD BLD: ABNORMAL
EOSINOPHIL # BLD AUTO: 0.1 K/UL (ref 0–0.5)
EOSINOPHIL NFR BLD: 0.6 % (ref 0–8)
ERYTHROCYTE [DISTWIDTH] IN BLOOD BY AUTOMATED COUNT: 17.1 % (ref 11.5–14.5)
HCT VFR BLD AUTO: 28.4 % (ref 37–48.5)
HGB BLD-MCNC: 9.5 G/DL (ref 12–16)
IMM GRANULOCYTES # BLD AUTO: 0.11 K/UL (ref 0–0.04)
IMM GRANULOCYTES NFR BLD AUTO: 0.8 % (ref 0–0.5)
LYMPHOCYTES # BLD AUTO: 1.4 K/UL (ref 1–4.8)
LYMPHOCYTES NFR BLD: 9.8 % (ref 18–48)
MCH RBC QN AUTO: 29.1 PG (ref 27–31)
MCHC RBC AUTO-ENTMCNC: 33.5 G/DL (ref 32–36)
MCV RBC AUTO: 87 FL (ref 82–98)
MONOCYTES # BLD AUTO: 1.5 K/UL (ref 0.3–1)
MONOCYTES NFR BLD: 10.4 % (ref 4–15)
NEUTROPHILS # BLD AUTO: 10.9 K/UL (ref 1.8–7.7)
NEUTROPHILS NFR BLD: 78.1 % (ref 38–73)
NRBC BLD-RTO: 1 /100 WBC
PLATELET # BLD AUTO: 323 K/UL (ref 150–450)
PMV BLD AUTO: 9.5 FL (ref 9.2–12.9)
RBC # BLD AUTO: 3.26 M/UL (ref 4–5.4)
SPECIMEN OUTDATE: NORMAL
WBC # BLD AUTO: 13.97 K/UL (ref 3.9–12.7)

## 2024-11-04 PROCEDURE — 86850 RBC ANTIBODY SCREEN: CPT | Performed by: INTERNAL MEDICINE

## 2024-11-04 PROCEDURE — 85025 COMPLETE CBC W/AUTO DIFF WBC: CPT | Performed by: INTERNAL MEDICINE

## 2024-11-04 PROCEDURE — 76816 OB US FOLLOW-UP PER FETUS: CPT | Mod: S$GLB,,, | Performed by: OBSTETRICS & GYNECOLOGY

## 2024-11-04 PROCEDURE — 99214 OFFICE O/P EST MOD 30 MIN: CPT | Mod: GT,S$GLB,, | Performed by: OBSTETRICS & GYNECOLOGY

## 2024-11-04 PROCEDURE — 36415 COLL VENOUS BLD VENIPUNCTURE: CPT | Performed by: INTERNAL MEDICINE

## 2024-11-04 NOTE — PROGRESS NOTES
"Telemedicine [facility] Ultrasound Note      Consultation started: 2024 at 11:52 PM   The chief complaint leading to consultation is: Sickle cell anemia  The patient location is: [facility]  The patient arrived at:   Spoke with nurse at bedside with patient assisting consultant. Also present with the patient at the time of the consultation:     Consultation ended: 2024 at 12:02.    Total time spent with patient: < 30 Minutes    Consulting clinician was informed of the encounter and consult note.        Maternal Fetal Medicine follow up consult    SUBJECTIVE:     Lana Chou is a 29 y.o.  female with IUP at 23w6d who is seen in follow up consultation by [facility].  Pregnancy complications include:   Problem   Pyelonephritis Affecting Pregnancy in Second Trimester   Sickle Cell Anemia       Previous notes reviewed.   No changes to medical, surgical, family, social, or obstetric history.    Interval history since last [facility] visit: admitted to ICU with urosepsis since last visit. Doing well now. Denies sickle related pain.    Medications reviewed.    Care team members:  Dr. Landers - Primary OB  Dr. Jane - Heme/Onc  Dr. Loza - Primary Care     OBJECTIVE:   BP (!) 96/55 (BP Location: Left arm, Patient Position: Sitting)   Pulse 89   Ht 5' 1" (1.549 m)   Wt 52.2 kg (115 lb 1.3 oz)   LMP 2024 (Exact Date)   BMI 21.74 kg/m²     Deferred-Telemedicine Physical Exam    Ultrasound performed. See viewpoint for full ultrasound report.    Significant labs/imaging:  10/15 Ucx: Neg  Echo 24    Left Ventricle: The left ventricle is normal in size. Normal wall thickness. There is normal systolic function with a visually estimated ejection fraction of 55 - 60%. There is normal diastolic function.    Right Ventricle: Normal right ventricular cavity size. Systolic function is normal.    Pulmonary Artery: The estimated pulmonary artery systolic pressure is 27 mmHg.     ASSESSMENT/PLAN:     29 y.o.  female " with IUP at 23w6d    Sickle cell anemia  S/p transfusion last week with Heme/Onc    Lab Results   Component Value Date    WBC 13.97 (H) 11/04/2024    HGB 9.5 (L) 11/04/2024    HCT 28.4 (L) 11/04/2024    MCV 87 11/04/2024     11/04/2024       Will defer to Heme/Onc re: transfusions.    Continue folic acid.  Hold aspirin x 1 week. Having frequent nosebleeds. Plts are normal. Start nasal saline spray BID and add humidifier. If this helps with nosebleeds can resume aspirin. If recur and are heavy, will need to d/c aspirin use. If nosebleed lasts more than 5 minutes or is profuse, patient instructed to go to ED.    Start serial growth scans.  Needs to start twice weekly antepartum testing at 32 weeks.  Anatomy completed. Bowel no longer appears echogenic (CF screen negative). Patient reassured. Monitor appearance of bowel at each growth scan.    F/u in 4 weeks with MFM.    Pyelonephritis affecting pregnancy in second trimester  On Macrobid suppression.  Last Ucx neg.  Recommend OB send Ucx every month.  Needs macrobid suppression x 6 weeks postpartum. Patient counseled and verbalized understanding.    F/u in 4 weeks for MFM visit  F/u in 4 weeks for US

## 2024-11-04 NOTE — ASSESSMENT & PLAN NOTE
S/p transfusion last week with Heme/Onc    Lab Results   Component Value Date    WBC 13.97 (H) 11/04/2024    HGB 9.5 (L) 11/04/2024    HCT 28.4 (L) 11/04/2024    MCV 87 11/04/2024     11/04/2024       Will defer to Heme/Onc re: transfusions.    Continue folic acid.  Hold aspirin x 1 week. Having frequent nosebleeds. Plts are normal. Start nasal saline spray BID and add humidifier. If this helps with nosebleeds can resume aspirin. If recur and are heavy, will need to d/c aspirin use. If nosebleed lasts more than 5 minutes or is profuse, patient instructed to go to ED.    Start serial growth scans.  Needs to start twice weekly antepartum testing at 32 weeks.  Anatomy completed. Bowel no longer appears echogenic (CF screen negative). Patient reassured. Monitor appearance of bowel at each growth scan.    F/u in 4 weeks with MFM.

## 2024-11-04 NOTE — ASSESSMENT & PLAN NOTE
On Macrobid suppression.  Last Ucx neg.  Recommend OB send Ucx every month.  Needs macrobid suppression x 6 weeks postpartum. Patient counseled and verbalized understanding.

## 2024-11-05 ENCOUNTER — PATIENT MESSAGE (OUTPATIENT)
Dept: OTHER | Facility: OTHER | Age: 29
End: 2024-11-05
Payer: COMMERCIAL

## 2024-11-11 ENCOUNTER — LAB VISIT (OUTPATIENT)
Dept: LAB | Facility: HOSPITAL | Age: 29
End: 2024-11-11
Attending: INTERNAL MEDICINE
Payer: COMMERCIAL

## 2024-11-11 ENCOUNTER — TELEPHONE (OUTPATIENT)
Dept: HEMATOLOGY/ONCOLOGY | Facility: CLINIC | Age: 29
End: 2024-11-11
Payer: COMMERCIAL

## 2024-11-11 DIAGNOSIS — D57.1 HEMOGLOBIN SS DISEASE WITHOUT CRISIS: ICD-10-CM

## 2024-11-11 LAB
BASOPHILS # BLD AUTO: 0.06 K/UL (ref 0–0.2)
BASOPHILS NFR BLD: 0.4 % (ref 0–1.9)
DIFFERENTIAL METHOD BLD: ABNORMAL
EOSINOPHIL # BLD AUTO: 0.1 K/UL (ref 0–0.5)
EOSINOPHIL NFR BLD: 0.7 % (ref 0–8)
ERYTHROCYTE [DISTWIDTH] IN BLOOD BY AUTOMATED COUNT: 16.1 % (ref 11.5–14.5)
HCT VFR BLD AUTO: 23.4 % (ref 37–48.5)
HGB BLD-MCNC: 7.7 G/DL (ref 12–16)
IMM GRANULOCYTES # BLD AUTO: 0.43 K/UL (ref 0–0.04)
IMM GRANULOCYTES NFR BLD AUTO: 3 % (ref 0–0.5)
LYMPHOCYTES # BLD AUTO: 1.6 K/UL (ref 1–4.8)
LYMPHOCYTES NFR BLD: 10.8 % (ref 18–48)
MCH RBC QN AUTO: 28.4 PG (ref 27–31)
MCHC RBC AUTO-ENTMCNC: 32.9 G/DL (ref 32–36)
MCV RBC AUTO: 86 FL (ref 82–98)
MONOCYTES # BLD AUTO: 1.7 K/UL (ref 0.3–1)
MONOCYTES NFR BLD: 11.7 % (ref 4–15)
NEUTROPHILS # BLD AUTO: 10.5 K/UL (ref 1.8–7.7)
NEUTROPHILS NFR BLD: 73.4 % (ref 38–73)
NRBC BLD-RTO: 1 /100 WBC
PLATELET # BLD AUTO: 317 K/UL (ref 150–450)
PMV BLD AUTO: 9.7 FL (ref 9.2–12.9)
RBC # BLD AUTO: 2.71 M/UL (ref 4–5.4)
WBC # BLD AUTO: 14.35 K/UL (ref 3.9–12.7)

## 2024-11-11 PROCEDURE — 36415 COLL VENOUS BLD VENIPUNCTURE: CPT | Performed by: INTERNAL MEDICINE

## 2024-11-11 PROCEDURE — 85025 COMPLETE CBC W/AUTO DIFF WBC: CPT | Performed by: INTERNAL MEDICINE

## 2024-11-11 NOTE — TELEPHONE ENCOUNTER
----- Message from Reji Reyes MD sent at 11/11/2024  9:58 AM CST -----  Lets definitely make sure she gets transfusion this week.  ----- Message -----  From: [employer] [employer]  Sent: 11/11/2024   8:24 AM CST  To: Reji Reyes MD

## 2024-11-12 ENCOUNTER — INFUSION (OUTPATIENT)
Dept: INFUSION THERAPY | Facility: HOSPITAL | Age: 29
End: 2024-11-12
Attending: INTERNAL MEDICINE
Payer: COMMERCIAL

## 2024-11-12 ENCOUNTER — ROUTINE PRENATAL (OUTPATIENT)
Dept: OBSTETRICS AND GYNECOLOGY | Facility: CLINIC | Age: 29
End: 2024-11-12
Payer: COMMERCIAL

## 2024-11-12 VITALS
WEIGHT: 116.88 LBS | BODY MASS INDEX: 22.08 KG/M2 | SYSTOLIC BLOOD PRESSURE: 100 MMHG | DIASTOLIC BLOOD PRESSURE: 62 MMHG

## 2024-11-12 VITALS
SYSTOLIC BLOOD PRESSURE: 88 MMHG | OXYGEN SATURATION: 100 % | TEMPERATURE: 98 F | DIASTOLIC BLOOD PRESSURE: 52 MMHG | RESPIRATION RATE: 18 BRPM | HEART RATE: 90 BPM

## 2024-11-12 DIAGNOSIS — D57.1 SICKLE CELL DISEASE WITHOUT CRISIS: ICD-10-CM

## 2024-11-12 DIAGNOSIS — Z34.92 SECOND TRIMESTER PREGNANCY: ICD-10-CM

## 2024-11-12 DIAGNOSIS — D57.1 HEMOGLOBIN SS DISEASE WITHOUT CRISIS: ICD-10-CM

## 2024-11-12 DIAGNOSIS — Z3A.25 25 WEEKS GESTATION OF PREGNANCY: Primary | ICD-10-CM

## 2024-11-12 LAB
BLD PROD TYP BPU: NORMAL
BLOOD UNIT EXPIRATION DATE: NORMAL
BLOOD UNIT TYPE CODE: 9500
BLOOD UNIT TYPE: NORMAL
CODING SYSTEM: NORMAL
CROSSMATCH INTERPRETATION: NORMAL
DISPENSE STATUS: NORMAL
TRANS ERYTHROCYTES VOL PATIENT: NORMAL ML

## 2024-11-12 PROCEDURE — 99999 PR PBB SHADOW E&M-EST. PATIENT-LVL III: CPT | Mod: PBBFAC,,, | Performed by: OBSTETRICS & GYNECOLOGY

## 2024-11-12 PROCEDURE — 25000003 PHARM REV CODE 250: Performed by: INTERNAL MEDICINE

## 2024-11-12 PROCEDURE — P9021 RED BLOOD CELLS UNIT: HCPCS | Performed by: INTERNAL MEDICINE

## 2024-11-12 PROCEDURE — 86920 COMPATIBILITY TEST SPIN: CPT | Performed by: INTERNAL MEDICINE

## 2024-11-12 PROCEDURE — 36430 TRANSFUSION BLD/BLD COMPNT: CPT

## 2024-11-12 RX ORDER — ACETAMINOPHEN 325 MG/1
650 TABLET ORAL
Status: COMPLETED | OUTPATIENT
Start: 2024-11-12 | End: 2024-11-12

## 2024-11-12 RX ORDER — NITROFURANTOIN 25; 75 MG/1; MG/1
100 CAPSULE ORAL NIGHTLY
COMMUNITY

## 2024-11-12 RX ORDER — DIPHENHYDRAMINE HCL 25 MG
25 CAPSULE ORAL
Status: COMPLETED | OUTPATIENT
Start: 2024-11-12 | End: 2024-11-12

## 2024-11-12 RX ORDER — HYDROCODONE BITARTRATE AND ACETAMINOPHEN 500; 5 MG/1; MG/1
TABLET ORAL ONCE
Status: DISCONTINUED | OUTPATIENT
Start: 2024-11-12 | End: 2024-11-12 | Stop reason: HOSPADM

## 2024-11-12 RX ADMIN — DIPHENHYDRAMINE HYDROCHLORIDE 25 MG: 25 CAPSULE ORAL at 12:11

## 2024-11-12 RX ADMIN — ACETAMINOPHEN 650 MG: 325 TABLET ORAL at 12:11

## 2024-11-12 NOTE — PROGRESS NOTES
25w0d  Patient comes in today for follow-up prenatal care  Was in the hospital for pyelonephritis.    Feeling much better today on prophylactic antibiotic  Has been getting blood transfusion to keep Hct > 25.  Has been with frequent nose bleed  No other complaint.  No vaginal bleeding, contractions, or rupture of membranes.  Good fetal movements.    Eating well without significant nausea/vomiting  Gaining weight  Taking prenatal vitamins, folic acid 4 mg, and baby aspirin  Taking vitamin D and Macrobid    Doing well with Connected MOM    Hct today at 23.  Getting blood transfusion today  Normal [id number] and maternal quad screen    Discussed with patient MFM's findings and recommendations  Discussed care for sickle cell anemia patients in pregnancy  Consider blood transfusion if hematocrit <25%  Discussed blood transfusion  Discussed antibiotic prophylaxis for pyelonephritis  Discussed her complicated care  Back in 2 weeks

## 2024-11-18 ENCOUNTER — LAB VISIT (OUTPATIENT)
Dept: LAB | Facility: HOSPITAL | Age: 29
End: 2024-11-18
Attending: INTERNAL MEDICINE
Payer: COMMERCIAL

## 2024-11-18 ENCOUNTER — PATIENT MESSAGE (OUTPATIENT)
Dept: OBSTETRICS AND GYNECOLOGY | Facility: CLINIC | Age: 29
End: 2024-11-18
Payer: COMMERCIAL

## 2024-11-18 DIAGNOSIS — Z3A.25 25 WEEKS GESTATION OF PREGNANCY: Primary | ICD-10-CM

## 2024-11-18 DIAGNOSIS — Z3A.26 26 WEEKS GESTATION OF PREGNANCY: ICD-10-CM

## 2024-11-18 DIAGNOSIS — D57.1 HEMOGLOBIN SS DISEASE WITHOUT CRISIS: ICD-10-CM

## 2024-11-18 LAB
BASOPHILS # BLD AUTO: 0.08 K/UL (ref 0–0.2)
BASOPHILS NFR BLD: 0.4 % (ref 0–1.9)
DIFFERENTIAL METHOD BLD: ABNORMAL
EOSINOPHIL # BLD AUTO: 0.1 K/UL (ref 0–0.5)
EOSINOPHIL NFR BLD: 0.5 % (ref 0–8)
ERYTHROCYTE [DISTWIDTH] IN BLOOD BY AUTOMATED COUNT: 17.2 % (ref 11.5–14.5)
HCT VFR BLD AUTO: 27.4 % (ref 37–48.5)
HGB BLD-MCNC: 9.1 G/DL (ref 12–16)
IMM GRANULOCYTES # BLD AUTO: 0.42 K/UL (ref 0–0.04)
IMM GRANULOCYTES NFR BLD AUTO: 2.2 % (ref 0–0.5)
LYMPHOCYTES # BLD AUTO: 1.6 K/UL (ref 1–4.8)
LYMPHOCYTES NFR BLD: 8.8 % (ref 18–48)
MCH RBC QN AUTO: 29.7 PG (ref 27–31)
MCHC RBC AUTO-ENTMCNC: 33.2 G/DL (ref 32–36)
MCV RBC AUTO: 90 FL (ref 82–98)
MONOCYTES # BLD AUTO: 2.1 K/UL (ref 0.3–1)
MONOCYTES NFR BLD: 11 % (ref 4–15)
NEUTROPHILS # BLD AUTO: 14.4 K/UL (ref 1.8–7.7)
NEUTROPHILS NFR BLD: 77.1 % (ref 38–73)
NRBC BLD-RTO: 1 /100 WBC
PLATELET # BLD AUTO: 385 K/UL (ref 150–450)
PMV BLD AUTO: 9.4 FL (ref 9.2–12.9)
RBC # BLD AUTO: 3.06 M/UL (ref 4–5.4)
WBC # BLD AUTO: 18.72 K/UL (ref 3.9–12.7)

## 2024-11-18 PROCEDURE — 36415 COLL VENOUS BLD VENIPUNCTURE: CPT | Performed by: INTERNAL MEDICINE

## 2024-11-18 PROCEDURE — 85025 COMPLETE CBC W/AUTO DIFF WBC: CPT | Performed by: INTERNAL MEDICINE

## 2024-11-19 ENCOUNTER — PATIENT MESSAGE (OUTPATIENT)
Dept: OTHER | Facility: OTHER | Age: 29
End: 2024-11-19
Payer: COMMERCIAL

## 2024-11-25 ENCOUNTER — OFFICE VISIT (OUTPATIENT)
Dept: HEMATOLOGY/ONCOLOGY | Facility: CLINIC | Age: 29
End: 2024-11-25
Payer: COMMERCIAL

## 2024-11-25 ENCOUNTER — LAB VISIT (OUTPATIENT)
Dept: LAB | Facility: HOSPITAL | Age: 29
End: 2024-11-25
Attending: INTERNAL MEDICINE
Payer: COMMERCIAL

## 2024-11-25 DIAGNOSIS — Z34.90 PREGNANCY, UNSPECIFIED GESTATIONAL AGE: ICD-10-CM

## 2024-11-25 DIAGNOSIS — Z3A.26 26 WEEKS GESTATION OF PREGNANCY: ICD-10-CM

## 2024-11-25 DIAGNOSIS — D57.1 HEMOGLOBIN SS DISEASE WITHOUT CRISIS: ICD-10-CM

## 2024-11-25 DIAGNOSIS — D57.1 HEMOGLOBIN SS DISEASE WITHOUT CRISIS: Primary | ICD-10-CM

## 2024-11-25 LAB
BASOPHILS # BLD AUTO: 0.06 K/UL (ref 0–0.2)
BASOPHILS NFR BLD: 0.4 % (ref 0–1.9)
DIFFERENTIAL METHOD BLD: ABNORMAL
EOSINOPHIL # BLD AUTO: 0.1 K/UL (ref 0–0.5)
EOSINOPHIL NFR BLD: 0.5 % (ref 0–8)
ERYTHROCYTE [DISTWIDTH] IN BLOOD BY AUTOMATED COUNT: 16.9 % (ref 11.5–14.5)
GLUCOSE SERPL-MCNC: 94 MG/DL (ref 70–140)
HCT VFR BLD AUTO: 26.7 % (ref 37–48.5)
HGB BLD-MCNC: 8.6 G/DL (ref 12–16)
IMM GRANULOCYTES # BLD AUTO: 0.64 K/UL (ref 0–0.04)
IMM GRANULOCYTES NFR BLD AUTO: 4 % (ref 0–0.5)
LYMPHOCYTES # BLD AUTO: 1.6 K/UL (ref 1–4.8)
LYMPHOCYTES NFR BLD: 9.8 % (ref 18–48)
MCH RBC QN AUTO: 28.4 PG (ref 27–31)
MCHC RBC AUTO-ENTMCNC: 32.2 G/DL (ref 32–36)
MCV RBC AUTO: 88 FL (ref 82–98)
MONOCYTES # BLD AUTO: 1.9 K/UL (ref 0.3–1)
MONOCYTES NFR BLD: 11.6 % (ref 4–15)
NEUTROPHILS # BLD AUTO: 12 K/UL (ref 1.8–7.7)
NEUTROPHILS NFR BLD: 73.7 % (ref 38–73)
NRBC BLD-RTO: 1 /100 WBC
PLATELET # BLD AUTO: 382 K/UL (ref 150–450)
PMV BLD AUTO: 9.6 FL (ref 9.2–12.9)
RBC # BLD AUTO: 3.03 M/UL (ref 4–5.4)
WBC # BLD AUTO: 16.19 K/UL (ref 3.9–12.7)

## 2024-11-25 PROCEDURE — 36415 COLL VENOUS BLD VENIPUNCTURE: CPT | Performed by: OBSTETRICS & GYNECOLOGY

## 2024-11-25 PROCEDURE — 99212 OFFICE O/P EST SF 10 MIN: CPT | Mod: 95,,, | Performed by: INTERNAL MEDICINE

## 2024-11-25 PROCEDURE — 82950 GLUCOSE TEST: CPT | Performed by: OBSTETRICS & GYNECOLOGY

## 2024-11-25 PROCEDURE — 85025 COMPLETE CBC W/AUTO DIFF WBC: CPT | Performed by: INTERNAL MEDICINE

## 2024-11-25 NOTE — PROGRESS NOTES
"PATIENT: Lana Chou  MRN: [id number]  DATE: 2024    The patient location is: [place]  The chief complaint leading to consultation is: SCD    Visit type: audiovisual    Face to Face time with patient: 10 min      Each patient to whom he or she provides medical services by telemedicine is:  (1) informed of the relationship between the physician and patient and the respective role of any other health care provider with respect to management of the patient; and (2) notified that he or she may decline to receive medical services by telemedicine and may withdraw from such care at any time.    Notes:       Subjective:     Chief complaint:  Chief Complaint   Patient presents with    Sickle Cell Anemia    Follow-up       HEME History:  Ms. Chou is a 29 y.o. female who presents for evaluation and management of SCD.  Ms Chou is a very pleasant 30yo  currently pregnant 18W0D with significant h/o HgbSS dz, history of painful crises--denies history of stroke or ACS. She has been on hydroxyurea and notes that she is still on it currently. She states that as an adult she "barely has pain episodes--" once every month or two, whether on hydrea or not. When she was a kid she had pain episodes 3-4/month. She started taking hydrea around 2019. So far this pregnancy she has already required 5 units of PRBC. She notes some nosebleeds but no other bleeding issues such as melena, hematochezia, hematuria, vaginal bleeding. Both of her parents have sickle cell trait as do two of her sisters and she is the only one affected by SCD.            Interval History: Ms. Chou returns for follow up. She has been getting weekly CBCs with PRN transfusions to maintain hemoglobin >7, she has felt well. Notes baby is meeting all milestones. She is due in February. No significant pain issues thus far. No swelling or fluid issues.       Review of Systems   A comprehensive review of systems was performed; pertinent positives and negatives are noted " in the HPI.        Objective:      Vitals: There were no vitals filed for this visit.  BMI: There is no height or weight on file to calculate BMI.      Physical Exam:   No physical exam due to remote nature of the visit.        Laboratory Data:  WBC   Date Value Ref Range Status   11/25/2024 16.19 (H) 3.90 - 12.70 K/uL Final     Hemoglobin   Date Value Ref Range Status   11/25/2024 8.6 (L) 12.0 - 16.0 g/dL Final     POC Hematocrit   Date Value Ref Range Status   08/30/2021 19 (LL) 36 - 54 %PCV Final     Hematocrit   Date Value Ref Range Status   11/25/2024 26.7 (L) 37.0 - 48.5 % Final     Platelets   Date Value Ref Range Status   11/25/2024 382 150 - 450 K/uL Final     Gran # (ANC)   Date Value Ref Range Status   11/25/2024 12.0 (H) 1.8 - 7.7 K/uL Final     Gran %   Date Value Ref Range Status   11/25/2024 73.7 (H) 38.0 - 73.0 % Final       Chemistry        Component Value Date/Time     10/29/2024 0755    K 3.7 10/29/2024 0755     10/29/2024 0755    CO2 20 (L) 10/29/2024 0755    BUN 9 10/29/2024 0755    CREATININE 0.9 10/29/2024 0755    GLU 85 10/29/2024 0755        Component Value Date/Time    CALCIUM 9.3 10/29/2024 0755    ALKPHOS 88 09/29/2024 0341    AST 23 09/29/2024 0341    ALT 11 09/29/2024 0341    BILITOT 1.7 (H) 09/29/2024 0341    ESTGFRAFRICA >60 05/14/2022 1343    EGFRNONAA >60 05/14/2022 1343              Assessment/Plan:     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 Pregnancy, unspecified gestational age      Very pleasant 28yo woman with HgbSS dz currently 26 weeks pregnant. Plan to continue weekly CBC and transfuse to maintain hemoglobin >7 for duration of pregnancy.    Med and Orders:       Follow Up:  Follow up in about 3 months (around 2/25/2025).      Above care plan was discussed with patient and all questions were addressed to their expressed satisfaction.       Reji Reyes MD, FACP  Hematology & Medical Oncology  [facility]

## 2024-11-26 ENCOUNTER — ROUTINE PRENATAL (OUTPATIENT)
Dept: OBSTETRICS AND GYNECOLOGY | Facility: CLINIC | Age: 29
End: 2024-11-26
Payer: COMMERCIAL

## 2024-11-26 VITALS
DIASTOLIC BLOOD PRESSURE: 60 MMHG | SYSTOLIC BLOOD PRESSURE: 102 MMHG | BODY MASS INDEX: 22.29 KG/M2 | WEIGHT: 117.94 LBS

## 2024-11-26 DIAGNOSIS — Z3A.27 27 WEEKS GESTATION OF PREGNANCY: ICD-10-CM

## 2024-11-26 DIAGNOSIS — O09.92 SUPERVISION OF HIGH RISK PREGNANCY IN SECOND TRIMESTER: Primary | ICD-10-CM

## 2024-11-26 PROCEDURE — 0502F SUBSEQUENT PRENATAL CARE: CPT | Mod: CPTII,S$GLB,, | Performed by: STUDENT IN AN ORGANIZED HEALTH CARE EDUCATION/TRAINING PROGRAM

## 2024-11-26 PROCEDURE — 99999 PR PBB SHADOW E&M-EST. PATIENT-LVL III: CPT | Mod: PBBFAC,,, | Performed by: STUDENT IN AN ORGANIZED HEALTH CARE EDUCATION/TRAINING PROGRAM

## 2024-11-26 NOTE — PROGRESS NOTES
Lana Chou is a 29 y.o.  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  Denies vaginal bleeding, loss of fluid, contractions, or decreased fetal movement    Past Medical History:   Diagnosis Date    Bilateral leg edema 10/15/2024    Chronic kidney disease (CKD) 2018    Emesis 2022    Encounter for surveillance of vaginal ring hormonal contraceptive device 11/15/2023    LGSIL on Pap smear of cervix 2017    LLQ abdominal pain 2023    Pregnancy with 18 completed weeks gestation 2024    Sickle cell anemia     Sickle cell disease     Sickle cell pain crisis 2024    Sickle-cell disease without crisis     UTI (urinary tract infection) 2023       OB History    Para Term  AB Living   1 0 0 0 0 0   SAB IAB Ectopic Multiple Live Births   0 0 0 0 0      # Outcome Date GA Lbr Ehsan/2nd Weight Sex Type Anes PTL Lv   1 Current                Review of patient's allergies indicates:   Allergen Reactions    Rocephin [ceftriaxone] Shortness Of Breath, Itching and Anxiety     Nausea, vomiting. No hives, swelling, or anaphylaxis. Can tolerate if necessary, but would avoid if possible.        Vitals  BP: 102/60  Weight: 53.5 kg (117 lb 15.1 oz)  Prenatal  Fundal Height (cm): 27 cm  Fetal Heart Rate: 145  Movement: Present     Assessment & Plan    - Continue PNV   - Normal GTT   - Tdap after next visit  - FKC reviewed    RTC on 12/10 for NEENA    Total time spent on visit was 10 minutes. This includes face to face time and non-face to face time preparing to see the patient (eg, review of tests), use of video or in-person , obtaining and/or reviewing separately obtained history, documenting clinical information in the electronic or other health record, independently interpreting results and communicating results to the patient/family/caregiver, or care coordination.

## 2024-12-02 ENCOUNTER — OFFICE VISIT (OUTPATIENT)
Dept: MATERNAL FETAL MEDICINE | Facility: CLINIC | Age: 29
End: 2024-12-02
Attending: OBSTETRICS & GYNECOLOGY
Payer: COMMERCIAL

## 2024-12-02 ENCOUNTER — LAB VISIT (OUTPATIENT)
Dept: LAB | Facility: HOSPITAL | Age: 29
End: 2024-12-02
Attending: INTERNAL MEDICINE
Payer: COMMERCIAL

## 2024-12-02 ENCOUNTER — PATIENT MESSAGE (OUTPATIENT)
Dept: MATERNAL FETAL MEDICINE | Facility: CLINIC | Age: 29
End: 2024-12-02

## 2024-12-02 ENCOUNTER — PROCEDURE VISIT (OUTPATIENT)
Dept: MATERNAL FETAL MEDICINE | Facility: CLINIC | Age: 29
End: 2024-12-02
Payer: COMMERCIAL

## 2024-12-02 VITALS
BODY MASS INDEX: 22.56 KG/M2 | DIASTOLIC BLOOD PRESSURE: 54 MMHG | SYSTOLIC BLOOD PRESSURE: 104 MMHG | WEIGHT: 119.5 LBS | HEIGHT: 61 IN

## 2024-12-02 DIAGNOSIS — Z36.89 ENCOUNTER FOR ULTRASOUND TO ASSESS FETAL GROWTH: ICD-10-CM

## 2024-12-02 DIAGNOSIS — O99.019 MATERNAL SICKLE CELL ANEMIA AFFECTING PREGNANCY, ANTEPARTUM: Primary | ICD-10-CM

## 2024-12-02 DIAGNOSIS — D57.1 MATERNAL SICKLE CELL ANEMIA AFFECTING PREGNANCY, ANTEPARTUM: Primary | ICD-10-CM

## 2024-12-02 DIAGNOSIS — D57.1 HEMOGLOBIN SS DISEASE WITHOUT CRISIS: ICD-10-CM

## 2024-12-02 LAB
ABO + RH BLD: NORMAL
ANISOCYTOSIS BLD QL SMEAR: SLIGHT
BASOPHILS # BLD AUTO: ABNORMAL K/UL (ref 0–0.2)
BASOPHILS NFR BLD: 0 % (ref 0–1.9)
BLD GP AB SCN CELLS X3 SERPL QL: NORMAL
DIFFERENTIAL METHOD BLD: ABNORMAL
EOSINOPHIL # BLD AUTO: ABNORMAL K/UL (ref 0–0.5)
EOSINOPHIL NFR BLD: 1 % (ref 0–8)
ERYTHROCYTE [DISTWIDTH] IN BLOOD BY AUTOMATED COUNT: 17.7 % (ref 11.5–14.5)
HCT VFR BLD AUTO: 26.7 % (ref 37–48.5)
HGB BLD-MCNC: 8.5 G/DL (ref 12–16)
HYPOCHROMIA BLD QL SMEAR: ABNORMAL
IMM GRANULOCYTES # BLD AUTO: ABNORMAL K/UL (ref 0–0.04)
IMM GRANULOCYTES NFR BLD AUTO: ABNORMAL % (ref 0–0.5)
LYMPHOCYTES # BLD AUTO: ABNORMAL K/UL (ref 1–4.8)
LYMPHOCYTES NFR BLD: 14 % (ref 18–48)
MCH RBC QN AUTO: 28.5 PG (ref 27–31)
MCHC RBC AUTO-ENTMCNC: 31.8 G/DL (ref 32–36)
MCV RBC AUTO: 90 FL (ref 82–98)
METAMYELOCYTES NFR BLD MANUAL: 1 %
MONOCYTES # BLD AUTO: ABNORMAL K/UL (ref 0.3–1)
MONOCYTES NFR BLD: 12 % (ref 4–15)
NEUTROPHILS # BLD AUTO: ABNORMAL K/UL (ref 1.8–7.7)
NEUTROPHILS NFR BLD: 69 % (ref 38–73)
NEUTS BAND NFR BLD MANUAL: 3 %
NRBC BLD-RTO: 2 /100 WBC
PLATELET # BLD AUTO: 364 K/UL (ref 150–450)
PLATELET BLD QL SMEAR: ABNORMAL
PMV BLD AUTO: 9.9 FL (ref 9.2–12.9)
POIKILOCYTOSIS BLD QL SMEAR: ABNORMAL
RBC # BLD AUTO: 2.98 M/UL (ref 4–5.4)
SICKLE CELLS BLD QL SMEAR: ABNORMAL
SPECIMEN OUTDATE: NORMAL
WBC # BLD AUTO: 17.71 K/UL (ref 3.9–12.7)

## 2024-12-02 PROCEDURE — 36415 COLL VENOUS BLD VENIPUNCTURE: CPT | Performed by: INTERNAL MEDICINE

## 2024-12-02 PROCEDURE — 3074F SYST BP LT 130 MM HG: CPT | Mod: CPTII,S$GLB,, | Performed by: OBSTETRICS & GYNECOLOGY

## 2024-12-02 PROCEDURE — 3008F BODY MASS INDEX DOCD: CPT | Mod: CPTII,S$GLB,, | Performed by: OBSTETRICS & GYNECOLOGY

## 2024-12-02 PROCEDURE — 99214 OFFICE O/P EST MOD 30 MIN: CPT | Mod: S$GLB,,, | Performed by: OBSTETRICS & GYNECOLOGY

## 2024-12-02 PROCEDURE — 3078F DIAST BP <80 MM HG: CPT | Mod: CPTII,S$GLB,, | Performed by: OBSTETRICS & GYNECOLOGY

## 2024-12-02 PROCEDURE — 86901 BLOOD TYPING SEROLOGIC RH(D): CPT | Performed by: INTERNAL MEDICINE

## 2024-12-02 PROCEDURE — 85027 COMPLETE CBC AUTOMATED: CPT | Performed by: INTERNAL MEDICINE

## 2024-12-02 PROCEDURE — 1160F RVW MEDS BY RX/DR IN RCRD: CPT | Mod: CPTII,S$GLB,, | Performed by: OBSTETRICS & GYNECOLOGY

## 2024-12-02 PROCEDURE — 1159F MED LIST DOCD IN RCRD: CPT | Mod: CPTII,S$GLB,, | Performed by: OBSTETRICS & GYNECOLOGY

## 2024-12-02 PROCEDURE — 76816 OB US FOLLOW-UP PER FETUS: CPT | Mod: S$GLB,,, | Performed by: OBSTETRICS & GYNECOLOGY

## 2024-12-02 PROCEDURE — 85007 BL SMEAR W/DIFF WBC COUNT: CPT | Performed by: INTERNAL MEDICINE

## 2024-12-02 PROCEDURE — 99999 PR PBB SHADOW E&M-EST. PATIENT-LVL III: CPT | Mod: PBBFAC,,, | Performed by: OBSTETRICS & GYNECOLOGY

## 2024-12-02 NOTE — PROGRESS NOTES
"Maternal Fetal Medicine follow up consult    SUBJECTIVE:     Lana Chou is a 29 y.o.  female with IUP at 27w6d who is seen in follow up consultation by [facility].  Pregnancy complications include:   No problems updated.    Previous notes reviewed.   No changes to medical, surgical, family, social, or obstetric history.    Interval history since last [facility] visit:  She states she is now doing well.  Is not scheduled for further transfusions.  I drew a hemoglobin S electrophoresis today.    Medications reviewed.    Care team members:  Dr. Landers - Primary OB       OBJECTIVE:   BP (!) 104/54 (BP Location: Left arm, Patient Position: Sitting)   Ht 5' 1" (1.549 m)   Wt 54.2 kg (119 lb 7.8 oz)   LMP 2024 (Exact Date)   BMI 22.58 kg/m²         Ultrasound performed. See viewpoint for full ultrasound report.  Fetal size is AGA with the EFW at the 53% and the AC at the 60%. The EFW is 1199 g.  A limited repeat fetal anatomic survey shows no abnormalities of the structures that were adequately imaged.  AFV is normal.     Significant labs/imaging:  Last CBC hemoglobin 8.5 hematocrit 26.5.  She has not had crises since last admit.  Normal glucose screen.    ASSESSMENT/PLAN:     29 y.o.  female with IUP at 27w6d  We will await her hemoglobin S electrophoresis to determine need for transfusions.  Her H and H is in acceptable range.  The patient was given an opportunity to ask questions about management and the diease process.  She expressed an understanding of and agreement to the above impression and plan. All questions were answered to her satisfaction.  She was given contact information to the [facility] clinic to address further concerns.      "

## 2024-12-03 ENCOUNTER — PATIENT MESSAGE (OUTPATIENT)
Dept: OTHER | Facility: OTHER | Age: 29
End: 2024-12-03
Payer: COMMERCIAL

## 2024-12-09 ENCOUNTER — LAB VISIT (OUTPATIENT)
Dept: LAB | Facility: HOSPITAL | Age: 29
End: 2024-12-09
Attending: INTERNAL MEDICINE
Payer: COMMERCIAL

## 2024-12-09 DIAGNOSIS — D57.1 HEMOGLOBIN SS DISEASE WITHOUT CRISIS: ICD-10-CM

## 2024-12-09 LAB
ANISOCYTOSIS BLD QL SMEAR: SLIGHT
BASOPHILS # BLD AUTO: ABNORMAL K/UL (ref 0–0.2)
BASOPHILS NFR BLD: 0 % (ref 0–1.9)
DIFFERENTIAL METHOD BLD: ABNORMAL
EOSINOPHIL # BLD AUTO: ABNORMAL K/UL (ref 0–0.5)
EOSINOPHIL NFR BLD: 0 % (ref 0–8)
ERYTHROCYTE [DISTWIDTH] IN BLOOD BY AUTOMATED COUNT: 18.4 % (ref 11.5–14.5)
HCT VFR BLD AUTO: 23.4 % (ref 37–48.5)
HGB BLD-MCNC: 7.9 G/DL (ref 12–16)
IMM GRANULOCYTES # BLD AUTO: ABNORMAL K/UL (ref 0–0.04)
IMM GRANULOCYTES NFR BLD AUTO: ABNORMAL % (ref 0–0.5)
LYMPHOCYTES # BLD AUTO: ABNORMAL K/UL (ref 1–4.8)
LYMPHOCYTES NFR BLD: 17 % (ref 18–48)
MCH RBC QN AUTO: 30.3 PG (ref 27–31)
MCHC RBC AUTO-ENTMCNC: 33.8 G/DL (ref 32–36)
MCV RBC AUTO: 90 FL (ref 82–98)
MONOCYTES # BLD AUTO: ABNORMAL K/UL (ref 0.3–1)
MONOCYTES NFR BLD: 8 % (ref 4–15)
MYELOCYTES NFR BLD MANUAL: 3 %
NEUTROPHILS # BLD AUTO: ABNORMAL K/UL (ref 1.8–7.7)
NEUTROPHILS NFR BLD: 68 % (ref 38–73)
NEUTS BAND NFR BLD MANUAL: 4 %
NRBC BLD-RTO: 2 /100 WBC
PLATELET # BLD AUTO: 309 K/UL (ref 150–450)
PLATELET BLD QL SMEAR: ABNORMAL
PMV BLD AUTO: 9.8 FL (ref 9.2–12.9)
POIKILOCYTOSIS BLD QL SMEAR: ABNORMAL
RBC # BLD AUTO: 2.61 M/UL (ref 4–5.4)
SICKLE CELLS BLD QL SMEAR: ABNORMAL
WBC # BLD AUTO: 17.16 K/UL (ref 3.9–12.7)

## 2024-12-09 PROCEDURE — 85027 COMPLETE CBC AUTOMATED: CPT | Performed by: INTERNAL MEDICINE

## 2024-12-09 PROCEDURE — 85007 BL SMEAR W/DIFF WBC COUNT: CPT | Performed by: INTERNAL MEDICINE

## 2024-12-09 PROCEDURE — 36415 COLL VENOUS BLD VENIPUNCTURE: CPT | Performed by: INTERNAL MEDICINE

## 2024-12-10 ENCOUNTER — ROUTINE PRENATAL (OUTPATIENT)
Dept: OBSTETRICS AND GYNECOLOGY | Facility: CLINIC | Age: 29
End: 2024-12-10
Payer: COMMERCIAL

## 2024-12-10 ENCOUNTER — PATIENT MESSAGE (OUTPATIENT)
Dept: HEMATOLOGY/ONCOLOGY | Facility: CLINIC | Age: 29
End: 2024-12-10
Payer: COMMERCIAL

## 2024-12-10 ENCOUNTER — CLINICAL SUPPORT (OUTPATIENT)
Dept: OBSTETRICS AND GYNECOLOGY | Facility: CLINIC | Age: 29
End: 2024-12-10
Payer: COMMERCIAL

## 2024-12-10 VITALS
SYSTOLIC BLOOD PRESSURE: 110 MMHG | DIASTOLIC BLOOD PRESSURE: 60 MMHG | WEIGHT: 123.44 LBS | BODY MASS INDEX: 23.33 KG/M2

## 2024-12-10 DIAGNOSIS — D57.1 HEMOGLOBIN SS DISEASE WITHOUT CRISIS: Primary | ICD-10-CM

## 2024-12-10 DIAGNOSIS — O09.93 SUPERVISION OF HIGH RISK PREGNANCY IN THIRD TRIMESTER: ICD-10-CM

## 2024-12-10 DIAGNOSIS — Z3A.29 29 WEEKS GESTATION OF PREGNANCY: Primary | ICD-10-CM

## 2024-12-10 DIAGNOSIS — Z23 NEED FOR TDAP VACCINATION: Primary | ICD-10-CM

## 2024-12-10 DIAGNOSIS — D57.1 SICKLE CELL DISEASE WITHOUT CRISIS: ICD-10-CM

## 2024-12-10 PROCEDURE — 99999 PR PBB SHADOW E&M-EST. PATIENT-LVL III: CPT | Mod: PBBFAC,,, | Performed by: OBSTETRICS & GYNECOLOGY

## 2024-12-10 PROCEDURE — 90715 TDAP VACCINE 7 YRS/> IM: CPT | Mod: S$GLB,,, | Performed by: OBSTETRICS & GYNECOLOGY

## 2024-12-10 PROCEDURE — 90471 IMMUNIZATION ADMIN: CPT | Mod: S$GLB,,, | Performed by: OBSTETRICS & GYNECOLOGY

## 2024-12-10 PROCEDURE — 99999 PR PBB SHADOW E&M-EST. PATIENT-LVL I: CPT | Mod: PBBFAC,,,

## 2024-12-10 NOTE — PROGRESS NOTES
29w0d  Patient comes in today for follow-up prenatal care  Was in the hospital for pyelonephritis.    Feeling much better today on prophylactic antibiotic  Has been getting blood transfusion to keep Hct > 25. Hct yesterday at 23.9  Has been with frequent nose bleed  No other complaint.  No vaginal bleeding, contractions, or rupture of membranes.  Good fetal movements.    Eating well without significant nausea/vomiting  Gaining weight  Taking prenatal vitamins, folic acid 4 mg, and baby aspirin  Taking vitamin D and Macrobid    Doing well with Connected MOM    Hct today at 23.9    Getting blood transfusion today  Normal [id number] and maternal quad screen  OGTT at 94    Discussed with patient MFM's findings and recommendations  Discussed care for sickle cell anemia patients in pregnancy  Consider blood transfusion if hematocrit <25%  Discussed blood transfusion  Discussed antibiotic prophylaxis for pyelonephritis  Discussed her complicated care  Tdap today  Back in 2 weeks

## 2024-12-11 ENCOUNTER — LAB VISIT (OUTPATIENT)
Dept: LAB | Facility: HOSPITAL | Age: 29
End: 2024-12-11
Attending: INTERNAL MEDICINE
Payer: COMMERCIAL

## 2024-12-11 DIAGNOSIS — D57.1 HEMOGLOBIN SS DISEASE WITHOUT CRISIS: Primary | ICD-10-CM

## 2024-12-11 DIAGNOSIS — D57.1 HEMOGLOBIN SS DISEASE WITHOUT CRISIS: ICD-10-CM

## 2024-12-11 DIAGNOSIS — Z36.89 ENCOUNTER FOR ULTRASOUND TO ASSESS FETAL GROWTH: Primary | ICD-10-CM

## 2024-12-11 LAB
ABO + RH BLD: NORMAL
BLD GP AB SCN CELLS X3 SERPL QL: NORMAL
SPECIMEN OUTDATE: NORMAL

## 2024-12-11 PROCEDURE — 86902 BLOOD TYPE ANTIGEN DONOR EA: CPT | Performed by: INTERNAL MEDICINE

## 2024-12-11 PROCEDURE — 36415 COLL VENOUS BLD VENIPUNCTURE: CPT | Performed by: INTERNAL MEDICINE

## 2024-12-11 PROCEDURE — 85660 RBC SICKLE CELL TEST: CPT | Performed by: INTERNAL MEDICINE

## 2024-12-11 PROCEDURE — 86850 RBC ANTIBODY SCREEN: CPT | Performed by: INTERNAL MEDICINE

## 2024-12-11 PROCEDURE — 86920 COMPATIBILITY TEST SPIN: CPT | Performed by: INTERNAL MEDICINE

## 2024-12-11 RX ORDER — HYDROCODONE BITARTRATE AND ACETAMINOPHEN 500; 5 MG/1; MG/1
TABLET ORAL ONCE
Status: CANCELLED | OUTPATIENT
Start: 2024-12-11 | End: 2024-12-11

## 2024-12-11 RX ORDER — ACETAMINOPHEN 325 MG/1
650 TABLET ORAL
Status: CANCELLED | OUTPATIENT
Start: 2024-12-11

## 2024-12-11 RX ORDER — HYDROCODONE BITARTRATE AND ACETAMINOPHEN 500; 5 MG/1; MG/1
TABLET ORAL
Status: DISCONTINUED | OUTPATIENT
Start: 2024-12-11 | End: 2024-12-11 | Stop reason: HOSPADM

## 2024-12-11 RX ORDER — DIPHENHYDRAMINE HCL 25 MG
25 CAPSULE ORAL
Status: CANCELLED | OUTPATIENT
Start: 2024-12-11

## 2024-12-12 ENCOUNTER — INFUSION (OUTPATIENT)
Dept: INFUSION THERAPY | Facility: HOSPITAL | Age: 29
End: 2024-12-12
Attending: INTERNAL MEDICINE
Payer: COMMERCIAL

## 2024-12-12 VITALS
RESPIRATION RATE: 16 BRPM | TEMPERATURE: 98 F | HEART RATE: 96 BPM | SYSTOLIC BLOOD PRESSURE: 101 MMHG | OXYGEN SATURATION: 100 % | DIASTOLIC BLOOD PRESSURE: 60 MMHG

## 2024-12-12 DIAGNOSIS — D57.1 HEMOGLOBIN SS DISEASE WITHOUT CRISIS: ICD-10-CM

## 2024-12-12 PROCEDURE — 36430 TRANSFUSION BLD/BLD COMPNT: CPT

## 2024-12-12 PROCEDURE — 25000003 PHARM REV CODE 250: Performed by: INTERNAL MEDICINE

## 2024-12-12 PROCEDURE — P9040 RBC LEUKOREDUCED IRRADIATED: HCPCS | Performed by: INTERNAL MEDICINE

## 2024-12-12 RX ORDER — HYDROCODONE BITARTRATE AND ACETAMINOPHEN 500; 5 MG/1; MG/1
TABLET ORAL ONCE
Status: DISCONTINUED | OUTPATIENT
Start: 2024-12-12 | End: 2024-12-12 | Stop reason: HOSPADM

## 2024-12-12 RX ORDER — DIPHENHYDRAMINE HCL 25 MG
25 CAPSULE ORAL
Status: COMPLETED | OUTPATIENT
Start: 2024-12-12 | End: 2024-12-12

## 2024-12-12 RX ORDER — ACETAMINOPHEN 325 MG/1
650 TABLET ORAL
Status: COMPLETED | OUTPATIENT
Start: 2024-12-12 | End: 2024-12-12

## 2024-12-12 RX ADMIN — ACETAMINOPHEN 650 MG: 325 TABLET ORAL at 11:12

## 2024-12-12 RX ADMIN — DIPHENHYDRAMINE HYDROCHLORIDE 25 MG: 25 CAPSULE ORAL at 11:12

## 2024-12-12 NOTE — PLAN OF CARE
Pt ambulated to clinic w/o difficulty. Pt received 1 unit pf PRBCs via 20g to L AC. Pt tolerated transfusion well. VSS in NAD. Care plan discussed with pt. Pt verbalized understanding.       Problem: Anemia  Goal: Anemia Symptom Improvement  Outcome: Progressing     Problem: Fatigue  Goal: Improved Activity Tolerance  Outcome: Progressing

## 2024-12-16 ENCOUNTER — PATIENT MESSAGE (OUTPATIENT)
Dept: HEMATOLOGY/ONCOLOGY | Facility: CLINIC | Age: 29
End: 2024-12-16
Payer: COMMERCIAL

## 2024-12-16 ENCOUNTER — LAB VISIT (OUTPATIENT)
Dept: LAB | Facility: HOSPITAL | Age: 29
End: 2024-12-16
Attending: INTERNAL MEDICINE
Payer: COMMERCIAL

## 2024-12-16 DIAGNOSIS — D57.1 HEMOGLOBIN SS DISEASE WITHOUT CRISIS: ICD-10-CM

## 2024-12-16 LAB
BASOPHILS # BLD AUTO: 0.06 K/UL (ref 0–0.2)
BASOPHILS NFR BLD: 0.3 % (ref 0–1.9)
DIFFERENTIAL METHOD BLD: ABNORMAL
EOSINOPHIL # BLD AUTO: 0.2 K/UL (ref 0–0.5)
EOSINOPHIL NFR BLD: 0.9 % (ref 0–8)
ERYTHROCYTE [DISTWIDTH] IN BLOOD BY AUTOMATED COUNT: 17.9 % (ref 11.5–14.5)
HCT VFR BLD AUTO: 24.3 % (ref 37–48.5)
HGB BLD-MCNC: 8.3 G/DL (ref 12–16)
IMM GRANULOCYTES # BLD AUTO: 0.42 K/UL (ref 0–0.04)
IMM GRANULOCYTES NFR BLD AUTO: 2.4 % (ref 0–0.5)
LYMPHOCYTES # BLD AUTO: 1.6 K/UL (ref 1–4.8)
LYMPHOCYTES NFR BLD: 9.1 % (ref 18–48)
MCH RBC QN AUTO: 30.9 PG (ref 27–31)
MCHC RBC AUTO-ENTMCNC: 34.2 G/DL (ref 32–36)
MCV RBC AUTO: 90 FL (ref 82–98)
MONOCYTES # BLD AUTO: 2.2 K/UL (ref 0.3–1)
MONOCYTES NFR BLD: 12.7 % (ref 4–15)
NEUTROPHILS # BLD AUTO: 13 K/UL (ref 1.8–7.7)
NEUTROPHILS NFR BLD: 74.6 % (ref 38–73)
NRBC BLD-RTO: 1 /100 WBC
PLATELET # BLD AUTO: 285 K/UL (ref 150–450)
PMV BLD AUTO: 9.6 FL (ref 9.2–12.9)
RBC # BLD AUTO: 2.69 M/UL (ref 4–5.4)
WBC # BLD AUTO: 17.4 K/UL (ref 3.9–12.7)

## 2024-12-16 PROCEDURE — 36415 COLL VENOUS BLD VENIPUNCTURE: CPT | Performed by: INTERNAL MEDICINE

## 2024-12-16 PROCEDURE — 85025 COMPLETE CBC W/AUTO DIFF WBC: CPT | Performed by: INTERNAL MEDICINE

## 2024-12-17 ENCOUNTER — PATIENT MESSAGE (OUTPATIENT)
Dept: OTHER | Facility: OTHER | Age: 29
End: 2024-12-17
Payer: COMMERCIAL

## 2024-12-20 ENCOUNTER — HOSPITAL ENCOUNTER (EMERGENCY)
Facility: HOSPITAL | Age: 29
Discharge: HOME | End: 2024-12-20
Attending: EMERGENCY MEDICINE
Payer: COMMERCIAL

## 2024-12-20 VITALS
OXYGEN SATURATION: 98 % | RESPIRATION RATE: 20 BRPM | DIASTOLIC BLOOD PRESSURE: 61 MMHG | TEMPERATURE: 98 F | HEIGHT: 61 IN | WEIGHT: 119 LBS | BODY MASS INDEX: 22.47 KG/M2 | HEART RATE: 102 BPM | SYSTOLIC BLOOD PRESSURE: 99 MMHG

## 2024-12-20 DIAGNOSIS — D57.09 SICKLE CELL DISEASE WITH CRISIS AND OTHER COMPLICATION: ICD-10-CM

## 2024-12-20 DIAGNOSIS — R07.89 CHEST PAIN, NON-CARDIAC: ICD-10-CM

## 2024-12-20 DIAGNOSIS — R82.71 ASYMPTOMATIC BACTERIURIA DURING PREGNANCY: ICD-10-CM

## 2024-12-20 DIAGNOSIS — O99.891 ASYMPTOMATIC BACTERIURIA DURING PREGNANCY: ICD-10-CM

## 2024-12-20 DIAGNOSIS — R07.9 CHEST PAIN: ICD-10-CM

## 2024-12-20 DIAGNOSIS — Z3A.30 PREGNANCY WITH 30 COMPLETED WEEKS GESTATION: Primary | ICD-10-CM

## 2024-12-20 LAB
ALBUMIN SERPL BCP-MCNC: 2.4 G/DL (ref 3.5–5.2)
ALP SERPL-CCNC: 104 U/L (ref 40–150)
ALT SERPL W/O P-5'-P-CCNC: 16 U/L (ref 10–44)
ANION GAP SERPL CALC-SCNC: 10 MMOL/L (ref 8–16)
ANISOCYTOSIS BLD QL SMEAR: SLIGHT
AST SERPL-CCNC: 28 U/L (ref 10–40)
BACTERIA #/AREA URNS HPF: ABNORMAL /HPF
BASOPHILS # BLD AUTO: ABNORMAL K/UL (ref 0–0.2)
BASOPHILS NFR BLD: 0 % (ref 0–1.9)
BILIRUB SERPL-MCNC: 3.8 MG/DL (ref 0.1–1)
BILIRUB UR QL STRIP: NEGATIVE
BUN SERPL-MCNC: 7 MG/DL (ref 6–20)
CALCIUM SERPL-MCNC: 9.9 MG/DL (ref 8.7–10.5)
CHLORIDE SERPL-SCNC: 112 MMOL/L (ref 95–110)
CLARITY UR: CLEAR
CO2 SERPL-SCNC: 15 MMOL/L (ref 23–29)
COLOR UR: YELLOW
CREAT SERPL-MCNC: 0.8 MG/DL (ref 0.5–1.4)
DIFFERENTIAL METHOD BLD: ABNORMAL
EOSINOPHIL # BLD AUTO: ABNORMAL K/UL (ref 0–0.5)
EOSINOPHIL NFR BLD: 1 % (ref 0–8)
ERYTHROCYTE [DISTWIDTH] IN BLOOD BY AUTOMATED COUNT: 17.8 % (ref 11.5–14.5)
EST. GFR  (NO RACE VARIABLE): >60 ML/MIN/1.73 M^2
GLUCOSE SERPL-MCNC: 83 MG/DL (ref 70–110)
GLUCOSE UR QL STRIP: NEGATIVE
HCT VFR BLD AUTO: 27.9 % (ref 37–48.5)
HGB BLD-MCNC: 9.7 G/DL (ref 12–16)
HGB UR QL STRIP: NEGATIVE
IMM GRANULOCYTES # BLD AUTO: ABNORMAL K/UL (ref 0–0.04)
IMM GRANULOCYTES NFR BLD AUTO: ABNORMAL % (ref 0–0.5)
KETONES UR QL STRIP: NEGATIVE
LEUKOCYTE ESTERASE UR QL STRIP: ABNORMAL
LYMPHOCYTES # BLD AUTO: ABNORMAL K/UL (ref 1–4.8)
LYMPHOCYTES NFR BLD: 12 % (ref 18–48)
MCH RBC QN AUTO: 29.8 PG (ref 27–31)
MCHC RBC AUTO-ENTMCNC: 34.8 G/DL (ref 32–36)
MCV RBC AUTO: 86 FL (ref 82–98)
METAMYELOCYTES NFR BLD MANUAL: 3 %
MICROSCOPIC COMMENT: ABNORMAL
MONOCYTES # BLD AUTO: ABNORMAL K/UL (ref 0.3–1)
MONOCYTES NFR BLD: 6 % (ref 4–15)
MYELOCYTES NFR BLD MANUAL: 2 %
NEUTROPHILS NFR BLD: 72 % (ref 38–73)
NEUTS BAND NFR BLD MANUAL: 4 %
NITRITE UR QL STRIP: NEGATIVE
NRBC BLD-RTO: 2 /100 WBC
PH UR STRIP: 7 [PH] (ref 5–8)
PLATELET # BLD AUTO: 372 K/UL (ref 150–450)
PLATELET BLD QL SMEAR: ABNORMAL
PMV BLD AUTO: 9.9 FL (ref 9.2–12.9)
POIKILOCYTOSIS BLD QL SMEAR: SLIGHT
POTASSIUM SERPL-SCNC: 3.5 MMOL/L (ref 3.5–5.1)
PROT SERPL-MCNC: 7 G/DL (ref 6–8.4)
PROT UR QL STRIP: NEGATIVE
RBC # BLD AUTO: 3.25 M/UL (ref 4–5.4)
RETICS/RBC NFR AUTO: 11.3 % (ref 0.5–2.5)
SICKLE CELLS BLD QL SMEAR: ABNORMAL
SODIUM SERPL-SCNC: 137 MMOL/L (ref 136–145)
SP GR UR STRIP: <1.005 (ref 1–1.03)
SPHEROCYTES BLD QL SMEAR: ABNORMAL
SQUAMOUS #/AREA URNS HPF: 1 /HPF
URN SPEC COLLECT METH UR: ABNORMAL
UROBILINOGEN UR STRIP-ACNC: ABNORMAL EU/DL
WBC # BLD AUTO: 17.2 K/UL (ref 3.9–12.7)
WBC #/AREA URNS HPF: 13 /HPF (ref 0–5)

## 2024-12-20 PROCEDURE — 96375 TX/PRO/DX INJ NEW DRUG ADDON: CPT

## 2024-12-20 PROCEDURE — 85007 BL SMEAR W/DIFF WBC COUNT: CPT | Performed by: EMERGENCY MEDICINE

## 2024-12-20 PROCEDURE — 85045 AUTOMATED RETICULOCYTE COUNT: CPT | Performed by: EMERGENCY MEDICINE

## 2024-12-20 PROCEDURE — 81000 URINALYSIS NONAUTO W/SCOPE: CPT | Performed by: EMERGENCY MEDICINE

## 2024-12-20 PROCEDURE — 87088 URINE BACTERIA CULTURE: CPT | Performed by: EMERGENCY MEDICINE

## 2024-12-20 PROCEDURE — 63600175 PHARM REV CODE 636 W HCPCS: Performed by: EMERGENCY MEDICINE

## 2024-12-20 PROCEDURE — 87186 SC STD MICRODIL/AGAR DIL: CPT | Performed by: EMERGENCY MEDICINE

## 2024-12-20 PROCEDURE — 96361 HYDRATE IV INFUSION ADD-ON: CPT

## 2024-12-20 PROCEDURE — 93005 ELECTROCARDIOGRAM TRACING: CPT

## 2024-12-20 PROCEDURE — 85027 COMPLETE CBC AUTOMATED: CPT | Performed by: EMERGENCY MEDICINE

## 2024-12-20 PROCEDURE — 25000003 PHARM REV CODE 250: Performed by: EMERGENCY MEDICINE

## 2024-12-20 PROCEDURE — 99284 EMERGENCY DEPT VISIT MOD MDM: CPT | Mod: 25

## 2024-12-20 PROCEDURE — 87086 URINE CULTURE/COLONY COUNT: CPT | Performed by: EMERGENCY MEDICINE

## 2024-12-20 PROCEDURE — 80053 COMPREHEN METABOLIC PANEL: CPT | Performed by: EMERGENCY MEDICINE

## 2024-12-20 PROCEDURE — 93010 ELECTROCARDIOGRAM REPORT: CPT | Mod: ,,, | Performed by: INTERNAL MEDICINE

## 2024-12-20 PROCEDURE — 96374 THER/PROPH/DIAG INJ IV PUSH: CPT

## 2024-12-20 RX ORDER — HYDROMORPHONE HYDROCHLORIDE 1 MG/ML
1 INJECTION, SOLUTION INTRAMUSCULAR; INTRAVENOUS; SUBCUTANEOUS
Status: COMPLETED | OUTPATIENT
Start: 2024-12-20 | End: 2024-12-20

## 2024-12-20 RX ORDER — NITROFURANTOIN 25; 75 MG/1; MG/1
100 CAPSULE ORAL 2 TIMES DAILY
Qty: 10 CAPSULE | Refills: 0 | Status: SHIPPED | OUTPATIENT
Start: 2024-12-20 | End: 2024-12-27

## 2024-12-20 RX ORDER — MORPHINE SULFATE 4 MG/ML
2 INJECTION, SOLUTION INTRAMUSCULAR; INTRAVENOUS
Status: COMPLETED | OUTPATIENT
Start: 2024-12-20 | End: 2024-12-20

## 2024-12-20 RX ORDER — HYDROCODONE BITARTRATE AND ACETAMINOPHEN 5; 325 MG/1; MG/1
1 TABLET ORAL EVERY 6 HOURS PRN
Qty: 12 TABLET | Refills: 0 | Status: SHIPPED | OUTPATIENT
Start: 2024-12-20

## 2024-12-20 RX ADMIN — SODIUM CHLORIDE 1000 ML: 9 INJECTION, SOLUTION INTRAVENOUS at 09:12

## 2024-12-20 RX ADMIN — HYDROMORPHONE HYDROCHLORIDE 1 MG: 1 INJECTION, SOLUTION INTRAMUSCULAR; INTRAVENOUS; SUBCUTANEOUS at 09:12

## 2024-12-20 RX ADMIN — MORPHINE SULFATE 2 MG: 4 INJECTION, SOLUTION INTRAMUSCULAR; INTRAVENOUS at 01:12

## 2024-12-20 NOTE — ED PROVIDER NOTES
Encounter Date: 12/20/2024    SCRIBE #1 NOTE: IOdalys, am scribing for, and in the presence of,  Bertram Somers MD. I have scribed the following portions of the note - Other sections scribed: HPI, ROS.       History     Chief Complaint   Patient presents with    Sickle Cell Pain Crisis     Pt to ER with reports of chest pain and lower back pain starting this morning since 4AM. Pt rating pain 9/10. Pt 30 weeks Pregnant denies complications      This is a 29 y.o. F patient with a PMHx of hemglobin SS disease, hypokalemia, left ovarian cyst, pyelonephritis, sickle cell anemia, who presents to the Emergency Department with a chief complaint of on and off, nonradiating low back pain and chest pain worse with deep breaths and bending secondary to sickle cell pain crisis since 0400 today. Patient reports nonradiating midsternal, constant, sharp chest pain, history of same pain with sickle cell crisis. Denies history of DVT/PE. Patient also reports subacute swelling to her legs since pregnant. Denies assymetrical swelling. Also reports history of nosebleeds. No injury, trauma, falls. Denies cough, fever, shortness of breath, black/bloody stools, large amount of (watery) vaginal discharge, vaginal bleeding, vaginal pain, fever, chills, abdominal pain, nausea, vomiting, diarrhea, dysuria, headaches, congestion, sore throat, arm or leg trouble, ear pain, rash, or other associated symptoms.    The history is provided by the patient. No  was used.     Review of patient's allergies indicates:   Allergen Reactions    Rocephin [ceftriaxone] Shortness Of Breath, Itching and Anxiety     Nausea, vomiting. No hives, swelling, or anaphylaxis. Can tolerate if necessary, but would avoid if possible.     Past Medical History:   Diagnosis Date    Bilateral leg edema 10/15/2024    Chronic kidney disease (CKD) 04/09/2018    Emesis 11/23/2022    Encounter for surveillance of vaginal ring hormonal  contraceptive device 11/15/2023    LGSIL on Pap smear of cervix 2017    LLQ abdominal pain 2023    Pregnancy with 18 completed weeks gestation 2024    Sickle cell anemia     Sickle cell disease     Sickle cell pain crisis 2024    Sickle-cell disease without crisis     UTI (urinary tract infection) 2023     Past Surgical History:   Procedure Laterality Date    BREAST LUMPECTOMY      CARDIAC SURGERY      port insertion    NEPHRECTOMY       Family History   Problem Relation Name Age of Onset    Sickle cell trait Father      Sickle cell trait Mother      Sickle cell trait Sister      Sickle cell trait Sister      Breast cancer Neg Hx      Colon cancer Neg Hx      Ovarian cancer Neg Hx       Social History     Tobacco Use    Smoking status: Former     Types: Vaping with nicotine     Start date: 2022     Quit date: 2024     Years since quittin.4    Smokeless tobacco: Never   Substance Use Topics    Alcohol use: Not Currently    Drug use: No     Review of Systems   Constitutional:  Negative for chills, diaphoresis and fever.   HENT:  Positive for nosebleeds (chronic). Negative for ear pain and sore throat.    Eyes:  Negative for pain.   Respiratory:  Negative for cough and shortness of breath.    Cardiovascular:  Positive for chest pain (sickle cell pain) and leg swelling (w/ pregnancy (-) assymetrical).   Gastrointestinal:  Negative for abdominal pain, blood in stool, diarrhea, nausea and vomiting.   Genitourinary:  Negative for dysuria, vaginal bleeding, vaginal discharge and vaginal pain.   Musculoskeletal:  Positive for back pain (lower).        (-) Arm trouble.    Skin:  Negative for rash.   Neurological:  Negative for headaches.   Psychiatric/Behavioral:  Negative for confusion.        Physical Exam     Initial Vitals [24 0826]   BP Pulse Resp Temp SpO2   (!) 110/55 102 20 98.1 °F (36.7 °C) 97 %      MAP       --         Physical Exam  The patient was examined  specifically for the following:   General:No significant distress, Good color, Warm and dry. Head and neck:Scalp atraumatic, Neck supple. Neurological:Appropriate conversation, Gross motor deficits. Eyes:Conjugate gaze, Clear corneas. ENT: No epistaxis. Cardiac: Regular rate and rhythm, Grossly normal heart tones. Pulmonary: Wheezing, Rales. Gastrointestinal: Abdominal tenderness, Abdominal distention. Musculoskeletal: Extremity deformity, Apparent pain with range of motion of the joints. Skin: Rash.   The findings on examination were normal except for the following:  The patient has mild sternal tenderness.  The lungs are clear.  The heart tones are normal.  The heart rate is 102 the blood pressure is 110/55.  There is no swelling or tenderness of the lower extremities.  The abdomen is nontender.  The patient is obviously gravid with a near term uterus.  There is no clinical evidence of respiratory distress.  Oxygen saturations are 97%.  There is no significant tenderness of the lumbar back.  The patient stands and walks without difficulty.  ED Course   Procedures  Labs Reviewed   COMPREHENSIVE METABOLIC PANEL - Abnormal       Result Value    Sodium 137      Potassium 3.5      Chloride 112 (*)     CO2 15 (*)     Glucose 83      BUN 7      Creatinine 0.8      Calcium 9.9      Total Protein 7.0      Albumin 2.4 (*)     Total Bilirubin 3.8 (*)     Alkaline Phosphatase 104      AST 28      ALT 16      eGFR >60      Anion Gap 10     CBC W/ AUTO DIFFERENTIAL - Abnormal    WBC 17.20 (*)     RBC 3.25 (*)     Hemoglobin 9.7 (*)     Hematocrit 27.9 (*)     MCV 86      MCH 29.8      MCHC 34.8      RDW 17.8 (*)     Platelets 372      MPV 9.9      Immature Granulocytes CANCELED      Immature Grans (Abs) CANCELED      Lymph # CANCELED      Mono # CANCELED      Eos # CANCELED      Baso # CANCELED      nRBC 2 (*)     Gran % 72.0      Lymph % 12.0 (*)     Mono % 6.0      Eosinophil % 1.0      Basophil % 0.0      Bands 4.0       Metamyelocytes 3.0      Myelocytes 2.0      Platelet Estimate Appears normal      Aniso Slight      Poik Slight      Spherocytes Occasional      Sickle Cells Occasional (*)     Differential Method Manual     RETICULOCYTES - Abnormal    Retic 11.3 (*)    URINALYSIS, REFLEX TO URINE CULTURE - Abnormal    Specimen UA Urine, Clean Catch      Color, UA Yellow      Appearance, UA Clear      pH, UA 7.0      Specific Gravity, UA <1.005 (*)     Protein, UA Negative      Glucose, UA Negative      Ketones, UA Negative      Bilirubin (UA) Negative      Occult Blood UA Negative      Nitrite, UA Negative      Urobilinogen, UA 2.0-3.0 (*)     Leukocytes, UA 2+ (*)     Narrative:     Specimen Source->Urine   URINALYSIS MICROSCOPIC - Abnormal    WBC, UA 13 (*)     Bacteria Many (*)     Squam Epithel, UA 1      Microscopic Comment SEE COMMENT      Narrative:     Specimen Source->Urine   CULTURE, URINE     EKG Readings: (Independently Interpreted)   This patient is in a normal sinus rhythm with a heart rate of 89.  There are no significant ST segment or T-wave changes.  There is no definite evidence of acute myocardial infarction or malignant arrhythmia.       Imaging Results    None          Medications   morphine injection 2 mg (has no administration in time range)   HYDROmorphone injection 1 mg (1 mg Intravenous Given 12/20/24 0942)   sodium chloride 0.9% bolus 1,000 mL 1,000 mL (0 mLs Intravenous Stopped 12/20/24 1045)     Medical Decision Making  Amount and/or Complexity of Data Reviewed  Labs: ordered. Decision-making details documented in ED Course.  ECG/medicine tests: ordered and independent interpretation performed. Decision-making details documented in ED Course.    Risk  Prescription drug management.    Given the above this patient presents emergency room 30 weeks pregnant complaining of precordial chest pain.  The lungs are clear.  The heart tones are normal there is no cough.  There is no respiratory distress there is no  fever.  The patient is not tachycardic or hypoxic.  There is no asymmetrical leg edema.  I considered pulmonary embolus but feel it is very unlikely given the setting in the physical exam.  Has pain is also precordial and low.  The patient was treated for a sickle cell pain crisis.  Her hemoglobin is 9.7.  Is not require transfusion.  She has a good reticulocyte count.  Of note the patient does have bacteria in her urine.  I will treat her for an asymptomatic bacteriuria.  The patient was offered a chest x-ray but declined it because of the pregnancy.  I considered acute chest syndrome feel it is unlikely clinically.  There is no respiratory distress there is no cough there is no hypoxia.  The lungs are clear.  I will discharge to follow up with the OBGYN.        Scribe Attestation:   Scribe #1: I performed the above scribed service and the documentation accurately describes the services I performed. I attest to the accuracy of the note.                               Clinical Impression:  Final diagnoses:  [R07.9] Chest pain  [R07.89] Chest pain, non-cardiac  [Z3A.30] Pregnancy with 30 completed weeks gestation (Primary)  [O99.891, R82.71] Asymptomatic bacteriuria during pregnancy  [D57.09] Sickle cell disease with crisis and other complication          ED Disposition Condition    Discharge Stable          ED Prescriptions       Medication Sig Dispense Start Date End Date Auth. Provider    nitrofurantoin, macrocrystal-monohydrate, (MACROBID) 100 MG capsule Take 1 capsule (100 mg total) by mouth 2 (two) times daily. for 7 days 10 capsule 12/20/2024 12/27/2024 Bertram Somers MD    HYDROcodone-acetaminophen (NORCO) 5-325 mg per tablet Take 1 tablet by mouth every 6 (six) hours as needed for Pain. 12 tablet 12/20/2024 -- Bertram Somers MD          Follow-up Information       Follow up With Specialties Details Why Contact Info    Luann Loza MD Internal Medicine In 3 days  [address] [place] [address]  [place]  [address]  [phone]            Please note that the documentation on this chart was provided by the scribe above on the date of service noted above, and that the documentation in the chart accurately reflects the work and decisions made by me alone.  Signed, Bertram Valdovinos MD  12/20/24 1311       Bertram Somers MD  12/20/24 1316

## 2024-12-20 NOTE — DISCHARGE INSTRUCTIONS
Please return immediately if you get worse or if new problems develop.  Lots of liquids.  Tylenol 1000 mg by mouth every 8 hours as needed for pain or Tylenol with hydrocodone as directed.  Please follow up with the primary care doctor next week.  Macrobid as directed.  Lots of liquids.  Rest.

## 2024-12-20 NOTE — ED TRIAGE NOTES
Pt came to the ED c/o of chest and back pain that started early this morning. Pt states she's having a sickle cell crisis. Pt is 30 weeks pregnant

## 2024-12-21 LAB
OHS QRS DURATION: 74 MS
OHS QTC CALCULATION: 430 MS

## 2024-12-22 LAB — BACTERIA UR CULT: ABNORMAL

## 2024-12-23 ENCOUNTER — LAB VISIT (OUTPATIENT)
Dept: LAB | Facility: HOSPITAL | Age: 29
End: 2024-12-23
Attending: INTERNAL MEDICINE
Payer: COMMERCIAL

## 2024-12-23 ENCOUNTER — TELEPHONE (OUTPATIENT)
Dept: HEMATOLOGY/ONCOLOGY | Facility: CLINIC | Age: 29
End: 2024-12-23
Payer: COMMERCIAL

## 2024-12-23 DIAGNOSIS — D57.1 HEMOGLOBIN SS DISEASE WITHOUT CRISIS: Primary | ICD-10-CM

## 2024-12-23 DIAGNOSIS — D57.1 HEMOGLOBIN SS DISEASE WITHOUT CRISIS: ICD-10-CM

## 2024-12-23 LAB
ANISOCYTOSIS BLD QL SMEAR: ABNORMAL
BASOPHILS # BLD AUTO: ABNORMAL K/UL (ref 0–0.2)
BASOPHILS NFR BLD: 0 % (ref 0–1.9)
DIFFERENTIAL METHOD BLD: ABNORMAL
EOSINOPHIL # BLD AUTO: ABNORMAL K/UL (ref 0–0.5)
EOSINOPHIL NFR BLD: 0 % (ref 0–8)
ERYTHROCYTE [DISTWIDTH] IN BLOOD BY AUTOMATED COUNT: 20.1 % (ref 11.5–14.5)
HCT VFR BLD AUTO: 22.5 % (ref 37–48.5)
HGB BLD-MCNC: 7.2 G/DL (ref 12–16)
IMM GRANULOCYTES # BLD AUTO: ABNORMAL K/UL (ref 0–0.04)
IMM GRANULOCYTES NFR BLD AUTO: ABNORMAL % (ref 0–0.5)
LYMPHOCYTES # BLD AUTO: ABNORMAL K/UL (ref 1–4.8)
LYMPHOCYTES NFR BLD: 10 % (ref 18–48)
MCH RBC QN AUTO: 29.8 PG (ref 27–31)
MCHC RBC AUTO-ENTMCNC: 32 G/DL (ref 32–36)
MCV RBC AUTO: 93 FL (ref 82–98)
MONOCYTES # BLD AUTO: ABNORMAL K/UL (ref 0.3–1)
MONOCYTES NFR BLD: 18 % (ref 4–15)
MYELOCYTES NFR BLD MANUAL: 2 %
NEUTROPHILS # BLD AUTO: ABNORMAL K/UL (ref 1.8–7.7)
NEUTROPHILS NFR BLD: 67 % (ref 38–73)
NEUTS BAND NFR BLD MANUAL: 3 %
NRBC BLD-RTO: 5 /100 WBC
PLATELET # BLD AUTO: 310 K/UL (ref 150–450)
PLATELET BLD QL SMEAR: ABNORMAL
PMV BLD AUTO: 10.7 FL (ref 9.2–12.9)
POIKILOCYTOSIS BLD QL SMEAR: SLIGHT
POLYCHROMASIA BLD QL SMEAR: ABNORMAL
RBC # BLD AUTO: 2.42 M/UL (ref 4–5.4)
SICKLE CELLS BLD QL SMEAR: ABNORMAL
TARGETS BLD QL SMEAR: ABNORMAL
WBC # BLD AUTO: 15.75 K/UL (ref 3.9–12.7)

## 2024-12-23 PROCEDURE — 36415 COLL VENOUS BLD VENIPUNCTURE: CPT | Performed by: INTERNAL MEDICINE

## 2024-12-23 PROCEDURE — 85027 COMPLETE CBC AUTOMATED: CPT | Performed by: INTERNAL MEDICINE

## 2024-12-23 PROCEDURE — 85007 BL SMEAR W/DIFF WBC COUNT: CPT | Performed by: INTERNAL MEDICINE

## 2024-12-23 NOTE — TELEPHONE ENCOUNTER
Called pt to see if there was a blood bank where she lives. She was not sure. I asked if she could get her transfusion today around 12 or 1. Pt said no she has another appt today she has to go to. She will wait tell next week to do her T&S and get her transfusion.

## 2024-12-23 NOTE — PROGRESS NOTES
Hi this patient will need to do her transfusion this week--she should have orders she's been getting them periodically. Can someone please help make sure that gets arranged.

## 2024-12-24 ENCOUNTER — ROUTINE PRENATAL (OUTPATIENT)
Dept: OBSTETRICS AND GYNECOLOGY | Facility: CLINIC | Age: 29
End: 2024-12-24
Payer: COMMERCIAL

## 2024-12-24 VITALS
DIASTOLIC BLOOD PRESSURE: 58 MMHG | SYSTOLIC BLOOD PRESSURE: 100 MMHG | WEIGHT: 129.88 LBS | BODY MASS INDEX: 24.54 KG/M2

## 2024-12-24 DIAGNOSIS — Z3A.31 31 WEEKS GESTATION OF PREGNANCY: Primary | ICD-10-CM

## 2024-12-24 DIAGNOSIS — D57.1 SICKLE CELL DISEASE WITHOUT CRISIS: ICD-10-CM

## 2024-12-24 DIAGNOSIS — O09.93 SUPERVISION OF HIGH RISK PREGNANCY IN THIRD TRIMESTER: ICD-10-CM

## 2024-12-24 PROCEDURE — 99999 PR PBB SHADOW E&M-EST. PATIENT-LVL III: CPT | Mod: PBBFAC,,, | Performed by: OBSTETRICS & GYNECOLOGY

## 2024-12-24 NOTE — PROGRESS NOTES
31w0d  Patient comes in today for follow-up prenatal care  Was in the hospital for pyelonephritis.    Feeling much better today on prophylactic antibiotic  Has been getting blood transfusion to keep Hct > 25. Hct yesterday at 22.5  Has been with frequent nose bleed  No other complaint.  No vaginal bleeding, contractions, or rupture of membranes.  Good fetal movements.    Eating well without significant nausea/vomiting  Gaining weight  Taking prenatal vitamins, folic acid 4 mg, and baby aspirin  Taking vitamin D and Macrobid  Tdap done previously    Doing well with [facility]    Hct today at 22.5  Getting blood transfusion soon  Normal [id number] and maternal quad screen  OGTT at 94    Discussed with patient MFM's findings and recommendations  Discussed care for sickle cell anemia patients in pregnancy  Consider blood transfusion if hematocrit <25%  Discussed blood transfusion  Discussed antibiotic prophylaxis for pyelonephritis  Discussed her complicated care    Back in 2 weeks

## 2024-12-27 ENCOUNTER — PATIENT MESSAGE (OUTPATIENT)
Dept: OBSTETRICS AND GYNECOLOGY | Facility: CLINIC | Age: 29
End: 2024-12-27
Payer: COMMERCIAL

## 2024-12-30 ENCOUNTER — PROCEDURE VISIT (OUTPATIENT)
Dept: MATERNAL FETAL MEDICINE | Facility: CLINIC | Age: 29
End: 2024-12-30
Payer: COMMERCIAL

## 2024-12-30 ENCOUNTER — LAB VISIT (OUTPATIENT)
Dept: LAB | Facility: HOSPITAL | Age: 29
End: 2024-12-30
Attending: INTERNAL MEDICINE
Payer: COMMERCIAL

## 2024-12-30 ENCOUNTER — OFFICE VISIT (OUTPATIENT)
Dept: MATERNAL FETAL MEDICINE | Facility: CLINIC | Age: 29
End: 2024-12-30
Payer: COMMERCIAL

## 2024-12-30 VITALS
WEIGHT: 128 LBS | DIASTOLIC BLOOD PRESSURE: 51 MMHG | BODY MASS INDEX: 24.17 KG/M2 | SYSTOLIC BLOOD PRESSURE: 96 MMHG | HEIGHT: 61 IN

## 2024-12-30 DIAGNOSIS — D57.1 HEMOGLOBIN SS DISEASE WITHOUT CRISIS: ICD-10-CM

## 2024-12-30 DIAGNOSIS — O23.02 PYELONEPHRITIS AFFECTING PREGNANCY IN SECOND TRIMESTER: ICD-10-CM

## 2024-12-30 DIAGNOSIS — Z36.89 ENCOUNTER FOR ULTRASOUND TO ASSESS FETAL GROWTH: ICD-10-CM

## 2024-12-30 DIAGNOSIS — D57.1 SICKLE CELL DISEASE WITHOUT CRISIS: Primary | ICD-10-CM

## 2024-12-30 LAB
ABO + RH BLD: NORMAL
BASOPHILS # BLD AUTO: 0.03 K/UL (ref 0–0.2)
BASOPHILS NFR BLD: 0.2 % (ref 0–1.9)
BLD GP AB SCN CELLS X3 SERPL QL: NORMAL
DIFFERENTIAL METHOD BLD: ABNORMAL
EOSINOPHIL # BLD AUTO: 0.1 K/UL (ref 0–0.5)
EOSINOPHIL NFR BLD: 0.6 % (ref 0–8)
ERYTHROCYTE [DISTWIDTH] IN BLOOD BY AUTOMATED COUNT: 20.4 % (ref 11.5–14.5)
HCT VFR BLD AUTO: 24.5 % (ref 37–48.5)
HGB BLD-MCNC: 8 G/DL (ref 12–16)
IMM GRANULOCYTES # BLD AUTO: 0.53 K/UL (ref 0–0.04)
IMM GRANULOCYTES NFR BLD AUTO: 3.5 % (ref 0–0.5)
LYMPHOCYTES # BLD AUTO: 1.5 K/UL (ref 1–4.8)
LYMPHOCYTES NFR BLD: 10 % (ref 18–48)
MCH RBC QN AUTO: 31.1 PG (ref 27–31)
MCHC RBC AUTO-ENTMCNC: 32.7 G/DL (ref 32–36)
MCV RBC AUTO: 95 FL (ref 82–98)
MONOCYTES # BLD AUTO: 1.9 K/UL (ref 0.3–1)
MONOCYTES NFR BLD: 12.7 % (ref 4–15)
NEUTROPHILS # BLD AUTO: 11.1 K/UL (ref 1.8–7.7)
NEUTROPHILS NFR BLD: 73 % (ref 38–73)
NRBC BLD-RTO: 4 /100 WBC
PLATELET # BLD AUTO: 381 K/UL (ref 150–450)
PMV BLD AUTO: 10.1 FL (ref 9.2–12.9)
RBC # BLD AUTO: 2.57 M/UL (ref 4–5.4)
SPECIMEN OUTDATE: NORMAL
WBC # BLD AUTO: 15.15 K/UL (ref 3.9–12.7)

## 2024-12-30 PROCEDURE — 36415 COLL VENOUS BLD VENIPUNCTURE: CPT | Performed by: INTERNAL MEDICINE

## 2024-12-30 PROCEDURE — 85025 COMPLETE CBC W/AUTO DIFF WBC: CPT | Performed by: INTERNAL MEDICINE

## 2024-12-30 PROCEDURE — 99999 PR PBB SHADOW E&M-EST. PATIENT-LVL III: CPT | Mod: PBBFAC,,, | Performed by: STUDENT IN AN ORGANIZED HEALTH CARE EDUCATION/TRAINING PROGRAM

## 2024-12-30 PROCEDURE — 76816 OB US FOLLOW-UP PER FETUS: CPT | Mod: S$GLB,,, | Performed by: STUDENT IN AN ORGANIZED HEALTH CARE EDUCATION/TRAINING PROGRAM

## 2024-12-30 PROCEDURE — 86850 RBC ANTIBODY SCREEN: CPT | Performed by: INTERNAL MEDICINE

## 2024-12-30 PROCEDURE — 76819 FETAL BIOPHYS PROFIL W/O NST: CPT | Mod: S$GLB,,, | Performed by: STUDENT IN AN ORGANIZED HEALTH CARE EDUCATION/TRAINING PROGRAM

## 2024-12-30 NOTE — ASSESSMENT & PLAN NOTE
S/p transfusion 12/12 with Heme/Onc, will receive another one tomorrow.    Lab Results   Component Value Date    WBC 15.15 (H) 12/30/2024    HGB 8.0 (L) 12/30/2024    HCT 24.5 (L) 12/30/2024    MCV 95 12/30/2024     12/30/2024     Recommendations:  Will defer to Heme/Onc re: transfusions. Agree with recommendation to maintain Hg>7  Continue serial growth scans. Normal growth today.  Continue twice weekly antepartum testing. BPP 8/8 today.  Monitor appearance of bowel at each growth scan (previously echogenic, resolved on anatomy scan, appears normal today).

## 2024-12-30 NOTE — PROGRESS NOTES
"Maternal Fetal Medicine follow up consult    SUBJECTIVE:     Lana Chou is a 29 y.o.  female with IUP at 31w6d who is seen in follow up consultation by MFM.  Pregnancy complications include:   Problem   Pyelonephritis Affecting Pregnancy in Second Trimester   Sickle Cell Anemia       Previous notes reviewed.   No changes to medical, surgical, family, social, or obstetric history.    Interval history since last MFM visit: Patient states she is feeling well. Reports active fetal movement and denies contractions, vaginal bleeding, or leakage of fluid. She has no complaints or concerns today.    Medications reviewed.     OBJECTIVE:   BP (!) 96/51 (BP Location: Left arm, Patient Position: Sitting)   Ht 5' 1" (1.549 m)   Wt 58 kg (127 lb 15.6 oz)   LMP 2024 (Exact Date)   BMI 24.18 kg/m²     Ultrasound performed. See viewpoint for full ultrasound report.    ASSESSMENT/PLAN:     29 y.o.  female with IUP at 31w6d    Sickle cell anemia  S/p transfusion  with Heme/Onc, will receive another one tomorrow.    Lab Results   Component Value Date    WBC 15.15 (H) 2024    HGB 8.0 (L) 2024    HCT 24.5 (L) 2024    MCV 95 2024     2024     Recommendations:  Will defer to Heme/Onc re: transfusions. Agree with recommendation to maintain Hg>7  Continue serial growth scans. Normal growth today.  Continue twice weekly antepartum testing. BPP 8/8 today.  Monitor appearance of bowel at each growth scan (previously echogenic, resolved on anatomy scan, appears normal today).    Pyelonephritis affecting pregnancy in second trimester  E. Coli urosepsis    On Macrobid suppression.  Last Ucx neg.  Recommend OB send Ucx every month.  Needs macrobid suppression x 6 weeks postpartum. Patient counseled and verbalized understanding.    F/u in 4-6 weeks for MFM visit    20 minutes of total time spent on the encounter, which includes face to face time and non-face to face time " preparing to see the patient (eg, review of tests), obtaining and/or reviewing separately obtained history, documenting clinical information in the electronic or other health record, independently interpreting results (not separately reported) and communicating results to the patient/family/caregiver, or care coordination (not separately reported).    JAQUELIN Ramirez MD   Maternal-Fetal Medicine

## 2024-12-30 NOTE — ASSESSMENT & PLAN NOTE
E. Coli urosepsis 9/28   On Macrobid suppression.  Last Ucx neg.  Recommend OB send Ucx every month.  Needs macrobid suppression x 6 weeks postpartum. Patient counseled and verbalized understanding.

## 2025-01-07 ENCOUNTER — LAB VISIT (OUTPATIENT)
Dept: LAB | Facility: HOSPITAL | Age: 30
End: 2025-01-07
Attending: INTERNAL MEDICINE
Payer: COMMERCIAL

## 2025-01-07 ENCOUNTER — ROUTINE PRENATAL (OUTPATIENT)
Dept: OBSTETRICS AND GYNECOLOGY | Facility: CLINIC | Age: 30
End: 2025-01-07
Payer: COMMERCIAL

## 2025-01-07 ENCOUNTER — PATIENT MESSAGE (OUTPATIENT)
Dept: OBSTETRICS AND GYNECOLOGY | Facility: CLINIC | Age: 30
End: 2025-01-07

## 2025-01-07 ENCOUNTER — PATIENT MESSAGE (OUTPATIENT)
Dept: HEMATOLOGY/ONCOLOGY | Facility: CLINIC | Age: 30
End: 2025-01-07
Payer: COMMERCIAL

## 2025-01-07 VITALS
SYSTOLIC BLOOD PRESSURE: 100 MMHG | DIASTOLIC BLOOD PRESSURE: 60 MMHG | BODY MASS INDEX: 24.04 KG/M2 | WEIGHT: 127.19 LBS

## 2025-01-07 DIAGNOSIS — O09.93 SUPERVISION OF HIGH RISK PREGNANCY IN THIRD TRIMESTER: ICD-10-CM

## 2025-01-07 DIAGNOSIS — D57.1 HEMOGLOBIN SS DISEASE WITHOUT CRISIS: Primary | ICD-10-CM

## 2025-01-07 DIAGNOSIS — D57.1 MATERNAL SICKLE CELL ANEMIA COMPLICATING PREGNANCY IN THIRD TRIMESTER: ICD-10-CM

## 2025-01-07 DIAGNOSIS — O99.019 MATERNAL SICKLE CELL ANEMIA AFFECTING PREGNANCY, ANTEPARTUM: Primary | ICD-10-CM

## 2025-01-07 DIAGNOSIS — D57.1 SICKLE CELL DISEASE WITHOUT CRISIS: ICD-10-CM

## 2025-01-07 DIAGNOSIS — O99.013 MATERNAL SICKLE CELL ANEMIA COMPLICATING PREGNANCY IN THIRD TRIMESTER: ICD-10-CM

## 2025-01-07 DIAGNOSIS — Z3A.33 33 WEEKS GESTATION OF PREGNANCY: Primary | ICD-10-CM

## 2025-01-07 DIAGNOSIS — D57.1 MATERNAL SICKLE CELL ANEMIA AFFECTING PREGNANCY, ANTEPARTUM: Primary | ICD-10-CM

## 2025-01-07 DIAGNOSIS — D57.1 HEMOGLOBIN SS DISEASE WITHOUT CRISIS: ICD-10-CM

## 2025-01-07 LAB
ANISOCYTOSIS BLD QL SMEAR: ABNORMAL
BASOPHILS # BLD AUTO: 0.04 K/UL (ref 0–0.2)
BASOPHILS NFR BLD: 0.3 % (ref 0–1.9)
DIFFERENTIAL METHOD BLD: ABNORMAL
EOSINOPHIL # BLD AUTO: 0.1 K/UL (ref 0–0.5)
EOSINOPHIL NFR BLD: 0.4 % (ref 0–8)
ERYTHROCYTE [DISTWIDTH] IN BLOOD BY AUTOMATED COUNT: 20.3 % (ref 11.5–14.5)
HCT VFR BLD AUTO: 24.2 % (ref 37–48.5)
HGB BLD-MCNC: 7.9 G/DL (ref 12–16)
HYPOCHROMIA BLD QL SMEAR: ABNORMAL
IMM GRANULOCYTES # BLD AUTO: 0.4 K/UL (ref 0–0.04)
IMM GRANULOCYTES NFR BLD AUTO: 2.7 % (ref 0–0.5)
LYMPHOCYTES # BLD AUTO: 1.6 K/UL (ref 1–4.8)
LYMPHOCYTES NFR BLD: 10.5 % (ref 18–48)
MCH RBC QN AUTO: 31.7 PG (ref 27–31)
MCHC RBC AUTO-ENTMCNC: 32.6 G/DL (ref 32–36)
MCV RBC AUTO: 97 FL (ref 82–98)
MONOCYTES # BLD AUTO: 2.3 K/UL (ref 0.3–1)
MONOCYTES NFR BLD: 15.2 % (ref 4–15)
NEUTROPHILS # BLD AUTO: 10.5 K/UL (ref 1.8–7.7)
NEUTROPHILS NFR BLD: 70.9 % (ref 38–73)
NRBC BLD-RTO: 5 /100 WBC
PLATELET # BLD AUTO: 322 K/UL (ref 150–450)
PLATELET BLD QL SMEAR: ABNORMAL
PMV BLD AUTO: 10.4 FL (ref 9.2–12.9)
POIKILOCYTOSIS BLD QL SMEAR: SLIGHT
POLYCHROMASIA BLD QL SMEAR: ABNORMAL
RBC # BLD AUTO: 2.49 M/UL (ref 4–5.4)
SICKLE CELLS BLD QL SMEAR: ABNORMAL
SPHEROCYTES BLD QL SMEAR: ABNORMAL
WBC # BLD AUTO: 14.85 K/UL (ref 3.9–12.7)

## 2025-01-07 PROCEDURE — 99999 PR PBB SHADOW E&M-EST. PATIENT-LVL III: CPT | Mod: PBBFAC,,, | Performed by: OBSTETRICS & GYNECOLOGY

## 2025-01-07 PROCEDURE — 85025 COMPLETE CBC W/AUTO DIFF WBC: CPT | Performed by: INTERNAL MEDICINE

## 2025-01-07 PROCEDURE — 36415 COLL VENOUS BLD VENIPUNCTURE: CPT | Performed by: INTERNAL MEDICINE

## 2025-01-07 NOTE — PROGRESS NOTES
33w0d  Patient comes in today for follow-up prenatal care  Was in the hospital for pyelonephritis.    Feeling much better today on prophylactic antibiotic  Has been getting blood transfusion to keep Hct > 25. Hct yesterday at 22.5  Has been with frequent nose bleed  No other complaint.  No vaginal bleeding, contractions, or rupture of membranes.  Good fetal movements.    Eating well without significant nausea/vomiting  Not gaining weight  Taking prenatal vitamins, folic acid 4 mg, and baby aspirin  Taking vitamin D and Macrobid  Tdap done previously    Doing well with [facility]    H/H on 12/30/24 at 8.0/24.5  Getting test repeated today  Normal [id number] and maternal quad screen  OGTT at 94    Discussed with patient MFM's findings and recommendations  Discussed care for sickle cell anemia patients in pregnancy  Consider blood transfusion if hematocrit <25%  Discussed blood transfusion  Discussed antibiotic prophylaxis for pyelonephritis  Discussed her complicated care  Discussed NST twice a week.  Order today  Back next week

## 2025-01-10 ENCOUNTER — CLINICAL SUPPORT (OUTPATIENT)
Dept: OBSTETRICS AND GYNECOLOGY | Facility: CLINIC | Age: 30
End: 2025-01-10
Payer: COMMERCIAL

## 2025-01-10 VITALS — SYSTOLIC BLOOD PRESSURE: 98 MMHG | DIASTOLIC BLOOD PRESSURE: 60 MMHG

## 2025-01-10 DIAGNOSIS — O99.019 MATERNAL SICKLE CELL ANEMIA AFFECTING PREGNANCY, ANTEPARTUM: ICD-10-CM

## 2025-01-10 DIAGNOSIS — Z3A.33 33 WEEKS GESTATION OF PREGNANCY: Primary | ICD-10-CM

## 2025-01-10 DIAGNOSIS — D57.1 MATERNAL SICKLE CELL ANEMIA AFFECTING PREGNANCY, ANTEPARTUM: ICD-10-CM

## 2025-01-10 PROCEDURE — 99999 PR PBB SHADOW E&M-EST. PATIENT-LVL I: CPT | Mod: PBBFAC,,,

## 2025-01-10 NOTE — PROGRESS NOTES
NST completed in prenatal testing clinic. NST reactive and reassuring. Patient denies regular cxtn, leaking of fluid or vaginal bleeding. She reports occasional alysha george contractions. + fetal movement.        01/10/25 1010   Vital Signs   BP 98/60   Non Stress Test - General   Indications/Pt Reported Complaint SC Disease   Test Duration (min) 33 min   Number of Fetuses 1   Acoustic Stimulator No   Contractions Irregular   Nonstress Test Baby A   Variability Moderate   Decels None   Accels Present   Baseline FHR   (125-130 bpm)   Interpretation Baby A   Nonstress Test Interpretation Reactive   Overall Impression Reassuring   Comments 6.06 cm  (MVP)

## 2025-01-13 ENCOUNTER — LAB VISIT (OUTPATIENT)
Dept: LAB | Facility: HOSPITAL | Age: 30
End: 2025-01-13
Attending: INTERNAL MEDICINE
Payer: COMMERCIAL

## 2025-01-13 DIAGNOSIS — D57.1 HEMOGLOBIN SS DISEASE WITHOUT CRISIS: ICD-10-CM

## 2025-01-13 LAB
BASOPHILS # BLD AUTO: 0.04 K/UL (ref 0–0.2)
BASOPHILS NFR BLD: 0.3 % (ref 0–1.9)
DIFFERENTIAL METHOD BLD: ABNORMAL
EOSINOPHIL # BLD AUTO: 0.1 K/UL (ref 0–0.5)
EOSINOPHIL NFR BLD: 0.4 % (ref 0–8)
ERYTHROCYTE [DISTWIDTH] IN BLOOD BY AUTOMATED COUNT: 20.6 % (ref 11.5–14.5)
HCT VFR BLD AUTO: 24.9 % (ref 37–48.5)
HGB BLD-MCNC: 7.9 G/DL (ref 12–16)
IMM GRANULOCYTES # BLD AUTO: 0.42 K/UL (ref 0–0.04)
IMM GRANULOCYTES NFR BLD AUTO: 2.7 % (ref 0–0.5)
LYMPHOCYTES # BLD AUTO: 1.3 K/UL (ref 1–4.8)
LYMPHOCYTES NFR BLD: 8 % (ref 18–48)
MCH RBC QN AUTO: 30.7 PG (ref 27–31)
MCHC RBC AUTO-ENTMCNC: 31.7 G/DL (ref 32–36)
MCV RBC AUTO: 97 FL (ref 82–98)
MONOCYTES # BLD AUTO: 1.6 K/UL (ref 0.3–1)
MONOCYTES NFR BLD: 10.3 % (ref 4–15)
NEUTROPHILS # BLD AUTO: 12.3 K/UL (ref 1.8–7.7)
NEUTROPHILS NFR BLD: 78.3 % (ref 38–73)
NRBC BLD-RTO: 5 /100 WBC
PLATELET # BLD AUTO: 334 K/UL (ref 150–450)
PMV BLD AUTO: 10 FL (ref 9.2–12.9)
RBC # BLD AUTO: 2.57 M/UL (ref 4–5.4)
WBC # BLD AUTO: 15.7 K/UL (ref 3.9–12.7)

## 2025-01-13 PROCEDURE — 85025 COMPLETE CBC W/AUTO DIFF WBC: CPT | Performed by: INTERNAL MEDICINE

## 2025-01-13 PROCEDURE — 36415 COLL VENOUS BLD VENIPUNCTURE: CPT | Performed by: INTERNAL MEDICINE

## 2025-01-14 ENCOUNTER — CLINICAL SUPPORT (OUTPATIENT)
Dept: OBSTETRICS AND GYNECOLOGY | Facility: CLINIC | Age: 30
End: 2025-01-14
Payer: COMMERCIAL

## 2025-01-14 ENCOUNTER — ROUTINE PRENATAL (OUTPATIENT)
Dept: OBSTETRICS AND GYNECOLOGY | Facility: CLINIC | Age: 30
End: 2025-01-14
Payer: COMMERCIAL

## 2025-01-14 VITALS — BODY MASS INDEX: 24.58 KG/M2 | SYSTOLIC BLOOD PRESSURE: 98 MMHG | DIASTOLIC BLOOD PRESSURE: 59 MMHG | WEIGHT: 130.06 LBS

## 2025-01-14 DIAGNOSIS — O99.019 MATERNAL SICKLE CELL ANEMIA AFFECTING PREGNANCY, ANTEPARTUM: ICD-10-CM

## 2025-01-14 DIAGNOSIS — O09.93 SUPERVISION OF HIGH RISK PREGNANCY IN THIRD TRIMESTER: ICD-10-CM

## 2025-01-14 DIAGNOSIS — D57.1 MATERNAL SICKLE CELL ANEMIA AFFECTING PREGNANCY, ANTEPARTUM: ICD-10-CM

## 2025-01-14 DIAGNOSIS — Z3A.34 34 WEEKS GESTATION OF PREGNANCY: Primary | ICD-10-CM

## 2025-01-14 PROCEDURE — 99999 PR PBB SHADOW E&M-EST. PATIENT-LVL I: CPT | Mod: PBBFAC,,,

## 2025-01-14 PROCEDURE — 99999 PR PBB SHADOW E&M-EST. PATIENT-LVL III: CPT | Mod: PBBFAC,,, | Performed by: OBSTETRICS & GYNECOLOGY

## 2025-01-14 PROCEDURE — 0502F SUBSEQUENT PRENATAL CARE: CPT | Mod: CPTII,S$GLB,, | Performed by: OBSTETRICS & GYNECOLOGY

## 2025-01-14 PROCEDURE — 59025 FETAL NON-STRESS TEST: CPT | Mod: 26,,, | Performed by: OBSTETRICS & GYNECOLOGY

## 2025-01-14 NOTE — PROGRESS NOTES
34w0d  Patient comes in today for follow-up prenatal care  Was in the hospital for pyelonephritis.    Feeling much better today on prophylactic antibiotic  Has been getting blood transfusion to keep Hct > 25. Hct yesterday at 24.9  Has been with frequent nose bleed  No other complaint.  No vaginal bleeding, contractions, or rupture of membranes.  Good fetal movements.    Eating well without significant nausea/vomiting  Gaining weight  Taking prenatal vitamins, folic acid 4 mg, and baby aspirin  Taking vitamin D and Macrobid  Tdap done previously    Doing well with [facility]    H/H on 1/13/2025 were 7.9/24.9  Getting test repeated weekly  Normal [id number] and maternal quad screen  OGTT at 94    Discussed with patient MFM's findings and recommendations  Discussed care for sickle cell anemia patients in pregnancy  Consider blood transfusion if hematocrit <25%  Discussed blood transfusion  Discussed antibiotic prophylaxis for pyelonephritis  Discussed her complicated care  Discussed NSTweekly.    Back next week

## 2025-01-14 NOTE — PROGRESS NOTES
NST completed in prenatal testing clinic. NST reactive and reassuring. Patient denies regular cxtn today, however did experience some last night during her night shift. PO hydration encouraged during long shifts. She denies leaking of fluid or vaginal bleeding. Patient reports + fetal movement.        01/14/25 1026   Non Stress Test - General   Indications/Pt Reported Complaint SC disease   Test Duration (min) 22 min   Number of Fetuses 1   Acoustic Stimulator No   Contractions Irregular   Nonstress Test Baby A   Variability Moderate   Decels None   Accels Present   Baseline    Interpretation Baby A   Nonstress Test Interpretation Reactive   Overall Impression Reassuring   Comments 6.65 cm MVP

## 2025-01-17 ENCOUNTER — CLINICAL SUPPORT (OUTPATIENT)
Dept: OBSTETRICS AND GYNECOLOGY | Facility: CLINIC | Age: 30
End: 2025-01-17
Payer: COMMERCIAL

## 2025-01-17 VITALS — SYSTOLIC BLOOD PRESSURE: 100 MMHG | DIASTOLIC BLOOD PRESSURE: 62 MMHG

## 2025-01-17 DIAGNOSIS — Z3A.34 34 WEEKS GESTATION OF PREGNANCY: Primary | ICD-10-CM

## 2025-01-17 DIAGNOSIS — O99.019 MATERNAL SICKLE CELL ANEMIA AFFECTING PREGNANCY, ANTEPARTUM: ICD-10-CM

## 2025-01-17 DIAGNOSIS — D57.1 MATERNAL SICKLE CELL ANEMIA AFFECTING PREGNANCY, ANTEPARTUM: ICD-10-CM

## 2025-01-17 PROCEDURE — 99999 PR PBB SHADOW E&M-EST. PATIENT-LVL I: CPT | Mod: PBBFAC,,,

## 2025-01-17 NOTE — PROGRESS NOTES
NST completed in prenatal testing clinic. NST reactive and reassuring. Patient denies leaking of fluid or vaginal bleeding. Contractions noted on monitoring today, mild on palpation. Patient is feeling abdominal cramping. Patient reports + fetal movement.        01/17/25 1022   Vital Signs   /62   Non Stress Test - General   Indications/Pt Reported Complaint SC disease   Test Duration (min) 23 min   Number of Fetuses 1   Acoustic Stimulator No   Contractions Irregular   Nonstress Test Baby A   Variability Moderate   Decels None   Accels Present   Baseline    Interpretation Baby A   Nonstress Test Interpretation Reactive   Overall Impression Reassuring

## 2025-01-21 ENCOUNTER — PATIENT MESSAGE (OUTPATIENT)
Dept: OTHER | Facility: OTHER | Age: 30
End: 2025-01-21
Payer: COMMERCIAL

## 2025-01-24 ENCOUNTER — CLINICAL SUPPORT (OUTPATIENT)
Dept: OBSTETRICS AND GYNECOLOGY | Facility: CLINIC | Age: 30
End: 2025-01-24
Payer: COMMERCIAL

## 2025-01-24 VITALS — DIASTOLIC BLOOD PRESSURE: 56 MMHG | SYSTOLIC BLOOD PRESSURE: 92 MMHG

## 2025-01-24 DIAGNOSIS — O99.019 MATERNAL SICKLE CELL ANEMIA AFFECTING PREGNANCY, ANTEPARTUM: ICD-10-CM

## 2025-01-24 DIAGNOSIS — Z3A.35 35 WEEKS GESTATION OF PREGNANCY: Primary | ICD-10-CM

## 2025-01-24 DIAGNOSIS — O23.11 ACUTE CYSTITIS DURING PREGNANCY IN FIRST TRIMESTER: ICD-10-CM

## 2025-01-24 DIAGNOSIS — D57.1 MATERNAL SICKLE CELL ANEMIA AFFECTING PREGNANCY, ANTEPARTUM: ICD-10-CM

## 2025-01-24 PROCEDURE — 99999 PR PBB SHADOW E&M-EST. PATIENT-LVL I: CPT | Mod: PBBFAC,,,

## 2025-01-24 NOTE — PROGRESS NOTES
NST completed in prenatal testing clinic. NST reactive and reassuring. Patient denies leaking of fluid or vaginal bleeding. Contractions on monitoring today. Patient states she feels tightening and mild pain. Moderate on palpation. Labor precautions given. Encouraged to go to L&D if contractions become painful. Patient reports + fetal movement.        01/24/25 1530   Vital Signs   BP (!) 92/56   Non Stress Test - General   Indications/Pt Reported Complaint SC disease   Test Duration (min) 24 min   Number of Fetuses 1   Acoustic Stimulator No   Contractions Irregular  (4-8 minutes)   Nonstress Test Baby A   Variability Moderate   Decels None   Accels Present   Baseline FHR   (125-160 bpm)   Interpretation Baby A   Nonstress Test Interpretation Reactive   Overall Impression Reassuring   Comments MVP-6.95 cm

## 2025-01-25 RX ORDER — NITROFURANTOIN 25; 75 MG/1; MG/1
100 CAPSULE ORAL 2 TIMES DAILY
Qty: 14 CAPSULE | Refills: 0 | Status: SHIPPED | OUTPATIENT
Start: 2025-01-25 | End: 2025-02-06

## 2025-01-25 NOTE — TELEPHONE ENCOUNTER
Refill Routing Note   Medication(s) are not appropriate for processing by [facility] for the following reason(s):      Medication outside of protocol    [facility] action(s):  Route Care Due:  None identified            Appointments  past 12m or future 3m with PCP    Date Provider   Last Visit   1/14/2025 Tyson Landers MD   Next Visit   1/28/2025 Tyson Landers MD   ED visits in past 90 days: 1        Note composed:8:23 PM 01/24/2025

## 2025-01-27 ENCOUNTER — OFFICE VISIT (OUTPATIENT)
Dept: MATERNAL FETAL MEDICINE | Facility: CLINIC | Age: 30
End: 2025-01-27
Payer: COMMERCIAL

## 2025-01-27 ENCOUNTER — PROCEDURE VISIT (OUTPATIENT)
Dept: MATERNAL FETAL MEDICINE | Facility: CLINIC | Age: 30
End: 2025-01-27
Payer: COMMERCIAL

## 2025-01-27 VITALS — WEIGHT: 133.63 LBS | BODY MASS INDEX: 25.24 KG/M2 | DIASTOLIC BLOOD PRESSURE: 62 MMHG | SYSTOLIC BLOOD PRESSURE: 97 MMHG

## 2025-01-27 DIAGNOSIS — O99.019 MATERNAL SICKLE CELL ANEMIA AFFECTING PREGNANCY, ANTEPARTUM: Primary | ICD-10-CM

## 2025-01-27 DIAGNOSIS — Z36.89 ENCOUNTER FOR ULTRASOUND TO ASSESS FETAL GROWTH: ICD-10-CM

## 2025-01-27 DIAGNOSIS — D57.1 MATERNAL SICKLE CELL ANEMIA AFFECTING PREGNANCY, ANTEPARTUM: Primary | ICD-10-CM

## 2025-01-27 PROCEDURE — 3008F BODY MASS INDEX DOCD: CPT | Mod: CPTII,S$GLB,, | Performed by: OBSTETRICS & GYNECOLOGY

## 2025-01-27 PROCEDURE — 3078F DIAST BP <80 MM HG: CPT | Mod: CPTII,S$GLB,, | Performed by: OBSTETRICS & GYNECOLOGY

## 2025-01-27 PROCEDURE — 99999 PR PBB SHADOW E&M-EST. PATIENT-LVL III: CPT | Mod: PBBFAC,,, | Performed by: OBSTETRICS & GYNECOLOGY

## 2025-01-27 PROCEDURE — 3074F SYST BP LT 130 MM HG: CPT | Mod: CPTII,S$GLB,, | Performed by: OBSTETRICS & GYNECOLOGY

## 2025-01-27 PROCEDURE — 99213 OFFICE O/P EST LOW 20 MIN: CPT | Mod: S$GLB,,, | Performed by: OBSTETRICS & GYNECOLOGY

## 2025-01-27 PROCEDURE — 76816 OB US FOLLOW-UP PER FETUS: CPT | Mod: S$GLB,,, | Performed by: OBSTETRICS & GYNECOLOGY

## 2025-01-27 PROCEDURE — 1159F MED LIST DOCD IN RCRD: CPT | Mod: CPTII,S$GLB,, | Performed by: OBSTETRICS & GYNECOLOGY

## 2025-01-27 PROCEDURE — 76819 FETAL BIOPHYS PROFIL W/O NST: CPT | Mod: S$GLB,,, | Performed by: OBSTETRICS & GYNECOLOGY

## 2025-01-27 NOTE — ASSESSMENT & PLAN NOTE
"Please see original consult for full counseling and recommendations   Continues close care with hematology - Dr. Reyes. Has not needed recent transfusions given Hgb >7.  Reports rare pain, Tylenol use is PRN.  Does report a possible "mild" crisis during the snow week, but did not require admission.  Denies any other symptoms at this time.  Normal growth today.    Recommendations:  Will defer to Heme/Onc re: transfusions. Agree with recommendation to maintain Hg>7  No further US or visits were scheduled with M.  Continue twice weekly antepartum testing.  Continue Macrobid suppression.  Folic acid 4 mg  Avoid iron supplementation due to risks of iron overload  Prophylactic lovenox or heparin while admitted to the hospital antepartum or postpartum  Antepartum testing twice weekly at 32 weeks (this can be ordered by primary OB)  Delivery timing - 39 weeks, unless indicated earlier    "

## 2025-01-27 NOTE — PROGRESS NOTES
Maternal Fetal Medicine follow up consult    SUBJECTIVE:     Lana Chou is a 29 y.o.  female with IUP at 35w6d who is seen in follow up consultation by M.  Pregnancy complications include:   Problem   Maternal Sickle Cell Anemia Affecting Pregnancy, Antepartum     Previous notes reviewed.   No changes to medical, surgical, family, social, or obstetric history.    Interval history since last M visit:   Patient has no complaints today. She is overall feeling well.  Patient denies any contractions/cramping, vaginal bleeding or leakage of fluid.  She reports good fetal movement.    Medications:  Current Outpatient Medications   Medication Instructions    acetaminophen (TYLENOL) 500 mg, Oral, Every 6 hours PRN    aspirin 81 mg    cholecalciferol (vitamin D3) 50,000 Units, Oral, Every 7 days    folic acid (FOLVITE) 4 mg, Oral, Daily    nitrofurantoin, macrocrystal-monohydrate, (MACROBID) 100 MG capsule Take 1 capsule (100 mg total) by mouth 2 (two) times daily for 7 days     Care team members:  Anshul - Primary OB     OBJECTIVE:   BP 97/62 (BP Location: Left arm, Patient Position: Sitting)   Wt 60.6 kg (133 lb 9.6 oz)   LMP 2024 (Exact Date)   BMI 25.24 kg/m²     Physical Exam:  Deferred    Ultrasound performed. See viewpoint for full ultrasound report.  Santos live IUP  Fetal size is appropriate for gestational age, with the EFW (2965 g) plotting at the 52% and the AC plotting at the 90%.   A limited repeat fetal anatomic survey appears normal.   The BPP score is reassuring at 8/8.  The MVP is normal.  cephalic presentation.     Significant labs/imaging:  Lab Results   Component Value Date    WBC 15.70 (H) 2025    HGB 7.9 (L) 2025    HCT 24.9 (L) 2025    MCV 97 2025     2025     ASSESSMENT/PLAN:     29 y.o.  female with IUP at 35w6d    Maternal sickle cell anemia affecting pregnancy, antepartum  Please see original consult for full counseling and  "recommendations   Continues close care with hematology - Dr. Reyes. Has not needed recent transfusions given Hgb >7.  Reports rare pain, Tylenol use is PRN.  Does report a possible "mild" crisis during the snow week, but did not require admission.  Denies any other symptoms at this time.  Normal growth today.    Recommendations:  Will defer to Heme/Onc re: transfusions. Agree with recommendation to maintain Hg>7  No further US or visits were scheduled with MFM.  Continue twice weekly antepartum testing.  Continue Macrobid suppression.  Folic acid 4 mg  Avoid iron supplementation due to risks of iron overload  Prophylactic lovenox or heparin while admitted to the hospital antepartum or postpartum  Antepartum testing twice weekly at 32 weeks (this can be ordered by primary OB)  Delivery timing - 39 weeks, unless indicated earlier      Patient was counseled that prenatal ultrasound studies have limitations. They do not detect all fetal, genetic, placental, and maternal abnormalities. A normal appearing prenatal ultrasound is reassuring. However, it does not guarantee the absence of an abnormality or predict a normal outcome for the fetus or the mother.     FOLLOW UP: No further ultrasounds or visits were scheduled.     The patient was given an opportunity to ask questions about the management of her high risk pregnancy problems. She expressed an understanding of and agreement to the above impression and plan. All questions were answered to her satisfaction.    20 minutes of total time spent on the encounter, which includes face to face time and non-face to face time preparing to see the patient (eg, review of tests), obtaining and/or reviewing separately obtained history, documenting clinical information in the electronic or other health record, independently interpreting results (not separately reported) and communicating results to the patient/family/caregiver, or care coordination (not separately " reported).        David Paez MD   Maternal-Fetal Medicine      Electronically Signed by David Paez January 27, 2025

## 2025-01-28 ENCOUNTER — PATIENT MESSAGE (OUTPATIENT)
Dept: OBSTETRICS AND GYNECOLOGY | Facility: CLINIC | Age: 30
End: 2025-01-28
Payer: COMMERCIAL

## 2025-01-28 ENCOUNTER — TELEPHONE (OUTPATIENT)
Dept: HEMATOLOGY/ONCOLOGY | Facility: CLINIC | Age: 30
End: 2025-01-28
Payer: COMMERCIAL

## 2025-01-28 ENCOUNTER — LAB VISIT (OUTPATIENT)
Dept: LAB | Facility: HOSPITAL | Age: 30
End: 2025-01-28
Attending: INTERNAL MEDICINE
Payer: COMMERCIAL

## 2025-01-28 DIAGNOSIS — D57.1 HEMOGLOBIN SS DISEASE WITHOUT CRISIS: ICD-10-CM

## 2025-01-28 LAB
ANISOCYTOSIS BLD QL SMEAR: ABNORMAL
BASOPHILS # BLD AUTO: ABNORMAL K/UL (ref 0–0.2)
BASOPHILS NFR BLD: 0 % (ref 0–1.9)
DIFFERENTIAL METHOD BLD: ABNORMAL
EOSINOPHIL # BLD AUTO: ABNORMAL K/UL (ref 0–0.5)
EOSINOPHIL NFR BLD: 0 % (ref 0–8)
ERYTHROCYTE [DISTWIDTH] IN BLOOD BY AUTOMATED COUNT: 22.5 % (ref 11.5–14.5)
HCT VFR BLD AUTO: 20.5 % (ref 37–48.5)
HGB BLD-MCNC: 6.8 G/DL (ref 12–16)
HYPOCHROMIA BLD QL SMEAR: ABNORMAL
IMM GRANULOCYTES # BLD AUTO: ABNORMAL K/UL (ref 0–0.04)
IMM GRANULOCYTES NFR BLD AUTO: ABNORMAL % (ref 0–0.5)
LYMPHOCYTES # BLD AUTO: ABNORMAL K/UL (ref 1–4.8)
LYMPHOCYTES NFR BLD: 8 % (ref 18–48)
MCH RBC QN AUTO: 32.5 PG (ref 27–31)
MCHC RBC AUTO-ENTMCNC: 33.2 G/DL (ref 32–36)
MCV RBC AUTO: 98 FL (ref 82–98)
MONOCYTES # BLD AUTO: ABNORMAL K/UL (ref 0.3–1)
MONOCYTES NFR BLD: 8 % (ref 4–15)
NEUTROPHILS # BLD AUTO: ABNORMAL K/UL (ref 1.8–7.7)
NEUTROPHILS NFR BLD: 77 % (ref 38–73)
NEUTS BAND NFR BLD MANUAL: 7 %
NRBC BLD-RTO: 8 /100 WBC
PLATELET # BLD AUTO: 406 K/UL (ref 150–450)
PLATELET BLD QL SMEAR: ABNORMAL
PMV BLD AUTO: 9.6 FL (ref 9.2–12.9)
POIKILOCYTOSIS BLD QL SMEAR: ABNORMAL
POLYCHROMASIA BLD QL SMEAR: ABNORMAL
RBC # BLD AUTO: 2.09 M/UL (ref 4–5.4)
SICKLE CELLS BLD QL SMEAR: ABNORMAL
WBC # BLD AUTO: 17.31 K/UL (ref 3.9–12.7)

## 2025-01-28 PROCEDURE — 85027 COMPLETE CBC AUTOMATED: CPT | Performed by: INTERNAL MEDICINE

## 2025-01-28 PROCEDURE — 36415 COLL VENOUS BLD VENIPUNCTURE: CPT | Performed by: INTERNAL MEDICINE

## 2025-01-28 PROCEDURE — 85007 BL SMEAR W/DIFF WBC COUNT: CPT | Performed by: INTERNAL MEDICINE

## 2025-01-28 NOTE — TELEPHONE ENCOUNTER
----- Message from Reji Reyes MD sent at 1/28/2025 12:14 PM CST -----  She needs her RBC transfusion this week for sure!  ----- Message -----  From: nErique, CS [facility]  Sent: 1/28/2025  11:47 AM CST  To: Reji Reyes MD

## 2025-01-29 ENCOUNTER — LAB VISIT (OUTPATIENT)
Dept: LAB | Facility: HOSPITAL | Age: 30
End: 2025-01-29
Attending: INTERNAL MEDICINE
Payer: COMMERCIAL

## 2025-01-29 ENCOUNTER — ROUTINE PRENATAL (OUTPATIENT)
Dept: OBSTETRICS AND GYNECOLOGY | Facility: CLINIC | Age: 30
End: 2025-01-29
Payer: COMMERCIAL

## 2025-01-29 VITALS
BODY MASS INDEX: 24.58 KG/M2 | DIASTOLIC BLOOD PRESSURE: 62 MMHG | WEIGHT: 130.06 LBS | SYSTOLIC BLOOD PRESSURE: 100 MMHG

## 2025-01-29 DIAGNOSIS — O09.93 SUPERVISION OF HIGH RISK PREGNANCY IN THIRD TRIMESTER: ICD-10-CM

## 2025-01-29 DIAGNOSIS — Z3A.36 36 WEEKS GESTATION OF PREGNANCY: Primary | ICD-10-CM

## 2025-01-29 DIAGNOSIS — D57.1 HEMOGLOBIN SS DISEASE WITHOUT CRISIS: ICD-10-CM

## 2025-01-29 DIAGNOSIS — D57.1 MATERNAL SICKLE CELL ANEMIA AFFECTING PREGNANCY, ANTEPARTUM: ICD-10-CM

## 2025-01-29 DIAGNOSIS — O99.019 MATERNAL SICKLE CELL ANEMIA AFFECTING PREGNANCY, ANTEPARTUM: ICD-10-CM

## 2025-01-29 DIAGNOSIS — D57.1 SICKLE CELL DISEASE WITHOUT CRISIS: ICD-10-CM

## 2025-01-29 LAB
ABO + RH BLD: NORMAL
BLD GP AB SCN CELLS X3 SERPL QL: NORMAL
SPECIMEN OUTDATE: NORMAL

## 2025-01-29 PROCEDURE — 86850 RBC ANTIBODY SCREEN: CPT | Performed by: INTERNAL MEDICINE

## 2025-01-29 PROCEDURE — 87653 STREP B DNA AMP PROBE: CPT | Performed by: OBSTETRICS & GYNECOLOGY

## 2025-01-29 PROCEDURE — 36415 COLL VENOUS BLD VENIPUNCTURE: CPT | Performed by: INTERNAL MEDICINE

## 2025-01-29 PROCEDURE — 99999 PR PBB SHADOW E&M-EST. PATIENT-LVL III: CPT | Mod: PBBFAC,,, | Performed by: OBSTETRICS & GYNECOLOGY

## 2025-01-29 PROCEDURE — 86902 BLOOD TYPE ANTIGEN DONOR EA: CPT | Performed by: INTERNAL MEDICINE

## 2025-01-29 PROCEDURE — 85660 RBC SICKLE CELL TEST: CPT | Performed by: INTERNAL MEDICINE

## 2025-01-29 NOTE — PROGRESS NOTES
36w1d  Patient comes in today for follow-up prenatal care  Was in the hospital for pyelonephritis.    Feeling much better today on prophylactic antibiotic  Has been getting blood transfusion to keep Hct > 25. Hct yesterday at 24.9  Has been with frequent nose bleed  No other complaint.  No vaginal bleeding, contractions, or rupture of membranes.  Good fetal movements.    Eating well without significant nausea/vomiting  Not gaining weight  Taking prenatal vitamins, folic acid 4 mg, and baby aspirin  Taking vitamin D and Macrobid  Tdap done previously    Doing well with [facility]    H/H on 1/28/2025 were 6.8/20.5  Getting test repeated weekly  Normal [id number] and maternal quad screen  OGTT at 94    Discussed with patient MFM's findings and recommendations  Discussed care for sickle cell anemia patients in pregnancy  Consider blood transfusion if hematocrit <25%  Discussed blood transfusion  Discussed antibiotic prophylaxis for pyelonephritis  Discussed her complicated care  Discussed NSTweekly.    Labor precautions  Fetal kick count  GBS, HIV/FTA-Abs, and consents  Back next week

## 2025-01-30 ENCOUNTER — INFUSION (OUTPATIENT)
Dept: INFUSION THERAPY | Facility: HOSPITAL | Age: 30
End: 2025-01-30
Attending: INTERNAL MEDICINE
Payer: COMMERCIAL

## 2025-01-30 ENCOUNTER — LAB VISIT (OUTPATIENT)
Dept: LAB | Facility: HOSPITAL | Age: 30
End: 2025-01-30
Attending: OBSTETRICS & GYNECOLOGY
Payer: COMMERCIAL

## 2025-01-30 ENCOUNTER — CLINICAL SUPPORT (OUTPATIENT)
Dept: OBSTETRICS AND GYNECOLOGY | Facility: CLINIC | Age: 30
End: 2025-01-30
Payer: COMMERCIAL

## 2025-01-30 VITALS
SYSTOLIC BLOOD PRESSURE: 97 MMHG | HEART RATE: 107 BPM | OXYGEN SATURATION: 99 % | TEMPERATURE: 98 F | DIASTOLIC BLOOD PRESSURE: 53 MMHG | RESPIRATION RATE: 16 BRPM

## 2025-01-30 DIAGNOSIS — O99.013 MATERNAL SICKLE CELL ANEMIA COMPLICATING PREGNANCY IN THIRD TRIMESTER: ICD-10-CM

## 2025-01-30 DIAGNOSIS — D57.1 SICKLE CELL DISEASE WITHOUT CRISIS: ICD-10-CM

## 2025-01-30 DIAGNOSIS — O09.93 SUPERVISION OF HIGH RISK PREGNANCY IN THIRD TRIMESTER: ICD-10-CM

## 2025-01-30 DIAGNOSIS — D57.1 HEMOGLOBIN SS DISEASE WITHOUT CRISIS: ICD-10-CM

## 2025-01-30 DIAGNOSIS — O99.019 MATERNAL SICKLE CELL ANEMIA AFFECTING PREGNANCY, ANTEPARTUM: ICD-10-CM

## 2025-01-30 DIAGNOSIS — D57.1 MATERNAL SICKLE CELL ANEMIA COMPLICATING PREGNANCY IN THIRD TRIMESTER: ICD-10-CM

## 2025-01-30 DIAGNOSIS — Z3A.36 36 WEEKS GESTATION OF PREGNANCY: Primary | ICD-10-CM

## 2025-01-30 DIAGNOSIS — Z3A.36 36 WEEKS GESTATION OF PREGNANCY: ICD-10-CM

## 2025-01-30 DIAGNOSIS — D57.1 MATERNAL SICKLE CELL ANEMIA AFFECTING PREGNANCY, ANTEPARTUM: ICD-10-CM

## 2025-01-30 LAB
ANISOCYTOSIS BLD QL SMEAR: ABNORMAL
BASOPHILS # BLD AUTO: ABNORMAL K/UL (ref 0–0.2)
BASOPHILS NFR BLD: 0 % (ref 0–1.9)
BLD PROD TYP BPU: NORMAL
BLOOD UNIT EXPIRATION DATE: NORMAL
BLOOD UNIT TYPE CODE: 5100
BLOOD UNIT TYPE: NORMAL
CODING SYSTEM: NORMAL
CROSSMATCH INTERPRETATION: NORMAL
DIFFERENTIAL METHOD BLD: ABNORMAL
DISPENSE STATUS: NORMAL
EOSINOPHIL # BLD AUTO: ABNORMAL K/UL (ref 0–0.5)
EOSINOPHIL NFR BLD: 0 % (ref 0–8)
ERYTHROCYTE [DISTWIDTH] IN BLOOD BY AUTOMATED COUNT: 22 % (ref 11.5–14.5)
GIANT PLATELETS BLD QL SMEAR: PRESENT
HCT VFR BLD AUTO: 20.7 % (ref 37–48.5)
HGB BLD-MCNC: 6.9 G/DL (ref 12–16)
HIV 1+2 AB+HIV1 P24 AG SERPL QL IA: NORMAL
HYPOCHROMIA BLD QL SMEAR: ABNORMAL
IMM GRANULOCYTES # BLD AUTO: ABNORMAL K/UL (ref 0–0.04)
IMM GRANULOCYTES NFR BLD AUTO: ABNORMAL % (ref 0–0.5)
LYMPHOCYTES # BLD AUTO: ABNORMAL K/UL (ref 1–4.8)
LYMPHOCYTES NFR BLD: 9 % (ref 18–48)
MCH RBC QN AUTO: 32.7 PG (ref 27–31)
MCHC RBC AUTO-ENTMCNC: 33.3 G/DL (ref 32–36)
MCV RBC AUTO: 98 FL (ref 82–98)
MONOCYTES # BLD AUTO: ABNORMAL K/UL (ref 0.3–1)
MONOCYTES NFR BLD: 10 % (ref 4–15)
MYELOCYTES NFR BLD MANUAL: 1 %
NEUTROPHILS NFR BLD: 72 % (ref 38–73)
NEUTS BAND NFR BLD MANUAL: 8 %
NRBC BLD-RTO: 10 /100 WBC
NUM UNITS TRANS PACKED RBC: NORMAL
PLATELET # BLD AUTO: 417 K/UL (ref 150–450)
PLATELET BLD QL SMEAR: ABNORMAL
PMV BLD AUTO: 9.9 FL (ref 9.2–12.9)
POIKILOCYTOSIS BLD QL SMEAR: ABNORMAL
POLYCHROMASIA BLD QL SMEAR: ABNORMAL
RBC # BLD AUTO: 2.11 M/UL (ref 4–5.4)
SICKLE CELLS BLD QL SMEAR: ABNORMAL
TREPONEMA PALLIDUM IGG+IGM AB [PRESENCE] IN SERUM OR PLASMA BY IMMUNOASSAY: NONREACTIVE
WBC # BLD AUTO: 14.16 K/UL (ref 3.9–12.7)

## 2025-01-30 PROCEDURE — 36430 TRANSFUSION BLD/BLD COMPNT: CPT

## 2025-01-30 PROCEDURE — 86920 COMPATIBILITY TEST SPIN: CPT | Performed by: INTERNAL MEDICINE

## 2025-01-30 PROCEDURE — 85660 RBC SICKLE CELL TEST: CPT | Performed by: INTERNAL MEDICINE

## 2025-01-30 PROCEDURE — 86593 SYPHILIS TEST NON-TREP QUANT: CPT | Performed by: OBSTETRICS & GYNECOLOGY

## 2025-01-30 PROCEDURE — 85007 BL SMEAR W/DIFF WBC COUNT: CPT | Performed by: OBSTETRICS & GYNECOLOGY

## 2025-01-30 PROCEDURE — 25000003 PHARM REV CODE 250: Performed by: INTERNAL MEDICINE

## 2025-01-30 PROCEDURE — 36415 COLL VENOUS BLD VENIPUNCTURE: CPT | Performed by: OBSTETRICS & GYNECOLOGY

## 2025-01-30 PROCEDURE — 85027 COMPLETE CBC AUTOMATED: CPT | Performed by: OBSTETRICS & GYNECOLOGY

## 2025-01-30 PROCEDURE — P9016 RBC LEUKOCYTES REDUCED: HCPCS | Performed by: INTERNAL MEDICINE

## 2025-01-30 PROCEDURE — 87389 HIV-1 AG W/HIV-1&-2 AB AG IA: CPT | Performed by: OBSTETRICS & GYNECOLOGY

## 2025-01-30 RX ORDER — ACETAMINOPHEN 325 MG/1
650 TABLET ORAL
Status: COMPLETED | OUTPATIENT
Start: 2025-01-30 | End: 2025-01-30

## 2025-01-30 RX ORDER — DIPHENHYDRAMINE HCL 25 MG
25 CAPSULE ORAL ONCE
Status: COMPLETED | OUTPATIENT
Start: 2025-01-30 | End: 2025-01-30

## 2025-01-30 RX ORDER — HYDROCODONE BITARTRATE AND ACETAMINOPHEN 500; 5 MG/1; MG/1
TABLET ORAL
Status: DISCONTINUED | OUTPATIENT
Start: 2025-01-30 | End: 2025-01-30 | Stop reason: HOSPADM

## 2025-01-30 RX ORDER — DIPHENHYDRAMINE HYDROCHLORIDE 50 MG/ML
25 INJECTION INTRAMUSCULAR; INTRAVENOUS
Status: DISCONTINUED | OUTPATIENT
Start: 2025-01-30 | End: 2025-01-30

## 2025-01-30 RX ADMIN — ACETAMINOPHEN 650 MG: 325 TABLET ORAL at 01:01

## 2025-01-30 RX ADMIN — DIPHENHYDRAMINE HYDROCHLORIDE 25 MG: 25 CAPSULE ORAL at 01:01

## 2025-01-30 NOTE — PROGRESS NOTES
NST completed in prenatal testing clinic. NST reactive and reassuring. Patient denies leaking of fluid or vaginal bleeding. She reports some irregular UC. Labor precautions given. Patient reports + fetal movement.        01/30/25 1012   Non Stress Test - General   Indications/Pt Reported Complaint SC disease   Test Duration (min) 22 min   Number of Fetuses 1   Acoustic Stimulator No   Contractions Irregular   Nonstress Test Baby A   Variability Moderate   Decels None   Accels Present   Baseline    Interpretation Baby A   Nonstress Test Interpretation Reactive   Overall Impression Reassuring

## 2025-01-31 LAB — GROUP B STREPTOCOCCUS, PCR: NEGATIVE

## 2025-02-04 ENCOUNTER — ROUTINE PRENATAL (OUTPATIENT)
Dept: OBSTETRICS AND GYNECOLOGY | Facility: CLINIC | Age: 30
End: 2025-02-04
Payer: COMMERCIAL

## 2025-02-04 ENCOUNTER — LAB VISIT (OUTPATIENT)
Dept: LAB | Facility: HOSPITAL | Age: 30
End: 2025-02-04
Attending: INTERNAL MEDICINE
Payer: COMMERCIAL

## 2025-02-04 ENCOUNTER — CLINICAL SUPPORT (OUTPATIENT)
Dept: OBSTETRICS AND GYNECOLOGY | Facility: CLINIC | Age: 30
End: 2025-02-04
Payer: COMMERCIAL

## 2025-02-04 VITALS
DIASTOLIC BLOOD PRESSURE: 60 MMHG | BODY MASS INDEX: 24.87 KG/M2 | SYSTOLIC BLOOD PRESSURE: 100 MMHG | WEIGHT: 131.63 LBS

## 2025-02-04 DIAGNOSIS — Z3A.37 37 WEEKS GESTATION OF PREGNANCY: Primary | ICD-10-CM

## 2025-02-04 DIAGNOSIS — O99.019 MATERNAL SICKLE CELL ANEMIA AFFECTING PREGNANCY, ANTEPARTUM: ICD-10-CM

## 2025-02-04 DIAGNOSIS — D57.1 MATERNAL SICKLE CELL ANEMIA COMPLICATING PREGNANCY IN THIRD TRIMESTER: ICD-10-CM

## 2025-02-04 DIAGNOSIS — O99.013 MATERNAL SICKLE CELL ANEMIA COMPLICATING PREGNANCY IN THIRD TRIMESTER: ICD-10-CM

## 2025-02-04 DIAGNOSIS — D57.1 SICKLE CELL DISEASE WITHOUT CRISIS: ICD-10-CM

## 2025-02-04 DIAGNOSIS — D57.1 MATERNAL SICKLE CELL ANEMIA AFFECTING PREGNANCY, ANTEPARTUM: ICD-10-CM

## 2025-02-04 DIAGNOSIS — D57.1 HEMOGLOBIN SS DISEASE WITHOUT CRISIS: ICD-10-CM

## 2025-02-04 LAB
ANISOCYTOSIS BLD QL SMEAR: ABNORMAL
BASOPHILS # BLD AUTO: ABNORMAL K/UL (ref 0–0.2)
BASOPHILS NFR BLD: 0 % (ref 0–1.9)
DIFFERENTIAL METHOD BLD: ABNORMAL
EOSINOPHIL # BLD AUTO: ABNORMAL K/UL (ref 0–0.5)
EOSINOPHIL NFR BLD: 1 % (ref 0–8)
ERYTHROCYTE [DISTWIDTH] IN BLOOD BY AUTOMATED COUNT: 19 % (ref 11.5–14.5)
GIANT PLATELETS BLD QL SMEAR: PRESENT
HCT VFR BLD AUTO: 25.4 % (ref 37–48.5)
HGB BLD-MCNC: 8.4 G/DL (ref 12–16)
HYPOCHROMIA BLD QL SMEAR: ABNORMAL
IMM GRANULOCYTES # BLD AUTO: ABNORMAL K/UL (ref 0–0.04)
IMM GRANULOCYTES NFR BLD AUTO: ABNORMAL % (ref 0–0.5)
LYMPHOCYTES # BLD AUTO: ABNORMAL K/UL (ref 1–4.8)
LYMPHOCYTES NFR BLD: 8 % (ref 18–48)
MCH RBC QN AUTO: 32.8 PG (ref 27–31)
MCHC RBC AUTO-ENTMCNC: 33.1 G/DL (ref 32–36)
MCV RBC AUTO: 99 FL (ref 82–98)
METAMYELOCYTES NFR BLD MANUAL: 1 %
MONOCYTES # BLD AUTO: ABNORMAL K/UL (ref 0.3–1)
MONOCYTES NFR BLD: 7 % (ref 4–15)
MYELOCYTES NFR BLD MANUAL: 2 %
NEUTROPHILS # BLD AUTO: ABNORMAL K/UL (ref 1.8–7.7)
NEUTROPHILS NFR BLD: 77 % (ref 38–73)
NEUTS BAND NFR BLD MANUAL: 4 %
NRBC BLD-RTO: 5 /100 WBC
PLATELET # BLD AUTO: 438 K/UL (ref 150–450)
PLATELET BLD QL SMEAR: ABNORMAL
PMV BLD AUTO: 9.6 FL (ref 9.2–12.9)
POIKILOCYTOSIS BLD QL SMEAR: ABNORMAL
RBC # BLD AUTO: 2.56 M/UL (ref 4–5.4)
SICKLE CELLS BLD QL SMEAR: ABNORMAL
WBC # BLD AUTO: 15.56 K/UL (ref 3.9–12.7)

## 2025-02-04 PROCEDURE — 99999 PR PBB SHADOW E&M-EST. PATIENT-LVL III: CPT | Mod: PBBFAC,,, | Performed by: OBSTETRICS & GYNECOLOGY

## 2025-02-04 PROCEDURE — 36415 COLL VENOUS BLD VENIPUNCTURE: CPT | Performed by: INTERNAL MEDICINE

## 2025-02-04 PROCEDURE — 85007 BL SMEAR W/DIFF WBC COUNT: CPT | Performed by: INTERNAL MEDICINE

## 2025-02-04 PROCEDURE — 85027 COMPLETE CBC AUTOMATED: CPT | Performed by: INTERNAL MEDICINE

## 2025-02-04 PROCEDURE — 0502F SUBSEQUENT PRENATAL CARE: CPT | Mod: CPTII,S$GLB,, | Performed by: OBSTETRICS & GYNECOLOGY

## 2025-02-04 NOTE — PROGRESS NOTES
NST completed in prenatal testing clinic. NST reactive and reassuring. Patient denies cxtn, leaking of fluid or vaginal bleeding. Patient reports + fetal movement. NEENA following NST.        02/04/25 0848   Non Stress Test - General   Indications/Pt Reported Complaint SC disease   Test Duration (min) 20 min   Number of Fetuses 1   Acoustic Stimulator Yes   Response Acceleration   Contractions Irregular   Nonstress Test Baby A   Variability Moderate   Decels None   Accels Present   Baseline    Interpretation Baby A   Nonstress Test Interpretation Reactive   Overall Impression Reassuring   Comments MVP- 6.20 cm

## 2025-02-04 NOTE — PROGRESS NOTES
37w0d  Patient comes in today for follow-up prenatal care  Was in the hospital for pyelonephritis.    Feeling much better today on prophylactic antibiotic  Has been getting blood transfusion to keep Hct > 25. Hct yesterday at 24.9  Has been with frequent nose bleed  No other complaint.  No vaginal bleeding, contractions, or rupture of membranes.  Good fetal movements.    Eating well without significant nausea/vomiting  Gaining weight  Taking prenatal vitamins, folic acid 4 mg, and baby aspirin  Taking vitamin D and Macrobid  Tdap done previously    Doing well with [facility]    H/H today are 8.4/25.4  Getting test repeated weekly  Normal [id number] and maternal quad screen  OGTT at 94    NST reactive today    Discussed with patient MFM's findings and recommendations  Discussed care for sickle cell anemia patients in pregnancy  Consider blood transfusion if hematocrit <25%  Discussed blood transfusion  Discussed antibiotic prophylaxis for pyelonephritis  Discussed her complicated care  Discussed NSTweekly.    Labor precautions  Fetal kick count  Labor induction scheduled for 2/18/2025  Back next week

## 2025-02-07 ENCOUNTER — CLINICAL SUPPORT (OUTPATIENT)
Dept: OBSTETRICS AND GYNECOLOGY | Facility: CLINIC | Age: 30
End: 2025-02-07
Payer: COMMERCIAL

## 2025-02-07 VITALS — SYSTOLIC BLOOD PRESSURE: 98 MMHG | DIASTOLIC BLOOD PRESSURE: 56 MMHG

## 2025-02-07 DIAGNOSIS — Z3A.37 37 WEEKS GESTATION OF PREGNANCY: Primary | ICD-10-CM

## 2025-02-07 DIAGNOSIS — D57.1 MATERNAL SICKLE CELL ANEMIA AFFECTING PREGNANCY, ANTEPARTUM: ICD-10-CM

## 2025-02-07 DIAGNOSIS — O99.019 MATERNAL SICKLE CELL ANEMIA AFFECTING PREGNANCY, ANTEPARTUM: ICD-10-CM

## 2025-02-07 PROCEDURE — 99999 PR PBB SHADOW E&M-EST. PATIENT-LVL I: CPT | Mod: PBBFAC,,,

## 2025-02-07 NOTE — PROGRESS NOTES
NST completed in prenatal testing clinic. NST reactive and reassuring. Patient denies regular/painful cxtn, leaking of fluid or vaginal bleeding. Patient reports + fetal movement.        02/07/25 1336   Vital Signs   BP (!) 98/56   Non Stress Test - General   Indications/Pt Reported Complaint SC disease   Test Duration (min) 32 min   Number of Fetuses 1   Acoustic Stimulator No   Contractions   (occasional)   Nonstress Test Baby A   Variability Moderate   Decels None   Accels Present   Baseline    Interpretation Baby A   Nonstress Test Interpretation Reactive   Overall Impression Reassuring   Comments MVP- 6.0 cm

## 2025-02-11 ENCOUNTER — ROUTINE PRENATAL (OUTPATIENT)
Dept: OBSTETRICS AND GYNECOLOGY | Facility: CLINIC | Age: 30
End: 2025-02-11
Payer: COMMERCIAL

## 2025-02-11 ENCOUNTER — LAB VISIT (OUTPATIENT)
Dept: LAB | Facility: HOSPITAL | Age: 30
End: 2025-02-11
Attending: INTERNAL MEDICINE
Payer: COMMERCIAL

## 2025-02-11 VITALS
SYSTOLIC BLOOD PRESSURE: 100 MMHG | WEIGHT: 131.81 LBS | DIASTOLIC BLOOD PRESSURE: 60 MMHG | BODY MASS INDEX: 24.91 KG/M2

## 2025-02-11 DIAGNOSIS — O99.019 MATERNAL SICKLE CELL ANEMIA AFFECTING PREGNANCY, ANTEPARTUM: ICD-10-CM

## 2025-02-11 DIAGNOSIS — Z3A.38 38 WEEKS GESTATION OF PREGNANCY: Primary | ICD-10-CM

## 2025-02-11 DIAGNOSIS — D57.1 MATERNAL SICKLE CELL ANEMIA AFFECTING PREGNANCY, ANTEPARTUM: ICD-10-CM

## 2025-02-11 DIAGNOSIS — D57.1 HEMOGLOBIN SS DISEASE WITHOUT CRISIS: ICD-10-CM

## 2025-02-11 LAB
ANISOCYTOSIS BLD QL SMEAR: ABNORMAL
BASOPHILS # BLD AUTO: 0.03 K/UL (ref 0–0.2)
BASOPHILS NFR BLD: 0.2 % (ref 0–1.9)
DIFFERENTIAL METHOD BLD: ABNORMAL
EOSINOPHIL # BLD AUTO: 0.1 K/UL (ref 0–0.5)
EOSINOPHIL NFR BLD: 0.3 % (ref 0–8)
ERYTHROCYTE [DISTWIDTH] IN BLOOD BY AUTOMATED COUNT: 19 % (ref 11.5–14.5)
HCT VFR BLD AUTO: 24.1 % (ref 37–48.5)
HGB BLD-MCNC: 7.8 G/DL (ref 12–16)
HYPOCHROMIA BLD QL SMEAR: ABNORMAL
IMM GRANULOCYTES # BLD AUTO: 0.26 K/UL (ref 0–0.04)
IMM GRANULOCYTES NFR BLD AUTO: 1.4 % (ref 0–0.5)
LYMPHOCYTES # BLD AUTO: 1.4 K/UL (ref 1–4.8)
LYMPHOCYTES NFR BLD: 7.5 % (ref 18–48)
MCH RBC QN AUTO: 32 PG (ref 27–31)
MCHC RBC AUTO-ENTMCNC: 32.4 G/DL (ref 32–36)
MCV RBC AUTO: 99 FL (ref 82–98)
MONOCYTES # BLD AUTO: 2.6 K/UL (ref 0.3–1)
MONOCYTES NFR BLD: 14.1 % (ref 4–15)
NEUTROPHILS # BLD AUTO: 13.8 K/UL (ref 1.8–7.7)
NEUTROPHILS NFR BLD: 76.5 % (ref 38–73)
NRBC BLD-RTO: 6 /100 WBC
PLATELET # BLD AUTO: 389 K/UL (ref 150–450)
PLATELET BLD QL SMEAR: ABNORMAL
PMV BLD AUTO: 10.1 FL (ref 9.2–12.9)
POIKILOCYTOSIS BLD QL SMEAR: SLIGHT
POLYCHROMASIA BLD QL SMEAR: ABNORMAL
RBC # BLD AUTO: 2.44 M/UL (ref 4–5.4)
SICKLE CELLS BLD QL SMEAR: ABNORMAL
TARGETS BLD QL SMEAR: ABNORMAL
WBC # BLD AUTO: 18.09 K/UL (ref 3.9–12.7)

## 2025-02-11 PROCEDURE — 0502F SUBSEQUENT PRENATAL CARE: CPT | Mod: CPTII,S$GLB,, | Performed by: OBSTETRICS & GYNECOLOGY

## 2025-02-11 PROCEDURE — 99999 PR PBB SHADOW E&M-EST. PATIENT-LVL III: CPT | Mod: PBBFAC,,, | Performed by: OBSTETRICS & GYNECOLOGY

## 2025-02-11 PROCEDURE — 85025 COMPLETE CBC W/AUTO DIFF WBC: CPT | Performed by: INTERNAL MEDICINE

## 2025-02-11 PROCEDURE — 36415 COLL VENOUS BLD VENIPUNCTURE: CPT | Performed by: INTERNAL MEDICINE

## 2025-02-11 RX ORDER — DIPHENOXYLATE HYDROCHLORIDE AND ATROPINE SULFATE 2.5; .025 MG/1; MG/1
2 TABLET ORAL EVERY 6 HOURS PRN
OUTPATIENT
Start: 2025-02-11

## 2025-02-11 RX ORDER — ONDANSETRON 8 MG/1
8 TABLET, ORALLY DISINTEGRATING ORAL EVERY 8 HOURS PRN
OUTPATIENT
Start: 2025-02-11

## 2025-02-11 RX ORDER — OXYTOCIN-SODIUM CHLORIDE 0.9% IV SOLN 30 UNIT/500ML 30-0.9/5 UT/ML-%
10 SOLUTION INTRAVENOUS ONCE AS NEEDED
OUTPATIENT
Start: 2025-02-11 | End: 2036-07-10

## 2025-02-11 RX ORDER — OXYTOCIN 10 [USP'U]/ML
10 INJECTION, SOLUTION INTRAMUSCULAR; INTRAVENOUS ONCE AS NEEDED
OUTPATIENT
Start: 2025-02-11 | End: 2036-07-10

## 2025-02-11 RX ORDER — MISOPROSTOL 100 MCG
25 TABLET ORAL EVERY 4 HOURS PRN
OUTPATIENT
Start: 2025-02-11

## 2025-02-11 RX ORDER — MUPIROCIN 20 MG/G
OINTMENT TOPICAL
OUTPATIENT
Start: 2025-02-11

## 2025-02-11 RX ORDER — METHYLERGONOVINE MALEATE 0.2 MG/ML
200 INJECTION INTRAVENOUS ONCE AS NEEDED
OUTPATIENT
Start: 2025-02-11 | End: 2036-07-10

## 2025-02-11 RX ORDER — LIDOCAINE HYDROCHLORIDE 10 MG/ML
10 INJECTION, SOLUTION INFILTRATION; PERINEURAL ONCE AS NEEDED
OUTPATIENT
Start: 2025-02-11 | End: 2036-07-10

## 2025-02-11 RX ORDER — PROMETHAZINE HYDROCHLORIDE 25 MG/1
25 TABLET ORAL EVERY 6 HOURS PRN
OUTPATIENT
Start: 2025-02-11

## 2025-02-11 RX ORDER — MISOPROSTOL 200 UG/1
800 TABLET ORAL ONCE AS NEEDED
OUTPATIENT
Start: 2025-02-11 | End: 2036-07-10

## 2025-02-11 RX ORDER — CALCIUM CARBONATE 200(500)MG
500 TABLET,CHEWABLE ORAL 3 TIMES DAILY PRN
OUTPATIENT
Start: 2025-02-11

## 2025-02-11 RX ORDER — TERBUTALINE SULFATE 1 MG/ML
0.25 INJECTION SUBCUTANEOUS
OUTPATIENT
Start: 2025-02-11

## 2025-02-11 RX ORDER — OXYTOCIN/0.9 % SODIUM CHLORIDE 15/250 ML
95 PLASTIC BAG, INJECTION (ML) INTRAVENOUS ONCE AS NEEDED
OUTPATIENT
Start: 2025-02-11 | End: 2036-07-10

## 2025-02-11 RX ORDER — MISOPROSTOL 200 UG/1
800 TABLET ORAL ONCE AS NEEDED
OUTPATIENT
Start: 2025-02-11

## 2025-02-11 RX ORDER — CARBOPROST TROMETHAMINE 250 UG/ML
250 INJECTION, SOLUTION INTRAMUSCULAR
OUTPATIENT
Start: 2025-02-11

## 2025-02-11 RX ORDER — OXYTOCIN/0.9 % SODIUM CHLORIDE 15/250 ML
95 PLASTIC BAG, INJECTION (ML) INTRAVENOUS CONTINUOUS PRN
OUTPATIENT
Start: 2025-02-11

## 2025-02-11 RX ORDER — SIMETHICONE 80 MG
1 TABLET,CHEWABLE ORAL 4 TIMES DAILY PRN
OUTPATIENT
Start: 2025-02-11

## 2025-02-11 RX ORDER — OXYTOCIN/0.9 % SODIUM CHLORIDE 15/250 ML
0-32 PLASTIC BAG, INJECTION (ML) INTRAVENOUS CONTINUOUS
OUTPATIENT
Start: 2025-02-11

## 2025-02-11 RX ORDER — BUTORPHANOL TARTRATE 2 MG/ML
2 INJECTION INTRAMUSCULAR; INTRAVENOUS
OUTPATIENT
Start: 2025-02-11

## 2025-02-11 NOTE — PROGRESS NOTES
38w0d  Patient comes in today for follow-up prenatal care  Was in the hospital for pyelonephritis.    Feeling much better on prophylactic antibiotic  Has been getting blood transfusion to keep Hct > 25. Hct today at 24.1  Has been with frequent nose bleed  No other complaint.  No vaginal bleeding, contractions, or rupture of membranes.  Good fetal movements.    Eating well without significant nausea/vomiting  Gaining weight  Taking prenatal vitamins, folic acid 4 mg, and baby aspirin  Taking vitamin D and Macrobid  Tdap done previously    Doing well with [facility]    H/H today are 7.8/24.1  Getting test repeated weekly  Normal [id number] and maternal quad screen  OGTT at 94    NST reactive today    Discussed with patient MFM's findings and recommendations  Discussed care for sickle cell anemia patients in pregnancy  Consider blood transfusion if hematocrit <25%  Discussed blood transfusion  Discussed antibiotic prophylaxis for pyelonephritis  Discussed her complicated care  Discussed NSTweekly.    Labor precautions  Fetal kick count  Labor induction scheduled for 2/18/2025 or next week.   Orders in  Back next week

## 2025-02-17 ENCOUNTER — HOSPITAL ENCOUNTER (INPATIENT)
Facility: HOSPITAL | Age: 30
LOS: 6 days | Discharge: HOME | End: 2025-02-23
Attending: EMERGENCY MEDICINE | Admitting: OBSTETRICS & GYNECOLOGY
Payer: COMMERCIAL

## 2025-02-17 DIAGNOSIS — D57.00 SICKLE CELL PAIN CRISIS: ICD-10-CM

## 2025-02-17 DIAGNOSIS — Z3A.39 PREGNANCY WITH 39 COMPLETED WEEKS GESTATION: ICD-10-CM

## 2025-02-17 DIAGNOSIS — R07.9 CHEST PAIN: ICD-10-CM

## 2025-02-17 DIAGNOSIS — R07.9 CHEST PAIN, UNSPECIFIED TYPE: ICD-10-CM

## 2025-02-17 DIAGNOSIS — O99.019 MATERNAL SICKLE CELL ANEMIA AFFECTING PREGNANCY, ANTEPARTUM: ICD-10-CM

## 2025-02-17 DIAGNOSIS — N30.90 CYSTITIS: ICD-10-CM

## 2025-02-17 DIAGNOSIS — M79.652 BILATERAL THIGH PAIN: ICD-10-CM

## 2025-02-17 DIAGNOSIS — Z3A.39 39 WEEKS GESTATION OF PREGNANCY: ICD-10-CM

## 2025-02-17 DIAGNOSIS — Z3A.38 38 WEEKS GESTATION OF PREGNANCY: ICD-10-CM

## 2025-02-17 DIAGNOSIS — Z98.891 STATUS POST CESAREAN SECTION: Primary | ICD-10-CM

## 2025-02-17 DIAGNOSIS — M79.651 BILATERAL THIGH PAIN: ICD-10-CM

## 2025-02-17 DIAGNOSIS — M54.50 LUMBAR BACK PAIN: ICD-10-CM

## 2025-02-17 DIAGNOSIS — D57.1 MATERNAL SICKLE CELL ANEMIA AFFECTING PREGNANCY, ANTEPARTUM: ICD-10-CM

## 2025-02-17 DIAGNOSIS — O23.03 PYELONEPHRITIS AFFECTING PREGNANCY IN THIRD TRIMESTER: ICD-10-CM

## 2025-02-17 LAB
ABO + RH BLD: NORMAL
ALBUMIN SERPL BCP-MCNC: 2.2 G/DL (ref 3.5–5.2)
ALP SERPL-CCNC: 150 U/L (ref 40–150)
ALT SERPL W/O P-5'-P-CCNC: 12 U/L (ref 10–44)
ANION GAP SERPL CALC-SCNC: 11 MMOL/L (ref 8–16)
ANISOCYTOSIS BLD QL SMEAR: ABNORMAL
AST SERPL-CCNC: 28 U/L (ref 10–40)
BACTERIA #/AREA URNS HPF: ABNORMAL /HPF
BASOPHILS # BLD AUTO: ABNORMAL K/UL (ref 0–0.2)
BASOPHILS NFR BLD: 0 % (ref 0–1.9)
BILIRUB SERPL-MCNC: 8.1 MG/DL (ref 0.1–1)
BILIRUB UR QL STRIP: NEGATIVE
BLD GP AB SCN CELLS X3 SERPL QL: NORMAL
BLD PROD TYP BPU: NORMAL
BLD PROD TYP BPU: NORMAL
BLOOD UNIT EXPIRATION DATE: NORMAL
BLOOD UNIT EXPIRATION DATE: NORMAL
BLOOD UNIT TYPE CODE: 5100
BLOOD UNIT TYPE CODE: 5100
BLOOD UNIT TYPE: NORMAL
BLOOD UNIT TYPE: NORMAL
BUN SERPL-MCNC: 10 MG/DL (ref 6–20)
CALCIUM SERPL-MCNC: 9 MG/DL (ref 8.7–10.5)
CHLORIDE SERPL-SCNC: 109 MMOL/L (ref 95–110)
CLARITY UR: ABNORMAL
CO2 SERPL-SCNC: 17 MMOL/L (ref 23–29)
CODING SYSTEM: NORMAL
CODING SYSTEM: NORMAL
COLOR UR: YELLOW
CREAT SERPL-MCNC: 1 MG/DL (ref 0.5–1.4)
CROSSMATCH INTERPRETATION: NORMAL
CROSSMATCH INTERPRETATION: NORMAL
DIFFERENTIAL METHOD BLD: ABNORMAL
DISPENSE STATUS: NORMAL
DISPENSE STATUS: NORMAL
EOSINOPHIL # BLD AUTO: ABNORMAL K/UL (ref 0–0.5)
EOSINOPHIL NFR BLD: 0 % (ref 0–8)
ERYTHROCYTE [DISTWIDTH] IN BLOOD BY AUTOMATED COUNT: 19.1 % (ref 11.5–14.5)
EST. GFR  (NO RACE VARIABLE): >60 ML/MIN/1.73 M^2
GIANT PLATELETS BLD QL SMEAR: PRESENT
GLUCOSE SERPL-MCNC: 92 MG/DL (ref 70–110)
GLUCOSE UR QL STRIP: NEGATIVE
HCT VFR BLD AUTO: 20.7 % (ref 37–48.5)
HGB BLD-MCNC: 7.3 G/DL (ref 12–16)
HGB UR QL STRIP: NEGATIVE
HYPOCHROMIA BLD QL SMEAR: ABNORMAL
IMM GRANULOCYTES # BLD AUTO: ABNORMAL K/UL (ref 0–0.04)
IMM GRANULOCYTES NFR BLD AUTO: ABNORMAL % (ref 0–0.5)
KETONES UR QL STRIP: NEGATIVE
LEUKOCYTE ESTERASE UR QL STRIP: ABNORMAL
LYMPHOCYTES # BLD AUTO: ABNORMAL K/UL (ref 1–4.8)
LYMPHOCYTES NFR BLD: 10 % (ref 18–48)
MCH RBC QN AUTO: 33.8 PG (ref 27–31)
MCHC RBC AUTO-ENTMCNC: 35.3 G/DL (ref 32–36)
MCV RBC AUTO: 96 FL (ref 82–98)
METAMYELOCYTES NFR BLD MANUAL: 4 %
MICROSCOPIC COMMENT: ABNORMAL
MONOCYTES # BLD AUTO: ABNORMAL K/UL (ref 0.3–1)
MONOCYTES NFR BLD: 9 % (ref 4–15)
MYELOCYTES NFR BLD MANUAL: 3 %
NEUTROPHILS NFR BLD: 66 % (ref 38–73)
NEUTS BAND NFR BLD MANUAL: 8 %
NITRITE UR QL STRIP: NEGATIVE
NRBC BLD-RTO: 7 /100 WBC
NUM UNITS TRANS PACKED RBC: NORMAL
NUM UNITS TRANS PACKED RBC: NORMAL
PH UR STRIP: 7 [PH] (ref 5–8)
PLATELET # BLD AUTO: 361 K/UL (ref 150–450)
PLATELET BLD QL SMEAR: ABNORMAL
PMV BLD AUTO: 10.3 FL (ref 9.2–12.9)
POIKILOCYTOSIS BLD QL SMEAR: ABNORMAL
POLYCHROMASIA BLD QL SMEAR: ABNORMAL
POTASSIUM SERPL-SCNC: 3.5 MMOL/L (ref 3.5–5.1)
PROT SERPL-MCNC: 7 G/DL (ref 6–8.4)
PROT UR QL STRIP: NEGATIVE
RBC # BLD AUTO: 2.16 M/UL (ref 4–5.4)
RBC #/AREA URNS HPF: 5 /HPF (ref 0–4)
RETICS/RBC NFR AUTO: >23 % (ref 0.5–2.5)
SICKLE CELLS BLD QL SMEAR: ABNORMAL
SODIUM SERPL-SCNC: 137 MMOL/L (ref 136–145)
SP GR UR STRIP: 1 (ref 1–1.03)
SPECIMEN OUTDATE: NORMAL
SQUAMOUS #/AREA URNS HPF: 15 /HPF
STOMATOCYTES BLD QL SMEAR: PRESENT
TARGETS BLD QL SMEAR: ABNORMAL
URN SPEC COLLECT METH UR: ABNORMAL
UROBILINOGEN UR STRIP-ACNC: >=8 EU/DL
WBC # BLD AUTO: 14.12 K/UL (ref 3.9–12.7)
WBC #/AREA URNS HPF: 75 /HPF (ref 0–5)

## 2025-02-17 PROCEDURE — 85027 COMPLETE CBC AUTOMATED: CPT

## 2025-02-17 PROCEDURE — 36410 VNPNXR 3YR/> PHY/QHP DX/THER: CPT

## 2025-02-17 PROCEDURE — 25000003 PHARM REV CODE 250: Performed by: EMERGENCY MEDICINE

## 2025-02-17 PROCEDURE — 11000001 HC ACUTE MED/SURG PRIVATE ROOM

## 2025-02-17 PROCEDURE — 85045 AUTOMATED RETICULOCYTE COUNT: CPT

## 2025-02-17 PROCEDURE — 36430 TRANSFUSION BLD/BLD COMPNT: CPT

## 2025-02-17 PROCEDURE — 93010 ELECTROCARDIOGRAM REPORT: CPT | Mod: ,,, | Performed by: INTERNAL MEDICINE

## 2025-02-17 PROCEDURE — 86901 BLOOD TYPING SEROLOGIC RH(D): CPT

## 2025-02-17 PROCEDURE — 25000003 PHARM REV CODE 250

## 2025-02-17 PROCEDURE — P9016 RBC LEUKOCYTES REDUCED: HCPCS | Performed by: EMERGENCY MEDICINE

## 2025-02-17 PROCEDURE — 59025 FETAL NON-STRESS TEST: CPT

## 2025-02-17 PROCEDURE — 96376 TX/PRO/DX INJ SAME DRUG ADON: CPT

## 2025-02-17 PROCEDURE — 96374 THER/PROPH/DIAG INJ IV PUSH: CPT

## 2025-02-17 PROCEDURE — 63600175 PHARM REV CODE 636 W HCPCS: Performed by: EMERGENCY MEDICINE

## 2025-02-17 PROCEDURE — 99223 1ST HOSP IP/OBS HIGH 75: CPT | Mod: 25,,, | Performed by: OBSTETRICS & GYNECOLOGY

## 2025-02-17 PROCEDURE — 96361 HYDRATE IV INFUSION ADD-ON: CPT

## 2025-02-17 PROCEDURE — 87088 URINE BACTERIA CULTURE: CPT

## 2025-02-17 PROCEDURE — 81000 URINALYSIS NONAUTO W/SCOPE: CPT

## 2025-02-17 PROCEDURE — 86920 COMPATIBILITY TEST SPIN: CPT | Performed by: EMERGENCY MEDICINE

## 2025-02-17 PROCEDURE — 85007 BL SMEAR W/DIFF WBC COUNT: CPT

## 2025-02-17 PROCEDURE — 86920 COMPATIBILITY TEST SPIN: CPT | Performed by: OBSTETRICS & GYNECOLOGY

## 2025-02-17 PROCEDURE — 87186 SC STD MICRODIL/AGAR DIL: CPT

## 2025-02-17 PROCEDURE — 80053 COMPREHEN METABOLIC PANEL: CPT

## 2025-02-17 PROCEDURE — 63600175 PHARM REV CODE 636 W HCPCS: Mod: JZ,TB | Performed by: EMERGENCY MEDICINE

## 2025-02-17 PROCEDURE — 93005 ELECTROCARDIOGRAM TRACING: CPT

## 2025-02-17 PROCEDURE — 87086 URINE CULTURE/COLONY COUNT: CPT

## 2025-02-17 PROCEDURE — 63600175 PHARM REV CODE 636 W HCPCS: Performed by: OBSTETRICS & GYNECOLOGY

## 2025-02-17 PROCEDURE — 25000003 PHARM REV CODE 250: Performed by: OBSTETRICS & GYNECOLOGY

## 2025-02-17 PROCEDURE — 99285 EMERGENCY DEPT VISIT HI MDM: CPT | Mod: 25

## 2025-02-17 PROCEDURE — 59025 FETAL NON-STRESS TEST: CPT | Mod: 26,,, | Performed by: OBSTETRICS & GYNECOLOGY

## 2025-02-17 PROCEDURE — 96375 TX/PRO/DX INJ NEW DRUG ADDON: CPT

## 2025-02-17 PROCEDURE — C1751 CATH, INF, PER/CENT/MIDLINE: HCPCS

## 2025-02-17 RX ORDER — SIMETHICONE 80 MG
1 TABLET,CHEWABLE ORAL EVERY 6 HOURS PRN
Status: DISCONTINUED | OUTPATIENT
Start: 2025-02-17 | End: 2025-02-21

## 2025-02-17 RX ORDER — DIPHENHYDRAMINE HYDROCHLORIDE 50 MG/ML
25 INJECTION INTRAMUSCULAR; INTRAVENOUS
Status: COMPLETED | OUTPATIENT
Start: 2025-02-17 | End: 2025-02-17

## 2025-02-17 RX ORDER — DIPHENHYDRAMINE HCL 25 MG
25 CAPSULE ORAL EVERY 4 HOURS PRN
Status: DISCONTINUED | OUTPATIENT
Start: 2025-02-17 | End: 2025-02-21

## 2025-02-17 RX ORDER — HYDROCODONE BITARTRATE AND ACETAMINOPHEN 500; 5 MG/1; MG/1
TABLET ORAL
Status: DISCONTINUED | OUTPATIENT
Start: 2025-02-17 | End: 2025-02-21

## 2025-02-17 RX ORDER — SODIUM CHLORIDE 0.9 % (FLUSH) 0.9 %
10 SYRINGE (ML) INJECTION EVERY 12 HOURS PRN
Status: DISCONTINUED | OUTPATIENT
Start: 2025-02-17 | End: 2025-02-21

## 2025-02-17 RX ORDER — DIPHENHYDRAMINE HYDROCHLORIDE 50 MG/ML
25 INJECTION INTRAMUSCULAR; INTRAVENOUS EVERY 4 HOURS PRN
Status: DISCONTINUED | OUTPATIENT
Start: 2025-02-17 | End: 2025-02-21

## 2025-02-17 RX ORDER — OXYCODONE AND ACETAMINOPHEN 5; 325 MG/1; MG/1
1 TABLET ORAL EVERY 4 HOURS PRN
Refills: 0 | Status: DISCONTINUED | OUTPATIENT
Start: 2025-02-17 | End: 2025-02-21

## 2025-02-17 RX ORDER — AMOXICILLIN 250 MG
1 CAPSULE ORAL NIGHTLY PRN
Status: DISCONTINUED | OUTPATIENT
Start: 2025-02-17 | End: 2025-02-21

## 2025-02-17 RX ORDER — SODIUM CHLORIDE, SODIUM LACTATE, POTASSIUM CHLORIDE, CALCIUM CHLORIDE 600; 310; 30; 20 MG/100ML; MG/100ML; MG/100ML; MG/100ML
INJECTION, SOLUTION INTRAVENOUS CONTINUOUS
Status: DISCONTINUED | OUTPATIENT
Start: 2025-02-17 | End: 2025-02-21

## 2025-02-17 RX ORDER — HYDROMORPHONE HYDROCHLORIDE 1 MG/ML
1 INJECTION, SOLUTION INTRAMUSCULAR; INTRAVENOUS; SUBCUTANEOUS
Refills: 0 | Status: COMPLETED | OUTPATIENT
Start: 2025-02-17 | End: 2025-02-17

## 2025-02-17 RX ORDER — SODIUM CHLORIDE 0.9 % (FLUSH) 0.9 %
10 SYRINGE (ML) INJECTION
Status: DISCONTINUED | OUTPATIENT
Start: 2025-02-17 | End: 2025-02-21

## 2025-02-17 RX ORDER — ONDANSETRON 8 MG/1
8 TABLET, ORALLY DISINTEGRATING ORAL EVERY 8 HOURS PRN
Status: DISCONTINUED | OUTPATIENT
Start: 2025-02-17 | End: 2025-02-21

## 2025-02-17 RX ORDER — FAMOTIDINE 20 MG/1
20 TABLET, FILM COATED ORAL 2 TIMES DAILY
Status: DISCONTINUED | OUTPATIENT
Start: 2025-02-17 | End: 2025-02-17

## 2025-02-17 RX ORDER — FAMOTIDINE 20 MG/1
20 TABLET, FILM COATED ORAL 2 TIMES DAILY
Status: DISCONTINUED | OUTPATIENT
Start: 2025-02-17 | End: 2025-02-21

## 2025-02-17 RX ORDER — PRENATAL WITH FERROUS FUM AND FOLIC ACID 3080; 920; 120; 400; 22; 1.84; 3; 20; 10; 1; 12; 200; 27; 25; 2 [IU]/1; [IU]/1; MG/1; [IU]/1; MG/1; MG/1; MG/1; MG/1; MG/1; MG/1; UG/1; MG/1; MG/1; MG/1; MG/1
1 TABLET ORAL DAILY
Status: DISCONTINUED | OUTPATIENT
Start: 2025-02-18 | End: 2025-02-18

## 2025-02-17 RX ORDER — OXYCODONE AND ACETAMINOPHEN 10; 325 MG/1; MG/1
1 TABLET ORAL EVERY 4 HOURS PRN
Refills: 0 | Status: DISCONTINUED | OUTPATIENT
Start: 2025-02-17 | End: 2025-02-21

## 2025-02-17 RX ORDER — ACETAMINOPHEN 325 MG/1
650 TABLET ORAL EVERY 6 HOURS PRN
Status: DISCONTINUED | OUTPATIENT
Start: 2025-02-17 | End: 2025-02-21

## 2025-02-17 RX ADMIN — SODIUM CHLORIDE 1000 ML: 9 INJECTION, SOLUTION INTRAVENOUS at 09:02

## 2025-02-17 RX ADMIN — DIPHENHYDRAMINE HYDROCHLORIDE 25 MG: 50 INJECTION INTRAMUSCULAR; INTRAVENOUS at 09:02

## 2025-02-17 RX ADMIN — SODIUM CHLORIDE, POTASSIUM CHLORIDE, SODIUM LACTATE AND CALCIUM CHLORIDE: 600; 310; 30; 20 INJECTION, SOLUTION INTRAVENOUS at 11:02

## 2025-02-17 RX ADMIN — OXYCODONE HYDROCHLORIDE AND ACETAMINOPHEN 1 TABLET: 5; 325 TABLET ORAL at 07:02

## 2025-02-17 RX ADMIN — FAMOTIDINE 20 MG: 20 TABLET ORAL at 06:02

## 2025-02-17 RX ADMIN — SODIUM CHLORIDE: 9 INJECTION, SOLUTION INTRAVENOUS at 04:02

## 2025-02-17 RX ADMIN — DIPHENHYDRAMINE HYDROCHLORIDE 25 MG: 50 INJECTION, SOLUTION INTRAMUSCULAR; INTRAVENOUS at 04:02

## 2025-02-17 RX ADMIN — HYDROMORPHONE HYDROCHLORIDE 1 MG: 1 INJECTION, SOLUTION INTRAMUSCULAR; INTRAVENOUS; SUBCUTANEOUS at 11:02

## 2025-02-17 RX ADMIN — PROMETHAZINE HYDROCHLORIDE 12.5 MG: 25 INJECTION INTRAMUSCULAR; INTRAVENOUS at 09:02

## 2025-02-17 RX ADMIN — ACETAMINOPHEN 650 MG: 325 TABLET ORAL at 04:02

## 2025-02-17 RX ADMIN — HYDROMORPHONE HYDROCHLORIDE 1 MG: 1 INJECTION, SOLUTION INTRAMUSCULAR; INTRAVENOUS; SUBCUTANEOUS at 09:02

## 2025-02-17 RX ADMIN — PIPERACILLIN SODIUM AND TAZOBACTAM SODIUM 4.5 G: 4; .5 INJECTION, POWDER, FOR SOLUTION INTRAVENOUS at 11:02

## 2025-02-17 NOTE — HPI
Lana Chou is a 29 y.o. female, G1, with a PMHx of CKD, sickle cell anemia, 38w6d pregant due for induction tomorrow, presenting to ED with lower back and bilateral leg pain. Notes pain occasionally radiates to chest, denies current chest pain. Reports pain feels similar to previous episodes of sickle cell pain crisis. Reports last episode was in September.  No other exacerbating or alleviating factors. Patient denies cough, shortness of breath, fever, chills, abdominal pain, nausea, vomiting, diarrhea, dysuria, headaches, congestion, sore throat, arm or leg trouble, eye pain, ear pain, rash, or other associated symptoms.

## 2025-02-17 NOTE — SUBJECTIVE & OBJECTIVE
Obstetric HPI:  Patient reports None contractions, active fetal movement, No vaginal bleeding , No loss of fluid     This pregnancy has been complicated by sickle cell disease, in crisis.    History of pyelonephritis in early pregnancy.  On antibiotic therapy      OB History    Para Term  AB Living   1 0 0 0 0 0   SAB IAB Ectopic Multiple Live Births   0 0 0 0 0      # Outcome Date GA Lbr Ehsan/2nd Weight Sex Type Anes PTL Lv   1 Current              Past Medical History:   Diagnosis Date    Bilateral leg edema 10/15/2024    Chronic kidney disease (CKD) 2018    Emesis 2022    Encounter for surveillance of vaginal ring hormonal contraceptive device 11/15/2023    LGSIL on Pap smear of cervix 2017    LLQ abdominal pain 2023    Pregnancy with 18 completed weeks gestation 2024    Sickle cell anemia     Sickle cell disease     Sickle cell pain crisis 2024    Sickle-cell disease without crisis     UTI (urinary tract infection) 2023     Past Surgical History:   Procedure Laterality Date    BREAST LUMPECTOMY      CARDIAC SURGERY      port insertion    NEPHRECTOMY         PTA Medications   Medication Sig    aspirin 81 mg Cap Take 81 mg by mouth.    folic acid (FOLVITE) 1 MG tablet Take 4 tablets (4 mg total) by mouth once daily.    acetaminophen (TYLENOL) 500 MG tablet Take 1 tablet (500 mg total) by mouth every 6 (six) hours as needed. (Patient not taking: Reported on 2025)    cholecalciferol, vitamin D3, 1,250 mcg (50,000 unit) capsule Take 1 capsule (50,000 Units total) by mouth every 7 days. (Patient not taking: Reported on 2025)       Review of patient's allergies indicates:   Allergen Reactions    Rocephin [ceftriaxone] Shortness Of Breath, Itching and Anxiety     Nausea, vomiting. No hives, swelling, or anaphylaxis. Can tolerate if necessary, but would avoid if possible.        Family History       Problem Relation (Age of Onset)    Sickle cell trait  Father, Mother, Sister, Sister          Tobacco Use    Smoking status: Former     Types: Vaping with nicotine     Start date: 2022     Quit date: 2024     Years since quittin.6    Smokeless tobacco: Never   Substance and Sexual Activity    Alcohol use: Not Currently    Drug use: No    Sexual activity: Yes     Partners: Male     Birth control/protection: None     Review of Systems   Objective:     Vital Signs (Most Recent):  Temp: 98.6 °F (37 °C) (25 0842)  Pulse: 101 (25 1445)  Resp: (!) 54 (25 1405)  BP: 98/60 (25 1440)  SpO2: 97 % (25 1445) Vital Signs (24h Range):  Temp:  [98.6 °F (37 °C)] 98.6 °F (37 °C)  Pulse:  [] 101  Resp:  [16-57] 54  SpO2:  [91 %-99 %] 97 %  BP: ()/(48-62) 98/60     Weight: 59 kg (130 lb)  Body mass index is 24.56 kg/m².    FHT: 130 Cat 1 (reassuring)  TOCO:  Rare contractions     Physical Exam:   Constitutional: She appears well-developed and well-nourished. No distress.   BMI of 24.56    HENT:   Head: Normocephalic and atraumatic.    Eyes: EOM are normal.      Pulmonary/Chest: Effort normal. No respiratory distress.   Breasts: Non-tender, no engorgement, no masses, no retraction, no discharge. Negative for lymphadenopathy.         Abdominal: Soft. She exhibits no distension. There is no abdominal tenderness. There is no rebound and no guarding.     Genitourinary:    Vagina normal.   No vaginal discharge in the vagina.    Genitourinary Comments: Vulva without any obvious lesions.  Urethral meatus normal size and location without any lesion.  Urethra is non-tender without stricture or discharge.  Bladder is non-tender.  Vaginal vault with good support.  Minimal white discharge noted.  No obvious lesion.  Normal rugation.  Cervix is without any cervical motion tenderness.  No obvious lesion.  Uterus is small, non-tender, normal contour.  Adnexa is without any masses or tenderness.  Perineum without obvious lesion.                Musculoskeletal: Normal range of motion.       Neurological: She is alert.    Skin: Skin is warm and dry.    Psychiatric: She has a normal mood and affect.        Cervix:  Dilation:  0  Effacement:  25%  Station: -3  Presentation: Vertex     Significant Labs:  Lab Results   Component Value Date    GROUPTRH O POS 02/17/2025    HEPBSAG Non-reactive 07/18/2024       CBC:   Recent Labs   Lab 02/17/25  0902   WBC 14.12*   RBC 2.16*   HGB 7.3*   HCT 20.7*      MCV 96   MCH 33.8*   MCHC 35.3     CMP:   Recent Labs   Lab 02/17/25  0902   GLU 92   CALCIUM 9.0   ALBUMIN 2.2*   PROT 7.0      K 3.5   CO2 17*      BUN 10   CREATININE 1.0   ALKPHOS 150   ALT 12   AST 28   BILITOT 8.1*     Recent Labs   Lab 02/17/25  1002   COLORU Yellow   SPECGRAV 1.005   PHUR 7.0   PROTEINUA Negative   BACTERIA Moderate*   NITRITE Negative   LEUKOCYTESUR 3+*   UROBILINOGEN >=8.0*     I have personallly reviewed all pertinent lab results from the last 24 hours.

## 2025-02-17 NOTE — NURSING
MD Sevilla on unit. Report given on pt. MD placed order for Midline access to be placed d/t nature of care needs (blood transfusions, frequent lab draws, and eventual labor). MD notified house supervisor.     MD Landers on unit and was updated on this plan.     1620-Blood transfusion started @ 1611. Pt noted redness at her IV site, but stated that she had localized redness during her previous blood transfusion that resolved after infusion stopped. Pt denies any other s/s of reaction or s/s of IV infiltration.     1622-IV team at bedside to place midline. Will switch blood to new site when midline in.     1645-Blood transfusion moved to Midline and peripheral IV removed. Redness at site resolved immediately.

## 2025-02-17 NOTE — ED PROVIDER NOTES
Encounter Date: 2/17/2025    SCRIBE #1 NOTE: I, Rayna Zuñiga, am scribing for, and in the presence of,  Bertram Somers MD. I have scribed the following portions of the note - Other sections scribed: HPI, ROS.       History     Chief Complaint   Patient presents with    Leg Pain     39 wks pregnant, due for induction tomorrow. Began with severe lower back, chest, bilateral leg, hip pain. States it feels like a sickle cell crisis     Lana Chou is a 29 y.o. female, with a PMHx of CKD, sickle cell anemia, 39 weeks pregant due for induction tomorrow, presenting to ED with lower back and bilateral leg pain. Notes pain occasionally radiates to chest, denies current chest pain. Reports pain feels similar to previous episodes of sickle cell pain crisis. Reports last episode was in September.  No other exacerbating or alleviating factors. Patient denies cough, shortness of breath, fever, chills, abdominal pain, nausea, vomiting, diarrhea, dysuria, headaches, congestion, sore throat, arm or leg trouble, eye pain, ear pain, rash, or other associated symptoms.        The history is provided by the patient. No  was used.     Review of patient's allergies indicates:   Allergen Reactions    Rocephin [ceftriaxone] Shortness Of Breath, Itching and Anxiety     Nausea, vomiting. No hives, swelling, or anaphylaxis. Can tolerate if necessary, but would avoid if possible.     Past Medical History:   Diagnosis Date    Bilateral leg edema 10/15/2024    Chronic kidney disease (CKD) 04/09/2018    Emesis 11/23/2022    Encounter for surveillance of vaginal ring hormonal contraceptive device 11/15/2023    LGSIL on Pap smear of cervix 06/01/2017    LLQ abdominal pain 03/02/2023    Pregnancy with 18 completed weeks gestation 09/30/2024    Sickle cell anemia     Sickle cell disease     Sickle cell pain crisis 05/29/2024    Sickle-cell disease without crisis     UTI (urinary tract infection) 03/02/2023     Past Surgical  History:   Procedure Laterality Date    BREAST LUMPECTOMY      CARDIAC SURGERY      port insertion    NEPHRECTOMY       Family History   Problem Relation Name Age of Onset    Sickle cell trait Father      Sickle cell trait Mother      Sickle cell trait Sister      Sickle cell trait Sister      Breast cancer Neg Hx      Colon cancer Neg Hx      Ovarian cancer Neg Hx       Social History[1]  Review of Systems   Constitutional:  Negative for chills, diaphoresis and fever.   HENT:  Negative for ear pain and sore throat.    Eyes:  Negative for pain.   Respiratory:  Negative for cough and shortness of breath.    Cardiovascular:  Positive for chest pain.   Gastrointestinal:  Negative for abdominal pain, diarrhea, nausea and vomiting.   Genitourinary:  Negative for dysuria.   Musculoskeletal:  Positive for back pain and myalgias.        (-) Arm or leg trouble.    Skin:  Negative for rash.   Neurological:  Negative for headaches.   Psychiatric/Behavioral:  Negative for confusion.        Physical Exam     Initial Vitals [02/17/25 0842]   BP Pulse Resp Temp SpO2   (!) 108/48 (!) 120 (!) 24 98.6 °F (37 °C) 99 %      MAP       --         Physical Exam  The patient was examined specifically for the following:   General:No significant distress, Good color, Warm and dry. Head and neck:Scalp atraumatic, Neck supple. Neurological:Appropriate conversation, Gross motor deficits. Eyes:Conjugate gaze, Clear corneas. ENT: No epistaxis. Cardiac: Regular rate and rhythm, Grossly normal heart tones. Pulmonary: Wheezing, Rales. Gastrointestinal: Abdominal tenderness, Abdominal distention. Musculoskeletal: Extremity deformity, Apparent pain with range of motion of the joints. Skin: Rash.   The findings on examination were normal except for the following:  The patient appears to be uncomfortable.  Lower extremities are not swollen or tender.  Lungs are clear.  The heart tones are normal.  Chest abdomen pelvis is nontender.  The patient has an  obvious gravid term uterus.  There is no significant tenderness of the lumbar back.  The patient is not splinting.  ED Course   Procedures  Labs Reviewed   CBC W/ AUTO DIFFERENTIAL - Abnormal       Result Value    WBC 14.12 (*)     RBC 2.16 (*)     Hemoglobin 7.3 (*)     Hematocrit 20.7 (*)     MCV 96      MCH 33.8 (*)     MCHC 35.3      RDW 19.1 (*)     Platelets 361      MPV 10.3      Immature Granulocytes CANCELED      Immature Grans (Abs) CANCELED      Lymph # CANCELED      Mono # CANCELED      Eos # CANCELED      Baso # CANCELED      nRBC 7 (*)     Gran % 66.0      Lymph % 10.0 (*)     Mono % 9.0      Eosinophil % 0.0      Basophil % 0.0      Bands 8.0      Metamyelocytes 4.0      Myelocytes 3.0      Platelet Estimate Appears normal      Aniso Moderate      Poik Moderate      Poly Occasional      Hypo Occasional      Target Cells Occasional      Stomatocytes Present      Sickle Cells Moderate (*)     Large/Giant Platelets Present      Differential Method Manual     COMPREHENSIVE METABOLIC PANEL - Abnormal    Sodium 137      Potassium 3.5      Chloride 109      CO2 17 (*)     Glucose 92      BUN 10      Creatinine 1.0      Calcium 9.0      Total Protein 7.0      Albumin 2.2 (*)     Total Bilirubin 8.1 (*)     Alkaline Phosphatase 150      AST 28      ALT 12      eGFR >60      Anion Gap 11     RETICULOCYTES - Abnormal    Retic >23.0 (*)    URINALYSIS, REFLEX TO URINE CULTURE - Abnormal    Specimen UA Urine, Clean Catch      Color, UA Yellow      Appearance, UA Hazy (*)     pH, UA 7.0      Specific Gravity, UA 1.005      Protein, UA Negative      Glucose, UA Negative      Ketones, UA Negative      Bilirubin (UA) Negative      Occult Blood UA Negative      Nitrite, UA Negative      Urobilinogen, UA >=8.0 (*)     Leukocytes, UA 3+ (*)     Narrative:     Specimen Source->Urine   URINALYSIS MICROSCOPIC - Abnormal    RBC, UA 5 (*)     WBC, UA 75 (*)     Bacteria Moderate (*)     Squam Epithel, UA 15      Microscopic  Comment SEE COMMENT      Narrative:     Specimen Source->Urine   CULTURE, URINE   TYPE & SCREEN    Group & Rh O POS      Indirect Benji NEG      Specimen Outdate 02/20/2025 23:59       EKG Readings: (Independently Interpreted)   This patient is in a normal sinus rhythm with a heart rate in 95.  There are no significant ST segment or T-wave changes.  There is no evidence of acute myocardial infarction or significant injury.  Some nonspecific T-wave changes.  This is doctor Somers dictating an I independently interpreted this EKG.       Imaging Results    None          Medications   piperacillin-tazobactam (ZOSYN) 4.5 g in D5W 100 mL IVPB (MB+) (4.5 g Intravenous New Bag 2/17/25 1147)   sodium chloride 0.9% bolus 1,000 mL 1,000 mL (0 mLs Intravenous Stopped 2/17/25 1004)   HYDROmorphone injection 1 mg (1 mg Intravenous Given 2/17/25 0904)   promethazine (PHENERGAN) 12.5 mg in 0.9% NaCl 50 mL IVPB (0 mg Intravenous Stopped 2/17/25 0912)   diphenhydrAMINE injection 25 mg (25 mg Intravenous Given 2/17/25 0904)   HYDROmorphone injection 1 mg (1 mg Intravenous Given 2/17/25 1146)     Medical Decision Making  Amount and/or Complexity of Data Reviewed  Labs: ordered. Decision-making details documented in ED Course.  ECG/medicine tests: ordered and independent interpretation performed. Decision-making details documented in ED Course.    Risk  Prescription drug management.    Given the above this patient presents emergency room 39 weeks pregnant with lumbar back pain and leg pain.  The patient also has a history of sickle cell disease.  She believes she is having a sickle cell pain crisis.  The patient is treated with IV fluids and hydromorphone in the emergency room and improved.  There was no pelvic pain.  The patient is scheduled for induction tomorrow.  Given her sickle cell crisis today we will admit to labor and delivery and get her ready for her induction tomorrow.  Laboratory evaluation is interesting for white cells  and bacteria in the urine.  Urinary tract infection is considered.  The patient has a vague history of allergy to ceftriaxone.  I will treat with Zosyn.  She is markedly improved in the emergency room after being treated with IV fluids and hydromorphone.  I will admit to labor and delivery.        Scribe Attestation:   Scribe #1: I performed the above scribed service and the documentation accurately describes the services I performed. I attest to the accuracy of the note.                         Please note that the documentation on this chart was provided by the scribe above on the date of service noted above, and that the documentation in the chart accurately reflects the work and decisions made by me alone.  Signed, Dr. Somers      Clinical Impression:  Final diagnoses:  [R07.9] Chest pain - History of, no chest pain now  [D57.00] Sickle cell pain crisis (Primary)  [Z3A.39] Pregnancy with 39 completed weeks gestation  [N30.90] Cystitis  [M54.50] Lumbar back pain  [M79.651, M79.652] Bilateral thigh pain          ED Disposition Condition    Admit Stable                    [1]   Social History  Tobacco Use    Smoking status: Former     Types: Vaping with nicotine     Start date: 2022     Quit date: 2024     Years since quittin.6    Smokeless tobacco: Never   Substance Use Topics    Alcohol use: Not Currently    Drug use: No        Bertram Somers MD  [phone]

## 2025-02-17 NOTE — ASSESSMENT & PLAN NOTE
In sickle cell crisis  Will give IV antibiotic  IVF  Pain control  If better, plan induction tomorrow, 2/18/25

## 2025-02-17 NOTE — HOSPITAL COURSE
H/H on admission at 7.3/20.7  Admit to hospital with sickle cell crisis for hydration, pain control    2025  Feeling much better today.  Pain better. Ambulate in her room.  Tolerating regular diet  Ready for induction    2025  Now status post Cytotec x 4.  Cervix still closed.  Urine culture again grew out GNR  Has been on Macrobid nightly for prophylaxis    1740 Urine culture with Multi-drug resistant E Coli sensitive to Unasyn and Macrobid.  Patient had been on Macrobid prophylaxis at home.  Will continue with Unasyn at 1.5 g IVPB Q6  Now status post Cytotec x 6 doses.  No real cervical change.  Will start Cervidil.  Placed by me at 1725  Ok to continue to eat    2025  Now status post Cytotec x 24 hours and Cervidil for 12 hours  On Pitocin  Requesting epidural.  But cervix is still closed    Spontaneous rupture of membranes at 0826. Thick meconium  Fetal tracing with frequent decelerations.  Cervix to 2 cm, 90 % at around 1220    I came to evaluate patient at around 1800  Still has not changed  We decided to continue with Pitocin until tomorrow morning.  But patient called back to request  section.  I came back to again discuss with patient plan  She no longer wants to be induced.    Risks and benefits of  delivery discussed with patient and her family  Will proceed with surgery now  Consider another unit of pRBC and continue with Unasyn postop  Lovenox 12 hours postop    Primary low transverse  section  MD Modesto Ruiz, CST  Epidural by PERLITA Greene MD  Viable female infant at 1951 25  Weight: 6#15.1 or 3150 gm  Apgar: 8/9  Placenta: manually extracted intact with three vessels.  To Path  Normal uterus, tubes and ovaries  EBL: 1000 cc  Blood transfusion given. One unit of pRBCs  No complication    25  Doing well.  Trying to breast-feed but has been supplementing.    25  Doing well.  Breast-feeding well.      2025  Postpartum course was  benign.  She is breast-feeding well.  Exam was benign with patient afebrile, vitals stable, and minimal bleeding.  Normal activities.  Patient discharged home on postpartum day #3, 2/23/25   Discharge medications include Percocet, Motrin, prenatal vitamins, and iron supplement.  Augmentin and Macrobid for right pyelonephritis with multi-resistant E Coli  Follow-up with me in one week for dressing removal and postop/postpartum follow-up    Tyson Landers MD.

## 2025-02-17 NOTE — H&P
[facility]  Obstetrics  History & Physical    Patient Name: Lana Chou  MRN: [id number]  Admission Date: 2025  Primary Care Provider: Luann Loza MD    Subjective:     Principal Problem:<principal problem not specified>    History of Present Illness:  Lana Chou is a 29 y.o. female, G1, with a PMHx of CKD, sickle cell anemia, 38w6d pregant due for induction tomorrow, presenting to ED with lower back and bilateral leg pain. Notes pain occasionally radiates to chest, denies current chest pain. Reports pain feels similar to previous episodes of sickle cell pain crisis. Reports last episode was in September.  No other exacerbating or alleviating factors. Patient denies cough, shortness of breath, fever, chills, abdominal pain, nausea, vomiting, diarrhea, dysuria, headaches, congestion, sore throat, arm or leg trouble, eye pain, ear pain, rash, or other associated symptoms.       Obstetric HPI:  Patient reports None contractions, active fetal movement, No vaginal bleeding , No loss of fluid     This pregnancy has been complicated by sickle cell disease, in crisis.    History of pyelonephritis in early pregnancy.  On antibiotic therapy      OB History    Para Term  AB Living   1 0 0 0 0 0   SAB IAB Ectopic Multiple Live Births   0 0 0 0 0      # Outcome Date GA Lbr Ehsan/2nd Weight Sex Type Anes PTL Lv   1 Current              Past Medical History:   Diagnosis Date    Bilateral leg edema 10/15/2024    Chronic kidney disease (CKD) 2018    Emesis 2022    Encounter for surveillance of vaginal ring hormonal contraceptive device 11/15/2023    LGSIL on Pap smear of cervix 2017    LLQ abdominal pain 2023    Pregnancy with 18 completed weeks gestation 2024    Sickle cell anemia     Sickle cell disease     Sickle cell pain crisis 2024    Sickle-cell disease without crisis     UTI (urinary tract infection) 2023     Past Surgical History:    Procedure Laterality Date    BREAST LUMPECTOMY      CARDIAC SURGERY      port insertion    NEPHRECTOMY         PTA Medications   Medication Sig    aspirin 81 mg Cap Take 81 mg by mouth.    folic acid (FOLVITE) 1 MG tablet Take 4 tablets (4 mg total) by mouth once daily.    acetaminophen (TYLENOL) 500 MG tablet Take 1 tablet (500 mg total) by mouth every 6 (six) hours as needed. (Patient not taking: Reported on 2025)    cholecalciferol, vitamin D3, 1,250 mcg (50,000 unit) capsule Take 1 capsule (50,000 Units total) by mouth every 7 days. (Patient not taking: Reported on 2025)       Review of patient's allergies indicates:   Allergen Reactions    Rocephin [ceftriaxone] Shortness Of Breath, Itching and Anxiety     Nausea, vomiting. No hives, swelling, or anaphylaxis. Can tolerate if necessary, but would avoid if possible.        Family History       Problem Relation (Age of Onset)    Sickle cell trait Father, Mother, Sister, Sister          Tobacco Use    Smoking status: Former     Types: Vaping with nicotine     Start date: 2022     Quit date: 2024     Years since quittin.6    Smokeless tobacco: Never   Substance and Sexual Activity    Alcohol use: Not Currently    Drug use: No    Sexual activity: Yes     Partners: Male     Birth control/protection: None     Review of Systems   Objective:     Vital Signs (Most Recent):  Temp: 98.6 °F (37 °C) (25 0842)  Pulse: 101 (25 1445)  Resp: (!) 54 (25 1405)  BP: 98/60 (25 1440)  SpO2: 97 % (25 1445) Vital Signs (24h Range):  Temp:  [98.6 °F (37 °C)] 98.6 °F (37 °C)  Pulse:  [] 101  Resp:  [16-57] 54  SpO2:  [91 %-99 %] 97 %  BP: ()/(48-62) 98/60     Weight: 59 kg (130 lb)  Body mass index is 24.56 kg/m².    FHT: 130 Cat 1 (reassuring)  TOCO:  Rare contractions     Physical Exam:   Constitutional: She appears well-developed and well-nourished. No distress.   BMI of 24.56    HENT:   Head: Normocephalic and  atraumatic.    Eyes: EOM are normal.      Pulmonary/Chest: Effort normal. No respiratory distress.   Breasts: Non-tender, no engorgement, no masses, no retraction, no discharge. Negative for lymphadenopathy.         Abdominal: Soft. She exhibits no distension. There is no abdominal tenderness. There is no rebound and no guarding.     Genitourinary:    Vagina normal.   No vaginal discharge in the vagina.    Genitourinary Comments: Vulva without any obvious lesions.  Urethral meatus normal size and location without any lesion.  Urethra is non-tender without stricture or discharge.  Bladder is non-tender.  Vaginal vault with good support.  Minimal white discharge noted.  No obvious lesion.  Normal rugation.  Cervix is without any cervical motion tenderness.  No obvious lesion.  Uterus is small, non-tender, normal contour.  Adnexa is without any masses or tenderness.  Perineum without obvious lesion.               Musculoskeletal: Normal range of motion.       Neurological: She is alert.    Skin: Skin is warm and dry.    Psychiatric: She has a normal mood and affect.        Cervix:  Dilation:  0  Effacement:  25%  Station: -3  Presentation: Vertex     Significant Labs:  Lab Results   Component Value Date    GROUPTRH O POS 2025    HEPBSAG Non-reactive 2024       CBC:   Recent Labs   Lab 25  0902   WBC 14.12*   RBC 2.16*   HGB 7.3*   HCT 20.7*      MCV 96   MCH 33.8*   MCHC 35.3     CMP:   Recent Labs   Lab 25  0902   GLU 92   CALCIUM 9.0   ALBUMIN 2.2*   PROT 7.0      K 3.5   CO2 17*      BUN 10   CREATININE 1.0   ALKPHOS 150   ALT 12   AST 28   BILITOT 8.1*     Recent Labs   Lab 25  1002   COLORU Yellow   SPECGRAV 1.005   PHUR 7.0   PROTEINUA Negative   BACTERIA Moderate*   NITRITE Negative   LEUKOCYTESUR 3+*   UROBILINOGEN >=8.0*     I have personallly reviewed all pertinent lab results from the last 24 hours.  Assessment/Plan:     29 y.o. female  at 38w6d  for:    38 weeks gestation of pregnancy  In sickle cell crisis  Will give IV antibiotic  IVF  Pain control  If better, plan induction tomorrow, 2/18/25    Pyelonephritis affecting pregnancy in third trimester  In sickle cell crisis  Will give IV antibiotic  IVF  Pain control  If better, plan induction tomorrow, 2/18/25    Sickle cell anemia  In sickle cell crisis  Will give IV antibiotic  IVF  Pain control  If better, plan induction tomorrow, 2/18/25    Maternal sickle cell anemia affecting pregnancy, antepartum  In sickle cell crisis  Will give IV antibiotic  IVF  Pain control  If better, plan induction tomorrow, 2/18/25        Tyson Landers MD  Obstetrics  [facility]

## 2025-02-18 PROBLEM — Z3A.39 39 WEEKS GESTATION OF PREGNANCY: Status: ACTIVE | Noted: 2025-02-17

## 2025-02-18 LAB
ALBUMIN SERPL BCP-MCNC: 1.9 G/DL (ref 3.5–5.2)
ALP SERPL-CCNC: 141 U/L (ref 40–150)
ALT SERPL W/O P-5'-P-CCNC: 14 U/L (ref 10–44)
ANION GAP SERPL CALC-SCNC: 7 MMOL/L (ref 8–16)
AST SERPL-CCNC: 31 U/L (ref 10–40)
BASOPHILS # BLD AUTO: ABNORMAL K/UL (ref 0–0.2)
BASOPHILS NFR BLD: 0 % (ref 0–1.9)
BILIRUB SERPL-MCNC: 7.9 MG/DL (ref 0.1–1)
BUN SERPL-MCNC: 7 MG/DL (ref 6–20)
CALCIUM SERPL-MCNC: 8.6 MG/DL (ref 8.7–10.5)
CHLORIDE SERPL-SCNC: 113 MMOL/L (ref 95–110)
CO2 SERPL-SCNC: 17 MMOL/L (ref 23–29)
CREAT SERPL-MCNC: 0.9 MG/DL (ref 0.5–1.4)
DIFFERENTIAL METHOD BLD: ABNORMAL
EOSINOPHIL # BLD AUTO: ABNORMAL K/UL (ref 0–0.5)
EOSINOPHIL NFR BLD: 0 % (ref 0–8)
ERYTHROCYTE [DISTWIDTH] IN BLOOD BY AUTOMATED COUNT: 20.5 % (ref 11.5–14.5)
EST. GFR  (NO RACE VARIABLE): >60 ML/MIN/1.73 M^2
GLUCOSE SERPL-MCNC: 87 MG/DL (ref 70–110)
HCT VFR BLD AUTO: 24 % (ref 37–48.5)
HGB BLD-MCNC: 8.3 G/DL (ref 12–16)
IMM GRANULOCYTES # BLD AUTO: ABNORMAL K/UL (ref 0–0.04)
IMM GRANULOCYTES NFR BLD AUTO: ABNORMAL % (ref 0–0.5)
LYMPHOCYTES # BLD AUTO: ABNORMAL K/UL (ref 1–4.8)
LYMPHOCYTES NFR BLD: 8 % (ref 18–48)
MCH RBC QN AUTO: 31.1 PG (ref 27–31)
MCHC RBC AUTO-ENTMCNC: 34.6 G/DL (ref 32–36)
MCV RBC AUTO: 90 FL (ref 82–98)
METAMYELOCYTES NFR BLD MANUAL: 1 %
MONOCYTES # BLD AUTO: ABNORMAL K/UL (ref 0.3–1)
MONOCYTES NFR BLD: 11 % (ref 4–15)
MYELOCYTES NFR BLD MANUAL: 2 %
NEUTROPHILS NFR BLD: 68 % (ref 38–73)
NEUTS BAND NFR BLD MANUAL: 10 %
NRBC BLD-RTO: 11 /100 WBC
OHS QRS DURATION: 74 MS
OHS QTC CALCULATION: 469 MS
PLATELET # BLD AUTO: 313 K/UL (ref 150–450)
PMV BLD AUTO: 9.7 FL (ref 9.2–12.9)
POTASSIUM SERPL-SCNC: 3.9 MMOL/L (ref 3.5–5.1)
PROT SERPL-MCNC: 6.1 G/DL (ref 6–8.4)
RBC # BLD AUTO: 2.67 M/UL (ref 4–5.4)
SICKLE CELLS BLD QL SMEAR: ABNORMAL
SODIUM SERPL-SCNC: 137 MMOL/L (ref 136–145)
TOXIC GRANULES BLD QL SMEAR: PRESENT
WBC # BLD AUTO: 11.69 K/UL (ref 3.9–12.7)

## 2025-02-18 PROCEDURE — P9016 RBC LEUKOCYTES REDUCED: HCPCS | Performed by: OBSTETRICS & GYNECOLOGY

## 2025-02-18 PROCEDURE — 85007 BL SMEAR W/DIFF WBC COUNT: CPT | Performed by: OBSTETRICS & GYNECOLOGY

## 2025-02-18 PROCEDURE — 85027 COMPLETE CBC AUTOMATED: CPT | Performed by: OBSTETRICS & GYNECOLOGY

## 2025-02-18 PROCEDURE — 72100002 HC LABOR CARE, 1ST 8 HOURS

## 2025-02-18 PROCEDURE — 86920 COMPATIBILITY TEST SPIN: CPT | Performed by: OBSTETRICS & GYNECOLOGY

## 2025-02-18 PROCEDURE — 80053 COMPREHEN METABOLIC PANEL: CPT | Performed by: OBSTETRICS & GYNECOLOGY

## 2025-02-18 PROCEDURE — 25000003 PHARM REV CODE 250: Performed by: OBSTETRICS & GYNECOLOGY

## 2025-02-18 PROCEDURE — 63600175 PHARM REV CODE 636 W HCPCS: Performed by: OBSTETRICS & GYNECOLOGY

## 2025-02-18 PROCEDURE — 36415 COLL VENOUS BLD VENIPUNCTURE: CPT | Performed by: OBSTETRICS & GYNECOLOGY

## 2025-02-18 PROCEDURE — 11000001 HC ACUTE MED/SURG PRIVATE ROOM

## 2025-02-18 PROCEDURE — 27201598 HC CASSETTE, BLOOD WARMER

## 2025-02-18 PROCEDURE — 36430 TRANSFUSION BLD/BLD COMPNT: CPT

## 2025-02-18 PROCEDURE — 72100003 HC LABOR CARE, EA. ADDL. 8 HRS

## 2025-02-18 RX ORDER — MUPIROCIN 20 MG/G
OINTMENT TOPICAL 2 TIMES DAILY
Status: DISCONTINUED | OUTPATIENT
Start: 2025-02-18 | End: 2025-02-21

## 2025-02-18 RX ORDER — CALCIUM CARBONATE 200(500)MG
500 TABLET,CHEWABLE ORAL 3 TIMES DAILY PRN
Status: DISCONTINUED | OUTPATIENT
Start: 2025-02-18 | End: 2025-02-21

## 2025-02-18 RX ORDER — LIDOCAINE HYDROCHLORIDE 10 MG/ML
10 INJECTION, SOLUTION INFILTRATION; PERINEURAL ONCE AS NEEDED
Status: DISCONTINUED | OUTPATIENT
Start: 2025-02-18 | End: 2025-02-21

## 2025-02-18 RX ORDER — HYDROCODONE BITARTRATE AND ACETAMINOPHEN 500; 5 MG/1; MG/1
TABLET ORAL
Status: DISCONTINUED | OUTPATIENT
Start: 2025-02-18 | End: 2025-02-21

## 2025-02-18 RX ORDER — TERBUTALINE SULFATE 1 MG/ML
0.25 INJECTION SUBCUTANEOUS
Status: DISCONTINUED | OUTPATIENT
Start: 2025-02-18 | End: 2025-02-21

## 2025-02-18 RX ORDER — PROMETHAZINE HYDROCHLORIDE 25 MG/1
25 TABLET ORAL EVERY 6 HOURS PRN
Status: DISCONTINUED | OUTPATIENT
Start: 2025-02-18 | End: 2025-02-21

## 2025-02-18 RX ORDER — ENOXAPARIN SODIUM 100 MG/ML
30 INJECTION SUBCUTANEOUS EVERY 24 HOURS
Status: DISCONTINUED | OUTPATIENT
Start: 2025-02-18 | End: 2025-02-18

## 2025-02-18 RX ORDER — ONDANSETRON 8 MG/1
8 TABLET, ORALLY DISINTEGRATING ORAL EVERY 8 HOURS PRN
Status: DISCONTINUED | OUTPATIENT
Start: 2025-02-18 | End: 2025-02-21

## 2025-02-18 RX ORDER — MUPIROCIN 20 MG/G
OINTMENT TOPICAL
Status: DISCONTINUED | OUTPATIENT
Start: 2025-02-18 | End: 2025-02-21

## 2025-02-18 RX ORDER — OXYTOCIN-SODIUM CHLORIDE 0.9% IV SOLN 30 UNIT/500ML 30-0.9/5 UT/ML-%
10 SOLUTION INTRAVENOUS ONCE AS NEEDED
Status: COMPLETED | OUTPATIENT
Start: 2025-02-18 | End: 2025-02-20

## 2025-02-18 RX ORDER — OXYTOCIN-SODIUM CHLORIDE 0.9% IV SOLN 30 UNIT/500ML 30-0.9/5 UT/ML-%
0-32 SOLUTION INTRAVENOUS CONTINUOUS
Status: DISCONTINUED | OUTPATIENT
Start: 2025-02-18 | End: 2025-02-21

## 2025-02-18 RX ORDER — SIMETHICONE 80 MG
1 TABLET,CHEWABLE ORAL 4 TIMES DAILY PRN
Status: DISCONTINUED | OUTPATIENT
Start: 2025-02-18 | End: 2025-02-21

## 2025-02-18 RX ORDER — BUTORPHANOL TARTRATE 2 MG/ML
2 INJECTION INTRAMUSCULAR; INTRAVENOUS
Status: DISCONTINUED | OUTPATIENT
Start: 2025-02-18 | End: 2025-02-21

## 2025-02-18 RX ORDER — ENOXAPARIN SODIUM 100 MG/ML
30 INJECTION SUBCUTANEOUS EVERY 24 HOURS
Status: DISCONTINUED | OUTPATIENT
Start: 2025-02-19 | End: 2025-02-20

## 2025-02-18 RX ORDER — MISOPROSTOL 100 MCG
25 TABLET ORAL EVERY 4 HOURS PRN
Status: COMPLETED | OUTPATIENT
Start: 2025-02-18 | End: 2025-02-19

## 2025-02-18 RX ADMIN — BUTORPHANOL TARTRATE 2 MG: 2 INJECTION, SOLUTION INTRAMUSCULAR; INTRAVENOUS at 02:02

## 2025-02-18 RX ADMIN — DIPHENHYDRAMINE HYDROCHLORIDE 25 MG: 50 INJECTION, SOLUTION INTRAMUSCULAR; INTRAVENOUS at 09:02

## 2025-02-18 RX ADMIN — SODIUM CHLORIDE, POTASSIUM CHLORIDE, SODIUM LACTATE AND CALCIUM CHLORIDE: 600; 310; 30; 20 INJECTION, SOLUTION INTRAVENOUS at 07:02

## 2025-02-18 RX ADMIN — MISOPROSTOL 25 MCG: 100 TABLET ORAL at 03:02

## 2025-02-18 RX ADMIN — SODIUM CHLORIDE, POTASSIUM CHLORIDE, SODIUM LACTATE AND CALCIUM CHLORIDE: 600; 310; 30; 20 INJECTION, SOLUTION INTRAVENOUS at 11:02

## 2025-02-18 RX ADMIN — MISOPROSTOL 25 MCG: 100 TABLET ORAL at 09:02

## 2025-02-18 RX ADMIN — FAMOTIDINE 20 MG: 20 TABLET ORAL at 10:02

## 2025-02-18 RX ADMIN — MISOPROSTOL 25 MCG: 100 TABLET ORAL at 10:02

## 2025-02-18 NOTE — PROGRESS NOTES
[facility] - Labor & Delivery  Obstetrics  Labor Progress Note    Patient Name: Lana Chou  MRN: [id number]  Admission Date: 2/17/2025  Hospital Length of Stay: 1 days  Attending Physician: Tyson Landers MD  Primary Care Provider: Luann Loza MD    Subjective:     Principal Problem:39 weeks gestation of pregnancy    Hospital Course:  H/H on admission at 7.3/20.7  Admit to hospital with sickle cell crisis for hydration, pain control    2/18/2025  Feeling much better today.  Pain better. Ambulate in her room.  Tolerating regular diet  Ready for induction    Obstetric HPI:  Patient reports irregular contractions, active fetal movement, absent vaginal bleeding , absent loss of fluid   Sickle cell disease with very low H/H  Status post 2 units pRBCs yesterday       Objective:     Vital Signs (Most Recent):  Temp: 98.1 °F (36.7 °C) (02/18/25 0420)  Pulse: 85 (02/18/25 0420)  Resp: 20 (02/18/25 0420)  BP: 100/61 (02/18/25 0420)  SpO2: 98 % (02/18/25 0420) Vital Signs (24h Range):  Temp:  [98 °F (36.7 °C)-98.9 °F (37.2 °C)] 98.1 °F (36.7 °C)  Pulse:  [] 85  Resp:  [16-57] 20  SpO2:  [91 %-100 %] 98 %  BP: ()/(48-62) 100/61     Weight: 59 kg (130 lb)  Body mass index is 24.56 kg/m².    FHT: 130 Cat 1 (reassuring)  TOCO:  Q 5 minutes      Intake/Output Summary (Last 24 hours) at 2/18/2025 0727  Last data filed at 2/18/2025 0558  Gross per 24 hour   Intake 1450.3 ml   Output --   Net 1450.3 ml       Cervical Exam:  Dilation:  0  Effacement:  50%  Station: -3  Presentation: Vertex     Significant Labs:  Recent Lab Results         02/18/25  0514   02/17/25  1002   02/17/25  0902        Unit Blood Type Code     5100  [P]            5100  [P]            5100            5100       Unit Expiration     [id number]  [P]            [id number]  [P]            [id number]            [id number]       Unit Blood Type     O POS  [P]            O POS  [P]            O POS            O POS       Albumin 1.9     2.2             150       ALT 14     12       Anion Gap 7     11       Aniso     Moderate       Appearance, UA   Hazy         AST 31     28       Bacteria, UA   Moderate         Bands 10.0     8.0       Baso # CANCELED  Comment: Result canceled by the ancillary.     CANCELED  Comment: Result canceled by the ancillary.       Basophil % 0.0     0.0       Bilirubin (UA)   Negative         BILIRUBIN TOTAL 7.9  Comment: For infants and newborns, interpretation of results should be based  on gestational age, weight and in agreement with clinical  observations.    Premature Infant recommended reference ranges:  Up to 24 hours.............<8.0 mg/dL  Up to 48 hours............<12.0 mg/dL  3-5 days..................<15.0 mg/dL  6-29 days.................<15.0 mg/dL       8.1  Comment: For infants and newborns, interpretation of results should be based  on gestational age, weight and in agreement with clinical  observations.    Premature Infant recommended reference ranges:  Up to 24 hours.............<8.0 mg/dL  Up to 48 hours............<12.0 mg/dL  3-5 days..................<15.0 mg/dL  6-29 days.................<15.0 mg/dL         BUN 7     10       Calcium 8.6     9.0       Chloride 113     109       CO2 17     17       CODING SYSTEM     [id number]  [P]            [id number]  [P]            [id number]            [id number]       Color, UA   Yellow         Creatinine 0.9     1.0       Crossmatch Interpretation     Compatible  [P]            Compatible  [P]            Compatible            Compatible       Differential Method Manual     Manual       DISPENSE STATUS     CROSSMATCHED  [P]            CROSSMATCHED  [P]            TRANSFUSED            TRANSFUSED       eGFR >60     >60       Eos # CANCELED  Comment: Result canceled by the ancillary.     CANCELED  Comment: Result canceled by the ancillary.       Eos % 0.0     0.0       Giant Platelets     Present       Glucose 87     92       Glucose, UA   Negative         Gran % 68.0      66.0       Group & Rh     O POS       Hematocrit 24.0     20.7       Hemoglobin 8.3     7.3       Hypo     Occasional       Immature Grans (Abs) CANCELED  Comment: Mild elevation in immature granulocytes is non specific and   can be seen in a variety of conditions including stress response,   acute inflammation, trauma and pregnancy. Correlation with other   laboratory and clinical findings is essential.    Result canceled by the ancillary.       CANCELED  Comment: Mild elevation in immature granulocytes is non specific and   can be seen in a variety of conditions including stress response,   acute inflammation, trauma and pregnancy. Correlation with other   laboratory and clinical findings is essential.    Result canceled by the ancillary.         Immature Granulocytes CANCELED  Comment: Result canceled by the ancillary.     CANCELED  Comment: Result canceled by the ancillary.       INDIRECT NIKOLAI     NEG       Ketones, UA   Negative         Leukocyte Esterase, UA   3+         Lymph # CANCELED  Comment: Result canceled by the ancillary.     CANCELED  Comment: Result canceled by the ancillary.       Lymph % 8.0     10.0       MCH 31.1     33.8       MCHC 34.6     35.3       MCV 90     96       Metamyelocytes 1.0     4.0       Microscopic Comment   SEE COMMENT  Comment: Other formed elements not mentioned in the report are not   present in the microscopic examination.            Mono # CANCELED  Comment: Result canceled by the ancillary.     CANCELED  Comment: Result canceled by the ancillary.       Mono % 11.0     9.0       MPV 9.7     10.3       Myelocytes 2.0     3.0       NITRITE UA   Negative         nRBC 11     7       Blood, UA   Negative         pH, UA   7.0         Platelet Estimate     Appears normal       Platelet Count 313     361       Poikilocytosis     Moderate       Poly     Occasional       Potassium 3.9     3.5       Product Code     [id number]  [P]            [id number]  [P]            [id number]             [id number]       PROTEIN TOTAL 6.1     7.0       Protein, UA   Negative  Comment: Recommend a 24 hour urine protein or a urine   protein/creatinine ratio if globulin induced proteinuria is  clinically suspected.           RBC 2.67     2.16       RBC, UA   5         RDW 20.5     19.1       Retic     >23.0       Sickle Cells Moderate     Moderate       Sodium 137     137       Spec Grav UA   1.005         Specimen Outdate     2025 23:59       Specimen UA   Urine, Clean Catch         Squam Epithel, UA   15         Stomatocytes     Present       Target Cells     Occasional       Toxic Granulation Present           UNIT NUMBER     [id number]  [P]            [id number]  [P]            [id number]            [id number]       UROBILINOGEN UA   >=8.0         WBC, UA   75         WBC 11.69     14.12                [P] - Preliminary Result               Physical Exam:   Constitutional: She appears well-developed and well-nourished. No distress.    HENT:   Head: Normocephalic and atraumatic.    Eyes: EOM are normal.     Cardiovascular:  Normal rate.             Pulmonary/Chest: Effort normal. No respiratory distress.        Abdominal: Soft. She exhibits no distension. There is no abdominal tenderness. There is no rebound and no guarding.             Musculoskeletal: Normal range of motion.       Neurological: She is alert.    Skin: Skin is warm and dry.    Psychiatric: She has a normal mood and affect.       Review of Systems  Assessment/Plan:     29 y.o. female  at 39w0d for:    * 39 weeks gestation of pregnancy  Much better this morning  Continue with IV antibiotic  IVF  Pain control  Will begin Cytotec induction today, 25    Pyelonephritis affecting pregnancy in third trimester  Not in sickle cell crisis  Will continue with IV antibiotic  IVF  Pain control      Sickle cell anemia  No longer appears to be in sickle cell crisis  Continue with IV antibiotic and IVF  Pain control  Start Cytotec  induction  Lovenox prophylaxis    Now status post 2 units pRBCs.  Hct still at 24.  Will give another unit    Maternal sickle cell anemia affecting pregnancy, antepartum  No longer appears to be in sickle cell crisis  Continue with IV antibiotic and IVF  Pain control  Start Cytotec induction  Lovenox prophylaxis     Now status post 2 units pRBCs.  Hct still at 24.  Will give another unit             Tyson Landers MD  Obstetrics  [facility] - [place] - Labor & Delivery

## 2025-02-18 NOTE — NURSING
2047: EFM placed for NST    2121: NST complete. Reactive and reassuring. EFM d/c. Pt states she feels her ctxs, describes them as tightness in her abd.  Pt up to bathroom.

## 2025-02-18 NOTE — SUBJECTIVE & OBJECTIVE
Obstetric HPI:  Patient reports irregular contractions, active fetal movement, absent vaginal bleeding , absent loss of fluid   Sickle cell disease with very low H/H  Status post 2 units pRBCs yesterday       Objective:     Vital Signs (Most Recent):  Temp: 98.1 °F (36.7 °C) (02/18/25 0420)  Pulse: 85 (02/18/25 0420)  Resp: 20 (02/18/25 0420)  BP: 100/61 (02/18/25 0420)  SpO2: 98 % (02/18/25 0420) Vital Signs (24h Range):  Temp:  [98 °F (36.7 °C)-98.9 °F (37.2 °C)] 98.1 °F (36.7 °C)  Pulse:  [] 85  Resp:  [16-57] 20  SpO2:  [91 %-100 %] 98 %  BP: ()/(48-62) 100/61     Weight: 59 kg (130 lb)  Body mass index is 24.56 kg/m².    FHT: 130 Cat 1 (reassuring)  TOCO:  Q 5 minutes      Intake/Output Summary (Last 24 hours) at 2/18/2025 0727  Last data filed at 2/18/2025 0558  Gross per 24 hour   Intake 1450.3 ml   Output --   Net 1450.3 ml       Cervical Exam:  Dilation:  0  Effacement:  50%  Station: -3  Presentation: Vertex     Significant Labs:  Recent Lab Results         02/18/25  0514   02/17/25  1002   02/17/25  0902        Unit Blood Type Code     5100  [P]            5100  [P]            5100            5100       Unit Expiration     [id number]  [P]            [id number]  [P]            [id number]            [id number]       Unit Blood Type     O POS  [P]            O POS  [P]            O POS            O POS       Albumin 1.9     2.2            150       ALT 14     12       Anion Gap 7     11       Aniso     Moderate       Appearance, UA   Hazy         AST 31     28       Bacteria, UA   Moderate         Bands 10.0     8.0       Baso # CANCELED  Comment: Result canceled by the ancillary.     CANCELED  Comment: Result canceled by the ancillary.       Basophil % 0.0     0.0       Bilirubin (UA)   Negative         BILIRUBIN TOTAL 7.9  Comment: For infants and newborns, interpretation of results should be based  on gestational age, weight and in agreement with  clinical  observations.    Premature Infant recommended reference ranges:  Up to 24 hours.............<8.0 mg/dL  Up to 48 hours............<12.0 mg/dL  3-5 days..................<15.0 mg/dL  6-29 days.................<15.0 mg/dL       8.1  Comment: For infants and newborns, interpretation of results should be based  on gestational age, weight and in agreement with clinical  observations.    Premature Infant recommended reference ranges:  Up to 24 hours.............<8.0 mg/dL  Up to 48 hours............<12.0 mg/dL  3-5 days..................<15.0 mg/dL  6-29 days.................<15.0 mg/dL         BUN 7     10       Calcium 8.6     9.0       Chloride 113     109       CO2 17     17       CODING SYSTEM     [id number]  [P]            [id number]  [P]            TOET405            [id number]       Color, UA   Yellow         Creatinine 0.9     1.0       Crossmatch Interpretation     Compatible  [P]            Compatible  [P]            Compatible            Compatible       Differential Method Manual     Manual       DISPENSE STATUS     CROSSMATCHED  [P]            CROSSMATCHED  [P]            TRANSFUSED            TRANSFUSED       eGFR >60     >60       Eos # CANCELED  Comment: Result canceled by the ancillary.     CANCELED  Comment: Result canceled by the ancillary.       Eos % 0.0     0.0       Giant Platelets     Present       Glucose 87     92       Glucose, UA   Negative         Gran % 68.0     66.0       Group & Rh     O POS       Hematocrit 24.0     20.7       Hemoglobin 8.3     7.3       Hypo     Occasional       Immature Grans (Abs) CANCELED  Comment: Mild elevation in immature granulocytes is non specific and   can be seen in a variety of conditions including stress response,   acute inflammation, trauma and pregnancy. Correlation with other   laboratory and clinical findings is essential.    Result canceled by the ancillary.       CANCELED  Comment: Mild elevation in immature granulocytes is non specific and   can be  seen in a variety of conditions including stress response,   acute inflammation, trauma and pregnancy. Correlation with other   laboratory and clinical findings is essential.    Result canceled by the ancillary.         Immature Granulocytes CANCELED  Comment: Result canceled by the ancillary.     CANCELED  Comment: Result canceled by the ancillary.       INDIRECT NIKOLAI     NEG       Ketones, UA   Negative         Leukocyte Esterase, UA   3+         Lymph # CANCELED  Comment: Result canceled by the ancillary.     CANCELED  Comment: Result canceled by the ancillary.       Lymph % 8.0     10.0       MCH 31.1     33.8       MCHC 34.6     35.3       MCV 90     96       Metamyelocytes 1.0     4.0       Microscopic Comment   SEE COMMENT  Comment: Other formed elements not mentioned in the report are not   present in the microscopic examination.            Mono # CANCELED  Comment: Result canceled by the ancillary.     CANCELED  Comment: Result canceled by the ancillary.       Mono % 11.0     9.0       MPV 9.7     10.3       Myelocytes 2.0     3.0       NITRITE UA   Negative         nRBC 11     7       Blood, UA   Negative         pH, UA   7.0         Platelet Estimate     Appears normal       Platelet Count 313     361       Poikilocytosis     Moderate       Poly     Occasional       Potassium 3.9     3.5       Product Code     [id number]  [P]            [id number]  [P]            [id number]            [id number]       PROTEIN TOTAL 6.1     7.0       Protein, UA   Negative  Comment: Recommend a 24 hour urine protein or a urine   protein/creatinine ratio if globulin induced proteinuria is  clinically suspected.           RBC 2.67     2.16       RBC, UA   5         RDW 20.5     19.1       Retic     >23.0       Sickle Cells Moderate     Moderate       Sodium 137     137       Spec Grav UA   1.005         Specimen Outdate     02/20/2025 23:59       Specimen UA   Urine, Clean Catch         Squam Epithel, UA   15         Stomatocytes      Present       Target Cells     Occasional       Toxic Granulation Present           UNIT NUMBER     [id number]  [P]            [id number]  [P]            [id number]            [id number]       UROBILINOGEN UA   >=8.0         WBC, UA   75         WBC 11.69     14.12                [P] - Preliminary Result               Physical Exam:   Constitutional: She appears well-developed and well-nourished. No distress.    HENT:   Head: Normocephalic and atraumatic.    Eyes: EOM are normal.     Cardiovascular:  Normal rate.             Pulmonary/Chest: Effort normal. No respiratory distress.        Abdominal: Soft. She exhibits no distension. There is no abdominal tenderness. There is no rebound and no guarding.             Musculoskeletal: Normal range of motion.       Neurological: She is alert.    Skin: Skin is warm and dry.    Psychiatric: She has a normal mood and affect.       Review of Systems

## 2025-02-18 NOTE — ASSESSMENT & PLAN NOTE
No longer appears to be in sickle cell crisis  Continue with IV antibiotic and IVF  Pain control  Start Cytotec induction  Lovenox prophylaxis     Now status post 2 units pRBCs.  Hct still at 24.  Will give another unit

## 2025-02-18 NOTE — ASSESSMENT & PLAN NOTE
Much better this morning  Continue with IV antibiotic  IVF  Pain control  Will begin Cytotec induction today, 2/18/25

## 2025-02-18 NOTE — NURSING
Blood transfusion complete. Blood infusion side of tubing clamped off, NaCL side unclamped and infusion. Midline clean and dry, no redness or irritation noted at site. Pt states she is feeling well at this time. VSS.  POC discussed w/ pt. Pt requesting pain medicine for 4/10 pain rating.

## 2025-02-19 ENCOUNTER — ANESTHESIA (OUTPATIENT)
Dept: OBSTETRICS AND GYNECOLOGY | Facility: HOSPITAL | Age: 30
End: 2025-02-19

## 2025-02-19 ENCOUNTER — ANESTHESIA EVENT (OUTPATIENT)
Dept: OBSTETRICS AND GYNECOLOGY | Facility: HOSPITAL | Age: 30
End: 2025-02-19

## 2025-02-19 LAB
BACTERIA UR CULT: ABNORMAL

## 2025-02-19 PROCEDURE — 72100003 HC LABOR CARE, EA. ADDL. 8 HRS

## 2025-02-19 PROCEDURE — 63600175 PHARM REV CODE 636 W HCPCS: Performed by: OBSTETRICS & GYNECOLOGY

## 2025-02-19 PROCEDURE — 11000001 HC ACUTE MED/SURG PRIVATE ROOM

## 2025-02-19 PROCEDURE — 25000003 PHARM REV CODE 250: Performed by: OBSTETRICS & GYNECOLOGY

## 2025-02-19 RX ORDER — NITROFURANTOIN 25; 75 MG/1; MG/1
100 CAPSULE ORAL 2 TIMES DAILY
Status: DISCONTINUED | OUTPATIENT
Start: 2025-02-19 | End: 2025-02-19

## 2025-02-19 RX ADMIN — AMPICILLIN SODIUM AND SULBACTAM SODIUM 1.5 G: 1; .5 INJECTION, POWDER, FOR SOLUTION INTRAMUSCULAR; INTRAVENOUS at 07:02

## 2025-02-19 RX ADMIN — AMPICILLIN SODIUM AND SULBACTAM SODIUM 3 G: 2; 1 INJECTION, POWDER, FOR SOLUTION INTRAMUSCULAR; INTRAVENOUS at 08:02

## 2025-02-19 RX ADMIN — OXYCODONE HYDROCHLORIDE AND ACETAMINOPHEN 1 TABLET: 5; 325 TABLET ORAL at 07:02

## 2025-02-19 RX ADMIN — MISOPROSTOL 25 MCG: 100 TABLET ORAL at 04:02

## 2025-02-19 RX ADMIN — DIPHENHYDRAMINE HYDROCHLORIDE 25 MG: 25 CAPSULE ORAL at 12:02

## 2025-02-19 RX ADMIN — MISOPROSTOL 25 MCG: 100 TABLET ORAL at 08:02

## 2025-02-19 RX ADMIN — MISOPROSTOL 25 MCG: 100 TABLET ORAL at 12:02

## 2025-02-19 RX ADMIN — FAMOTIDINE 20 MG: 20 TABLET ORAL at 08:02

## 2025-02-19 RX ADMIN — DINOPROSTONE 10 MG: 10 INSERT VAGINAL at 05:02

## 2025-02-19 NOTE — CARE UPDATE
1740 Urine culture with E Coli sensitive to Unasyn and Macrobid.  Patient had been on Macrobid prophylaxis at home.  Will continue with Unasyn at 1.5 g IVPB Q6  Now status post Cytotec x 6 doses.  No real cervical change.  Will start Cervidil.  Placed at 1725  Ok to continue to eat

## 2025-02-19 NOTE — ASSESSMENT & PLAN NOTE
Much better this morning  Continue IVF  Pain control  Continue with Cytotec induction  Add Unasyn for E Coli

## 2025-02-19 NOTE — NURSING
MD Landers initially with orders for Macrobid. POC changed, new verbal order for Unasyn 3g once this AM, and to d/c macrobid order until urine sensitivity results. Discussed pt's allergy to Rocephin, orders to proceed with administration.

## 2025-02-19 NOTE — ASSESSMENT & PLAN NOTE
Not in sickle cell crisis  Will continue with IV antibiotic.  Unasyn added this morning for multi-resistant E Coli  IVF  Pain control

## 2025-02-19 NOTE — ASSESSMENT & PLAN NOTE
No longer appears to be in sickle cell crisis  Continue with IV antibiotic and IV fluid  Pain control  Continue with Cytotec induction  Lovenox prophylaxis postpartum.      Now status post 3 units pRBCs.    Blood available in facility

## 2025-02-19 NOTE — PROGRESS NOTES
02/19/25 1700   TeleUmer Gomez Note - Strip   Strip Reviewed by Jason Nurse? Yes   TeleStork Jason Note - Communication   Jason Nurse Communicated with Bedside Nurse Regarding: Fetal Status;Contraction Pattern

## 2025-02-19 NOTE — NURSING
Pt decided she does not want an epidural at this time and that she would like to wait. Pt wishes honored. LR bolus stopped.

## 2025-02-19 NOTE — SUBJECTIVE & OBJECTIVE
Interval History:  Lana is a 29 y.o.  at 39w1d. She is doing well.   On Cytotec    Objective:     Vital Signs (Most Recent):  Temp: 98.1 °F (36.7 °C) (25 0708)  Pulse: 61 (25 0740)  Resp: 16 (25 0708)  BP: (!) 108/58 (25 0647)  SpO2: 97 % (25 0738) Vital Signs (24h Range):  Temp:  [98.1 °F (36.7 °C)-98.8 °F (37.1 °C)] 98.1 °F (36.7 °C)  Pulse:  [] 61  Resp:  [16-18] 16  SpO2:  [92 %-100 %] 97 %  BP: (102-126)/(51-77) 108/58     Weight: 59 kg (130 lb)  Body mass index is 24.56 kg/m².    FHT: 130 Cat 1 (reassuring)  TOCO:  Q 5 minutes    Cervical Exam:  Dilation:  0  Effacement:  50%  Station: -3  Presentation: Vertex     Significant Labs:  Lab Results   Component Value Date    GROUPTRH O POS 2025    HEPBSAG Non-reactive 2024       CBC:   Recent Labs   Lab 25  0514   WBC 11.69   RBC 2.67*   HGB 8.3*   HCT 24.0*      MCV 90   MCH 31.1*   MCHC 34.6     CMP:   Recent Labs   Lab 25  0514   GLU 87   CALCIUM 8.6*   ALBUMIN 1.9*   PROT 6.1      K 3.9   CO2 17*   *   BUN 7   CREATININE 0.9   ALKPHOS 141   ALT 14   AST 31   BILITOT 7.9*     I have personallly reviewed all pertinent lab results from the last 24 hours.    Physical Exam:   Constitutional: She appears well-developed and well-nourished. No distress.    HENT:   Head: Normocephalic and atraumatic.    Eyes: EOM are normal.     Cardiovascular:  Normal rate.             Pulmonary/Chest: Effort normal. No respiratory distress.        Abdominal: Soft. She exhibits no distension. There is no abdominal tenderness. There is no rebound and no guarding.   Gravid               Musculoskeletal: Normal range of motion.       Neurological: She is alert.    Skin: Skin is warm and dry.    Psychiatric: She has a normal mood and affect.       Review of Systems

## 2025-02-19 NOTE — NURSING
SVE performed 1/50/-3, cervix soft. Pt states she is in 7/10 pain w/ contractions and is undecided on whether she wants an epidural at this time. Pt informed that she is able to receive an epidural as long as she is able to sit through her contractions to ensure procedure is performed safely.   POC discussed w/ pt. Pt encouraged to think about epidural and discuss it w/ her support persons, call RN when she has made decision.     Holding misoprostol dose due to ctx pattern

## 2025-02-19 NOTE — PROGRESS NOTES
[facility] - Labor & Delivery  Obstetrics  Labor Progress Note    Patient Name: Lana Chou  MRN: [id number]  Admission Date: 2025  Hospital Length of Stay: 2 days  Attending Physician: Tyson Landers MD  Primary Care Provider: Luann Loza MD    Subjective:     Principal Problem:39 weeks gestation of pregnancy    Hospital Course:  H/H on admission at 7.3/20.7  Admit to hospital with sickle cell crisis for hydration, pain control    2025  Feeling much better today.  Pain better. Ambulate in her room.  Tolerating regular diet  Ready for induction    2025  Now status post Cytotec x 4.  Cervix still closed.  Urine culture again grew out GNR.\  Has been on Macrobid nightly for prophylaxis    Interval History:  Lana is a 29 y.o.  at 39w1d. She is doing well.   On Cytotec    Objective:     Vital Signs (Most Recent):  Temp: 98.1 °F (36.7 °C) (25 0708)  Pulse: 61 (25 0740)  Resp: 16 (25 0708)  BP: (!) 108/58 (25 0647)  SpO2: 97 % (25 0738) Vital Signs (24h Range):  Temp:  [98.1 °F (36.7 °C)-98.8 °F (37.1 °C)] 98.1 °F (36.7 °C)  Pulse:  [] 61  Resp:  [16-18] 16  SpO2:  [92 %-100 %] 97 %  BP: (102-126)/(51-77) 108/58     Weight: 59 kg (130 lb)  Body mass index is 24.56 kg/m².    FHT: 130 Cat 1 (reassuring)  TOCO:  Q 5 minutes    Cervical Exam:  Dilation:  0  Effacement:  50%  Station: -3  Presentation: Vertex     Significant Labs:  Lab Results   Component Value Date    GROUPTRH O POS 2025    HEPBSAG Non-reactive 2024       CBC:   Recent Labs   Lab 25  0514   WBC 11.69   RBC 2.67*   HGB 8.3*   HCT 24.0*      MCV 90   MCH 31.1*   MCHC 34.6     CMP:   Recent Labs   Lab 25  0514   GLU 87   CALCIUM 8.6*   ALBUMIN 1.9*   PROT 6.1      K 3.9   CO2 17*   *   BUN 7   CREATININE 0.9   ALKPHOS 141   ALT 14   AST 31   BILITOT 7.9*     I have personallly reviewed all pertinent lab results from the last 24 hours.    Physical Exam:    Constitutional: She appears well-developed and well-nourished. No distress.    HENT:   Head: Normocephalic and atraumatic.    Eyes: EOM are normal.     Cardiovascular:  Normal rate.             Pulmonary/Chest: Effort normal. No respiratory distress.        Abdominal: Soft. She exhibits no distension. There is no abdominal tenderness. There is no rebound and no guarding.   Gravid               Musculoskeletal: Normal range of motion.       Neurological: She is alert.    Skin: Skin is warm and dry.    Psychiatric: She has a normal mood and affect.       Review of Systems  Assessment/Plan:     29 y.o. female  at 39w1d for:    * 39 weeks gestation of pregnancy  Much better this morning  Continue IVF  Pain control  Continue with Cytotec induction  Add Unasyn for E Coli     Pyelonephritis affecting pregnancy in third trimester  Not in sickle cell crisis  Will continue with IV antibiotic.  Unasyn added this morning for multi-resistant E Coli  IVF  Pain control      Sickle cell anemia  No longer appears to be in sickle cell crisis  Continue with IV antibiotic and IV fluid  Pain control  Continue with Cytotec induction  Lovenox prophylaxis postpartum.      Now status post 3 units pRBCs.    Blood available in facility    Maternal sickle cell anemia affecting pregnancy, antepartum     No longer appears to be in sickle cell crisis  Continue with IV antibiotic and IV fluid  Pain control  Continue with Cytotec induction  Lovenox prophylaxis postpartum.       Now status post 3 units pRBCs.    Blood available in facility          Tyson Landers MD  Obstetrics  [facility] - Labor & Delivery

## 2025-02-20 ENCOUNTER — ANESTHESIA (OUTPATIENT)
Dept: OBSTETRICS AND GYNECOLOGY | Facility: HOSPITAL | Age: 30
End: 2025-02-20
Payer: COMMERCIAL

## 2025-02-20 ENCOUNTER — ANESTHESIA EVENT (OUTPATIENT)
Dept: OBSTETRICS AND GYNECOLOGY | Facility: HOSPITAL | Age: 30
End: 2025-02-20
Payer: COMMERCIAL

## 2025-02-20 PROBLEM — D57.1 MATERNAL SICKLE CELL ANEMIA AFFECTING PREGNANCY, ANTEPARTUM: Status: RESOLVED | Noted: 2024-08-20 | Resolved: 2025-02-20

## 2025-02-20 PROBLEM — O99.019 MATERNAL SICKLE CELL ANEMIA AFFECTING PREGNANCY, ANTEPARTUM: Status: RESOLVED | Noted: 2024-08-20 | Resolved: 2025-02-20

## 2025-02-20 PROBLEM — Z98.891 STATUS POST CESAREAN SECTION: Status: ACTIVE | Noted: 2025-02-20

## 2025-02-20 PROBLEM — Z3A.39 39 WEEKS GESTATION OF PREGNANCY: Status: RESOLVED | Noted: 2025-02-17 | Resolved: 2025-02-20

## 2025-02-20 PROCEDURE — 63600175 PHARM REV CODE 636 W HCPCS: Performed by: NURSE ANESTHETIST, CERTIFIED REGISTERED

## 2025-02-20 PROCEDURE — 71000033 HC RECOVERY, INTIAL HOUR: Performed by: OBSTETRICS & GYNECOLOGY

## 2025-02-20 PROCEDURE — 63600175 PHARM REV CODE 636 W HCPCS: Performed by: OBSTETRICS & GYNECOLOGY

## 2025-02-20 PROCEDURE — 99232 SBSQ HOSP IP/OBS MODERATE 35: CPT | Mod: 25,24,, | Performed by: OBSTETRICS & GYNECOLOGY

## 2025-02-20 PROCEDURE — 63600175 PHARM REV CODE 636 W HCPCS: Performed by: ANESTHESIOLOGY

## 2025-02-20 PROCEDURE — 3E0P7VZ INTRODUCTION OF HORMONE INTO FEMALE REPRODUCTIVE, VIA NATURAL OR ARTIFICIAL OPENING: ICD-10-PCS | Performed by: OBSTETRICS & GYNECOLOGY

## 2025-02-20 PROCEDURE — 3E033VJ INTRODUCTION OF OTHER HORMONE INTO PERIPHERAL VEIN, PERCUTANEOUS APPROACH: ICD-10-PCS | Performed by: OBSTETRICS & GYNECOLOGY

## 2025-02-20 PROCEDURE — 27200710 HC EPIDURAL INFUSION PUMP SET

## 2025-02-20 PROCEDURE — 30233N1 TRANSFUSION OF NONAUTOLOGOUS RED BLOOD CELLS INTO PERIPHERAL VEIN, PERCUTANEOUS APPROACH: ICD-10-PCS | Performed by: OBSTETRICS & GYNECOLOGY

## 2025-02-20 PROCEDURE — P9016 RBC LEUKOCYTES REDUCED: HCPCS | Performed by: OBSTETRICS & GYNECOLOGY

## 2025-02-20 PROCEDURE — 27000207 HC ISOLATION

## 2025-02-20 PROCEDURE — 72100003 HC LABOR CARE, EA. ADDL. 8 HRS

## 2025-02-20 PROCEDURE — 99900035 HC TECH TIME PER 15 MIN (STAT)

## 2025-02-20 PROCEDURE — 36004725 HC OB OR TIME LEV III - EA ADD 15 MIN: Performed by: OBSTETRICS & GYNECOLOGY

## 2025-02-20 PROCEDURE — 3E0DXGC INTRODUCTION OF OTHER THERAPEUTIC SUBSTANCE INTO MOUTH AND PHARYNX, EXTERNAL APPROACH: ICD-10-PCS | Performed by: OBSTETRICS & GYNECOLOGY

## 2025-02-20 PROCEDURE — 71000039 HC RECOVERY, EACH ADD'L HOUR: Performed by: OBSTETRICS & GYNECOLOGY

## 2025-02-20 PROCEDURE — 62326 NJX INTERLAMINAR LMBR/SAC: CPT

## 2025-02-20 PROCEDURE — 37000008 HC ANESTHESIA 1ST 15 MINUTES: Performed by: OBSTETRICS & GYNECOLOGY

## 2025-02-20 PROCEDURE — C1751 CATH, INF, PER/CENT/MIDLINE: HCPCS

## 2025-02-20 PROCEDURE — 36004724 HC OB OR TIME LEV III - 1ST 15 MIN: Performed by: OBSTETRICS & GYNECOLOGY

## 2025-02-20 PROCEDURE — 11000001 HC ACUTE MED/SURG PRIVATE ROOM

## 2025-02-20 PROCEDURE — 37000009 HC ANESTHESIA EA ADD 15 MINS: Performed by: OBSTETRICS & GYNECOLOGY

## 2025-02-20 PROCEDURE — 25000003 PHARM REV CODE 250: Performed by: ANESTHESIOLOGY

## 2025-02-20 PROCEDURE — 27100025 HC TUBING, SET FLUID WARMER: Performed by: ANESTHESIOLOGY

## 2025-02-20 PROCEDURE — 59025 FETAL NON-STRESS TEST: CPT | Mod: 26,,, | Performed by: OBSTETRICS & GYNECOLOGY

## 2025-02-20 PROCEDURE — 25000003 PHARM REV CODE 250: Performed by: NURSE ANESTHETIST, CERTIFIED REGISTERED

## 2025-02-20 PROCEDURE — 88307 TISSUE EXAM BY PATHOLOGIST: CPT | Performed by: PATHOLOGY

## 2025-02-20 PROCEDURE — 51702 INSERT TEMP BLADDER CATH: CPT

## 2025-02-20 PROCEDURE — 59510 CESAREAN DELIVERY: CPT | Mod: AT,,, | Performed by: OBSTETRICS & GYNECOLOGY

## 2025-02-20 PROCEDURE — 25000003 PHARM REV CODE 250: Performed by: OBSTETRICS & GYNECOLOGY

## 2025-02-20 RX ORDER — OXYTOCIN 10 [USP'U]/ML
10 INJECTION, SOLUTION INTRAMUSCULAR; INTRAVENOUS ONCE AS NEEDED
Status: DISCONTINUED | OUTPATIENT
Start: 2025-02-20 | End: 2025-02-23 | Stop reason: HOSPADM

## 2025-02-20 RX ORDER — ONDANSETRON 8 MG/1
8 TABLET, ORALLY DISINTEGRATING ORAL EVERY 8 HOURS PRN
Status: DISCONTINUED | OUTPATIENT
Start: 2025-02-20 | End: 2025-02-21

## 2025-02-20 RX ORDER — LIDOCAINE HYDROCHLORIDE AND EPINEPHRINE 10; 20 UG/ML; MG/ML
INJECTION, SOLUTION INFILTRATION; PERINEURAL
Status: DISCONTINUED | OUTPATIENT
Start: 2025-02-20 | End: 2025-02-20

## 2025-02-20 RX ORDER — OXYTOCIN-SODIUM CHLORIDE 0.9% IV SOLN 30 UNIT/500ML 30-0.9/5 UT/ML-%
95 SOLUTION INTRAVENOUS ONCE AS NEEDED
Status: DISCONTINUED | OUTPATIENT
Start: 2025-02-20 | End: 2025-02-21

## 2025-02-20 RX ORDER — METOCLOPRAMIDE HYDROCHLORIDE 5 MG/ML
10 INJECTION INTRAMUSCULAR; INTRAVENOUS ONCE
Status: COMPLETED | OUTPATIENT
Start: 2025-02-20 | End: 2025-02-20

## 2025-02-20 RX ORDER — TRANEXAMIC ACID 10 MG/ML
1000 INJECTION, SOLUTION INTRAVENOUS EVERY 30 MIN PRN
Status: DISCONTINUED | OUTPATIENT
Start: 2025-02-20 | End: 2025-02-23 | Stop reason: HOSPADM

## 2025-02-20 RX ORDER — MORPHINE SULFATE 0.5 MG/ML
INJECTION, SOLUTION EPIDURAL; INTRATHECAL; INTRAVENOUS
Status: DISCONTINUED | OUTPATIENT
Start: 2025-02-20 | End: 2025-02-20

## 2025-02-20 RX ORDER — ACETAMINOPHEN 10 MG/ML
INJECTION, SOLUTION INTRAVENOUS
Status: DISCONTINUED | OUTPATIENT
Start: 2025-02-20 | End: 2025-02-20

## 2025-02-20 RX ORDER — OXYCODONE AND ACETAMINOPHEN 5; 325 MG/1; MG/1
1 TABLET ORAL EVERY 4 HOURS PRN
Status: DISCONTINUED | OUTPATIENT
Start: 2025-02-20 | End: 2025-02-23 | Stop reason: HOSPADM

## 2025-02-20 RX ORDER — KETAMINE HYDROCHLORIDE 100 MG/ML
INJECTION, SOLUTION INTRAMUSCULAR; INTRAVENOUS
Status: DISCONTINUED | OUTPATIENT
Start: 2025-02-20 | End: 2025-02-20

## 2025-02-20 RX ORDER — MISOPROSTOL 200 UG/1
800 TABLET ORAL ONCE AS NEEDED
Status: DISCONTINUED | OUTPATIENT
Start: 2025-02-20 | End: 2025-02-21

## 2025-02-20 RX ORDER — FENTANYL CITRATE 50 UG/ML
INJECTION, SOLUTION INTRAMUSCULAR; INTRAVENOUS
Status: DISCONTINUED | OUTPATIENT
Start: 2025-02-20 | End: 2025-02-20

## 2025-02-20 RX ORDER — KETOROLAC TROMETHAMINE 30 MG/ML
30 INJECTION, SOLUTION INTRAMUSCULAR; INTRAVENOUS EVERY 6 HOURS
Status: COMPLETED | OUTPATIENT
Start: 2025-02-21 | End: 2025-02-21

## 2025-02-20 RX ORDER — BISACODYL 10 MG/1
10 SUPPOSITORY RECTAL ONCE AS NEEDED
Status: DISCONTINUED | OUTPATIENT
Start: 2025-02-20 | End: 2025-02-23 | Stop reason: HOSPADM

## 2025-02-20 RX ORDER — DEXAMETHASONE SODIUM PHOSPHATE 4 MG/ML
INJECTION, SOLUTION INTRA-ARTICULAR; INTRALESIONAL; INTRAMUSCULAR; INTRAVENOUS; SOFT TISSUE
Status: DISCONTINUED | OUTPATIENT
Start: 2025-02-20 | End: 2025-02-20

## 2025-02-20 RX ORDER — OXYCODONE HYDROCHLORIDE 5 MG/1
10 TABLET ORAL EVERY 4 HOURS PRN
Status: ACTIVE | OUTPATIENT
Start: 2025-02-20 | End: 2025-02-21

## 2025-02-20 RX ORDER — MISOPROSTOL 200 UG/1
800 TABLET ORAL ONCE AS NEEDED
Status: DISCONTINUED | OUTPATIENT
Start: 2025-02-20 | End: 2025-02-23 | Stop reason: HOSPADM

## 2025-02-20 RX ORDER — ONDANSETRON HYDROCHLORIDE 2 MG/ML
4 INJECTION, SOLUTION INTRAVENOUS EVERY 6 HOURS PRN
Status: ACTIVE | OUTPATIENT
Start: 2025-02-20 | End: 2025-02-21

## 2025-02-20 RX ORDER — DOCUSATE SODIUM 100 MG/1
200 CAPSULE, LIQUID FILLED ORAL 2 TIMES DAILY
Status: DISCONTINUED | OUTPATIENT
Start: 2025-02-20 | End: 2025-02-23 | Stop reason: HOSPADM

## 2025-02-20 RX ORDER — MIDAZOLAM HYDROCHLORIDE 1 MG/ML
INJECTION INTRAMUSCULAR; INTRAVENOUS
Status: DISCONTINUED | OUTPATIENT
Start: 2025-02-20 | End: 2025-02-20

## 2025-02-20 RX ORDER — FENTANYL/BUPIVACAINE/NS/PF 2MCG/ML-.1
PLASTIC BAG, INJECTION (ML) INJECTION CONTINUOUS
Refills: 0 | Status: DISCONTINUED | OUTPATIENT
Start: 2025-02-20 | End: 2025-02-21

## 2025-02-20 RX ORDER — ONDANSETRON HYDROCHLORIDE 2 MG/ML
INJECTION, SOLUTION INTRAMUSCULAR; INTRAVENOUS
Status: DISCONTINUED | OUTPATIENT
Start: 2025-02-20 | End: 2025-02-20

## 2025-02-20 RX ORDER — DIPHENHYDRAMINE HCL 25 MG
25 CAPSULE ORAL EVERY 4 HOURS PRN
Status: DISCONTINUED | OUTPATIENT
Start: 2025-02-20 | End: 2025-02-23 | Stop reason: HOSPADM

## 2025-02-20 RX ORDER — OXYTOCIN-SODIUM CHLORIDE 0.9% IV SOLN 30 UNIT/500ML 30-0.9/5 UT/ML-%
95 SOLUTION INTRAVENOUS CONTINUOUS PRN
Status: DISCONTINUED | OUTPATIENT
Start: 2025-02-20 | End: 2025-02-23 | Stop reason: HOSPADM

## 2025-02-20 RX ORDER — CARBOPROST TROMETHAMINE 250 UG/ML
250 INJECTION, SOLUTION INTRAMUSCULAR
Status: DISCONTINUED | OUTPATIENT
Start: 2025-02-20 | End: 2025-02-23 | Stop reason: HOSPADM

## 2025-02-20 RX ORDER — METHYLERGONOVINE MALEATE 0.2 MG/ML
200 INJECTION INTRAVENOUS ONCE AS NEEDED
Status: DISCONTINUED | OUTPATIENT
Start: 2025-02-20 | End: 2025-02-21

## 2025-02-20 RX ORDER — OXYTOCIN 10 [USP'U]/ML
10 INJECTION, SOLUTION INTRAMUSCULAR; INTRAVENOUS ONCE AS NEEDED
Status: DISCONTINUED | OUTPATIENT
Start: 2025-02-20 | End: 2025-02-21

## 2025-02-20 RX ORDER — AMOXICILLIN 250 MG
1 CAPSULE ORAL NIGHTLY PRN
Status: DISCONTINUED | OUTPATIENT
Start: 2025-02-20 | End: 2025-02-23 | Stop reason: HOSPADM

## 2025-02-20 RX ORDER — PROCHLORPERAZINE EDISYLATE 5 MG/ML
5 INJECTION INTRAMUSCULAR; INTRAVENOUS EVERY 6 HOURS PRN
Status: DISCONTINUED | OUTPATIENT
Start: 2025-02-20 | End: 2025-02-23 | Stop reason: HOSPADM

## 2025-02-20 RX ORDER — OXYCODONE HYDROCHLORIDE 5 MG/1
5 TABLET ORAL EVERY 4 HOURS PRN
Status: ACTIVE | OUTPATIENT
Start: 2025-02-20 | End: 2025-02-21

## 2025-02-20 RX ORDER — METHYLERGONOVINE MALEATE 0.2 MG/ML
200 INJECTION INTRAVENOUS ONCE AS NEEDED
Status: DISCONTINUED | OUTPATIENT
Start: 2025-02-20 | End: 2025-02-23 | Stop reason: HOSPADM

## 2025-02-20 RX ORDER — OXYCODONE AND ACETAMINOPHEN 10; 325 MG/1; MG/1
1 TABLET ORAL EVERY 4 HOURS PRN
Status: DISCONTINUED | OUTPATIENT
Start: 2025-02-20 | End: 2025-02-23 | Stop reason: HOSPADM

## 2025-02-20 RX ORDER — DIPHENOXYLATE HYDROCHLORIDE AND ATROPINE SULFATE 2.5; .025 MG/1; MG/1
2 TABLET ORAL EVERY 6 HOURS PRN
Status: DISCONTINUED | OUTPATIENT
Start: 2025-02-20 | End: 2025-02-23 | Stop reason: HOSPADM

## 2025-02-20 RX ORDER — SIMETHICONE 80 MG
1 TABLET,CHEWABLE ORAL EVERY 6 HOURS PRN
Status: DISCONTINUED | OUTPATIENT
Start: 2025-02-20 | End: 2025-02-23 | Stop reason: HOSPADM

## 2025-02-20 RX ORDER — FAMOTIDINE 10 MG/ML
20 INJECTION INTRAVENOUS ONCE
Status: COMPLETED | OUTPATIENT
Start: 2025-02-20 | End: 2025-02-20

## 2025-02-20 RX ORDER — ACETAMINOPHEN 325 MG/1
650 TABLET ORAL EVERY 6 HOURS
Status: COMPLETED | OUTPATIENT
Start: 2025-02-21 | End: 2025-02-21

## 2025-02-20 RX ORDER — ADHESIVE BANDAGE
30 BANDAGE TOPICAL 2 TIMES DAILY PRN
Status: DISCONTINUED | OUTPATIENT
Start: 2025-02-21 | End: 2025-02-23 | Stop reason: HOSPADM

## 2025-02-20 RX ORDER — LIDOCAINE HCL/EPINEPHRINE/PF 2%-1:200K
VIAL (ML) INJECTION
Status: DISCONTINUED | OUTPATIENT
Start: 2025-02-20 | End: 2025-02-20

## 2025-02-20 RX ORDER — PRENATAL WITH FERROUS FUM AND FOLIC ACID 3080; 920; 120; 400; 22; 1.84; 3; 20; 10; 1; 12; 200; 27; 25; 2 [IU]/1; [IU]/1; MG/1; [IU]/1; MG/1; MG/1; MG/1; MG/1; MG/1; MG/1; UG/1; MG/1; MG/1; MG/1; MG/1
1 TABLET ORAL DAILY
Status: DISCONTINUED | OUTPATIENT
Start: 2025-02-21 | End: 2025-02-23 | Stop reason: HOSPADM

## 2025-02-20 RX ORDER — SODIUM CITRATE AND CITRIC ACID MONOHYDRATE 334; 500 MG/5ML; MG/5ML
30 SOLUTION ORAL
Status: DISPENSED | OUTPATIENT
Start: 2025-02-20 | End: 2025-02-20

## 2025-02-20 RX ORDER — OXYTOCIN-SODIUM CHLORIDE 0.9% IV SOLN 30 UNIT/500ML 30-0.9/5 UT/ML-%
SOLUTION INTRAVENOUS CONTINUOUS PRN
Status: DISCONTINUED | OUTPATIENT
Start: 2025-02-20 | End: 2025-02-20

## 2025-02-20 RX ORDER — DIPHENOXYLATE HYDROCHLORIDE AND ATROPINE SULFATE 2.5; .025 MG/1; MG/1
2 TABLET ORAL EVERY 6 HOURS PRN
Status: DISCONTINUED | OUTPATIENT
Start: 2025-02-20 | End: 2025-02-21

## 2025-02-20 RX ADMIN — MORPHINE SULFATE 3 MG: 0.5 INJECTION, SOLUTION EPIDURAL; INTRATHECAL; INTRAVENOUS at 08:02

## 2025-02-20 RX ADMIN — FENTANYL CITRATE 100 MCG: 0.05 INJECTION, SOLUTION INTRAMUSCULAR; INTRAVENOUS at 08:02

## 2025-02-20 RX ADMIN — MIDAZOLAM HYDROCHLORIDE 2 MG: 1 INJECTION, SOLUTION INTRAMUSCULAR; INTRAVENOUS at 07:02

## 2025-02-20 RX ADMIN — AMPICILLIN SODIUM AND SULBACTAM SODIUM 1.5 G: 1; .5 INJECTION, POWDER, FOR SOLUTION INTRAMUSCULAR; INTRAVENOUS at 01:02

## 2025-02-20 RX ADMIN — FENTANYL CITRATE 100 MCG: 50 INJECTION, SOLUTION INTRAMUSCULAR; INTRAVENOUS at 07:02

## 2025-02-20 RX ADMIN — ACETAMINOPHEN 1000 MG: 10 INJECTION, SOLUTION INTRAVENOUS at 08:02

## 2025-02-20 RX ADMIN — METOCLOPRAMIDE 10 MG: 5 INJECTION, SOLUTION INTRAMUSCULAR; INTRAVENOUS at 09:02

## 2025-02-20 RX ADMIN — ONDANSETRON 4 MG: 2 INJECTION INTRAMUSCULAR; INTRAVENOUS at 07:02

## 2025-02-20 RX ADMIN — SODIUM CHLORIDE, POTASSIUM CHLORIDE, SODIUM LACTATE AND CALCIUM CHLORIDE: 600; 310; 30; 20 INJECTION, SOLUTION INTRAVENOUS at 04:02

## 2025-02-20 RX ADMIN — LIDOCAINE HYDROCHLORIDE AND EPINEPHRINE 5 ML: 20; 5 INJECTION, SOLUTION EPIDURAL; INFILTRATION; INTRACAUDAL; PERINEURAL at 06:02

## 2025-02-20 RX ADMIN — LIDOCAINE HYDROCHLORIDE AND EPINEPHRINE 2 ML: 20; 5 INJECTION, SOLUTION EPIDURAL; INFILTRATION; INTRACAUDAL; PERINEURAL at 07:02

## 2025-02-20 RX ADMIN — Medication 4 MILLI-UNITS/MIN: at 06:02

## 2025-02-20 RX ADMIN — LIDOCAINE HYDROCHLORIDE,EPINEPHRINE BITARTRATE 2 ML: 20; .01 INJECTION, SOLUTION INFILTRATION; PERINEURAL at 06:02

## 2025-02-20 RX ADMIN — AMPICILLIN SODIUM AND SULBACTAM SODIUM 1.5 G: 1; .5 INJECTION, POWDER, FOR SOLUTION INTRAMUSCULAR; INTRAVENOUS at 07:02

## 2025-02-20 RX ADMIN — MUPIROCIN: 20 OINTMENT TOPICAL at 07:02

## 2025-02-20 RX ADMIN — AMPICILLIN SODIUM AND SULBACTAM SODIUM 1.5 G: 1; .5 INJECTION, POWDER, FOR SOLUTION INTRAMUSCULAR; INTRAVENOUS at 02:02

## 2025-02-20 RX ADMIN — FAMOTIDINE 20 MG: 20 TABLET ORAL at 11:02

## 2025-02-20 RX ADMIN — AMPICILLIN SODIUM AND SULBACTAM SODIUM 1.5 G: 1; .5 INJECTION, POWDER, FOR SOLUTION INTRAMUSCULAR; INTRAVENOUS at 08:02

## 2025-02-20 RX ADMIN — LIDOCAINE HYDROCHLORIDE AND EPINEPHRINE 3 ML: 20; 5 INJECTION, SOLUTION EPIDURAL; INFILTRATION; INTRACAUDAL; PERINEURAL at 06:02

## 2025-02-20 RX ADMIN — KETAMINE HYDROCHLORIDE 20 MG: 100 INJECTION, SOLUTION, CONCENTRATE INTRAMUSCULAR; INTRAVENOUS at 07:02

## 2025-02-20 RX ADMIN — SODIUM CHLORIDE, POTASSIUM CHLORIDE, SODIUM LACTATE AND CALCIUM CHLORIDE: 600; 310; 30; 20 INJECTION, SOLUTION INTRAVENOUS at 10:02

## 2025-02-20 RX ADMIN — Medication 2 MILLI-UNITS/MIN: at 02:02

## 2025-02-20 RX ADMIN — FENTANYL CITRATE 100 MCG: 0.05 INJECTION, SOLUTION INTRAMUSCULAR; INTRAVENOUS at 07:02

## 2025-02-20 RX ADMIN — LIDOCAINE HYDROCHLORIDE AND EPINEPHRINE 5 ML: 20; 5 INJECTION, SOLUTION EPIDURAL; INFILTRATION; INTRACAUDAL; PERINEURAL at 07:02

## 2025-02-20 RX ADMIN — DEXAMETHASONE SODIUM PHOSPHATE 4 MG: 4 INJECTION, SOLUTION INTRA-ARTICULAR; INTRALESIONAL; INTRAMUSCULAR; INTRAVENOUS; SOFT TISSUE at 08:02

## 2025-02-20 RX ADMIN — AZITHROMYCIN DIHYDRATE 500 MG: 500 INJECTION, POWDER, LYOPHILIZED, FOR SOLUTION INTRAVENOUS at 07:02

## 2025-02-20 RX ADMIN — SODIUM CHLORIDE, POTASSIUM CHLORIDE, SODIUM LACTATE AND CALCIUM CHLORIDE: 600; 310; 30; 20 INJECTION, SOLUTION INTRAVENOUS at 02:02

## 2025-02-20 RX ADMIN — OXYTOCIN-SODIUM CHLORIDE 0.9% IV SOLN 30 UNIT/500ML 10 UNITS: 30-0.9/5 SOLUTION at 09:02

## 2025-02-20 RX ADMIN — Medication 334 ML/HR: at 07:02

## 2025-02-20 RX ADMIN — SODIUM CHLORIDE, POTASSIUM CHLORIDE, SODIUM LACTATE AND CALCIUM CHLORIDE: 600; 310; 30; 20 INJECTION, SOLUTION INTRAVENOUS at 08:02

## 2025-02-20 RX ADMIN — FAMOTIDINE 20 MG: 10 INJECTION, SOLUTION INTRAVENOUS at 09:02

## 2025-02-20 NOTE — ANESTHESIA PREPROCEDURE EVALUATION
[facility]  Anesthesia Pre-Operative Evaluation         Patient Name: Lana Chou  YOB: 1995  MRN: [id number]    2025      Lana Chou is a 29 y.o. female  at 39w2d who presented on 25 for sickle cell pain crisis. She received 3u pRBC and is now feeling that her crisis has resolved. Now ready for induction Current IUP has been c/b sick cell anemia with pain crisis, pyelonephritis.     She denies previous neuraxial anesthesia.     She denies history of HTN, asthma, spine abnormalities, or previous back surgeries.      OB History    Para Term  AB Living   1 0 0 0 0 0   SAB IAB Ectopic Multiple Live Births   0 0 0 0 0      # Outcome Date GA Lbr Ehsan/2nd Weight Sex Type Anes PTL Lv   1 Current                Review of patient's allergies indicates:   Allergen Reactions    Rocephin [ceftriaxone] Shortness Of Breath, Itching and Anxiety     Nausea, vomiting. No hives, swelling, or anaphylaxis. Can tolerate if necessary, but would avoid if possible.       Wt Readings from Last 1 Encounters:   25 1445 59 kg (130 lb)   25 0842 59 kg (130 lb)       BP Readings from Last 3 Encounters:   25 127/77   25 100/60   25 (!) 98/56       Problem List[1]    Past Surgical History:   Procedure Laterality Date    BREAST LUMPECTOMY      CARDIAC SURGERY      port insertion    NEPHRECTOMY         Tobacco Use: Medium Risk (2025)    Patient History     Smoking Tobacco Use: Former     Smokeless Tobacco Use: Never     Passive Exposure: Not on file     Alcohol Use: Not At Risk (12/10/2024)    AUDIT-C     Frequency of Alcohol Consumption: Never     Average Number of Drinks: Patient does not drink     Frequency of Binge Drinking: Never     Social History     Substance and Sexual Activity   Drug Use No         Chemistry        Component Value Date/Time     2025 0514    K 3.9 2025 0514     (H) 2025 0514    CO2 17 (L)  "02/18/2025 0514    BUN 7 02/18/2025 0514    CREATININE 0.9 02/18/2025 0514    GLU 87 02/18/2025 0514        Component Value Date/Time    CALCIUM 8.6 (L) 02/18/2025 0514    ALKPHOS 141 02/18/2025 0514    AST 31 02/18/2025 0514    ALT 14 02/18/2025 0514    BILITOT 7.9 (H) 02/18/2025 0514    ESTGFRAFRICA >60 05/14/2022 1343    EGFRNONAA >60 05/14/2022 1343            Lab Results   Component Value Date    WBC 11.69 02/18/2025    HGB 8.3 (L) 02/18/2025    HCT 24.0 (L) 02/18/2025     02/18/2025       No results found for: "LABPROT", "INR", "APTT"    Pre-op Assessment    I have reviewed the Patient Summary Reports.     I have reviewed the Nursing Notes. I have reviewed the NPO Status.   I have reviewed the Medications.     Review of Systems  Anesthesia Hx:  No problems with previous Anesthesia                Social:  Former Smoker       Hematology/Oncology:    Oncology Normal    -- Anemia:               Hematology Comments: Sickle Cell Anemia                    EENT/Dental:  EENT/Dental Normal           Cardiovascular:  Cardiovascular Normal                                              Pulmonary:  Pulmonary Normal                       Renal/:  Chronic Renal Disease   Prior nephrectomy  Active Pyelonephritis             Hepatic/GI:  Hepatic/GI Normal                    Musculoskeletal:  Musculoskeletal Normal                Neurological:  Neurology Normal                                      Endocrine:  Endocrine Normal            Psych:  Psychiatric Normal                    Physical Exam  General: Well nourished, Cooperative, Alert and Oriented    Airway:  Mallampati: III / II  Mouth Opening: Normal  TM Distance: Normal  Tongue: Normal  Neck ROM: Normal ROM    Dental:  Intact        Anesthesia Plan  Type of Anesthesia, risks & benefits discussed:    Anesthesia Type: Epidural  Intra-op Monitoring Plan: Standard ASA Monitors  Post Op Pain Control Plan: multimodal analgesia and IV/PO Opioids PRN  Informed Consent: " Informed consent signed with the Patient and all parties understand the risks and agree with anesthesia plan.  All questions answered. Patient consented to blood products? Yes  ASA Score: 3  Day of Surgery Review of History & Physical: H&P Update referred to the surgeon/provider.    Ready For Surgery From Anesthesia Perspective.     .           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

## 2025-02-20 NOTE — ANESTHESIA PROCEDURE NOTES
Epidural    Patient location during procedure: OB   Reason for block: primary anesthetic   Reason for block: labor analgesia requested by patient and obstetrician  Diagnosis: IUP   Start time: 2/20/2025 8:15 AM  Timeout: 2/20/2025 8:15 AM  End time: 2/20/2025 8:30 AM  Surgery related to: Vaginal Delivery    Staffing  Performing Provider: Fabricio Crowley MD  Authorizing Provider: Patricia Sevilla MD    Staffing  Performed by: Fabricio Crowley MD  Authorized by: Patricia Sevilla MD        Preanesthetic Checklist  Completed: patient identified, IV checked, site marked, risks and benefits discussed, surgical consent, monitors and equipment checked, pre-op evaluation, timeout performed, anesthesia consent given, hand hygiene performed and patient being monitored  Preparation  Patient position: sitting  Prep: ChloraPrep  Patient monitoring: Pulse Ox  Reason for block: primary anesthetic   Epidural  Skin Anesthetic: lidocaine 1%  Skin Wheal: 3 mL  Administration type: continuous  Approach: midline  Interspace: L3-4    Injection technique: [place] air  Needle and Epidural Catheter  Needle type: Tuohy   Needle gauge: 17  Needle length: 3.5 inches  Needle insertion depth: 6 cm  Catheter type: springwCabbyGo  Catheter size: 19 G  Catheter at skin depth: 11 cm  Insertion Attempts: 1  Test dose: 3 mL of lidocaine 1.5% with Epi 1-to-200,000  Additional Documentation: incremental injection, negative aspiration for heme and CSF, no paresthesia on injection, no signs/symptoms of IV or SA injection, no significant pain on injection and no significant complaints from patient  Needle localization: anatomical landmarks  Medications:  Volume per aspiration: 5 mL  Time between aspirations: 5 minutes   Assessment  Ease of block: easy  Patient's tolerance of the procedure: comfortable throughout block and no complaints No inadvertent dural puncture with Tuohy.  Dural puncture performed with spinal needle.

## 2025-02-20 NOTE — NURSING
Dr. Landers called. MD notified cervidil removed, SVE unchanged and cervix soft. MD gave order to start pitocin titration. Order received.

## 2025-02-20 NOTE — SUBJECTIVE & OBJECTIVE
"Interval History:  Lana is a 29 y.o.  at 39w2d. She is doing well.   Status post Cytotec x 24 hours and Cervidil x 12 hours  On Pitocin      Objective:     Vital Signs (Most Recent):  Temp: 97.9 °F (36.6 °C) (25 0008)  Pulse: 73 (25 0711)  Resp: 16 (25 000)  BP: 129/72 (25 0711)  SpO2: 98 % (25 0710) Vital Signs (24h Range):  Temp:  [97.9 °F (36.6 °C)-98.5 °F (36.9 °C)] 97.9 °F (36.6 °C)  Pulse:  [] 73  Resp:  [16-18] 16  SpO2:  [76 %-100 %] 98 %  BP: (104-129)/(53-77) 129/72     Weight: 59 kg (130 lb)  Body mass index is 24.56 kg/m².    FHT: 120-130 Cat 1 (reassuring)  TOCO:  Q 3-4 minutes    Cervical Exam:  Dilation:  0  Effacement:  75%  Station: -1  Presentation: Vertex     Significant Labs:  Lab Results   Component Value Date    GROUPTRH O POS 2025    HEPBSAG Non-reactive 2024       CBC: No results for input(s): "WBC", "RBC", "HGB", "HCT", "PLT", "MCV", "MCH", "MCHC" in the last 48 hours.  I have personallly reviewed all pertinent lab results from the last 24 hours.    Physical Exam:   Constitutional: She appears well-developed and well-nourished. No distress.    HENT:   Head: Normocephalic and atraumatic.    Eyes: EOM are normal.     Cardiovascular:  Normal rate.             Pulmonary/Chest: Effort normal. No respiratory distress.        Abdominal: Soft. She exhibits no distension. There is no abdominal tenderness. There is no rebound and no guarding.   Gravid             Musculoskeletal: Normal range of motion.       Neurological: She is alert.    Skin: Skin is warm and dry.    Psychiatric: She has a normal mood and affect.       Review of Systems  "

## 2025-02-20 NOTE — PROGRESS NOTES
[facility] - Labor & Delivery  Obstetrics  Labor Progress Note    Patient Name: Lana Chou  MRN: [id number]  Admission Date: 2025  Hospital Length of Stay: 3 days  Attending Physician: Tyson Landers MD  Primary Care Provider: Luann Loza MD    Subjective:     Principal Problem:39 weeks gestation of pregnancy    Hospital Course:  H/H on admission at 7.3/20.7  Admit to hospital with sickle cell crisis for hydration, pain control    2025  Feeling much better today.  Pain better. Ambulate in her room.  Tolerating regular diet  Ready for induction    2025  Now status post Cytotec x 4.  Cervix still closed.  Urine culture again grew out GNR  Has been on Macrobid nightly for prophylaxis     Urine culture with E Coli sensitive to Unasyn and Macrobid.  Patient had been on Macrobid prophylaxis at home.  Will continue with Unasyn at 1.5 g IVPB Q6  Now status post Cytotec x 6 doses.  No real cervical change.  Will start Cervidil.  Placed by me at 1725  Ok to continue to eat    2025  Now status post Cytotec x 24 hours and Cervidil for 12 hours  On Pitocin  Requesting epidural.  But cervix is still closed    Interval History:  Lana is a 29 y.o.  at 39w2d. She is doing well.   Status post Cytotec x 24 hours and Cervidil x 12 hours  On Pitocin      Objective:     Vital Signs (Most Recent):  Temp: 97.9 °F (36.6 °C) (25 0008)  Pulse: 73 (25 0711)  Resp: 16 (25 0008)  BP: 129/72 (25 0711)  SpO2: 98 % (25 0710) Vital Signs (24h Range):  Temp:  [97.9 °F (36.6 °C)-98.5 °F (36.9 °C)] 97.9 °F (36.6 °C)  Pulse:  [] 73  Resp:  [16-18] 16  SpO2:  [76 %-100 %] 98 %  BP: (104-129)/(53-77) 129/72     Weight: 59 kg (130 lb)  Body mass index is 24.56 kg/m².    FHT: 120-130 Cat 1 (reassuring)  TOCO:  Q 3-4 minutes    Cervical Exam:  Dilation:  0  Effacement:  75%  Station: -1  Presentation: Vertex     Significant Labs:  Lab Results   Component Value Date    GROUPTRH O POS  "2025    HEPBSAG Non-reactive 2024       CBC: No results for input(s): "WBC", "RBC", "HGB", "HCT", "PLT", "MCV", "MCH", "MCHC" in the last 48 hours.  I have personallly reviewed all pertinent lab results from the last 24 hours.    Physical Exam:   Constitutional: She appears well-developed and well-nourished. No distress.    HENT:   Head: Normocephalic and atraumatic.    Eyes: EOM are normal.     Cardiovascular:  Normal rate.             Pulmonary/Chest: Effort normal. No respiratory distress.        Abdominal: Soft. She exhibits no distension. There is no abdominal tenderness. There is no rebound and no guarding.   Gravid             Musculoskeletal: Normal range of motion.       Neurological: She is alert.    Skin: Skin is warm and dry.    Psychiatric: She has a normal mood and affect.       Review of Systems  Assessment/Plan:     29 y.o. female  at 39w2d for:    * 39 weeks gestation of pregnancy  Continue IV fluid and IV antibiotic  Pain control  Continue with Pitocin induction  Hopefully, she would progress soon      Pyelonephritis affecting pregnancy in third trimester  Not in sickle cell crisis  Will continue with IV antibiotic.  Unasyn added this morning for multi-resistant E Coli  IVF  Pain control      Sickle cell anemia  No longer appears to be in sickle cell crisis  Continue with IV antibiotic and IV fluid  Pain control  Continue with Cytotec induction  Lovenox prophylaxis postpartum.      Now status post 3 units pRBCs.    Blood available in facility    Maternal sickle cell anemia affecting pregnancy, antepartum     No longer appears to be in sickle cell crisis  Continue with IV antibiotic and IV fluid  Pain control  Continue with Cytotec induction  Lovenox prophylaxis postpartum.       Now status post 3 units pRBCs.    Blood available in facility          Tyson Landers MD  Obstetrics  [place] - Labor & Delivery          "

## 2025-02-20 NOTE — ASSESSMENT & PLAN NOTE
Continue IV fluid and IV antibiotic  Pain control  Continue with Pitocin induction  Hopefully, she would progress soon

## 2025-02-21 PROBLEM — N83.202 LEFT OVARIAN CYST: Status: RESOLVED | Noted: 2023-03-15 | Resolved: 2025-02-21

## 2025-02-21 PROBLEM — M25.472 BILATERAL SWELLING OF FEET AND ANKLES: Status: RESOLVED | Noted: 2024-10-15 | Resolved: 2025-02-21

## 2025-02-21 PROBLEM — N30.90 CYSTITIS: Status: ACTIVE | Noted: 2025-02-21

## 2025-02-21 PROBLEM — Z3A.38 38 WEEKS GESTATION OF PREGNANCY: Status: ACTIVE | Noted: 2025-02-21

## 2025-02-21 PROBLEM — E87.6 HYPOKALEMIA: Status: RESOLVED | Noted: 2024-05-29 | Resolved: 2025-02-21

## 2025-02-21 PROBLEM — O35.DXX0 ECHOGENIC FOCUS OF BOWEL, FETAL, AFFECTING CARE OF MOTHER, ANTEPARTUM: Status: RESOLVED | Noted: 2024-09-20 | Resolved: 2025-02-21

## 2025-02-21 PROBLEM — M25.475 BILATERAL SWELLING OF FEET AND ANKLES: Status: RESOLVED | Noted: 2024-10-15 | Resolved: 2025-02-21

## 2025-02-21 PROBLEM — O28.3 ECHOGENIC INTRACARDIAC FOCUS OF FETUS ON PRENATAL ULTRASOUND: Status: RESOLVED | Noted: 2024-09-20 | Resolved: 2025-02-21

## 2025-02-21 PROBLEM — M25.471 BILATERAL SWELLING OF FEET AND ANKLES: Status: RESOLVED | Noted: 2024-10-15 | Resolved: 2025-02-21

## 2025-02-21 PROBLEM — M25.474 BILATERAL SWELLING OF FEET AND ANKLES: Status: RESOLVED | Noted: 2024-10-15 | Resolved: 2025-02-21

## 2025-02-21 PROBLEM — R79.0 LOW MAGNESIUM LEVEL: Status: RESOLVED | Noted: 2024-10-15 | Resolved: 2025-02-21

## 2025-02-21 PROBLEM — Z3A.21 21 WEEKS GESTATION OF PREGNANCY: Status: RESOLVED | Noted: 2024-10-15 | Resolved: 2025-02-21

## 2025-02-21 PROBLEM — R07.9 CHEST PAIN: Status: ACTIVE | Noted: 2025-02-21

## 2025-02-21 LAB
BASOPHILS # BLD AUTO: 0.05 K/UL (ref 0–0.2)
BASOPHILS NFR BLD: 0.2 % (ref 0–1.9)
BLD PROD TYP BPU: NORMAL
BLOOD UNIT EXPIRATION DATE: NORMAL
BLOOD UNIT TYPE CODE: 5100
BLOOD UNIT TYPE: NORMAL
CODING SYSTEM: NORMAL
CROSSMATCH INTERPRETATION: NORMAL
DIFFERENTIAL METHOD BLD: ABNORMAL
DISPENSE STATUS: NORMAL
EOSINOPHIL # BLD AUTO: 0 K/UL (ref 0–0.5)
EOSINOPHIL NFR BLD: 0 % (ref 0–8)
ERYTHROCYTE [DISTWIDTH] IN BLOOD BY AUTOMATED COUNT: 18.6 % (ref 11.5–14.5)
HCT VFR BLD AUTO: 27.1 % (ref 37–48.5)
HGB BLD-MCNC: 9.3 G/DL (ref 12–16)
IMM GRANULOCYTES # BLD AUTO: 0.46 K/UL (ref 0–0.04)
IMM GRANULOCYTES NFR BLD AUTO: 1.5 % (ref 0–0.5)
LYMPHOCYTES # BLD AUTO: 0.6 K/UL (ref 1–4.8)
LYMPHOCYTES NFR BLD: 1.9 % (ref 18–48)
MCH RBC QN AUTO: 30.5 PG (ref 27–31)
MCHC RBC AUTO-ENTMCNC: 34.3 G/DL (ref 32–36)
MCV RBC AUTO: 89 FL (ref 82–98)
MONOCYTES # BLD AUTO: 3.1 K/UL (ref 0.3–1)
MONOCYTES NFR BLD: 10.1 % (ref 4–15)
NEUTROPHILS # BLD AUTO: 26.1 K/UL (ref 1.8–7.7)
NEUTROPHILS NFR BLD: 86.3 % (ref 38–73)
NRBC BLD-RTO: 3 /100 WBC
NUM UNITS TRANS PACKED RBC: NORMAL
PLATELET # BLD AUTO: 306 K/UL (ref 150–450)
PLATELET BLD QL SMEAR: ABNORMAL
PMV BLD AUTO: 10.2 FL (ref 9.2–12.9)
POIKILOCYTOSIS BLD QL SMEAR: SLIGHT
POLYCHROMASIA BLD QL SMEAR: ABNORMAL
RBC # BLD AUTO: 3.05 M/UL (ref 4–5.4)
SICKLE CELLS BLD QL SMEAR: ABNORMAL
TOXIC GRANULES BLD QL SMEAR: PRESENT
WBC # BLD AUTO: 30.22 K/UL (ref 3.9–12.7)

## 2025-02-21 PROCEDURE — 25000003 PHARM REV CODE 250: Performed by: OBSTETRICS & GYNECOLOGY

## 2025-02-21 PROCEDURE — 85025 COMPLETE CBC W/AUTO DIFF WBC: CPT | Performed by: OBSTETRICS & GYNECOLOGY

## 2025-02-21 PROCEDURE — 11000001 HC ACUTE MED/SURG PRIVATE ROOM

## 2025-02-21 PROCEDURE — 63600175 PHARM REV CODE 636 W HCPCS: Mod: JZ,TB | Performed by: ANESTHESIOLOGY

## 2025-02-21 PROCEDURE — 99900035 HC TECH TIME PER 15 MIN (STAT)

## 2025-02-21 PROCEDURE — 99232 SBSQ HOSP IP/OBS MODERATE 35: CPT | Mod: 24,,, | Performed by: OBSTETRICS & GYNECOLOGY

## 2025-02-21 PROCEDURE — 94799 UNLISTED PULMONARY SVC/PX: CPT

## 2025-02-21 PROCEDURE — 36415 COLL VENOUS BLD VENIPUNCTURE: CPT | Performed by: OBSTETRICS & GYNECOLOGY

## 2025-02-21 PROCEDURE — 63600175 PHARM REV CODE 636 W HCPCS: Performed by: OBSTETRICS & GYNECOLOGY

## 2025-02-21 PROCEDURE — 25000003 PHARM REV CODE 250: Performed by: ANESTHESIOLOGY

## 2025-02-21 RX ORDER — MUPIROCIN 20 MG/G
OINTMENT TOPICAL 2 TIMES DAILY
Status: DISCONTINUED | OUTPATIENT
Start: 2025-02-21 | End: 2025-02-23 | Stop reason: HOSPADM

## 2025-02-21 RX ORDER — ENOXAPARIN SODIUM 100 MG/ML
30 INJECTION SUBCUTANEOUS EVERY 24 HOURS
Status: DISCONTINUED | OUTPATIENT
Start: 2025-02-21 | End: 2025-02-23 | Stop reason: HOSPADM

## 2025-02-21 RX ORDER — ENOXAPARIN SODIUM 100 MG/ML
30 INJECTION SUBCUTANEOUS EVERY 24 HOURS
Status: DISCONTINUED | OUTPATIENT
Start: 2025-02-21 | End: 2025-02-21 | Stop reason: SDUPTHER

## 2025-02-21 RX ADMIN — AMPICILLIN SODIUM AND SULBACTAM SODIUM 1.5 G: 1; .5 INJECTION, POWDER, FOR SOLUTION INTRAMUSCULAR; INTRAVENOUS at 10:02

## 2025-02-21 RX ADMIN — ACETAMINOPHEN 650 MG: 325 TABLET ORAL at 05:02

## 2025-02-21 RX ADMIN — MUPIROCIN: 20 OINTMENT TOPICAL at 08:02

## 2025-02-21 RX ADMIN — KETOROLAC TROMETHAMINE 30 MG: 30 INJECTION, SOLUTION INTRAMUSCULAR; INTRAVENOUS at 04:02

## 2025-02-21 RX ADMIN — KETOROLAC TROMETHAMINE 30 MG: 30 INJECTION, SOLUTION INTRAMUSCULAR; INTRAVENOUS at 12:02

## 2025-02-21 RX ADMIN — PRENATAL VITAMINS-IRON FUMARATE 27 MG IRON-FOLIC ACID 0.8 MG TABLET 1 TABLET: at 08:02

## 2025-02-21 RX ADMIN — DOCUSATE SODIUM 200 MG: 100 CAPSULE, LIQUID FILLED ORAL at 08:02

## 2025-02-21 RX ADMIN — KETOROLAC TROMETHAMINE 30 MG: 30 INJECTION, SOLUTION INTRAMUSCULAR; INTRAVENOUS at 05:02

## 2025-02-21 RX ADMIN — AMPICILLIN SODIUM AND SULBACTAM SODIUM 1.5 G: 1; .5 INJECTION, POWDER, FOR SOLUTION INTRAMUSCULAR; INTRAVENOUS at 04:02

## 2025-02-21 RX ADMIN — DIPHENHYDRAMINE HYDROCHLORIDE 25 MG: 25 CAPSULE ORAL at 04:02

## 2025-02-21 RX ADMIN — ENOXAPARIN SODIUM 30 MG: 30 INJECTION SUBCUTANEOUS at 12:02

## 2025-02-21 RX ADMIN — ACETAMINOPHEN 650 MG: 325 TABLET ORAL at 04:02

## 2025-02-21 RX ADMIN — KETOROLAC TROMETHAMINE 30 MG: 30 INJECTION, SOLUTION INTRAMUSCULAR; INTRAVENOUS at 11:02

## 2025-02-21 RX ADMIN — ACETAMINOPHEN 650 MG: 325 TABLET ORAL at 11:02

## 2025-02-21 RX ADMIN — ACETAMINOPHEN 650 MG: 325 TABLET ORAL at 12:02

## 2025-02-21 NOTE — NURSING
Pericare given. Gown changed. Transferred to 204 via stretcher. Report given to Jani Landon pp RN.

## 2025-02-21 NOTE — NURSING
Pt requesting c section. MD Landers in route. NICU, MD Peters, and FALGUNI Cannon notified.     MD Landers with orders for Unasyn and azithromycin 500mg as preop antibiotics.

## 2025-02-21 NOTE — ANESTHESIA POSTPROCEDURE EVALUATION
Anesthesia Post Evaluation    Patient: Lana Chou    Procedure(s) Performed: Procedure(s) (LRB):   SECTION (N/A)    Final Anesthesia Type: general      Patient location during evaluation: PACU  Patient participation: Yes- Able to Participate  Level of consciousness: awake and alert  Post-procedure vital signs: reviewed and stable  Pain management: adequate  Airway patency: patent    PONV status at discharge: No PONV  Anesthetic complications: no      Respiratory status: spontaneous ventilation and room air  Hydration status: euvolemic  Follow-up not needed.              Vitals Value Taken Time   /55 25 21:15   Temp 36.6 °C (97.8 °F) 25 20:54   Pulse 103 25 21:27   Resp 18 25 20:54   SpO2 94 % 25 21:27   Vitals shown include unfiled device data.      No case tracking events are documented in the log.      Pain/Noreen Score: Pain Rating Prior to Med Admin: 5 (2025  7:43 PM)  Pain Rating Post Med Admin: 0 (2025  8:30 PM)

## 2025-02-21 NOTE — LACTATION NOTE
Adelja [employer] pump, tubing, collections containers and labels brought to bedside.  Discussed proper pump setting of initiation phase.  Instructed on proper usage of pump and to adjust suction according to maximum comfort level.  Verified appropriate flange fit.  Educated on the frequency and duration of pumping in order to promote and maintain a full milk supply.  Hands on pumping technique reviewed.  Encouraged hand expression after pumping.  Instructed on cleaning of breast pump parts.  Written instructions also given.  Pt verbalized understanding and appropriate recall for proper milk handling, collection, labeling, storage and transportation.

## 2025-02-21 NOTE — TRANSFER OF CARE
"Anesthesia Transfer of Care Note    Patient: Lana Chou    Procedure(s) Performed: Procedure(s) (LRB):   SECTION (N/A)    Patient location: Labor and Delivery    Anesthesia Type: epidural    Transport from OR: Transported from OR on room air with adequate spontaneous ventilation    Post pain: adequate analgesia    Post assessment: no apparent anesthetic complications and tolerated procedure well    Post vital signs: stable    Level of consciousness: awake, alert and oriented    Nausea/Vomiting: no nausea/vomiting    Complications: none    Transfer of care protocol was followed      Last vitals: Visit Vitals  /69   Pulse 102   Temp 36.6 °C (97.9 °F) (Oral)   Resp 16   Ht 5' 1" (1.549 m)   Wt 59 kg (130 lb)   LMP 2024 (Exact Date)   SpO2 96%   Breastfeeding No   BMI 24.56 kg/m²     "

## 2025-02-21 NOTE — PROGRESS NOTES
[facility]  Obstetrics  Postpartum Progress Note    Patient Name: Lana Chou  MRN: [id number]  Admission Date: 2025  Hospital Length of Stay: 4 days  Attending Physician: Tyson Landers MD  Primary Care Provider: Luann Loza MD    Subjective:     Principal Problem:Status post  section    Hospital Course:  H/H on admission at 7.3/20.7  Admit to hospital with sickle cell crisis for hydration, pain control    2025  Feeling much better today.  Pain better. Ambulate in her room.  Tolerating regular diet  Ready for induction    2025  Now status post Cytotec x 4.  Cervix still closed.  Urine culture again grew out GNR  Has been on Macrobid nightly for prophylaxis     Urine culture with Multi-drug resistant E Coli sensitive to Unasyn and Macrobid.  Patient had been on Macrobid prophylaxis at home.  Will continue with Unasyn at 1.5 g IVPB Q6  Now status post Cytotec x 6 doses.  No real cervical change.  Will start Cervidil.  Placed by me at 1725  Ok to continue to eat    2025  Now status post Cytotec x 24 hours and Cervidil for 12 hours  On Pitocin  Requesting epidural.  But cervix is still closed    Spontaneous rupture of membranes at 0826. Thick meconium  Fetal tracing with frequent decelerations.  Cervix to 2 cm, 90 % at around 1220    I came to evaluate patient at around 1800  Still has not changed  We decided to continue with Pitocin until tomorrow morning.  But patient called back to request  section.  I came back to again discuss with patient plan  She no longer wants to be induced.    Risks and benefits of  delivery discussed with patient and her family  Will proceed with surgery now  Consider another unit of pRBC and continue with Unasyn postop  Lovenox 12 hours postop    Primary low transverse  section  MD Modesto Ruiz, CST  Epidural by PERLITA Greene MD  Viable female infant at 19  Weight: 6#15.1 or 3150 gm  Apgar:  8/9  Placenta: manually extracted intact with three vessels.  To Path  Normal uterus, tubes and ovaries  EBL: 1000 cc  Blood transfusion given. One unit of pRBCs  No complication    2/21/25  Doing well.  Trying to breast-feed but has been supplementing.    Interval History:   Status post PLTCS 2/20/25    She is doing well this morning. She is tolerating a regular diet without nausea or vomiting. She is voiding spontaneously. She is ambulating. She has passed flatus, and has not a BM. Vaginal bleeding is mild. She denies fever or chills. Abdominal pain is mild and controlled with oral medications. She Is breastfeeding. She desires circumcision for her male baby: not applicable.    Objective:     Vital Signs (Most Recent):  Temp: 98 °F (36.7 °C) (02/21/25 0507)  Pulse: 75 (02/21/25 0507)  Resp: 18 (02/21/25 0507)  BP: (!) 107/56 (02/21/25 0507)  SpO2: 97 % (02/21/25 0507) Vital Signs (24h Range):  Temp:  [97.8 °F (36.6 °C)-98.3 °F (36.8 °C)] 98 °F (36.7 °C)  Pulse:  [0-121] 75  Resp:  [18] 18  SpO2:  [62 %-100 %] 97 %  BP: (107-161)/() 107/56     Weight: 59 kg (130 lb)  Body mass index is 24.56 kg/m².      Intake/Output Summary (Last 24 hours) at 2/21/2025 0726  Last data filed at 2/21/2025 0557  Gross per 24 hour   Intake 4271.88 ml   Output 6198 ml   Net -1926.12 ml         Significant Labs:  Lab Results   Component Value Date    GROUPTRH O POS 02/17/2025    HEPBSAG Non-reactive 07/18/2024     Recent Labs   Lab 02/21/25  0523   HGB 9.3*   HCT 27.1*       CBC:   Recent Labs   Lab 02/21/25  0523   WBC 30.22*   RBC 3.05*   HGB 9.3*   HCT 27.1*      MCV 89   MCH 30.5   MCHC 34.3     I have personallly reviewed all pertinent lab results from the last 24 hours.    Physical Exam:   Constitutional: She appears well-developed and well-nourished. No distress.    HENT:   Head: Normocephalic and atraumatic.    Eyes: EOM are normal.     Cardiovascular:  Normal rate.             Pulmonary/Chest: Effort normal. No  respiratory distress.        Abdominal: Soft. She exhibits no distension. There is no abdominal tenderness. There is no rebound and no guarding.   Pfannenstiel incision in AquaCel dressing.  No evidence of active bleeding.             Musculoskeletal: Normal range of motion.       Neurological: She is alert.    Skin: Skin is warm and dry.    Psychiatric: She has a normal mood and affect.       Review of Systems  Assessment/Plan:     29 y.o. female  for:    * Status post  section  Routine postpartum care  Watch vaginal bleeding  Pain control  Lactation assistance  Discharge home once milestones are met.      Pyelonephritis affecting pregnancy in third trimester  Not in sickle cell crisis  Will continue with IV antibiotic. On Unasyn for multi-resistant E Coli  IVF  Pain control      Sickle cell anemia  No longer appears to be in sickle cell crisis  Continue with IV antibiotic and IV fluid  Pain control  Lovenox prophylaxis postpartum.      Now status post 4 units pRBCs.    Blood available in facility        Disposition: As patient meets milestones, will plan to discharge home.    Tyson Landers MD  Obstetrics  [facility]

## 2025-02-21 NOTE — SUBJECTIVE & OBJECTIVE
Interval History:   Status post PLTCS 2/20/25    She is doing well this morning. She is tolerating a regular diet without nausea or vomiting. She is voiding spontaneously. She is ambulating. She has passed flatus, and has not a BM. Vaginal bleeding is mild. She denies fever or chills. Abdominal pain is mild and controlled with oral medications. She Is breastfeeding. She desires circumcision for her male baby: not applicable.    Objective:     Vital Signs (Most Recent):  Temp: 98 °F (36.7 °C) (02/21/25 0507)  Pulse: 75 (02/21/25 0507)  Resp: 18 (02/21/25 0507)  BP: (!) 107/56 (02/21/25 0507)  SpO2: 97 % (02/21/25 0507) Vital Signs (24h Range):  Temp:  [97.8 °F (36.6 °C)-98.3 °F (36.8 °C)] 98 °F (36.7 °C)  Pulse:  [0-121] 75  Resp:  [18] 18  SpO2:  [62 %-100 %] 97 %  BP: (107-161)/() 107/56     Weight: 59 kg (130 lb)  Body mass index is 24.56 kg/m².      Intake/Output Summary (Last 24 hours) at 2/21/2025 0726  Last data filed at 2/21/2025 0557  Gross per 24 hour   Intake 4271.88 ml   Output 6198 ml   Net -1926.12 ml         Significant Labs:  Lab Results   Component Value Date    GROUPTRH O POS 02/17/2025    HEPBSAG Non-reactive 07/18/2024     Recent Labs   Lab 02/21/25  0523   HGB 9.3*   HCT 27.1*       CBC:   Recent Labs   Lab 02/21/25  0523   WBC 30.22*   RBC 3.05*   HGB 9.3*   HCT 27.1*      MCV 89   MCH 30.5   MCHC 34.3     I have personallly reviewed all pertinent lab results from the last 24 hours.    Physical Exam:   Constitutional: She appears well-developed and well-nourished. No distress.    HENT:   Head: Normocephalic and atraumatic.    Eyes: EOM are normal.     Cardiovascular:  Normal rate.             Pulmonary/Chest: Effort normal. No respiratory distress.        Abdominal: Soft. She exhibits no distension. There is no abdominal tenderness. There is no rebound and no guarding.   Pfannenstiel incision in AquaCel dressing.  No evidence of active bleeding.             Musculoskeletal: Normal  range of motion.       Neurological: She is alert.    Skin: Skin is warm and dry.    Psychiatric: She has a normal mood and affect.       Review of Systems

## 2025-02-21 NOTE — ASSESSMENT & PLAN NOTE
Not in sickle cell crisis  Will continue with IV antibiotic. On Unasyn for multi-resistant E Coli  IVF  Pain control

## 2025-02-21 NOTE — PLAN OF CARE
Problem:  Fall Injury Risk  Goal: Absence of Fall, Infant Drop and Related Injury  Outcome: Progressing     Problem: Adult Inpatient Plan of Care  Goal: Plan of Care Review  Outcome: Progressing     Problem: Wound  Goal: Optimal Pain Control and Function  Outcome: Progressing     Problem: Wound  Goal: Optimal Wound Healing  Outcome: Progressing     Problem: Wound  Goal: Absence of Infection Signs and Symptoms  Outcome: Progressing

## 2025-02-21 NOTE — L&D DELIVERY NOTE
[facility]   Section   Operative Note    SUMMARY     Date of Procedure: 25       PREOPERATIVE DIAGNOSES:  1.  Intrauterine pregnancy at 39w2.  2.  Failed induction  3.  Sickle cell disease  4.  Pyelonephritis  5.  Meconium-stained amniotic fluid     POSTOPERATIVE DIAGNOSES:  1.  Intrauterine pregnancy at 39w2.  2.  Failed induction  3.  Sickle cell disease  4.  Pyelonephritis  5.  Meconium-stained amniotic fluid     PROCEDURE:  Primary low transverse  section with blood transfusion of one unit of pRBCs in OR.     SURGEON:  Tyson Landers M.D.     ASSISTANT:  Modesto Davenport CST     ANESTHESIA:  Epidural by TYRON Greene MD     BLOOD LOSS:  About 1,000 mL.     URINE:  Clear.     FINDINGS:  Viable female infant delivered from vertex position at 1953 on 25.  Weight is 6#15.1 or 3150 gm.  Apgar was 8 at 1 minute and 9 at 5 minutes.     OTHER FINDINGS:  Include normal tubes and ovaries bilaterally.     COMPLICATIONS:  None.     SPECIMEN:  None.     HISTORY:  The patient is a 29 year-old  at 39w2d consistent with early ultrasound, sickle cell disease, history of pyelonephritis with ESBL E Coli. Has been on Macrobid nightly for prophylaxis.  Presented for induction with right pyelonephritis. Zosyn given in ED.  Unasyn on our unit.  Cytotec x 6 doses.  Cervidil x 12 hours  Pitocin x 16 hours  Cervix dilated to 1.5 to 2 cm.    Patient requested  section for delivery after over 3 days of induction  Risks and benefits discussed.     PROCEDURE IN DETAIL:  After confirming appropriate consent was signed in chart, the patient was then transported to the OR.  Epidural anesthesia per Anesthesia.  The patient was then put in a supine position, prepped and draped.  Adequate anesthesia was verified with negative Allis test to the umbilicus.  A Pfannenstiel skin incision was then made with the scalpel, extended down to the fascia.  Fascia was then entered sharply and extended bilaterally  sharply.  Peritoneum was then identified and entered bluntly and sharply.  The lower uterine segment was then identified.  Hysterotomy was performed using the scalpel transversely and amniotomy performed bluntly without any difficulty.  Meconium-stained fluid noted.  The uterine incision was then extended bluntly using the surgeon's finger.  A viable female infant delivered from vertex position without any difficulty, suctioned on the field and handed off to the nurse practitioner in attendance.  Birth time was  on 25.  Weight is 6#15.1 or 3150 gm.  Apgar is 8 at 1 minute and 9 at 5 minutes.  Cord blood was then obtained.  Placenta was then manually extracted intact.  Uterus was then delivered into the abdominal incision.  Endometrial cavity was then wiped clean with wet laps.  Uterine incision was then repaired in 2 layers using #0 Monocryl in a running interlocking fashion with the second layer in an imbricating manner.  Good hemostasis was noted.  Again, normal tubes and ovaries bilaterally.  Uterus was then replaced back to the abdomen.  Good hemostasis was noted.  Peritoneum was then closed using #3-0 Vicryl, fascia was then closed using #1 Vicryl in a running nonlocking fashion.  Subcutaneous tissue was then noted to be in good hemostasis.  It was then reapproximated using #3-0 Vicryl and then the skin was then reapproximated using #3-0 Vicryl on a Fredy needle and a pressure bandage applied with the Aquacel dressing.  The patient was then transferred to the Recovery Room in stable condition.    One unit of pRBCs given to the patient in the OR secondary to acute blood loss at surgery         Specimens:   Specimen (24h ago, onward)       Start     Ordered    25 6471  Specimen to Pathology, Surgery Gynecology and Obstetrics  Once        Comments: Pre-op Diagnosis: Failure to ProgressProcedure(s): SECTION Number of specimens: 1Name of specimens: Placenta     References:    Click here for  "ordering Quick Tip   Question Answer Comment   Procedure Type: Gynecology and Obstetrics    Specimen Class: Routine/Screening    Release to patient Immediate        25  Specimen to Pathology, Surgery Gynecology and Obstetrics  Once        Comments: Pre-op Diagnosis: Failure to ProgressProcedure(s): SECTION Number of specimens: 1Name of specimens: placenta 39/2weeks     References:    Click here for ordering Quick Tip   Question Answer Comment   Procedure Type: Gynecology and Obstetrics    Specimen Class: Routine/Screening    Release to patient Immediate        25                    Condition: Good    VTE Risk Mitigation (From admission, onward)           Ordered     enoxaparin injection 30 mg  Every 24 hours         25 0129     IP VTE HIGH RISK PATIENT  Once         25 0708                    Disposition: PACU - hemodynamically stable.    Attestation: Good         Delivery Information for Roxy Chou    Birth information:  YOB: 2023   Time of birth: 7:53 PM   Sex: female   Head Delivery Date/Time: 2025  7:53 PM   Delivery type: , Low Transverse   Gestational Age: 39w2d       Delivery Providers    Delivering clinician: Tyson Landers MD   Provider Role    Modesto Davenport ST Watson-Perique, Clydetemnestia, SAPNA Heart, Bertram Albarran CRNA Bergeron, Debra, Ryan Tanner, Jany Hazel, NNP               Measurements    Weight: 3150 g  Weight (lbs): 6 lb 15.1 oz  Length: 48.3 cm  Length (in): 19"         Apgars    Living status: Living  Apgar Component Scores:  1 min.:  5 min.:  10 min.:  15 min.:  20 min.:    Skin color:  0  1       Heart rate:  2  2       Reflex irritability:  2  2       Muscle tone:  2  2       Respiratory effort:  2  2       Total:  8  9       Apgars assigned by: DILIA SUTTON         Operative Delivery    Forceps attempted?: No  Vacuum extractor " attempted?: No         Shoulder Dystocia    Shoulder dystocia present?: No           Presentation    Presentation: Vertex  Position: Middle Occiput Anterior           Interventions/Resuscitation    Method: Bulb Suctioning, Tactile Stimulation, Deep Suctioning       Cord    Vessels: 3 vessels  Complications: None  Delayed Cord Clamping?: No  Cord Blood Disposition: Lab  Gases Sent?: No  Stem Cell Collection (by MD): No       Placenta    Placenta delivery date/time: 2025 20:01  Placenta removal: Manual removal  Placenta appearance: Intact  Placenta disposition: Pathology           Labor Events:       labor: No     Labor Onset Date/Time: 2025 10:50     Dilation Complete Date/Time:         Start Pushing Date/Time:         Start Pushing Date/Time:       Rupture Date/Time:            Rupture type: INT (Intact)        Fluid Amount:       Fluid Color:                steroids: None     Antibiotics given for GBS: Yes     Induction: misoprostol;dinoprostone gel;oxytocin     Indications for induction:        Augmentation: oxytocin     Indications for augmentation: Ineffective Contraction Pattern     Labor complications: Failure to Progress in First Stage     Additional complications: Sickle Cell Anemia With Crisis        Cervical ripenin2025 10:50 AM      Misoprostol          Delivery:      Episiotomy:       Indication for Episiotomy:       Perineal Lacerations:   Repaired:      Periurethral Laceration:   Repaired:     Labial Laceration:   Repaired:     Sulcus Laceration:   Repaired:     Vaginal Laceration:   Repaired:     Cervical Laceration:   Repaired:     Repair suture:       Repair # of packets:       Last Value - EBL - Nursing (mL):       Sum - EBL - Nursing (mL): 0     Last Value - EBL - Anesthesia (mL):      Calculated QBL (mL): 1198     Running total QBL (mL): 1198     Vaginal Sweep Performed:       Surgicount Correct:       Vaginal Packing:   Quantity:       Other providers:        Anesthesia    Method: Epidural          Details (if applicable):  Trial of Labor Yes    Categorization: Primary    Priority: Urgent   Indications for : Failed induction   Incision Type: low transverse     Additional  information:  Forceps:    Vacuum:    Breech:    Observed anomalies    Other (Comments):

## 2025-02-21 NOTE — CARE UPDATE
Spontaneous rupture of membranes at 0826. Thick meconium  Fetal tracing with frequent decelerations.  Cervix to 2 cm, 90 % at around 1220    I came to evaluate patient at around 1800  Still has not changed  We decided to continue with Pitocin until tomorrow morning.  But patient called back to request  section.  I came back to again discuss with patient plan  She no longer wants to be induced.    Risks and benefits of  delivery discussed with patient and her family  Will proceed with surgery now  Consider another unit of pRBC and continue with Unasyn postop  Lovenox 12 hours postop

## 2025-02-21 NOTE — PLAN OF CARE
Problem: Breastfeeding  Goal: Effective Breastfeeding  Outcome: Progressing  Intervention: Promote Breast Care and Comfort  Flowsheets (Taken 2/21/2025 1115)  Breast Care: Breastfeeding: open to air  Intervention: Promote Effective Breastfeeding  Flowsheets (Taken 2/21/2025 1115)  Breastfeeding Assistance:   assisted with positioning   infant latch-on verified   feeding session observed   feeding on demand promoted   feeding cue recognition promoted   infant stimulated to wakeful state   infant suck/swallow verified   support offered   supplemental feeding provided  Parent-Child Attachment Promotion:   caring behavior modeled   cue recognition promoted   face-to-face positioning promoted   interaction encouraged   parent/caregiver presence encouraged   strengths emphasized   skin-to-skin contact encouraged   rooming-in promoted   participation in care promoted  Intervention: Support Exclusive Breastfeeding Success  Flowsheets (Taken 2/21/2025 1115)  Supportive Measures:   active listening utilized   counseling provided   decision-making supported   goal-setting facilitated   self-care encouraged   problem-solving facilitated   positive reinforcement provided   self-responsibility promoted   verbalization of feelings encouraged  Breastfeeding Support:   diary/feeding log utilized   encouragement provided   infant-mother separation minimized   lactation counseling provided   maternal hydration promoted   maternal nutrition promoted   maternal rest encouraged

## 2025-02-21 NOTE — LACTATION NOTE
02/21/25 1115   Maternal Assessment   Breast Density Bilateral:;soft   Areola Bilateral:;dense   Nipples Bilateral:;everted   Maternal Infant Feeding   Maternal Emotional State assist needed;relaxed   Infant Positioning clutch/football   Signs of Milk Transfer audible swallow;infant jaw motion present   Pain with Feeding no   Latch Assistance yes   Equipment Type   Breast Pump Type double electric, hospital grade   Breast Pump Flange Type hard     Patient would like to breastfeed her infant.  Says she can't seem to latch the infant on and have her stay on.  She was beginning to supplement the baby with formula using a bottle and standard nipple.  Infant had sucked 10 mls of formula.  I explained, if the baby can suck the bottle, we can get the baby to suck at the breast.  Infant was placed in a clutch hold and I assisted with latch.  Infant latched on and began sucking without difficulty.  Every time the baby would push the nipple forward, explained that she needed to re-latch the baby. Verbalized understanding.  Infant was able to nurse for 18 minutes on the right side.

## 2025-02-21 NOTE — ASSESSMENT & PLAN NOTE
No longer appears to be in sickle cell crisis  Continue with IV antibiotic and IV fluid  Pain control  Lovenox prophylaxis postpartum.      Now status post 4 units pRBCs.    Blood available in facility

## 2025-02-22 PROBLEM — N30.90 CYSTITIS: Status: RESOLVED | Noted: 2025-02-21 | Resolved: 2025-02-22

## 2025-02-22 PROBLEM — Z3A.38 38 WEEKS GESTATION OF PREGNANCY: Status: RESOLVED | Noted: 2025-02-21 | Resolved: 2025-02-22

## 2025-02-22 PROBLEM — R07.9 CHEST PAIN: Status: RESOLVED | Noted: 2025-02-21 | Resolved: 2025-02-22

## 2025-02-22 PROCEDURE — 25000003 PHARM REV CODE 250: Performed by: OBSTETRICS & GYNECOLOGY

## 2025-02-22 PROCEDURE — 63600175 PHARM REV CODE 636 W HCPCS: Performed by: OBSTETRICS & GYNECOLOGY

## 2025-02-22 PROCEDURE — 99232 SBSQ HOSP IP/OBS MODERATE 35: CPT | Mod: 24,,, | Performed by: OBSTETRICS & GYNECOLOGY

## 2025-02-22 PROCEDURE — 11000001 HC ACUTE MED/SURG PRIVATE ROOM

## 2025-02-22 RX ORDER — FAMOTIDINE 20 MG/1
20 TABLET, FILM COATED ORAL 2 TIMES DAILY
Status: DISCONTINUED | OUTPATIENT
Start: 2025-02-22 | End: 2025-02-22

## 2025-02-22 RX ORDER — IBUPROFEN 600 MG/1
600 TABLET ORAL EVERY 6 HOURS
Status: DISCONTINUED | OUTPATIENT
Start: 2025-02-22 | End: 2025-02-23 | Stop reason: HOSPADM

## 2025-02-22 RX ORDER — FAMOTIDINE 20 MG/1
20 TABLET, FILM COATED ORAL 2 TIMES DAILY PRN
Status: DISCONTINUED | OUTPATIENT
Start: 2025-02-22 | End: 2025-02-23 | Stop reason: HOSPADM

## 2025-02-22 RX ORDER — ACETAMINOPHEN 325 MG/1
650 TABLET ORAL EVERY 6 HOURS PRN
Status: DISCONTINUED | OUTPATIENT
Start: 2025-02-22 | End: 2025-02-23 | Stop reason: HOSPADM

## 2025-02-22 RX ADMIN — IBUPROFEN 600 MG: 600 TABLET, FILM COATED ORAL at 12:02

## 2025-02-22 RX ADMIN — SIMETHICONE 80 MG: 80 TABLET, CHEWABLE ORAL at 11:02

## 2025-02-22 RX ADMIN — DOCUSATE SODIUM 200 MG: 100 CAPSULE, LIQUID FILLED ORAL at 08:02

## 2025-02-22 RX ADMIN — DOCUSATE SODIUM 200 MG: 100 CAPSULE, LIQUID FILLED ORAL at 09:02

## 2025-02-22 RX ADMIN — SIMETHICONE 80 MG: 80 TABLET, CHEWABLE ORAL at 05:02

## 2025-02-22 RX ADMIN — OXYCODONE HYDROCHLORIDE AND ACETAMINOPHEN 1 TABLET: 5; 325 TABLET ORAL at 05:02

## 2025-02-22 RX ADMIN — AMPICILLIN SODIUM AND SULBACTAM SODIUM 1.5 G: 1; .5 INJECTION, POWDER, FOR SOLUTION INTRAMUSCULAR; INTRAVENOUS at 05:02

## 2025-02-22 RX ADMIN — AMPICILLIN SODIUM AND SULBACTAM SODIUM 1.5 G: 1; .5 INJECTION, POWDER, FOR SOLUTION INTRAMUSCULAR; INTRAVENOUS at 10:02

## 2025-02-22 RX ADMIN — AMPICILLIN SODIUM AND SULBACTAM SODIUM 1.5 G: 1; .5 INJECTION, POWDER, FOR SOLUTION INTRAMUSCULAR; INTRAVENOUS at 09:02

## 2025-02-22 RX ADMIN — OXYCODONE HYDROCHLORIDE AND ACETAMINOPHEN 1 TABLET: 5; 325 TABLET ORAL at 02:02

## 2025-02-22 RX ADMIN — MUPIROCIN: 20 OINTMENT TOPICAL at 08:02

## 2025-02-22 RX ADMIN — MUPIROCIN: 20 OINTMENT TOPICAL at 09:02

## 2025-02-22 RX ADMIN — AMPICILLIN SODIUM AND SULBACTAM SODIUM 1.5 G: 1; .5 INJECTION, POWDER, FOR SOLUTION INTRAMUSCULAR; INTRAVENOUS at 04:02

## 2025-02-22 RX ADMIN — PRENATAL VITAMINS-IRON FUMARATE 27 MG IRON-FOLIC ACID 0.8 MG TABLET 1 TABLET: at 09:02

## 2025-02-22 RX ADMIN — FAMOTIDINE 20 MG: 20 TABLET ORAL at 09:02

## 2025-02-22 RX ADMIN — IBUPROFEN 600 MG: 600 TABLET, FILM COATED ORAL at 05:02

## 2025-02-22 RX ADMIN — ENOXAPARIN SODIUM 30 MG: 30 INJECTION SUBCUTANEOUS at 05:02

## 2025-02-22 RX ADMIN — IBUPROFEN 600 MG: 600 TABLET, FILM COATED ORAL at 11:02

## 2025-02-22 RX ADMIN — FAMOTIDINE 20 MG: 20 TABLET ORAL at 08:02

## 2025-02-22 NOTE — SUBJECTIVE & OBJECTIVE
Interval History:   Status post primary low transverse  section for failed induction    She is doing well this morning. She is tolerating a regular diet without nausea or vomiting. She is voiding spontaneously. She is ambulating. She has passed flatus, and has not a BM. Vaginal bleeding is mild. She denies fever or chills. Abdominal pain is mild and controlled with oral medications. She Is breastfeeding. She desires circumcision for her male baby: not applicable.    Objective:     Vital Signs (Most Recent):  Temp: 97.6 °F (36.4 °C) (25)  Pulse: 95 (25)  Resp: 18 (25)  BP: 114/61 (25)  SpO2: 98 % (25) Vital Signs (24h Range):  Temp:  [97.5 °F (36.4 °C)-97.9 °F (36.6 °C)] 97.6 °F (36.4 °C)  Pulse:  [76-99] 95  Resp:  [18-20] 18  SpO2:  [95 %-100 %] 98 %  BP: (105-124)/(55-72) 114/61     Weight: 59 kg (130 lb)  Body mass index is 24.56 kg/m².      Intake/Output Summary (Last 24 hours) at 2025 0931  Last data filed at 2025 0800  Gross per 24 hour   Intake 600 ml   Output 2500 ml   Net -1900 ml         Significant Labs:  Lab Results   Component Value Date    GROUPTRH O POS 2025    HEPBSAG Non-reactive 2024     Recent Labs   Lab 25  0523   HGB 9.3*   HCT 27.1*       CBC:   Recent Labs   Lab 25  0523   WBC 30.22*   RBC 3.05*   HGB 9.3*   HCT 27.1*      MCV 89   MCH 30.5   MCHC 34.3     I have personallly reviewed all pertinent lab results from the last 24 hours.    Physical Exam:   Constitutional: She appears well-developed and well-nourished. No distress.    HENT:   Head: Normocephalic and atraumatic.    Eyes: EOM are normal.     Cardiovascular:  Normal rate.             Pulmonary/Chest: Effort normal. No respiratory distress.        Abdominal: Soft. She exhibits no distension. There is no abdominal tenderness. There is no rebound and no guarding.   Pfannenstiel incision in AquaCel dressing.  No evidence of active  bleeding.             Musculoskeletal: Normal range of motion.       Neurological: She is alert.    Skin: Skin is warm and dry.    Psychiatric: She has a normal mood and affect.       Review of Systems

## 2025-02-22 NOTE — ADDENDUM NOTE
Addendum  created 02/21/25 2141 by Bertram Cannon, CRNA    Flowsheet accepted, Intraprocedure Staff edited

## 2025-02-22 NOTE — PROGRESS NOTES
[facility]  Obstetrics  Postpartum Progress Note    Patient Name: Lana Chou  MRN: [id number]  Admission Date: 2025  Hospital Length of Stay: 5 days  Attending Physician: Tyson Landers MD  Primary Care Provider: Luann Loza MD    Subjective:     Principal Problem:Status post  section    Hospital Course:  H/H on admission at 7.3/20.7  Admit to hospital with sickle cell crisis for hydration, pain control    2025  Feeling much better today.  Pain better. Ambulate in her room.  Tolerating regular diet  Ready for induction    2025  Now status post Cytotec x 4.  Cervix still closed.  Urine culture again grew out GNR  Has been on Macrobid nightly for prophylaxis     Urine culture with Multi-drug resistant E Coli sensitive to Unasyn and Macrobid.  Patient had been on Macrobid prophylaxis at home.  Will continue with Unasyn at 1.5 g IVPB Q6  Now status post Cytotec x 6 doses.  No real cervical change.  Will start Cervidil.  Placed by me at 1725  Ok to continue to eat    2025  Now status post Cytotec x 24 hours and Cervidil for 12 hours  On Pitocin  Requesting epidural.  But cervix is still closed    Spontaneous rupture of membranes at 0826. Thick meconium  Fetal tracing with frequent decelerations.  Cervix to 2 cm, 90 % at around 1220    I came to evaluate patient at around 1800  Still has not changed  We decided to continue with Pitocin until tomorrow morning.  But patient called back to request  section.  I came back to again discuss with patient plan  She no longer wants to be induced.    Risks and benefits of  delivery discussed with patient and her family  Will proceed with surgery now  Consider another unit of pRBC and continue with Unasyn postop  Lovenox 12 hours postop    Primary low transverse  section  MD Modesto Ruiz, CST  Epidural by PERLITA Greene MD  Viable female infant at 19  Weight: 6#15.1 or 3150 gm  Apgar:    Placenta: manually extracted intact with three vessels.  To Path  Normal uterus, tubes and ovaries  EBL: 1000 cc  Blood transfusion given. One unit of pRBCs  No complication    25  Doing well.  Trying to breast-feed but has been supplementing.    25  Doing well.  Breast-feeding well.      Interval History:   Status post primary low transverse  section for failed induction    She is doing well this morning. She is tolerating a regular diet without nausea or vomiting. She is voiding spontaneously. She is ambulating. She has passed flatus, and has not a BM. Vaginal bleeding is mild. She denies fever or chills. Abdominal pain is mild and controlled with oral medications. She Is breastfeeding. She desires circumcision for her male baby: not applicable.    Objective:     Vital Signs (Most Recent):  Temp: 97.6 °F (36.4 °C) (25)  Pulse: 95 (25)  Resp: 18 (25)  BP: 114/61 (25)  SpO2: 98 % (25) Vital Signs (24h Range):  Temp:  [97.5 °F (36.4 °C)-97.9 °F (36.6 °C)] 97.6 °F (36.4 °C)  Pulse:  [76-99] 95  Resp:  [18-20] 18  SpO2:  [95 %-100 %] 98 %  BP: (105-124)/(55-72) 114/61     Weight: 59 kg (130 lb)  Body mass index is 24.56 kg/m².      Intake/Output Summary (Last 24 hours) at 2025 0931  Last data filed at 2025 0800  Gross per 24 hour   Intake 600 ml   Output 2500 ml   Net -1900 ml         Significant Labs:  Lab Results   Component Value Date    GROUPTRH O POS 2025    HEPBSAG Non-reactive 2024     Recent Labs   Lab 25  0523   HGB 9.3*   HCT 27.1*       CBC:   Recent Labs   Lab 25  0523   WBC 30.22*   RBC 3.05*   HGB 9.3*   HCT 27.1*      MCV 89   MCH 30.5   MCHC 34.3     I have personallly reviewed all pertinent lab results from the last 24 hours.    Physical Exam:   Constitutional: She appears well-developed and well-nourished. No distress.    HENT:   Head: Normocephalic and atraumatic.    Eyes: EOM are  normal.     Cardiovascular:  Normal rate.             Pulmonary/Chest: Effort normal. No respiratory distress.        Abdominal: Soft. She exhibits no distension. There is no abdominal tenderness. There is no rebound and no guarding.   Pfannenstiel incision in AquaCel dressing.  No evidence of active bleeding.             Musculoskeletal: Normal range of motion.       Neurological: She is alert.    Skin: Skin is warm and dry.    Psychiatric: She has a normal mood and affect.       Review of Systems  Assessment/Plan:     29 y.o. female  for:    * Status post  section  Routine postpartum care  Watch vaginal bleeding  Pain control  Lactation assistance  Discharge home once milestones are met.      Pyelonephritis affecting pregnancy in third trimester  Not in sickle cell crisis  Will continue with IV antibiotic. On Unasyn for multi-resistant E Coli  IVF  Pain control      Sickle cell anemia  No longer appears to be in sickle cell crisis  Continue with IV antibiotic and IV fluid  Pain control  Lovenox prophylaxis postpartum.      Now status post 4 units pRBCs.    Blood available in facility        Disposition: As patient meets milestones, will plan to discharge home.    Tyson Landers MD  Obstetrics  [facility]

## 2025-02-22 NOTE — LACTATION NOTE
02/22/25 0800   Maternal Assessment   Breast Density Bilateral:;soft   Areola Bilateral:;dense   Nipples Bilateral:;everted;short;graspable   Maternal Infant Feeding   Maternal Emotional State relaxed;independent   Infant Positioning clutch/football   Signs of Milk Transfer audible swallow;infant jaw motion present   Pain with Feeding no   Latch Assistance yes     Patient is latching infant well and the baby is nursing well.  States she does not have pain when nursing.  Infant is taking deep, rhythmic, sucks.  Offered support as needed.  Patient requests lanolin.  Provided and I explained it's all natural and it's okay to breastfeed with lanolin.  Verbalized understanding.

## 2025-02-22 NOTE — PLAN OF CARE
Problem:  Fall Injury Risk  Goal: Absence of Fall, Infant Drop and Related Injury  Outcome: Progressing     Problem: Adult Inpatient Plan of Care  Goal: Plan of Care Review  Outcome: Progressing  Goal: Patient-Specific Goal (Individualized)  Outcome: Progressing  Goal: Absence of Hospital-Acquired Illness or Injury  Outcome: Progressing  Goal: Optimal Comfort and Wellbeing  Outcome: Progressing  Goal: Readiness for Transition of Care  Outcome: Progressing     Problem: Infection  Goal: Absence of Infection Signs and Symptoms  Outcome: Progressing     Problem: Wound  Goal: Optimal Coping  Outcome: Progressing  Goal: Optimal Functional Ability  Outcome: Progressing  Goal: Absence of Infection Signs and Symptoms  Outcome: Progressing  Goal: Improved Oral Intake  Outcome: Progressing  Goal: Optimal Pain Control and Function  Outcome: Progressing  Goal: Skin Health and Integrity  Outcome: Progressing  Goal: Optimal Wound Healing  Outcome: Progressing     Problem: Labor  Goal: Hemostasis  Outcome: Met  Goal: Stable Fetal Wellbeing  Outcome: Met  Goal: Effective Progression to Delivery  Outcome: Met  Goal: Absence of Infection Signs and Symptoms  Outcome: Met  Goal: Acceptable Pain Control  Outcome: Met  Goal: Normal Uterine Contraction Pattern  Outcome: Met

## 2025-02-22 NOTE — PLAN OF CARE
Problem: Breastfeeding  Goal: Effective Breastfeeding  Outcome: Progressing  Intervention: Promote Breast Care and Comfort  Flowsheets (Taken 2/22/2025 0800)  Breast Care: Breastfeeding:   open to air   lanolin to nipples  Intervention: Promote Effective Breastfeeding  Flowsheets (Taken 2/22/2025 0800)  Breastfeeding Assistance:   alternative feeding device utilized   assisted with positioning   infant latch-on verified   feeding session observed   feeding on demand promoted   feeding cue recognition promoted   infant stimulated to wakeful state   infant suck/swallow verified   support offered   supplemental feeding provided  Parent-Child Attachment Promotion:   caring behavior modeled   cue recognition promoted   face-to-face positioning promoted   interaction encouraged   parent/caregiver presence encouraged   strengths emphasized   skin-to-skin contact encouraged   rooming-in promoted   positive reinforcement provided   participation in care promoted  Intervention: Support Exclusive Breastfeeding Success  Flowsheets (Taken 2/22/2025 0800)  Supportive Measures:   active listening utilized   counseling provided   decision-making supported   goal-setting facilitated   self-care encouraged   problem-solving facilitated   positive reinforcement provided   self-responsibility promoted   verbalization of feelings encouraged  Breastfeeding Support:   diary/feeding log utilized   encouragement provided   infant-mother separation minimized   lactation counseling provided   maternal hydration promoted   maternal nutrition promoted   maternal rest encouraged

## 2025-02-22 NOTE — PLAN OF CARE
VSS, Formula feeding per request. Supportive bra encouraged. Fundus and lochia WDL. Voiding, passing flatus, ambulating and tolerating regular diet. Bonding well with baby. Pain being managed with scheduled and prn meds. Stated an understanding to POC.

## 2025-02-22 NOTE — DISCHARGE INSTRUCTIONS
After a Cesearean Birth    General Discharge Instructions  May follow a regular diet, unless otherwise discussed with physician.  Take showers, not baths until instructed by your doctor.   For the next 5 days, continue to use Hibiclens solution when you shower. Always use a clean towel when drying incision.  Incision dressing should be removed 7 days after surgery. If dressing begins to peel up prior to this day, gently remove.    Activity as tolerated.  No lifting or heavy exercise for 6 weeks, no driving for 2 weeks, no sexual intercourse, douching or tampons for 6 weeks  May return to work/school as discussed with physician  Discuss birth control with physician  Breast care support bra worn at all times  Lactation consultant referral number ( [phone] or [phone])    Call Your Healthcare Provider Right Away If You Have:  A temperature of 100.4°F or higher.  If your blood pressure is over 140/90.  You have difficulty catching your breath or trouble breathing.  Heavy vaginal bleeding, clots, or vaginal discharge with foul odor. (heavier than menses)  Persistent nausea or vomiting.  You gain more than 3 pounds in 3 days.  Severe headaches not relieved by Tylenol (acetaminophen) or Motrin (ibuprofen)  Blurry or double vision, see spots or flashing lights.  Dizziness or fainting.  New onset swelling or worsening of existing swelling.  Burning or pain when you urinate.  No bowel movement for 5 days.  Redness, warmth, swelling, or pain in the lower leg.  Redness, discharge, or pain worse than you had in the hospital.  Burning, pain, red streaks, or lumpy areas in your breasts.  Cracks, blisters, or blood on your nipples.  Feelings of extreme sadness or anxiety, or a feeling that you dont want to be with your baby.  If you have any new or unusual symptoms or have questions or concerns    Incision Care  You will be able to shower and pat the incision dry.  Watch your incision for signs of infection, such as  increasing redness or drainage.  For ease of movement, hold a pillow against the incision when you get up from a lying or sitting position, and when you laugh or cough.  Avoid heavy lifting--nothing heavier than your baby until your doctor instructs you otherwise.     Follow-Up  Schedule a  follow-up exam with your healthcare provider for about 6 weeks after delivery. During this exam, your uterus and vaginal area will be checked. Contact your healthcare provider if you think you or your baby are having any problems.                                    Breastfeeding Discharge Instructions     AAP recommendation of exclusive breastfeeding for the first 6 months of life and continued breastfeeding with the introduction of supplemental foods beyond the first year of life and recommends to delay all bottle and pacifier use until after 4 weeks of age and breastfeeding is well established.  Discussed the benefits of exclusive breastfeeding for both mother and baby.  Discussed the risks of supplementation/pacifier use on the exclusivity of breastfeeding in the first 6 months. Feed the baby at the earliest sign of hunger or comfort  Hands to mouth, sucking motions  Rooting or searching for something to suck on  Dont wait for crying - it is a not a late sign of hunger; it is a sign of distress    The feedings may be 8-12 times per 24hrs and will not follow a schedule  Alternate the breast you start the feeding with, or start with the breast that feels the fullest  Switch breasts when the baby takes himself off the breast or falls asleep  Keep offering breasts until the baby looks full, no longer gives hunger signs, and stays asleep when placed on his back in the crib  If the baby is sleepy and wont wake for a feeding, put the baby skin-to-skin dressed in a diaper against the mothers bare chest  Sleep near your baby  The baby should be positioned and latched on to the breast correctly  Chest-to-chest,  chin in the breast  Babys lips are flipped outward  Babys mouth is stretched open wide like a shout  Babys sucking should feel like tugging to the mother  The baby should be drinking at the breast:  You should hear swallowing or gulping throughout the feeding  You should see milk on the babys lips when he comes off the breast  Your breasts should be softer when the baby is finished feeding  The baby should look relaxed at the end of feedings  After the 4th day and your milk is in:  The babys poop should turn bright yellow and be loose, watery, and seedy  The baby should have at least 3-4 poops the size of the palm of your hand per day  The baby should have at least 6-8 wet diapers per day  The urine should be light yellow in color  You should drink when you are thirsty and eat a healthy diet when you are    hungry.     Take naps to get the rest you need.   Take medications and/or drink alcohol only with permission of your obstetrician    or the babys pediatrician.  You can also call the [facility],   ([phone]), Monday-Friday, 8am-5pm Central time, to get the most   up-to-date evidence-based information on the use of medications during   pregnancy and breastfeeding.      The baby should be examined by a pediatrician at 3-5 days of age; unless ordered sooner by the pediatrician.  Once your milk comes in, the baby should be back to birth weight no later than 10-14 days of age.    Primary Engorgement    If the milk is flowing, use wet or dry heat applied to the breasts for approximately 10min prior to each feeding as a comfort measure to facilitate the milk ejection reflex    Follow heat treatment with breast massage to soften hard/lumpy areas of the breast    Use unrestricted, frequent, effective feedings.      Wake baby to feed if necessary    Avoid pacifier and bottle feedings    Hand express or pump breasts to the point of comfort prn    Use cold treatments in the form of ice packs/gel packs/  frozen vegetables wrapped in a soft thin cloth and applied to the breasts for approximately 20min after each feeding until engorgement is resolved    Wear comfortable, supportive bra    Take pain medicine prn    Use anti-inflammatory medications if prescribed by physician    Other:     Pumping Instructions :    Preparation and Hygiene:    Shower daily.  Wear a clean bra each day and wash daily in warm soapy water.  Change wet or moist breast pads frequently.  Moist pads can promote growth of germs.  Actively wash your hands, paying close attention to the area around and under your fingernails, thoroughly with soap and water for 15 seconds before pumping or handling your milk.  Re-wash your hands if you touch anything (scratching your nose, answering the phone, etc) while pumping or handling your milk.   Before pumping your breasts, assemble the pump collection kit and have ready the sterile container and labels.  Place these items on a clean surface next to the breastpump.  Each time after you have finished pumping, take apart all of the parts of the breastpump collection kit and place them in a separate cleaning container (do not place them in the sink).  Be sure to remove the yellow valve from the breastshield and separate the white membrane from the yellow valve.  Rinse all of these parts with cool water.  Then use a new sponge and/or bottle brush and dishwashing detergent to clean the parts.  Rinse off the soapy water with cool water and air dry on a clean towel covered with a clean cloth.  All parts may also be washed after each use in the top rack of a .  Once each day, sterilize all of the parts of the breastpump collection kit.  This can be done by boiling the kit parts for 10 minutes or by using a Quick Clean Micro-Steam Bag made by [employer]  If condensation appears in the tubing, continue to run the pump with the tubing attached for 1-2 minutes or until the tubing is dry.   Notify your  babys nurse or doctor if you become ill or need to take any medication, even over-the-counter medicines.        Collection and Storage of Expressed Breastmilk:         1. Pump your breasts at least 8-10 times every 24 hours.  Double pump (both breasts at  the same time) for at least 15-20 minutes using the most suction that is comfortable.    2. Write the date and time of pumping and the name of any medications you are takingon the babys pre-printed hospital identification label.   3.    Do not touch the inside of the storage containers or lids.      4.        Tightly screw the lid onto the container and place immediately into the                                refrigerator for daily use.  Bottle may remain at room temperature if the next                    feeding is within 4 hours.  5.    Expressed breastmilk should be refrigerated or frozen within 4 hours of                pumping.  6.        Do not store expressed breastmilk on the door of your refrigerator or freeze             where the temperature is warmer.   7.        Refrigerated milk may be stored in for up to 7 days.  At this point it can be              moved to the freezer for 6 -12 months.  8.        Thaw frozen breast milk in overnight in the refrigerator.  Once milk is thawed it              must be used within 24 hours.  9.        Refrigerated breast milk needs to be warmed to room temperature.  Warm by leaving unrefrigerated until it reached room temperature, or place sealed bottle into a cup of warm (not boiling) water or use a bottle warmer.               Never warm breast milk in a microwave or boiling water.    For any questions or concerns call:  Lactation Department at [phone]        How to Store Expressed/Pumped Breast Milk:      -Pumped milk can remain at the bedside, in a bottle with a lid, for 4 hours.    -Pumped milk can be placed in the refrigerator for 48 hours.    -Pumped milk can be placed in the freezer, that is attached to  the refrigerator, for 6 months.    -Pumped milk can be placed in a deep freeze, which a free standing freezer (upright or chest type), for 1 year.      Be sure to write the date and time on your milk.  Try to use your milk in order because it can change over time.

## 2025-02-23 ENCOUNTER — PATIENT MESSAGE (OUTPATIENT)
Dept: LACTATION | Facility: CLINIC | Age: 30
End: 2025-02-23
Payer: COMMERCIAL

## 2025-02-23 VITALS
HEART RATE: 74 BPM | WEIGHT: 130 LBS | TEMPERATURE: 98 F | BODY MASS INDEX: 24.55 KG/M2 | SYSTOLIC BLOOD PRESSURE: 109 MMHG | OXYGEN SATURATION: 97 % | RESPIRATION RATE: 18 BRPM | HEIGHT: 61 IN | DIASTOLIC BLOOD PRESSURE: 65 MMHG

## 2025-02-23 PROBLEM — D57.00 SICKLE CELL PAIN CRISIS: Status: RESOLVED | Noted: 2024-05-29 | Resolved: 2025-02-23

## 2025-02-23 PROCEDURE — 99900035 HC TECH TIME PER 15 MIN (STAT)

## 2025-02-23 PROCEDURE — 99238 HOSP IP/OBS DSCHRG MGMT 30/<: CPT | Mod: 24,,, | Performed by: OBSTETRICS & GYNECOLOGY

## 2025-02-23 PROCEDURE — 63600175 PHARM REV CODE 636 W HCPCS: Performed by: OBSTETRICS & GYNECOLOGY

## 2025-02-23 PROCEDURE — 25000003 PHARM REV CODE 250: Performed by: OBSTETRICS & GYNECOLOGY

## 2025-02-23 RX ORDER — NITROFURANTOIN 25; 75 MG/1; MG/1
100 CAPSULE ORAL 2 TIMES DAILY
Qty: 14 CAPSULE | Refills: 0 | Status: SHIPPED | OUTPATIENT
Start: 2025-02-23 | End: 2025-03-02

## 2025-02-23 RX ORDER — IBUPROFEN 600 MG/1
600 TABLET ORAL EVERY 6 HOURS
Qty: 40 TABLET | Refills: 1 | Status: SHIPPED | OUTPATIENT
Start: 2025-02-23

## 2025-02-23 RX ORDER — AMOXICILLIN AND CLAVULANATE POTASSIUM 500; 125 MG/1; MG/1
1 TABLET, FILM COATED ORAL 2 TIMES DAILY
Qty: 14 TABLET | Refills: 0 | Status: SHIPPED | OUTPATIENT
Start: 2025-02-23

## 2025-02-23 RX ORDER — OXYCODONE AND ACETAMINOPHEN 5; 325 MG/1; MG/1
1 TABLET ORAL EVERY 4 HOURS PRN
Qty: 15 TABLET | Refills: 0 | Status: SHIPPED | OUTPATIENT
Start: 2025-02-23

## 2025-02-23 RX ADMIN — AMPICILLIN SODIUM AND SULBACTAM SODIUM 1.5 G: 1; .5 INJECTION, POWDER, FOR SOLUTION INTRAMUSCULAR; INTRAVENOUS at 04:02

## 2025-02-23 RX ADMIN — IBUPROFEN 600 MG: 600 TABLET, FILM COATED ORAL at 05:02

## 2025-02-23 RX ADMIN — AMPICILLIN SODIUM AND SULBACTAM SODIUM 1.5 G: 1; .5 INJECTION, POWDER, FOR SOLUTION INTRAMUSCULAR; INTRAVENOUS at 10:02

## 2025-02-23 RX ADMIN — IBUPROFEN 600 MG: 600 TABLET, FILM COATED ORAL at 12:02

## 2025-02-23 RX ADMIN — DOCUSATE SODIUM 200 MG: 100 CAPSULE, LIQUID FILLED ORAL at 09:02

## 2025-02-23 RX ADMIN — MUPIROCIN: 20 OINTMENT TOPICAL at 09:02

## 2025-02-23 RX ADMIN — ACETAMINOPHEN 650 MG: 325 TABLET ORAL at 04:02

## 2025-02-23 RX ADMIN — OXYCODONE HYDROCHLORIDE AND ACETAMINOPHEN 1 TABLET: 5; 325 TABLET ORAL at 02:02

## 2025-02-23 RX ADMIN — SIMETHICONE 80 MG: 80 TABLET, CHEWABLE ORAL at 09:02

## 2025-02-23 RX ADMIN — OXYCODONE HYDROCHLORIDE AND ACETAMINOPHEN 1 TABLET: 5; 325 TABLET ORAL at 04:02

## 2025-02-23 RX ADMIN — OXYCODONE HYDROCHLORIDE AND ACETAMINOPHEN 1 TABLET: 5; 325 TABLET ORAL at 09:02

## 2025-02-23 RX ADMIN — PRENATAL VITAMINS-IRON FUMARATE 27 MG IRON-FOLIC ACID 0.8 MG TABLET 1 TABLET: at 09:02

## 2025-02-23 NOTE — PLAN OF CARE
Problem: Breastfeeding  Goal: Effective Breastfeeding  Outcome: Met  Intervention: Promote Breast Care and Comfort  Flowsheets (Taken 2/23/2025 1030)  Breast Care: Breastfeeding:   lanolin to nipples   open to air  Intervention: Promote Effective Breastfeeding  Flowsheets (Taken 2/23/2025 1030)  Breastfeeding Assistance:   infant latch-on verified   feeding session observed   feeding on demand promoted   feeding cue recognition promoted   infant stimulated to wakeful state   infant suck/swallow verified   supplemental feeding provided   support offered  Parent-Child Attachment Promotion:   caring behavior modeled   cue recognition promoted   face-to-face positioning promoted   interaction encouraged   parent/caregiver presence encouraged   strengths emphasized   skin-to-skin contact encouraged   rooming-in promoted   positive reinforcement provided   participation in care promoted  Intervention: Support Exclusive Breastfeeding Success  Flowsheets (Taken 2/23/2025 1030)  Supportive Measures:   active listening utilized   counseling provided   decision-making supported   goal-setting facilitated   self-care encouraged   problem-solving facilitated   positive reinforcement provided   self-responsibility promoted   verbalization of feelings encouraged  Breastfeeding Support:   diary/feeding log utilized   encouragement provided   infant-mother separation minimized   lactation counseling provided   maternal hydration promoted   maternal nutrition promoted   maternal rest encouraged

## 2025-02-23 NOTE — PLAN OF CARE
Problem:  Fall Injury Risk  Goal: Absence of Fall, Infant Drop and Related Injury  Outcome: Met     Problem: Adult Inpatient Plan of Care  Goal: Plan of Care Review  Outcome: Met  Goal: Patient-Specific Goal (Individualized)  Outcome: Met  Goal: Absence of Hospital-Acquired Illness or Injury  Outcome: Met  Goal: Optimal Comfort and Wellbeing  Outcome: Met  Goal: Readiness for Transition of Care  Outcome: Met     Problem: Infection  Goal: Absence of Infection Signs and Symptoms  Outcome: Met     Problem: Wound  Goal: Optimal Coping  Outcome: Met  Goal: Optimal Functional Ability  Outcome: Met  Goal: Absence of Infection Signs and Symptoms  Outcome: Met  Goal: Improved Oral Intake  Outcome: Met  Goal: Optimal Pain Control and Function  Outcome: Met  Goal: Skin Health and Integrity  Outcome: Met  Goal: Optimal Wound Healing  Outcome: Met

## 2025-02-23 NOTE — LACTATION NOTE
02/23/25 1030   Maternal Assessment   Breast Density Bilateral:;filling   Areola Bilateral:;dense   Nipples Bilateral:;everted   Maternal Infant Feeding   Maternal Emotional State independent;relaxed   Infant Positioning clutch/football   Signs of Milk Transfer audible swallow;infant jaw motion present   Pain with Feeding no   Latch Assistance no   Equipment Type   Breast Pump Type double electric, hospital grade   Breast Pump Flange Type hard   Breast Pump Flange Size 21 mm   Breast Pumping   Breast Pumping Interventions frequent pumping encouraged   Breast Pumping double electric breast pump utilized     Patient states the baby is cluster feeding and has been up all night.  Explained why a baby does this, she is trying to increase milk production.  She states she does not have any pain with latch but she feels her milk coming in.  Discussed infants voiding and stool pattern and what stool should look like if exclusively breastfeeding or giving formula.  Instructed patient to continue taking prenatal vitamins and that some medications may cross into breast milk and to check with her physician or pharmacist before taking any medication.  Discussed signs and symptoms of engorgement and mastitis and when to call the physician.  Instructed patient that the baby should only receive breast milk or formula for the first 6 months.  No water or juice.  Instructed the patient to feed the baby or pump 8 or more times in 24 hours.  Discussed pumping and how to store EBM.  Discussed how to thaw/warm EBM. Verbalized understanding.  All questions answered.  Lactation discharge instructions completed.

## 2025-02-23 NOTE — PLAN OF CARE
PT stable. VS WDL. Voiding and ambulating independently.  Fundus firm, midline 1 below, light bleeding.  Abdominal dressing clean, dry, intact with scant dried drainage.  Pain managed with ibuprofen 600, tylenol 650, and percocet 5.  Tolerating regular diet.  Midline IV SL.  Tolerating antibiotics well.  CHG shower completed.  Bonding well with baby.  Pt doing well.

## 2025-02-23 NOTE — DISCHARGE SUMMARY
[facility]  Obstetrics  Discharge Summary      Patient Name: Lana Chou  MRN: [id number]  Admission Date: 2025  Hospital Length of Stay: 6 days  Discharge Date and Time:  2025 9:31 AM  Attending Physician: Tyson Landers MD   Discharging Provider: Tyson Landers MD   Primary Care Provider: Luann Loza MD    HPI: Lana Chou is a 29 y.o. female, G1, with a PMHx of CKD, sickle cell anemia, 38w6d pregant due for induction tomorrow, presenting to ED with lower back and bilateral leg pain. Notes pain occasionally radiates to chest, denies current chest pain. Reports pain feels similar to previous episodes of sickle cell pain crisis. Reports last episode was in September.  No other exacerbating or alleviating factors. Patient denies cough, shortness of breath, fever, chills, abdominal pain, nausea, vomiting, diarrhea, dysuria, headaches, congestion, sore throat, arm or leg trouble, eye pain, ear pain, rash, or other associated symptoms.           Procedure(s) (LRB):   SECTION (N/A)     Hospital Course:   H/H on admission at 7.3/20.7  Admit to hospital with sickle cell crisis for hydration, pain control    2025  Feeling much better today.  Pain better. Ambulate in her room.  Tolerating regular diet  Ready for induction    2025  Now status post Cytotec x 4.  Cervix still closed.  Urine culture again grew out GNR  Has been on Macrobid nightly for prophylaxis    0 Urine culture with Multi-drug resistant E Coli sensitive to Unasyn and Macrobid.  Patient had been on Macrobid prophylaxis at home.  Will continue with Unasyn at 1.5 g IVPB Q6  Now status post Cytotec x 6 doses.  No real cervical change.  Will start Cervidil.  Placed by me at 1725  Ok to continue to eat    2025  Now status post Cytotec x 24 hours and Cervidil for 12 hours  On Pitocin  Requesting epidural.  But cervix is still closed    Spontaneous rupture of membranes at 0826. Thick meconium  Fetal tracing  with frequent decelerations.  Cervix to 2 cm, 90 % at around 1220    I came to evaluate patient at around 1800  Still has not changed  We decided to continue with Pitocin until tomorrow morning.  But patient called back to request  section.  I came back to again discuss with patient plan  She no longer wants to be induced.    Risks and benefits of  delivery discussed with patient and her family  Will proceed with surgery now  Consider another unit of pRBC and continue with Unasyn postop  Lovenox 12 hours postop    Primary low transverse  section  MD Modesto Ruiz, CST  Epidural by PERLITA Greene MD  Viable female infant at 1953 2/20/25  Weight: 6#15.1 or 3150 gm  Apgar: 8/9  Placenta: manually extracted intact with three vessels.  To Path  Normal uterus, tubes and ovaries  EBL: 1000 cc  Blood transfusion given. One unit of pRBCs  No complication    25  Doing well.  Trying to breast-feed but has been supplementing.    25  Doing well.  Breast-feeding well.      2025  Postpartum course was benign.  She is breast-feeding well.  Exam was benign with patient afebrile, vitals stable, and minimal bleeding.  Normal activities.  Patient discharged home on postpartum day #3, 25   Discharge medications include Percocet, Motrin, prenatal vitamins, and iron supplement.  Augmentin and Macrobid for right pyelonephritis with multi-resistant E Coli  Follow-up with me in one week for dressing removal and postop/postpartum follow-up    Mauro Landers MD.       Consults (From admission, onward)          Status Ordering Provider     Inpatient consult to Lactation  Once        Provider:  (Not yet assigned)    MAURO Vargas     Inpatient consult to Midline team  Once        Provider:  (Not yet assigned)    Completed AISHWARYA CABA            Final Active Diagnoses:    Diagnosis Date Noted POA    PRINCIPAL PROBLEM:  Status post  section [Z98.891] 2025 Not Applicable  "   Pyelonephritis affecting pregnancy in third trimester [O23.03] 2024 Yes    Sickle cell anemia [D57.1] 2024 Yes      Problems Resolved During this Admission:    Diagnosis Date Noted Date Resolved POA    38 weeks gestation of pregnancy [Z3A.38] 2025 Not Applicable    Chest pain [R07.9] 2025 Yes    Cystitis [N30.90] 2025 Yes    39 weeks gestation of pregnancy [Z3A.39] 2025 Not Applicable    Maternal sickle cell anemia affecting pregnancy, antepartum [O99.019, D57.1] 2024 Yes    Sickle cell pain crisis [D57.00] 2024 Yes        Significant Diagnostic Studies: Labs: All labs within the past 24 hours have been reviewed      Feeding Method: breast    Immunizations       Date Immunization Status Dose Route/Site Given by    25 MMR Incomplete 0.5 mL Subcutaneous/     25 Tdap Incomplete 0.5 mL Intramuscular/             Delivery:    Episiotomy:     Lacerations:     Repair suture:     Repair # of packets:     Blood loss (ml):       Birth information:  YOB: 2023   Time of birth: 7:53 PM   Sex: female   Delivery type: , Low Transverse   Gestational Age: 39w2d     Measurements    Weight: 3150 g  Weight (lbs): 6 lb 15.1 oz  Length: 48.3 cm  Length (in): 19"         Delivery Clinician: Delivery Providers    Delivering clinician: Tyson Landers MD   Provider Role    Modesto Davenport ST Watson-Perique, Clydetemnestia, SAPNA Heart, Bertram Albarran CRNA Bergeron, Debra, Ryan Tanner, Jany Hazel NNP              Additional  information:  Forceps:    Vacuum:    Breech:    Observed anomalies      Living?:     Apgars    Living status: Living  Apgar Component Scores:  1 min.:  5 min.:  10 min.:  15 min.:  20 min.:    Skin color:  0  1       Heart rate:  2  2       Reflex irritability:  2  2       Muscle tone:  2  2     "   Respiratory effort:  2  2       Total:  8  9       Apgars assigned by: DILIA SUTTON         Placenta: Delivered:       appearance  Pending Diagnostic Studies:       Procedure Component Value Units Date/Time    Specimen to Pathology, Surgery Gynecology and Obstetrics [[id number]] Collected: 25 220    Order Status: Sent Lab Status: In process Updated: 25 1319    Specimen: Tissue     Specimen to Pathology, Surgery Gynecology and Obstetrics [[id number]] Collected: 25    Order Status: Sent Lab Status: In process Updated: 25    Specimen: Tissue             Discharged Condition: good    Disposition: Home or Self Care    Follow Up:   Follow-up Information       Tyson Landers MD Follow up in 1 week(s).    Specialties: Obstetrics and Gynecology, Obstetrics and Gynecology  Why: for dressing removal, postop follow-up, postpartum follow-up  Contact information:  [address]  [address]  [facility] [address]  [phone]                           Patient Instructions:   No discharge procedures on file.  Medications:  Current Discharge Medication List        START taking these medications    Details   amoxicillin-clavulanate 500-125mg (AUGMENTIN) 500-125 mg Tab Take 1 tablet (500 mg total) by mouth 2 (two) times daily.  Qty: 14 tablet, Refills: 0      ibuprofen (ADVIL,MOTRIN) 600 MG tablet Take 1 tablet (600 mg total) by mouth every 6 (six) hours.  Qty: 40 tablet, Refills: 1      nitrofurantoin, macrocrystal-monohydrate, (MACROBID) 100 MG capsule Take 1 capsule (100 mg total) by mouth 2 (two) times daily. for 7 days  Qty: 14 capsule, Refills: 0      oxyCODONE-acetaminophen (PERCOCET) 5-325 mg per tablet Take 1 tablet by mouth every 4 (four) hours as needed for Pain.  Qty: 15 tablet, Refills: 0    Comments: Quantity prescribed more than 7 day supply? Yes, quantity medically necessary  Associated Diagnoses: Status post  section; Pyelonephritis affecting pregnancy in third trimester            CONTINUE these medications which have NOT CHANGED    Details   aspirin 81 mg Cap Take 81 mg by mouth.      folic acid (FOLVITE) 1 MG tablet Take 4 tablets (4 mg total) by mouth once daily.  Qty: 120 tablet, Refills: 2    Associated Diagnoses: Maternal sickle cell anemia affecting pregnancy, antepartum           STOP taking these medications       acetaminophen (TYLENOL) 500 MG tablet Comments:   Reason for Stopping:         cholecalciferol, vitamin D3, 1,250 mcg (50,000 unit) capsule Comments:   Reason for Stopping:               Tyson Landers MD  Obstetrics  [facility]

## 2025-02-25 ENCOUNTER — RESULTS FOLLOW-UP (OUTPATIENT)
Dept: OBSTETRICS AND GYNECOLOGY | Facility: CLINIC | Age: 30
End: 2025-02-25

## 2025-02-25 LAB
FINAL PATHOLOGIC DIAGNOSIS: NORMAL
GROSS: NORMAL
Lab: NORMAL

## 2025-02-26 ENCOUNTER — TELEPHONE (OUTPATIENT)
Dept: PRIMARY CARE CLINIC | Facility: CLINIC | Age: 30
End: 2025-02-26
Payer: COMMERCIAL

## 2025-02-27 ENCOUNTER — OFFICE VISIT (OUTPATIENT)
Dept: OBSTETRICS AND GYNECOLOGY | Facility: CLINIC | Age: 30
End: 2025-02-27
Payer: COMMERCIAL

## 2025-02-27 VITALS
DIASTOLIC BLOOD PRESSURE: 62 MMHG | WEIGHT: 121.25 LBS | SYSTOLIC BLOOD PRESSURE: 100 MMHG | HEIGHT: 61 IN | BODY MASS INDEX: 22.89 KG/M2

## 2025-02-27 DIAGNOSIS — Z98.891 STATUS POST CESAREAN SECTION: Primary | ICD-10-CM

## 2025-02-27 DIAGNOSIS — L23.9 ALLERGIC DERMATITIS: ICD-10-CM

## 2025-02-27 PROCEDURE — 3074F SYST BP LT 130 MM HG: CPT | Mod: CPTII,S$GLB,, | Performed by: OBSTETRICS & GYNECOLOGY

## 2025-02-27 PROCEDURE — 3078F DIAST BP <80 MM HG: CPT | Mod: CPTII,S$GLB,, | Performed by: OBSTETRICS & GYNECOLOGY

## 2025-02-27 PROCEDURE — 1159F MED LIST DOCD IN RCRD: CPT | Mod: CPTII,S$GLB,, | Performed by: OBSTETRICS & GYNECOLOGY

## 2025-02-27 PROCEDURE — 99024 POSTOP FOLLOW-UP VISIT: CPT | Mod: S$GLB,,, | Performed by: OBSTETRICS & GYNECOLOGY

## 2025-02-27 PROCEDURE — 99999 PR PBB SHADOW E&M-EST. PATIENT-LVL III: CPT | Mod: PBBFAC,,, | Performed by: OBSTETRICS & GYNECOLOGY

## 2025-02-27 RX ORDER — TRIAMCINOLONE ACETONIDE 1 MG/G
OINTMENT TOPICAL 2 TIMES DAILY
Qty: 30 G | Refills: 0 | Status: SHIPPED | OUTPATIENT
Start: 2025-02-27

## 2025-02-27 NOTE — PROGRESS NOTES
Subjective     Patient ID: Lana Chou is a 29 y.o. female.    Chief Complaint:  Post-op Evaluation (1 wk po for PCS on 2025.)      History of Present Illness  HPI  Ms Chou is a 29 years old, status post primary low transverse  section on 25.  She comes in today for an exam and followup.  Patient has no current complaints.  No fever or chills.  No nausea or vomiting.  No diarrhea or constipation.  No abdominal or pelvic pain.    She has not resumed normal intercourse.  Patient has begun some walking for exercise. She denies having signs and symptoms of significant depression.  She is breast-feeding with supplementation well.    On antibiotic for right pyelonephritis      GYN & OB History  No LMP recorded.   Date of Last Pap: 2023    OB History    Para Term  AB Living   1 1 1 0 0 1   SAB IAB Ectopic Multiple Live Births   0 0 0 0 1      # Outcome Date GA Lbr Ehsan/2nd Weight Sex Type Anes PTL Lv   1 Term 25 39w2d  3.15 kg (6 lb 15.1 oz) F CS-LTranv EPI N ABA      Complications: Failure to Progress in First Stage, Sickle cell anemia with crisis     Past Medical History:   Diagnosis Date    Bilateral leg edema 10/15/2024    Chronic kidney disease (CKD) 2018    Emesis 2022    Encounter for surveillance of vaginal ring hormonal contraceptive device 11/15/2023    LGSIL on Pap smear of cervix 2017    LLQ abdominal pain 2023    Pregnancy with 18 completed weeks gestation 2024    Sickle cell anemia     Sickle cell disease     Sickle cell pain crisis 2024    Sickle-cell disease without crisis     UTI (urinary tract infection) 2023       Past Surgical History:   Procedure Laterality Date    BREAST LUMPECTOMY      CARDIAC SURGERY      port insertion     SECTION N/A 2025    Procedure:  SECTION;  Surgeon: Tyson Landers MD;  Location: [facility] L&D OR;  Service: OB/GYN;  Laterality: N/A;    NEPHRECTOMY         Family History    Problem Relation Name Age of Onset    Sickle cell trait Father      Sickle cell trait Mother      Sickle cell trait Sister      Sickle cell trait Sister      Breast cancer Neg Hx      Colon cancer Neg Hx      Ovarian cancer Neg Hx         Social History     Socioeconomic History    Marital status: Single   Tobacco Use    Smoking status: Former     Types: Vaping with nicotine     Start date: 2022     Quit date: 2024     Years since quittin.6    Smokeless tobacco: Never   Substance and Sexual Activity    Alcohol use: Not Currently    Drug use: No    Sexual activity: Yes     Partners: Male     Birth control/protection: None   Social History Narrative    Together since 2021    He is  a     She worked at [employer].  PCT    Graduated from [employer].  Now working on our unit.     Social Drivers of Health     Financial Resource Strain: Low Risk  (2025)    Overall Financial Resource Strain (CARDIA)     Difficulty of Paying Living Expenses: Not hard at all   Food Insecurity: No Food Insecurity (2025)    Hunger Vital Sign     Worried About Running Out of Food in the Last Year: Never true     Ran Out of Food in the Last Year: Never true   Transportation Needs: No Transportation Needs (2025)    PRAPARE - Transportation     Lack of Transportation (Medical): No     Lack of Transportation (Non-Medical): No   Physical Activity: Sufficiently Active (2025)    Exercise Vital Sign     Days of Exercise per Week: 3 days     Minutes of Exercise per Session: 60 min   Stress: No Stress Concern Present (2025)    Citizen of [place] [employer] - Occupational Stress Questionnaire     Feeling of Stress : Not at all   Housing Stability: Low Risk  (2025)    Housing Stability Vital Sign     Unable to Pay for Housing in the Last Year: No     Number of Times Moved in the Last Year: 0     Homeless in the Last Year: No       Current Medications[1]    Review of patient's allergies  indicates:   Allergen Reactions    Rocephin [ceftriaxone] Shortness Of Breath, Itching and Anxiety     Nausea, vomiting. No hives, swelling, or anaphylaxis. Can tolerate if necessary, but would avoid if possible.       Review of Systems  Review of Systems   Constitutional:  Positive for fatigue. Negative for activity change, appetite change, chills, fever and unexpected weight change.   HENT:  Negative for mouth sores.    Respiratory:  Negative for cough, shortness of breath and wheezing.    Cardiovascular:  Negative for chest pain and palpitations.   Gastrointestinal:  Positive for abdominal pain. Negative for bloating, blood in stool, constipation, nausea and vomiting.        Incisional pain   Endocrine: Negative for diabetes and hot flashes.   Genitourinary:  Negative for dysmenorrhea, dyspareunia, dysuria, frequency, hematuria, menorrhagia, menstrual problem, pelvic pain, urgency, vaginal bleeding, vaginal discharge, vaginal pain, urinary incontinence, postcoital bleeding and vaginal odor.   Musculoskeletal:  Negative for back pain and myalgias.   Integumentary:  Negative for rash, breast mass and nipple discharge.   Neurological:  Negative for seizures and headaches.   Psychiatric/Behavioral:  Negative for depression and sleep disturbance. The patient is not nervous/anxious.    Breast: Negative for mass, mastodynia and nipple discharge         Objective   Physical Exam:   Constitutional: She appears well-developed and well-nourished. No distress.    HENT:   Head: Normocephalic and atraumatic.    Eyes: EOM are normal.     Cardiovascular:  Normal rate.             Pulmonary/Chest: Effort normal. No respiratory distress.        Abdominal: Soft. She exhibits no distension. There is no abdominal tenderness. There is no rebound and no guarding.   Pfannenstiel incision in AquaCel dressing.  No evidence of active bleeding.  Dressing removed.  Incision is clean, dry, intact.  Healing well without any evidence of  infection.               Musculoskeletal: Normal range of motion.       Neurological: She is alert.    Skin: Skin is warm and dry.    Psychiatric: She has a normal mood and affect.            Assessment and Plan     1. Status post  section           Plan:  I have discussed with the patient her condition.  She is doing well with respect to surgery.  She will continue to keep her incision clean and dry to promote proper healing.  She will be back in 4 weeks for follow-up.  She can come back sooner if she needs us.                   [1]   Current Outpatient Medications   Medication Sig Dispense Refill    amoxicillin-clavulanate 500-125mg (AUGMENTIN) 500-125 mg Tab Take 1 tablet (500 mg total) by mouth 2 (two) times daily. 14 tablet 0    aspirin 81 mg Cap Take 81 mg by mouth.      folic acid (FOLVITE) 1 MG tablet Take 4 tablets (4 mg total) by mouth once daily. 120 tablet 2    ibuprofen (ADVIL,MOTRIN) 600 MG tablet Take 1 tablet (600 mg total) by mouth every 6 (six) hours. 40 tablet 1    nitrofurantoin, macrocrystal-monohydrate, (MACROBID) 100 MG capsule Take 1 capsule (100 mg total) by mouth 2 (two) times daily. for 7 days 14 capsule 0    oxyCODONE-acetaminophen (PERCOCET) 5-325 mg per tablet Take 1 tablet by mouth every 4 (four) hours as needed for Pain. 15 tablet 0     No current facility-administered medications for this visit.

## 2025-02-28 ENCOUNTER — PATIENT MESSAGE (OUTPATIENT)
Dept: OBSTETRICS AND GYNECOLOGY | Facility: CLINIC | Age: 30
End: 2025-02-28
Payer: COMMERCIAL

## 2025-02-28 NOTE — LETTER
March 3, 2025      [facility]  [address]  [address]  [place] [address]  Phone: [phone]       Patient: Lana Chou   YOB: 1995  Date of Visit: 03/03/2025    To Whom It May Concern:    Efren Chou  may return to work/school on March 6, 2025 with no heavy lifting restrictions. My patient should not be lifting, pulling or pushing anything over 10lbs. If you have any questions or concerns, or if I can be of further assistance, please do not hesitate to contact me.    Sincerely,        MD Luciana Ruiz LPN

## 2025-03-11 ENCOUNTER — OFFICE VISIT (OUTPATIENT)
Dept: PRIMARY CARE CLINIC | Facility: CLINIC | Age: 30
End: 2025-03-11
Payer: COMMERCIAL

## 2025-03-11 VITALS
SYSTOLIC BLOOD PRESSURE: 88 MMHG | HEIGHT: 61 IN | OXYGEN SATURATION: 98 % | DIASTOLIC BLOOD PRESSURE: 57 MMHG | HEART RATE: 85 BPM | BODY MASS INDEX: 20.65 KG/M2 | WEIGHT: 109.38 LBS

## 2025-03-11 DIAGNOSIS — Z00.00 PREVENTATIVE HEALTH CARE: ICD-10-CM

## 2025-03-11 DIAGNOSIS — Z79.899 DRUG-INDUCED IMMUNODEFICIENCY: ICD-10-CM

## 2025-03-11 DIAGNOSIS — E55.9 VITAMIN D DEFICIENCY: ICD-10-CM

## 2025-03-11 DIAGNOSIS — R62.7 POOR WEIGHT GAIN IN ADULT: ICD-10-CM

## 2025-03-11 DIAGNOSIS — D57.1 HEMOGLOBIN SS DISEASE WITHOUT CRISIS: Primary | ICD-10-CM

## 2025-03-11 DIAGNOSIS — D84.821 DRUG-INDUCED IMMUNODEFICIENCY: ICD-10-CM

## 2025-03-11 DIAGNOSIS — D57.1 SICKLE CELL DISEASE WITHOUT CRISIS: ICD-10-CM

## 2025-03-11 DIAGNOSIS — Z90.5 H/O UNILATERAL NEPHRECTOMY: ICD-10-CM

## 2025-03-11 PROBLEM — O23.03 PYELONEPHRITIS AFFECTING PREGNANCY IN THIRD TRIMESTER: Status: RESOLVED | Noted: 2024-09-29 | Resolved: 2025-03-11

## 2025-03-11 PROBLEM — Z98.891 STATUS POST CESAREAN SECTION: Status: RESOLVED | Noted: 2025-02-20 | Resolved: 2025-03-11

## 2025-03-11 PROCEDURE — 99999 PR PBB SHADOW E&M-EST. PATIENT-LVL IV: CPT | Mod: PBBFAC,,, | Performed by: INTERNAL MEDICINE

## 2025-03-11 RX ORDER — HYDROXYUREA 500 MG/1
1500 CAPSULE ORAL DAILY
Qty: 90 CAPSULE | Refills: 5 | Status: SHIPPED | OUTPATIENT
Start: 2025-03-11

## 2025-03-11 RX ORDER — ERGOCALCIFEROL 1.25 MG/1
50000 CAPSULE ORAL
Qty: 12 CAPSULE | Refills: 3 | Status: SHIPPED | OUTPATIENT
Start: 2025-03-11

## 2025-03-11 NOTE — PROGRESS NOTES
[facility] Progress Note      Chief Complaint     Follow-up and Health Maintenance      Subjective:      History of Present Illness:  Lana Chou is a 29 y.o. female here for routine f/u post-delivery of Akilah on 2/20/2025 via  C/S  by Dr. Landers s/p PRBC 1 unit prior to delivery.  -only pumping 1-2 oz total per day but willing to sop to be able to restart her routine HbSS meds    -taking folic acid 4mg daily  -did not restart hydroxyea 1500mg daily   -was transfused multiple times throughout pregnancy to maintain HbS <30% with last HbS 27.7 12/2/2024  -sleeping better at night since Akilah is on a better sleep schedule  -saw Dr. 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  -currently taking folic acid 4mg but has not restarted Hydrea 500mg tid;  with zofran prn   -plans to go back to work in 5/2025    Vit  D def'y:  needs refill of Vit D 50,000 IU with level 29 in 11/2023 - stopped during pregnancy       SH: FT at [employer] main; 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; 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; sexually active unprotected with partner x 7 years; lives in [place] with mom and sister;  boyfriend/father of Akilah is currently completing  8 mo term in [employer] and will come home this year; she receives financial support from his family        Past Medical History:  Past Medical History:   Diagnosis Date    Bilateral leg  edema 10/15/2024    Chronic kidney disease (CKD) 2018    Emesis 2022    Encounter for surveillance of vaginal ring hormonal contraceptive device 11/15/2023    LGSIL on Pap smear of cervix 2017    LLQ abdominal pain 2023    Poor weight gain in adult 03/15/2023    Pregnancy with 18 completed weeks gestation 2024    Pyelonephritis affecting pregnancy in third trimester 2024    E. Coli urosepsis       Sickle cell anemia     Sickle cell disease     Sickle cell pain crisis 2024    Sickle-cell disease without crisis     Status post  section 2025    UTI (urinary tract infection) 2023       Past Surgical History:  Past Surgical History:   Procedure Laterality Date    BREAST LUMPECTOMY      CARDIAC SURGERY      port insertion     SECTION N/A 2025    Procedure:  SECTION;  Surgeon: Tyson Landers MD;  Location: [facility] L&D OR;  Service: OB/GYN;  Laterality: N/A;    NEPHRECTOMY         Allergies:  Review of patient's allergies indicates:   Allergen Reactions    Rocephin [ceftriaxone] Shortness Of Breath, Itching and Anxiety     Nausea, vomiting. No hives, swelling, or anaphylaxis. Can tolerate if necessary, but would avoid if possible.       Home Medications:  Current Outpatient Medications   Medication Sig Dispense Refill    aspirin 81 mg Cap Take 81 mg by mouth.      folic acid (FOLVITE) 1 MG tablet Take 4 tablets (4 mg total) by mouth once daily. 120 tablet 2    ibuprofen (ADVIL,MOTRIN) 600 MG tablet Take 1 tablet (600 mg total) by mouth every 6 (six) hours. 40 tablet 1    oxyCODONE-acetaminophen (PERCOCET) 5-325 mg per tablet Take 1 tablet by mouth every 4 (four) hours as needed for Pain. 15 tablet 0    triamcinolone acetonide 0.1% (KENALOG) 0.1 % ointment Apply topically 2 (two) times daily. 30 g 0    ergocalciferol (ERGOCALCIFEROL) 50,000 unit Cap Take 1 capsule (50,000 Units total) by mouth every 7 days. 12 capsule 3    hydroxyurea (HYDREA)  "500 mg Cap Take 3 capsules (1,500 mg total) by mouth once daily. 90 capsule 5     No current facility-administered medications for this visit.        Family History:  Family History   Problem Relation Name Age of Onset    Sickle cell trait Father      Sickle cell trait Mother      Sickle cell trait Sister      Sickle cell trait Sister      Breast cancer Neg Hx      Colon cancer Neg Hx      Ovarian cancer Neg Hx         Social History:  Social History     Tobacco Use    Smoking status: Former     Types: Vaping with nicotine     Start date: 2022     Quit date: 2024     Years since quittin.6    Smokeless tobacco: Never   Substance Use Topics    Alcohol use: Not Currently    Drug use: No       Review of Systems:  Pertinent positives and negatives listed in HPI. All other systems are reviewed and are negative.    Health Maintaince :   Health Maintenance Topics with due status: Not Due       Topic Last Completion Date    TETANUS VACCINE 12/10/2024    RSV Vaccine (Age 60+ and Pregnant patients) Not Due           Eye: [facility] in [place]; myopia - needs  Dental:  needs    Immunizations:   Tdap: UTD.12/10/2024  Influenza: UTD.  Menactra: 2020; Men B 2020, 2021  Pneumovax: UTD 10/15/2024  Shingrex x 2: n/a  COVID: needs  Hepatitis C:   Cancer Screening:  PAP: UTD 2023 by Dr. Mehta  The ASCVD Risk score (Mikhail KAUR, et al., 2019) failed to calculate for the following reasons:    The 2019 ASCVD risk score is only valid for ages 40 to 79      Objective:   BP (!) 88/57 (BP Location: Left arm, Patient Position: Sitting)   Pulse 85   Ht 5' 1" (1.549 m)   Wt 49.6 kg (109 lb 5.6 oz)   SpO2 98%   Breastfeeding Yes   BMI 20.66 kg/m²      Body mass index is 20.66 kg/m².       Physical Examination:  General: Alert and awake in no apparent distress  HEENT: Normocephalic and atraumatic; Tms WNL  Eyes:  PERRL; EOMi with anicteric sclera and clear conjunctivae  Mouth:  Oropharynx clear and without " exudate; moist mucous membranes  Neck:   Cervical nodes not enlarged; supple; no bruits  Cardio:  Regular rate and rhythm with normal S1 and S2; no murmurs or rubs  Resp:  CTAB; respirations unlabored; no wheezes, crackles or rhonchi  Abdom: Soft, NTND with normoactive bowel sounds; negative HSM  Extrem: Warm and well-perfused with no clubbing, cyanosis or edema  Skin:  No rashes, lesions, or color changes  Pulses:  2+ and symmetric distally  Neuro:  AAOx3; cooperative and pleasant with no focal deficits    Laboratory:      Most Recent Data:  Lab Results   Component Value Date    WBC 30.22 (H) 02/21/2025    HGB 9.3 (L) 02/21/2025    HCT 27.1 (L) 02/21/2025     02/21/2025    CHOL 117 (L) 06/07/2023    TRIG 118 06/07/2023    HDL 40 06/07/2023    ALT 14 02/18/2025    AST 31 02/18/2025     02/18/2025    K 3.9 02/18/2025     (H) 02/18/2025    BUN 7 02/18/2025    CO2 17 (L) 02/18/2025              CBC:   WBC   Date Value Ref Range Status   02/21/2025 30.22 (H) 3.90 - 12.70 K/uL Final     Hemoglobin   Date Value Ref Range Status   02/21/2025 9.3 (L) 12.0 - 16.0 g/dL Final     POC Hematocrit   Date Value Ref Range Status   08/30/2021 19 (LL) 36 - 54 %PCV Final     Hematocrit   Date Value Ref Range Status   02/21/2025 27.1 (L) 37.0 - 48.5 % Final     Platelets   Date Value Ref Range Status   02/21/2025 306 150 - 450 K/uL Final     MCV   Date Value Ref Range Status   02/21/2025 89 82 - 98 fL Final     RDW   Date Value Ref Range Status   02/21/2025 18.6 (H) 11.5 - 14.5 % Final     BMP:   Sodium   Date Value Ref Range Status   02/18/2025 137 136 - 145 mmol/L Final     Potassium   Date Value Ref Range Status   02/18/2025 3.9 3.5 - 5.1 mmol/L Final     Chloride   Date Value Ref Range Status   02/18/2025 113 (H) 95 - 110 mmol/L Final     CO2   Date Value Ref Range Status   02/18/2025 17 (L) 23 - 29 mmol/L Final     BUN   Date Value Ref Range Status   02/18/2025 7 6 - 20 mg/dL Final     Creatinine   Date Value  "Ref Range Status   02/18/2025 0.9 0.5 - 1.4 mg/dL Final     Glucose   Date Value Ref Range Status   02/18/2025 87 70 - 110 mg/dL Final     Calcium   Date Value Ref Range Status   02/18/2025 8.6 (L) 8.7 - 10.5 mg/dL Final     Magnesium   Date Value Ref Range Status   10/28/2024 1.8 1.6 - 2.6 mg/dL Final     LFTs:   Total Protein   Date Value Ref Range Status   02/18/2025 6.1 6.0 - 8.4 g/dL Final     Albumin   Date Value Ref Range Status   02/18/2025 1.9 (L) 3.5 - 5.2 g/dL Final     Total Bilirubin   Date Value Ref Range Status   02/18/2025 7.9 (H) 0.1 - 1.0 mg/dL Final     Comment:     For infants and newborns, interpretation of results should be based  on gestational age, weight and in agreement with clinical  observations.    Premature Infant recommended reference ranges:  Up to 24 hours.............<8.0 mg/dL  Up to 48 hours............<12.0 mg/dL  3-5 days..................<15.0 mg/dL  6-29 days.................<15.0 mg/dL       AST   Date Value Ref Range Status   02/18/2025 31 10 - 40 U/L Final     Alkaline Phosphatase   Date Value Ref Range Status   02/18/2025 141 40 - 150 U/L Final     ALT   Date Value Ref Range Status   02/18/2025 14 10 - 44 U/L Final     Coags: No results found for: "INR", "PROTIME", "PTT"  FLP:      Lab Results   Component Value Date    CHOL 117 (L) 06/07/2023    CHOL 113 (L) 11/25/2022    CHOL 103 07/01/2022     Lab Results   Component Value Date    HDL 40 06/07/2023    HDL 39 (L) 11/25/2022    HDL 39 (L) 07/01/2022     Lab Results   Component Value Date    LDLCALC 53.4 (L) 06/07/2023    LDLCALC 49.4 (L) 11/25/2022    LDLCALC 49 07/01/2022     Lab Results   Component Value Date    TRIG 118 06/07/2023    TRIG 123 11/25/2022    TRIG 96 07/01/2022     Lab Results   Component Value Date    CHOLHDL 34.2 06/07/2023    CHOLHDL 34.5 11/25/2022    CHOLHDL 2.64 07/01/2022      DM:      LDL Cholesterol   Date Value Ref Range Status   06/07/2023 53.4 (L) 63.0 - 159.0 mg/dL Final     Comment:     The " "National Cholesterol Education Program (NCEP) has set the  following guidelines (reference values) for LDL Cholesterol:  Optimal.......................<130 mg/dL  Borderline High...............130-159 mg/dL  High..........................160-189 mg/dL  Very High.....................>190 mg/dL       Creatinine   Date Value Ref Range Status   02/18/2025 0.9 0.5 - 1.4 mg/dL Final     Thyroid: No results found for: "TSH", "FREET4", "V5HWCUI", "J5UBMDK", "THYROIDAB"  Anemia:   Iron   Date Value Ref Range Status   03/03/2023 145 30 - 160 ug/dL Final     TIBC   Date Value Ref Range Status   03/03/2023 204 (L) 250 - 450 ug/dL Final     Ferritin   Date Value Ref Range Status   12/28/2023 448 (H) 16 - 154 ng/mL Final     Vitamin B-12   Date Value Ref Range Status   11/23/2022 429 210 - 950 pg/mL Final     Folate   Date Value Ref Range Status   12/28/2023 9.3 ng/mL Final     Comment:                                Reference Range                             Low:           <3.4                             Borderline:    3.4-5.4                             Normal:        >5.4          Cardiac:   Troponin I   Date Value Ref Range Status   06/06/2024 0.006 0.000 - 0.026 ng/mL Final     Comment:     The reference interval for Troponin I represents the 99th percentile   cutoff   for our facility and is consistent with 3rd generation assay   performance.       Urinalysis:   Urine Culture, Routine   Date Value Ref Range Status   02/17/2025   Final    Results called to and read back by: Josette Bernal RN  02/19/2025 02/17/2025 09:57  Final   02/17/2025 ESCHERICHIA COLI ESBL  >100,000 cfu/ml   (A)  Final     Color, UA   Date Value Ref Range Status   02/17/2025 Yellow Yellow, Straw, Faith Final     Specific Gravity, UA   Date Value Ref Range Status   02/17/2025 1.005 1.005 - 1.030 Final     Nitrite, UA   Date Value Ref Range Status   02/17/2025 Negative Negative Final     Ketones, UA   Date Value Ref Range Status   02/17/2025 " Negative Negative Final     Urobilinogen, UA   Date Value Ref Range Status   02/17/2025 >=8.0 (A) <2.0 EU/dL Final     WBC, UA   Date Value Ref Range Status   02/17/2025 75 (H) 0 - 5 /hpf Final       Other Results:  EKG (my interpretation):     Radiology:  Echo Saline Bubble? Yes; Ultrasound enhancing contrast? No    Left Ventricle: The left ventricle is normal in size. Normal wall   thickness. There is normal systolic function with a visually estimated   ejection fraction of 55 - 60%. There is normal diastolic function.    Right Ventricle: Normal right ventricular cavity size. Systolic   function is normal.    Pulmonary Artery: The estimated pulmonary artery systolic pressure is   27 mmHg.          Assessment/Plan     Lana Chou is a 29 y.o. 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  Dispense: 90 capsule; Refill: 5    2. Vitamin D deficiency  Assessment & Plan:  Refill Vit D 50,000 IU and check level    Orders:  -     ergocalciferol (ERGOCALCIFEROL) 50,000 unit Cap; Take 1 capsule (50,000 Units total) by mouth every 7 days.  Dispense: 12 capsule; Refill: 3    3. Preventative health care  Assessment & Plan:  Get labs in 4/2025 when baby comes for 2 mo visit  Needs eye and dental exams  Get Bexsero now        4. Drug-induced immunodeficiency  Overview:  Menactra: 8/7/2020; Men B 8/2020, 5/20/2021  Pneumovax: UTD 10/15/2024    Assessment & Plan:  Continue to see Hematologist, Dr. Reji Reyes  Needs Bexsero now and Menveo in 8/2025      5. Sickle cell disease without crisis  Assessment & Plan:  Repeat labs when pt comes in April 2025 for Akilah's 2 mo visit       6. H/O unilateral nephrectomy  Overview:  Left removed    Assessment & Plan:  Check BMP, Mag quarterly  Avoid  NSAIDs, alcohol, tylenol, ASA                 I spent a total of 33 minutes on the day of the visit.This includes face to face time and non-face to face time preparing to see the patient (eg, review of tests), obtaining and/or reviewing separately obtained history, documenting clinical information in the electronic or other health record, independently interpreting results and communicating results to the patient/family/caregiver, or care coordinator.   Code Status:     Luann Loza MD

## 2025-03-11 NOTE — PATIENT INSTRUCTIONS
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  Dispense: 90 capsule; Refill: 5    Vitamin D deficiency  Assessment & Plan:  Refill Vit D 50,000 IU and check level    Orders:  -     ergocalciferol (ERGOCALCIFEROL) 50,000 unit Cap; Take 1 capsule (50,000 Units total) by mouth every 7 days.  Dispense: 12 capsule; Refill: 3    Preventative health care  Assessment & Plan:  Get labs in 4/2025 when baby comes for 2 mo visit  Needs eye and dental exams

## 2025-03-11 NOTE — ASSESSMENT & PLAN NOTE
Reduce folic acid 1mg daily  Cont PNV po daily   Restart hydroxyurea 1500mg daily   Refill HC later

## 2025-03-13 ENCOUNTER — PATIENT MESSAGE (OUTPATIENT)
Dept: OBSTETRICS AND GYNECOLOGY | Facility: CLINIC | Age: 30
End: 2025-03-13
Payer: COMMERCIAL

## 2025-04-03 ENCOUNTER — POSTPARTUM VISIT (OUTPATIENT)
Dept: OBSTETRICS AND GYNECOLOGY | Facility: CLINIC | Age: 30
End: 2025-04-03
Payer: COMMERCIAL

## 2025-04-03 VITALS
BODY MASS INDEX: 21.23 KG/M2 | SYSTOLIC BLOOD PRESSURE: 110 MMHG | WEIGHT: 112.44 LBS | DIASTOLIC BLOOD PRESSURE: 62 MMHG | HEIGHT: 61 IN

## 2025-04-03 DIAGNOSIS — N30.00 ACUTE CYSTITIS WITHOUT HEMATURIA: ICD-10-CM

## 2025-04-03 DIAGNOSIS — Z98.891 STATUS POST CESAREAN SECTION: ICD-10-CM

## 2025-04-03 PROCEDURE — 99999 PR PBB SHADOW E&M-EST. PATIENT-LVL III: CPT | Mod: PBBFAC,,, | Performed by: OBSTETRICS & GYNECOLOGY

## 2025-04-03 PROCEDURE — 0503F POSTPARTUM CARE VISIT: CPT | Mod: CPTII,S$GLB,, | Performed by: OBSTETRICS & GYNECOLOGY

## 2025-04-03 RX ORDER — AMOXICILLIN AND CLAVULANATE POTASSIUM 500; 125 MG/1; MG/1
1 TABLET, FILM COATED ORAL 2 TIMES DAILY
Qty: 14 TABLET | Refills: 0 | Status: SHIPPED | OUTPATIENT
Start: 2025-04-03 | End: 2025-04-10

## 2025-04-03 NOTE — PROGRESS NOTES
Subjective     Patient ID: Lana Chou is a 29 y.o. female.    Chief Complaint:  Postpartum Follow-up (6 wks pp for PCS on 2025)      History of Present Illness  HPI  Ms Chou is a 29 years old, status post primary low transverse  section on 25.  She comes in today for an exam and followup.  Patient has no current complaints.  No fever or chills.  No nausea or vomiting.  No diarrhea or constipation.  No abdominal or pelvic pain.    She has not resumed normal intercourse.  Patient has begun some walking for exercise. She denies having signs and symptoms of significant depression. ?Postpartum blue  She is formula-feeding well.    Does not want to get back on OCP    GYN & OB History  No LMP recorded.   Date of Last Pap: 2023    OB History    Para Term  AB Living   1 1 1 0 0 1   SAB IAB Ectopic Multiple Live Births   0 0 0 0 1      # Outcome Date GA Lbr Ehsan/2nd Weight Sex Type Anes PTL Lv   1 Term 25 39w2d  3.15 kg (6 lb 15.1 oz) F CS-LTranv EPI N ABA      Complications: Failure to Progress in First Stage, Sickle cell anemia with crisis     Past Medical History:   Diagnosis Date    Bilateral leg edema 10/15/2024    Chronic kidney disease (CKD) 2018    Emesis 2022    Encounter for surveillance of vaginal ring hormonal contraceptive device 11/15/2023    LGSIL on Pap smear of cervix 2017    LLQ abdominal pain 2023    Poor weight gain in adult 03/15/2023    Pregnancy with 18 completed weeks gestation 2024    Pyelonephritis affecting pregnancy in third trimester 2024    E. Coli urosepsis       Sickle cell anemia     Sickle cell disease     Sickle cell pain crisis 2024    Sickle-cell disease without crisis     Status post  section 2025    UTI (urinary tract infection) 2023       Past Surgical History:   Procedure Laterality Date    BREAST LUMPECTOMY      CARDIAC SURGERY      port insertion     SECTION N/A  2025    Procedure:  SECTION;  Surgeon: Tyson Landers MD;  Location: [facility] L&D OR;  Service: OB/GYN;  Laterality: N/A;    NEPHRECTOMY         Family History   Problem Relation Name Age of Onset    Sickle cell trait Father      Sickle cell trait Mother      Sickle cell trait Sister      Sickle cell trait Sister      Breast cancer Neg Hx      Colon cancer Neg Hx      Ovarian cancer Neg Hx         Social History     Socioeconomic History    Marital status: Single   Tobacco Use    Smoking status: Former     Types: Vaping with nicotine     Start date: 2022     Quit date: 2024     Years since quittin.7    Smokeless tobacco: Never   Substance and Sexual Activity    Alcohol use: Not Currently    Drug use: No    Sexual activity: Yes     Partners: Male     Birth control/protection: None   Social History Narrative    Together since 2021    He is  a     She worked at [email or web address].  PCT    Graduated from [employer]  Now working on our unit.     Social Drivers of Health     Financial Resource Strain: Low Risk  (2025)    Overall Financial Resource Strain (CARDIA)     Difficulty of Paying Living Expenses: Not hard at all   Food Insecurity: No Food Insecurity (2025)    Hunger Vital Sign     Worried About Running Out of Food in the Last Year: Never true     Ran Out of Food in the Last Year: Never true   Transportation Needs: No Transportation Needs (2025)    PRAPARE - Transportation     Lack of Transportation (Medical): No     Lack of Transportation (Non-Medical): No   Physical Activity: Sufficiently Active (2025)    Exercise Vital Sign     Days of Exercise per Week: 3 days     Minutes of Exercise per Session: 60 min   Stress: No Stress Concern Present (2025)    [employer] - Occupational Stress Questionnaire     Feeling of Stress : Not at all   Housing Stability: Low Risk  (2025)    Housing Stability Vital Sign     Unable to Pay for  Housing in the Last Year: No     Number of Times Moved in the Last Year: 0     Homeless in the Last Year: No       Current Medications[1]    Review of patient's allergies indicates:   Allergen Reactions    Rocephin [ceftriaxone] Shortness Of Breath, Itching and Anxiety     Nausea, vomiting. No hives, swelling, or anaphylaxis. Can tolerate if necessary, but would avoid if possible.       Review of Systems  Review of Systems   Constitutional:  Positive for fatigue. Negative for activity change, appetite change, chills, fever and unexpected weight change.   HENT:  Negative for mouth sores.    Respiratory:  Negative for cough, shortness of breath and wheezing.    Cardiovascular:  Negative for chest pain and palpitations.   Gastrointestinal:  Positive for abdominal pain. Negative for bloating, blood in stool, constipation, nausea and vomiting.        Incisional pain   Endocrine: Negative for diabetes and hot flashes.   Genitourinary:  Negative for dysmenorrhea, dyspareunia, dysuria, frequency, hematuria, menorrhagia, menstrual problem, pelvic pain, urgency, vaginal bleeding, vaginal discharge, vaginal pain, urinary incontinence, postcoital bleeding and vaginal odor.   Musculoskeletal:  Negative for back pain and myalgias.   Integumentary:  Negative for rash, breast mass and nipple discharge.   Neurological:  Negative for seizures and headaches.   Psychiatric/Behavioral:  Negative for depression and sleep disturbance. The patient is not nervous/anxious.    Breast: Negative for mass, mastodynia and nipple discharge         Objective   Physical Exam:   Constitutional: She appears well-developed and well-nourished. No distress.    HENT:   Head: Normocephalic and atraumatic.    Eyes: EOM are normal.     Cardiovascular:  Normal rate.             Pulmonary/Chest: Effort normal. No respiratory distress.        Abdominal: Soft. She exhibits no distension. There is no abdominal tenderness. There is no rebound and no guarding.    Pfannenstiel incision in AquaCel dressing.  No evidence of active bleeding.  Dressing removed.  Incision is clean, dry, intact.  Healing well without any evidence of infection.       Genitourinary:    Uterus normal.   There is vaginal discharge in the vagina.    Genitourinary Comments: Vulva without any obvious lesions.  Urethral meatus normal size and location without any lesion.  Urethra is non-tender without stricture or discharge.  Bladder is non-tender.  Vaginal vault with good support.  Minimal yellow discharge noted.  No obvious lesion.  Normal rugation.  Cervix is without any cervical motion tenderness.  No obvious lesion.  Uterus is small, non-tender, normal contour.  Adnexa is without any masses or tenderness.  Perineum without obvious lesion.                 Musculoskeletal: Normal range of motion.       Neurological: She is alert.    Skin: Skin is warm and dry.    Psychiatric: She has a normal mood and affect.            Assessment and Plan     1. Postpartum care and examination immediately after delivery    2. Status post  section           Plan:  I have discussed with the patient her condition.  She is doing well with respect to surgery.  No longer wants children. But does not want to be on OCP at this time    Augmentin available for possible UTI                  [1]   Current Outpatient Medications   Medication Sig Dispense Refill    aspirin 81 mg Cap Take 81 mg by mouth.      ergocalciferol (ERGOCALCIFEROL) 50,000 unit Cap Take 1 capsule (50,000 Units total) by mouth every 7 days. 12 capsule 3    hydroxyurea (HYDREA) 500 mg Cap Take 3 capsules (1,500 mg total) by mouth once daily. 90 capsule 5    ibuprofen (ADVIL,MOTRIN) 600 MG tablet Take 1 tablet (600 mg total) by mouth every 6 (six) hours. 40 tablet 1    triamcinolone acetonide 0.1% (KENALOG) 0.1 % ointment Apply topically 2 (two) times daily. 30 g 0     No current facility-administered medications for this visit.

## 2025-04-10 ENCOUNTER — OFFICE VISIT (OUTPATIENT)
Dept: PRIMARY CARE CLINIC | Facility: CLINIC | Age: 30
End: 2025-04-10
Payer: COMMERCIAL

## 2025-04-10 ENCOUNTER — RESULTS FOLLOW-UP (OUTPATIENT)
Dept: PRIMARY CARE CLINIC | Facility: CLINIC | Age: 30
End: 2025-04-10

## 2025-04-10 ENCOUNTER — LAB VISIT (OUTPATIENT)
Dept: LAB | Facility: HOSPITAL | Age: 30
End: 2025-04-10
Attending: INTERNAL MEDICINE
Payer: COMMERCIAL

## 2025-04-10 VITALS
HEIGHT: 61 IN | OXYGEN SATURATION: 99 % | DIASTOLIC BLOOD PRESSURE: 60 MMHG | HEART RATE: 93 BPM | SYSTOLIC BLOOD PRESSURE: 92 MMHG | RESPIRATION RATE: 14 BRPM | BODY MASS INDEX: 20.77 KG/M2 | WEIGHT: 110 LBS

## 2025-04-10 DIAGNOSIS — Z79.899 DRUG-INDUCED IMMUNODEFICIENCY: ICD-10-CM

## 2025-04-10 DIAGNOSIS — E55.9 VITAMIN D DEFICIENCY: ICD-10-CM

## 2025-04-10 DIAGNOSIS — D84.821 DRUG-INDUCED IMMUNODEFICIENCY: ICD-10-CM

## 2025-04-10 DIAGNOSIS — R62.7 POOR WEIGHT GAIN IN ADULT: ICD-10-CM

## 2025-04-10 DIAGNOSIS — R80.9 MICROALBUMINURIA: Primary | ICD-10-CM

## 2025-04-10 DIAGNOSIS — D57.1 HEMOGLOBIN SS DISEASE WITHOUT CRISIS: ICD-10-CM

## 2025-04-10 DIAGNOSIS — D57.1 SICKLE CELL DISEASE WITHOUT CRISIS: ICD-10-CM

## 2025-04-10 DIAGNOSIS — Z00.00 PREVENTATIVE HEALTH CARE: ICD-10-CM

## 2025-04-10 DIAGNOSIS — Z00.00 PREVENTATIVE HEALTH CARE: Primary | ICD-10-CM

## 2025-04-10 LAB
25(OH)D3+25(OH)D2 SERPL-MCNC: 42 NG/ML (ref 30–96)
ABSOLUTE EOSINOPHIL (OHS): 0.07 K/UL
ABSOLUTE MONOCYTE (OHS): 1.18 K/UL (ref 0.3–1)
ABSOLUTE NEUTROPHIL COUNT (OHS): 8.03 K/UL (ref 1.8–7.7)
ALBUMIN SERPL BCP-MCNC: 4.2 G/DL (ref 3.5–5.2)
ALBUMIN/CREAT UR: 37.9 UG/MG
ALP SERPL-CCNC: 135 UNIT/L (ref 40–150)
ALT SERPL W/O P-5'-P-CCNC: 23 UNIT/L (ref 10–44)
ANION GAP (OHS): 12 MMOL/L (ref 8–16)
AST SERPL-CCNC: 35 UNIT/L (ref 11–45)
BACTERIA #/AREA URNS AUTO: ABNORMAL /HPF
BASOPHILS # BLD AUTO: 0.09 K/UL
BASOPHILS NFR BLD AUTO: 0.8 %
BILIRUB SERPL-MCNC: 2.5 MG/DL (ref 0.1–1)
BILIRUB UR QL STRIP.AUTO: NEGATIVE
BUN SERPL-MCNC: 13 MG/DL (ref 6–20)
CALCIUM SERPL-MCNC: 10 MG/DL (ref 8.7–10.5)
CHLORIDE SERPL-SCNC: 109 MMOL/L (ref 95–110)
CHOLEST SERPL-MCNC: 166 MG/DL (ref 120–199)
CHOLEST/HDLC SERPL: 4.3 {RATIO} (ref 2–5)
CLARITY UR: ABNORMAL
CO2 SERPL-SCNC: 18 MMOL/L (ref 23–29)
COLOR UR AUTO: ABNORMAL
CREAT SERPL-MCNC: 1.1 MG/DL (ref 0.5–1.4)
CREAT UR-MCNC: 87 MG/DL (ref 15–325)
EAG (OHS): 114 MG/DL (ref 68–131)
ERYTHROCYTE [DISTWIDTH] IN BLOOD BY AUTOMATED COUNT: 18.5 % (ref 11.5–14.5)
FERRITIN SERPL-MCNC: 2503 NG/ML (ref 20–300)
FOLATE SERPL-MCNC: 11.9 NG/ML (ref 4–24)
GFR SERPLBLD CREATININE-BSD FMLA CKD-EPI: >60 ML/MIN/1.73/M2
GLUCOSE SERPL-MCNC: 70 MG/DL (ref 70–110)
GLUCOSE UR QL STRIP: NEGATIVE
HBA1C MFR BLD: 5.6 % (ref 4–5.6)
HCT VFR BLD AUTO: 27.1 % (ref 37–48.5)
HDLC SERPL-MCNC: 39 MG/DL (ref 40–75)
HDLC SERPL: 23.5 % (ref 20–50)
HGB BLD-MCNC: 8.6 GM/DL (ref 12–16)
HGB UR QL STRIP: ABNORMAL
HYALINE CASTS UR QL AUTO: 4 /LPF (ref 0–1)
IMM GRANULOCYTES # BLD AUTO: 0.08 K/UL (ref 0–0.04)
IMM GRANULOCYTES NFR BLD AUTO: 0.7 % (ref 0–0.5)
KETONES UR QL STRIP: NEGATIVE
LDLC SERPL CALC-MCNC: 93.8 MG/DL (ref 63–159)
LEUKOCYTE ESTERASE UR QL STRIP: ABNORMAL
LYMPHOCYTES # BLD AUTO: 1.89 K/UL (ref 1–4.8)
MCH RBC QN AUTO: 27.9 PG (ref 27–31)
MCHC RBC AUTO-ENTMCNC: 31.7 G/DL (ref 32–36)
MCV RBC AUTO: 88 FL (ref 82–98)
MICROALBUMIN UR-MCNC: 33 UG/ML (ref ?–5000)
MICROSCOPIC COMMENT: ABNORMAL
NITRITE UR QL STRIP: NEGATIVE
NON-SQ EPI CELLS #/AREA URNS AUTO: <1 /HPF
NONHDLC SERPL-MCNC: 127 MG/DL
NUCLEATED RBC (/100WBC) (OHS): 1 /100 WBC
PH UR STRIP: 6 [PH]
PLATELET # BLD AUTO: 533 K/UL (ref 150–450)
PMV BLD AUTO: 10.6 FL (ref 9.2–12.9)
POTASSIUM SERPL-SCNC: 4 MMOL/L (ref 3.5–5.1)
PREALB SERPL-MCNC: 23 MG/DL (ref 20–43)
PROT SERPL-MCNC: 8.7 GM/DL (ref 6–8.4)
PROT UR QL STRIP: NEGATIVE
RBC # BLD AUTO: 3.08 M/UL (ref 4–5.4)
RBC #/AREA URNS AUTO: 12 /HPF (ref 0–4)
RELATIVE EOSINOPHIL (OHS): 0.6 %
RELATIVE LYMPHOCYTE (OHS): 16.7 % (ref 18–48)
RELATIVE MONOCYTE (OHS): 10.4 % (ref 4–15)
RELATIVE NEUTROPHIL (OHS): 70.8 % (ref 38–73)
RETICS/RBC NFR AUTO: 10 % (ref 0.5–2.5)
SODIUM SERPL-SCNC: 139 MMOL/L (ref 136–145)
SP GR UR STRIP: 1.01
SQUAMOUS #/AREA URNS AUTO: 2 /HPF
TRIGL SERPL-MCNC: 166 MG/DL (ref 30–150)
TSH SERPL-ACNC: 1.14 UIU/ML (ref 0.4–4)
UROBILINOGEN UR STRIP-ACNC: NEGATIVE EU/DL
VIT B12 SERPL-MCNC: 570 PG/ML (ref 210–950)
WBC # BLD AUTO: 11.34 K/UL (ref 3.9–12.7)
WBC #/AREA URNS AUTO: >100 /HPF (ref 0–5)
WBC CLUMPS UR QL AUTO: ABNORMAL

## 2025-04-10 PROCEDURE — 80061 LIPID PANEL: CPT

## 2025-04-10 PROCEDURE — 83036 HEMOGLOBIN GLYCOSYLATED A1C: CPT

## 2025-04-10 PROCEDURE — 84443 ASSAY THYROID STIM HORMONE: CPT

## 2025-04-10 PROCEDURE — 82607 VITAMIN B-12: CPT

## 2025-04-10 PROCEDURE — 82570 ASSAY OF URINE CREATININE: CPT

## 2025-04-10 PROCEDURE — 85045 AUTOMATED RETICULOCYTE COUNT: CPT

## 2025-04-10 PROCEDURE — 82728 ASSAY OF FERRITIN: CPT

## 2025-04-10 PROCEDURE — 99999 PR PBB SHADOW E&M-EST. PATIENT-LVL III: CPT | Mod: PBBFAC,,, | Performed by: INTERNAL MEDICINE

## 2025-04-10 PROCEDURE — 81003 URINALYSIS AUTO W/O SCOPE: CPT

## 2025-04-10 PROCEDURE — 84134 ASSAY OF PREALBUMIN: CPT

## 2025-04-10 PROCEDURE — 85025 COMPLETE CBC W/AUTO DIFF WBC: CPT

## 2025-04-10 PROCEDURE — 80053 COMPREHEN METABOLIC PANEL: CPT

## 2025-04-10 PROCEDURE — 82746 ASSAY OF FOLIC ACID SERUM: CPT

## 2025-04-10 PROCEDURE — 82306 VITAMIN D 25 HYDROXY: CPT

## 2025-04-10 PROCEDURE — 36415 COLL VENOUS BLD VENIPUNCTURE: CPT | Mod: PN

## 2025-04-10 RX ORDER — OXYCODONE AND ACETAMINOPHEN 5; 325 MG/1; MG/1
1 TABLET ORAL EVERY 4 HOURS PRN
Qty: 15 TABLET | Refills: 0 | Status: SHIPPED | OUTPATIENT
Start: 2025-04-10

## 2025-04-10 RX ORDER — FOLIC ACID 1 MG/1
1 TABLET ORAL DAILY
Qty: 90 TABLET | Refills: 3 | COMMUNITY
Start: 2025-04-10 | End: 2026-04-10

## 2025-04-10 NOTE — ASSESSMENT & PLAN NOTE
Cont folic acid 1mg daily  Cont hydroxyurea 1500mg daily   Refill Percocet 5/325mg every 4-6 hours prn severe pain   Hydrate with 6-8 glasses of water daily   Drink Ensure 1-2 bottles daily

## 2025-04-10 NOTE — PROGRESS NOTES
[facility]/P[facility] Progress Note      Chief Complaint     HEMOGLOBIN SS and Follow-up      Subjective:      History of Present Illness:  Lana Chou is a 29 y.o. 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 3 days per week   -sleeps 4 hours consecutively     Has been working out so abdominal mm are painful     HbSS disease: hydrating with  6-8 glasses of water   -last pain crisis on date of delivery of Akilah  -needs refill of percocet 5/325 today   -Usu takes motrin 400mg first then HC biweekly which is not strong enough  -not  sleeping well - max 4 hours since must awaken to feed Akilah  -restarted Hydrea 500mg tid; folic acid 1mg daily with zofran prn     Poor weight gain: would like weight 125 lbs; stopped drinking Ensure     Vit D def'y:  needs refill of Vit D 50,000 IU     SH: 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;  plans to start RN school at [employer] in 2024; sexually active unprotected with partner x 7 years; lives in [place] with mom - wants to leave the state       Past Medical History:  Past Medical History:   Diagnosis Date    Bilateral leg edema 10/15/2024    Chronic kidney disease (CKD) 2018    Emesis 2022    Encounter for surveillance of vaginal ring hormonal contraceptive device 11/15/2023    LGSIL on Pap smear of cervix 2017    LLQ abdominal pain 2023    Poor weight gain in adult 03/15/2023    Pregnancy with 18 completed weeks gestation 2024    Pyelonephritis affecting pregnancy in third trimester 2024    E. Coli urosepsis       Sickle cell anemia     Sickle cell disease     Sickle cell pain crisis 2024    Sickle-cell disease without crisis     Status post  section 2025    UTI (urinary tract infection) 2023       Past Surgical History:  Past Surgical History:   Procedure  Laterality Date    BREAST LUMPECTOMY      CARDIAC SURGERY      port insertion     SECTION N/A 2025    Procedure:  SECTION;  Surgeon: Tyson Landers MD;  Location: [facility] L&D OR;  Service: OB/GYN;  Laterality: N/A;    NEPHRECTOMY         Allergies:  Review of patient's allergies indicates:   Allergen Reactions    Rocephin [ceftriaxone] Shortness Of Breath, Itching and Anxiety     Nausea, vomiting. No hives, swelling, or anaphylaxis. Can tolerate if necessary, but would avoid if possible.       Home Medications:  Current Outpatient Medications   Medication Sig Dispense Refill    amoxicillin-clavulanate 500-125mg (AUGMENTIN) 500-125 mg Tab Take 1 tablet (500 mg total) by mouth 2 (two) times daily. for 7 days 14 tablet 0    aspirin 81 mg Cap Take 81 mg by mouth.      ergocalciferol (ERGOCALCIFEROL) 50,000 unit Cap Take 1 capsule (50,000 Units total) by mouth every 7 days. 12 capsule 3    hydroxyurea (HYDREA) 500 mg Cap Take 3 capsules (1,500 mg total) by mouth once daily. 90 capsule 5    ibuprofen (ADVIL,MOTRIN) 600 MG tablet Take 1 tablet (600 mg total) by mouth every 6 (six) hours. 40 tablet 1    triamcinolone acetonide 0.1% (KENALOG) 0.1 % ointment Apply topically 2 (two) times daily. 30 g 0    folic acid (FOLVITE) 1 MG tablet Take 1 tablet (1 mg total) by mouth once daily. 90 tablet 3    oxyCODONE-acetaminophen (PERCOCET) 5-325 mg per tablet Take 1 tablet by mouth every 4 (four) hours as needed for Pain. 15 tablet 0     No current facility-administered medications for this visit.        Family History:  Family History   Problem Relation Name Age of Onset    Sickle cell trait Father      Sickle cell trait Mother      Sickle cell trait Sister      Sickle cell trait Sister      Breast cancer Neg Hx      Colon cancer Neg Hx      Ovarian cancer Neg Hx         Social History:  Social History     Tobacco Use    Smoking status: Former     Types: Vaping with nicotine     Start date: 2022     Quit date:  "2024     Years since quittin.7    Smokeless tobacco: Never   Substance Use Topics    Alcohol use: Not Currently    Drug use: No       Review of Systems:  Pertinent positives and negatives listed in HPI. All other systems are reviewed and are negative.    Health Maintaince :   Health Maintenance Topics with due status: Not Due       Topic Last Completion Date    TETANUS VACCINE 12/10/2024    RSV Vaccine (Age 60+ and Pregnant patients) Not Due           Eye: [facility] [place]; myopia   Dental:     Immunizations:   Tdap: UTD.  Influenza: UTD.  Pneumovax: UTD  Shingrex x 2: n/a  COVID: needs  Hepatitis C:   Cancer Screening:  PAP: UTD 2023 by Dr. Mehta  The ASCVD Risk score (Mikhail KAUR, et al., 2019) failed to calculate for the following reasons:    The 2019 ASCVD risk score is only valid for ages 40 to 79      Objective:   BP 92/60 (BP Location: Left arm, Patient Position: Sitting)   Pulse 93   Resp 14   Ht 5' 1" (1.549 m)   Wt 49.9 kg (110 lb 0.2 oz)   SpO2 99%   Breastfeeding No   BMI 20.79 kg/m²      Body mass index is 20.79 kg/m².       Physical Examination:  General: Alert and awake in no apparent distress  HEENT: Normocephalic and atraumatic; Tms WNL  Eyes:  PERRL; EOMi with anicteric sclera and clear conjunctivae  Mouth:  Oropharynx clear and without exudate; moist mucous membranes  Neck:   Cervical nodes not enlarged; supple; no bruits  Cardio:  Regular rate and rhythm with normal S1 and S2; no murmurs or rubs  Resp:  CTAB; respirations unlabored; no wheezes, crackles or rhonchi  Abdom: Soft, NTND with normoactive bowel sounds; negative HSM  Extrem: Warm and well-perfused with no clubbing, cyanosis or edema  Skin:  No rashes, lesions, or color changes  Pulses:  2+ and symmetric distally  Neuro:  AAOx3; cooperative and pleasant with no focal deficits    Laboratory:      Most Recent Data:  Lab Results   Component Value Date    WBC 11.34 04/10/2025    HGB 8.6 (L) 04/10/2025    HCT " 27.1 (L) 04/10/2025     (H) 04/10/2025    CHOL 166 04/10/2025    TRIG 166 (H) 04/10/2025    HDL 39 (L) 04/10/2025    ALT 23 04/10/2025    AST 35 04/10/2025     04/10/2025    K 4.0 04/10/2025     04/10/2025    BUN 13 04/10/2025    CO2 18 (L) 04/10/2025    TSH 1.139 04/10/2025    HGBA1C 5.6 04/10/2025              CBC:   WBC   Date Value Ref Range Status   04/10/2025 11.34 3.90 - 12.70 K/uL Final     Hemoglobin   Date Value Ref Range Status   02/21/2025 9.3 (L) 12.0 - 16.0 g/dL Final     HGB   Date Value Ref Range Status   04/10/2025 8.6 (L) 12.0 - 16.0 gm/dL Final     POC Hematocrit   Date Value Ref Range Status   08/30/2021 19 (LL) 36 - 54 %PCV Final     Hematocrit   Date Value Ref Range Status   02/21/2025 27.1 (L) 37.0 - 48.5 % Final     HCT   Date Value Ref Range Status   04/10/2025 27.1 (L) 37.0 - 48.5 % Final     Platelet Count   Date Value Ref Range Status   04/10/2025 533 (H) 150 - 450 K/uL Final     Platelets   Date Value Ref Range Status   02/21/2025 306 150 - 450 K/uL Final     MCV   Date Value Ref Range Status   04/10/2025 88 82 - 98 fL Final   02/21/2025 89 82 - 98 fL Final     RDW   Date Value Ref Range Status   04/10/2025 18.5 (H) 11.5 - 14.5 % Final   02/21/2025 18.6 (H) 11.5 - 14.5 % Final     BMP:   Sodium   Date Value Ref Range Status   04/10/2025 139 136 - 145 mmol/L Final   02/18/2025 137 136 - 145 mmol/L Final     Potassium   Date Value Ref Range Status   04/10/2025 4.0 3.5 - 5.1 mmol/L Final   02/18/2025 3.9 3.5 - 5.1 mmol/L Final     Chloride   Date Value Ref Range Status   04/10/2025 109 95 - 110 mmol/L Final   02/18/2025 113 (H) 95 - 110 mmol/L Final     CO2   Date Value Ref Range Status   04/10/2025 18 (L) 23 - 29 mmol/L Final   02/18/2025 17 (L) 23 - 29 mmol/L Final     BUN   Date Value Ref Range Status   04/10/2025 13 6 - 20 mg/dL Final     Creatinine   Date Value Ref Range Status   04/10/2025 1.1 0.5 - 1.4 mg/dL Final     Glucose   Date Value Ref Range Status  "  02/18/2025 87 70 - 110 mg/dL Final     Calcium   Date Value Ref Range Status   04/10/2025 10.0 8.7 - 10.5 mg/dL Final   02/18/2025 8.6 (L) 8.7 - 10.5 mg/dL Final     Magnesium   Date Value Ref Range Status   10/28/2024 1.8 1.6 - 2.6 mg/dL Final     LFTs:   Total Protein   Date Value Ref Range Status   02/18/2025 6.1 6.0 - 8.4 g/dL Final     Albumin   Date Value Ref Range Status   04/10/2025 4.2 3.5 - 5.2 g/dL Final   02/18/2025 1.9 (L) 3.5 - 5.2 g/dL Final     Total Bilirubin   Date Value Ref Range Status   02/18/2025 7.9 (H) 0.1 - 1.0 mg/dL Final     Comment:     For infants and newborns, interpretation of results should be based  on gestational age, weight and in agreement with clinical  observations.    Premature Infant recommended reference ranges:  Up to 24 hours.............<8.0 mg/dL  Up to 48 hours............<12.0 mg/dL  3-5 days..................<15.0 mg/dL  6-29 days.................<15.0 mg/dL       Bilirubin Total   Date Value Ref Range Status   04/10/2025 2.5 (H) 0.1 - 1.0 mg/dL Final     Comment:     For infants and newborns, interpretation of results should be based   on gestational age, weight and in agreement with clinical   observations.    Premature Infant recommended reference ranges:   0-24 hours:  <8.0 mg/dL   24-48 hours: <12.0 mg/dL   3-5 days:    <15.0 mg/dL   6-29 days:   <15.0 mg/dL     AST   Date Value Ref Range Status   04/10/2025 35 11 - 45 unit/L Final   02/18/2025 31 10 - 40 U/L Final     Alkaline Phosphatase   Date Value Ref Range Status   02/18/2025 141 40 - 150 U/L Final     ALP   Date Value Ref Range Status   04/10/2025 135 40 - 150 unit/L Final     ALT   Date Value Ref Range Status   04/10/2025 23 10 - 44 unit/L Final   02/18/2025 14 10 - 44 U/L Final     Coags: No results found for: "INR", "PROTIME", "PTT"  FLP:      Lab Results   Component Value Date    CHOL 166 04/10/2025    CHOL 117 (L) 06/07/2023    CHOL 113 (L) 11/25/2022     Lab Results   Component Value Date    HDL 39 " (L) 04/10/2025    HDL 40 06/07/2023    HDL 39 (L) 11/25/2022     Lab Results   Component Value Date    LDLCALC 53.4 (L) 06/07/2023    LDLCALC 49.4 (L) 11/25/2022    LDLCALC 49 07/01/2022     Lab Results   Component Value Date    TRIG 166 (H) 04/10/2025    TRIG 118 06/07/2023    TRIG 123 11/25/2022     Lab Results   Component Value Date    CHOLHDL 23.5 04/10/2025    CHOLHDL 34.2 06/07/2023    CHOLHDL 34.5 11/25/2022      DM:      Hemoglobin A1c   Date Value Ref Range Status   04/10/2025 5.6 4.0 - 5.6 % Final     Comment:     ADA Screening Guidelines:  5.7-6.4%  Consistent with prediabetes  >=6.5%  Consistent with diabetes    High levels of fetal hemoglobin interfere with the HbA1C  assay. Heterozygous hemoglobin variants (HbS, HgC, etc)do  not significantly interfere with this assay.   However, presence of multiple variants may affect accuracy.     LDL Cholesterol   Date Value Ref Range Status   06/07/2023 53.4 (L) 63.0 - 159.0 mg/dL Final     Comment:     The National Cholesterol Education Program (NCEP) has set the  following guidelines (reference values) for LDL Cholesterol:  Optimal.......................<130 mg/dL  Borderline High...............130-159 mg/dL  High..........................160-189 mg/dL  Very High.....................>190 mg/dL       Creatinine   Date Value Ref Range Status   04/10/2025 1.1 0.5 - 1.4 mg/dL Final     Thyroid:   TSH   Date Value Ref Range Status   04/10/2025 1.139 0.400 - 4.000 uIU/mL Final     Anemia:   Iron   Date Value Ref Range Status   03/03/2023 145 30 - 160 ug/dL Final     TIBC   Date Value Ref Range Status   03/03/2023 204 (L) 250 - 450 ug/dL Final     Ferritin   Date Value Ref Range Status   12/28/2023 448 (H) 16 - 154 ng/mL Final     Vitamin B12   Date Value Ref Range Status   04/10/2025 570 210 - 950 pg/mL Final     Vitamin B-12   Date Value Ref Range Status   11/23/2022 429 210 - 950 pg/mL Final     Folate   Date Value Ref Range Status   12/28/2023 9.3 ng/mL Final      Comment:                                Reference Range                             Low:           <3.4                             Borderline:    3.4-5.4                             Normal:        >5.4          Folate Level   Date Value Ref Range Status   04/10/2025 11.9 4.0 - 24.0 ng/mL Final     Cardiac:   Troponin I   Date Value Ref Range Status   06/06/2024 0.006 0.000 - 0.026 ng/mL Final     Comment:     The reference interval for Troponin I represents the 99th percentile   cutoff   for our facility and is consistent with 3rd generation assay   performance.       Urinalysis:   Urine Culture, Routine   Date Value Ref Range Status   02/17/2025   Final    Results called to and read back by: Josette Bernal RN  02/19/2025 02/17/2025 09:57  Final   02/17/2025 ESCHERICHIA COLI ESBL  >100,000 cfu/ml   (A)  Final     Color, UA   Date Value Ref Range Status   04/10/2025 Cook (A) Straw, Faith, Yellow, Light-Orange Final   02/17/2025 Yellow Yellow, Straw, Faith Final     Spec Grav UA   Date Value Ref Range Status   04/10/2025 1.010 1.005 - 1.030 Final     Nitrite, UA   Date Value Ref Range Status   02/17/2025 Negative Negative Final     Nitrites, UA   Date Value Ref Range Status   04/10/2025 Negative Negative Final     Ketones, UA   Date Value Ref Range Status   02/17/2025 Negative Negative Final     Urobilinogen, UA   Date Value Ref Range Status   04/10/2025 Negative <2.0 EU/dL Final     WBC, UA   Date Value Ref Range Status   04/10/2025 >100 (H) 0 - 5 /HPF Final   02/17/2025 75 (H) 0 - 5 /hpf Final       Other Results:  EKG (my interpretation):     Radiology:  Echo Saline Bubble? Yes; Ultrasound enhancing contrast? No    Left Ventricle: The left ventricle is normal in size. Normal wall   thickness. There is normal systolic function with a visually estimated   ejection fraction of 55 - 60%. There is normal diastolic function.    Right Ventricle: Normal right ventricular cavity size. Systolic   function is normal.     Pulmonary Artery: The estimated pulmonary artery systolic pressure is   27 mmHg.          Assessment/Plan     Lana Chou is a 29 y.o. female with:  1. 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  Dispense: 90 tablet; Refill: 3  -     oxyCODONE-acetaminophen (PERCOCET) 5-325 mg per tablet; Take 1 tablet by mouth every 4 (four) hours as needed for Pain.  Dispense: 15 tablet; Refill: 0    3. Vitamin D deficiency  Assessment & Plan:  Cont Vit D 50,000 IU and check level      4. Sickle cell disease without crisis  Assessment & Plan:  Repeat labs today after restart Hydrea   Cont Folic acid 1mg daily and MVI      5. Drug-induced immunodeficiency  Overview:  Menactra: 8/7/2020; Men B 8/2020, 5/20/2021  Pneumovax: UTD 10/15/2024    Assessment & Plan:  Continue to see Hematologist, Dr. Reji Reyes  Needs Bexsero now and Menveo in 8/2025                 I spent a total of 38 minutes on the day of the visit.This includes face to face time and non-face to face time preparing to see the patient (eg, review of tests), obtaining and/or reviewing separately obtained history, documenting clinical information in the electronic or other health record, independently interpreting results and communicating results to the patient/family/caregiver, or care coordinator.   Code Status:     Luann Loza MD

## 2025-04-23 ENCOUNTER — RESULTS FOLLOW-UP (OUTPATIENT)
Dept: PRIMARY CARE CLINIC | Facility: CLINIC | Age: 30
End: 2025-04-23

## 2025-04-23 ENCOUNTER — LAB VISIT (OUTPATIENT)
Dept: LAB | Facility: HOSPITAL | Age: 30
End: 2025-04-23
Attending: INTERNAL MEDICINE
Payer: COMMERCIAL

## 2025-04-23 ENCOUNTER — TELEPHONE (OUTPATIENT)
Dept: PEDIATRICS | Facility: CLINIC | Age: 30
End: 2025-04-23
Payer: COMMERCIAL

## 2025-04-23 DIAGNOSIS — R80.9 MICROALBUMINURIA: ICD-10-CM

## 2025-04-23 LAB
ALBUMIN/CREAT UR: 222.9 UG/MG
CREAT UR-MCNC: 48 MG/DL (ref 15–325)
MICROALBUMIN UR-MCNC: 107 UG/ML (ref ?–5000)

## 2025-04-23 PROCEDURE — 82570 ASSAY OF URINE CREATININE: CPT

## 2025-04-23 PROCEDURE — 81001 URINALYSIS AUTO W/SCOPE: CPT

## 2025-04-24 DIAGNOSIS — D57.1 HB-SS DISEASE WITHOUT CRISIS: Primary | ICD-10-CM

## 2025-04-24 LAB
BACTERIA #/AREA URNS AUTO: ABNORMAL /HPF
BILIRUB UR QL STRIP.AUTO: NEGATIVE
CLARITY UR: ABNORMAL
COLOR UR AUTO: YELLOW
GLUCOSE UR QL STRIP: NEGATIVE
HGB UR QL STRIP: ABNORMAL
KETONES UR QL STRIP: NEGATIVE
LEUKOCYTE ESTERASE UR QL STRIP: ABNORMAL
MICROSCOPIC COMMENT: ABNORMAL
NITRITE UR QL STRIP: NEGATIVE
PH UR STRIP: 7 [PH]
PROT UR QL STRIP: ABNORMAL
RBC #/AREA URNS AUTO: 9 /HPF (ref 0–4)
SP GR UR STRIP: 1
SQUAMOUS #/AREA URNS AUTO: 5 /HPF
UROBILINOGEN UR STRIP-ACNC: NEGATIVE EU/DL
WBC #/AREA URNS AUTO: >100 /HPF (ref 0–5)
WBC CLUMPS UR QL AUTO: ABNORMAL

## 2025-06-06 ENCOUNTER — TELEPHONE (OUTPATIENT)
Dept: PRIMARY CARE CLINIC | Facility: CLINIC | Age: 30
End: 2025-06-06
Payer: COMMERCIAL

## 2025-06-11 DIAGNOSIS — Z00.00 PREVENTATIVE HEALTH CARE: Primary | ICD-10-CM

## 2025-06-16 DIAGNOSIS — Z00.00 PREVENTATIVE HEALTH CARE: Primary | ICD-10-CM

## 2025-06-17 ENCOUNTER — LAB VISIT (OUTPATIENT)
Dept: LAB | Facility: HOSPITAL | Age: 30
End: 2025-06-17
Attending: INTERNAL MEDICINE
Payer: COMMERCIAL

## 2025-06-17 DIAGNOSIS — Z00.00 PREVENTATIVE HEALTH CARE: ICD-10-CM

## 2025-06-17 PROCEDURE — 36415 COLL VENOUS BLD VENIPUNCTURE: CPT

## 2025-06-17 PROCEDURE — 86480 TB TEST CELL IMMUN MEASURE: CPT

## 2025-06-18 ENCOUNTER — RESULTS FOLLOW-UP (OUTPATIENT)
Dept: PRIMARY CARE CLINIC | Facility: CLINIC | Age: 30
End: 2025-06-18

## 2025-06-18 LAB
MITOGEN MINUS NIL (OHS): 8.16
NIL TB SYNCED (OHS): 0.01
QUANTIFERON GOLD INTERP (OHS): NEGATIVE
TB1 AG MINUS NIL (OHS): 0.01
TB2 AG MINUS NIL (OHS): 0.01

## 2025-07-01 NOTE — PROGRESS NOTES
[facility] Progress Note      Chief Complaint     No chief complaint on file.      Subjective:      History of Present Illness:  Lana Chou is a 30 y.o. female       Started working again this past Monday - works 7pm-7am [employer] 3rd floor 3 days per week   -sleeps 8-10 hours consecutively     C/o left temporal throbbing headache at work probably associated with dizziness/light-headedness primarily at work  while working on her computer and relieved with Tylenol ES 1gm po   -denies nausea, emesis but admits to photophobia     HbSS disease with microalbuminuria: hydrating  much better with at least 6-8 glasses of water   -last pain crisis on date of delivery of Akilah  -needs refill of percocet 5/325 today for severe headaches which is less frequent  -Usu takes motrin 600mg first then HC biweekly   - sleeping 8-10 hours since Akilah sleeps through the night   -taking Hydrea 500mg tid; folic acid 1mg daily with zofran prn   -needs repeat of urine microalbumin  -needs refer to Hematology for mgt   -ID never scheduled pt for appt for immunizations due to functional asplenia     Prevent: need eye, dental,   -needs immunizations for asplenia    Vit D def'y:  taking Vit D 50,000 IU     SH: 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;  plans to start  school at [employer] in 8/2024; sexually active unprotected with partner x 7 years; lives in [place] with mom - wants to leave the state       Past Medical History:  Past Medical History:   Diagnosis Date    Bilateral leg edema 10/15/2024    Chronic kidney disease (CKD) 04/09/2018    Emesis 11/23/2022    Encounter for surveillance of vaginal ring hormonal contraceptive device 11/15/2023    LGSIL on Pap smear of cervix 06/01/2017    LLQ abdominal pain 03/02/2023    Poor weight gain in adult 03/15/2023    Pregnancy with 18 completed weeks gestation 09/30/2024    Pyelonephritis affecting pregnancy in third trimester 09/29/2024    E. Coli  urosepsis       Sickle cell anemia     Sickle cell disease     Sickle cell pain crisis 2024    Sickle-cell disease without crisis     Status post  section 2025    UTI (urinary tract infection) 2023       Past Surgical History:  Past Surgical History:   Procedure Laterality Date    BREAST LUMPECTOMY      CARDIAC SURGERY      port insertion     SECTION N/A 2025    Procedure:  SECTION;  Surgeon: Tyson Landers MD;  Location: [facility] L&D OR;  Service: OB/GYN;  Laterality: N/A;    NEPHRECTOMY         Allergies:  Review of patient's allergies indicates:   Allergen Reactions    Rocephin [ceftriaxone] Shortness Of Breath, Itching and Anxiety     Nausea, vomiting. No hives, swelling, or anaphylaxis. Can tolerate if necessary, but would avoid if possible.       Home Medications:  Current Outpatient Medications   Medication Sig Dispense Refill    ergocalciferol (ERGOCALCIFEROL) 50,000 unit Cap Take 1 capsule (50,000 Units total) by mouth every 7 days. 12 capsule 3    folic acid (FOLVITE) 1 MG tablet Take 1 tablet (1 mg total) by mouth once daily. 90 tablet 3    hydroxyurea (HYDREA) 500 mg Cap Take 3 capsules (1,500 mg total) by mouth once daily. 90 capsule 5    ibuprofen (ADVIL,MOTRIN) 600 MG tablet Take 1 tablet (600 mg total) by mouth every 6 (six) hours. 40 tablet 1    triamcinolone acetonide 0.1% (KENALOG) 0.1 % ointment Apply topically 2 (two) times daily. 30 g 0    oxyCODONE-acetaminophen (PERCOCET) 5-325 mg per tablet Take 1 tablet by mouth every 4 (four) hours as needed for Pain. 20 tablet 0     No current facility-administered medications for this visit.        Family History:  Family History   Problem Relation Name Age of Onset    Sickle cell trait Father      Sickle cell trait Mother      Sickle cell trait Sister      Sickle cell trait Sister      Breast cancer Neg Hx      Colon cancer Neg Hx      Ovarian cancer Neg Hx         Social History:  Social History     Tobacco  "Use    Smoking status: Former     Types: Vaping with nicotine     Start date: 2022     Quit date: 2024     Years since quittin.0    Smokeless tobacco: Never   Substance Use Topics    Alcohol use: Not Currently    Drug use: No       Review of Systems:  Pertinent positives and negatives listed in HPI. All other systems are reviewed and are negative.    Health Maintaince :   Health Maintenance Topics with due status: Not Due       Topic Last Completion Date    Pap Smear 05/10/2023    Influenza Vaccine 10/15/2024    TETANUS VACCINE 12/10/2024    RSV Vaccine (Age 60+ and Pregnant patients) Not Due           Eye: [facility] in [place]; myopia   Dental:     Immunizations:   Tdap: UTD.  Influenza: UTD.  Pneumovax: UTD  Shingrex x 2: n/a  COVID: needs  Hepatitis C:   Cancer Screening:  PAP: UTD 2023 by Dr. Mehta  The ASCVD Risk score (Mikhail KAUR, et al., 2019) failed to calculate for the following reasons:    The 2019 ASCVD risk score is only valid for ages 40 to 79      Objective:   BP (!) 98/56 (BP Location: Left arm, Patient Position: Sitting)   Pulse 96   Ht 5' 1" (1.549 m)   Wt 51.5 kg (113 lb 8.6 oz)   LMP 2025   SpO2 97%   Breastfeeding No   BMI 21.45 kg/m²      Body mass index is 21.45 kg/m².       Physical Examination:  General: Alert and awake in no apparent distress  HEENT: Normocephalic and atraumatic; Tms WNL  Eyes:  PERRL; EOMi with mildly icteric sclera and clear conjunctivae  Neuro:  AAOx3; cooperative and pleasant with no focal deficits    Laboratory:      Most Recent Data:  Lab Results   Component Value Date    WBC 11.34 04/10/2025    HGB 8.6 (L) 04/10/2025    HCT 27.1 (L) 04/10/2025     (H) 04/10/2025    CHOL 166 04/10/2025    TRIG 166 (H) 04/10/2025    HDL 39 (L) 04/10/2025    ALT 23 04/10/2025    AST 35 04/10/2025     04/10/2025    K 4.0 04/10/2025     04/10/2025    BUN 13 04/10/2025    CO2 18 (L) 04/10/2025    TSH 1.139 04/10/2025    HGBA1C 5.6 " 04/10/2025              CBC:   WBC   Date Value Ref Range Status   04/10/2025 11.34 3.90 - 12.70 K/uL Final     Hemoglobin   Date Value Ref Range Status   02/21/2025 9.3 (L) 12.0 - 16.0 g/dL Final     HGB   Date Value Ref Range Status   04/10/2025 8.6 (L) 12.0 - 16.0 gm/dL Final     POC Hematocrit   Date Value Ref Range Status   08/30/2021 19 (LL) 36 - 54 %PCV Final     Hematocrit   Date Value Ref Range Status   02/21/2025 27.1 (L) 37.0 - 48.5 % Final     HCT   Date Value Ref Range Status   04/10/2025 27.1 (L) 37.0 - 48.5 % Final     Platelet Count   Date Value Ref Range Status   04/10/2025 533 (H) 150 - 450 K/uL Final     Platelets   Date Value Ref Range Status   02/21/2025 306 150 - 450 K/uL Final     MCV   Date Value Ref Range Status   04/10/2025 88 82 - 98 fL Final   02/21/2025 89 82 - 98 fL Final     RDW   Date Value Ref Range Status   04/10/2025 18.5 (H) 11.5 - 14.5 % Final   02/21/2025 18.6 (H) 11.5 - 14.5 % Final     BMP:   Sodium   Date Value Ref Range Status   04/10/2025 139 136 - 145 mmol/L Final   02/18/2025 137 136 - 145 mmol/L Final     Potassium   Date Value Ref Range Status   04/10/2025 4.0 3.5 - 5.1 mmol/L Final   02/18/2025 3.9 3.5 - 5.1 mmol/L Final     Chloride   Date Value Ref Range Status   04/10/2025 109 95 - 110 mmol/L Final   02/18/2025 113 (H) 95 - 110 mmol/L Final     CO2   Date Value Ref Range Status   04/10/2025 18 (L) 23 - 29 mmol/L Final   02/18/2025 17 (L) 23 - 29 mmol/L Final     BUN   Date Value Ref Range Status   04/10/2025 13 6 - 20 mg/dL Final     Creatinine   Date Value Ref Range Status   04/10/2025 1.1 0.5 - 1.4 mg/dL Final     Glucose   Date Value Ref Range Status   04/10/2025 70 70 - 110 mg/dL Final   02/18/2025 87 70 - 110 mg/dL Final     Calcium   Date Value Ref Range Status   04/10/2025 10.0 8.7 - 10.5 mg/dL Final   02/18/2025 8.6 (L) 8.7 - 10.5 mg/dL Final     Magnesium   Date Value Ref Range Status   10/28/2024 1.8 1.6 - 2.6 mg/dL Final     LFTs:   Protein Total   Date  "Value Ref Range Status   04/10/2025 8.7 (H) 6.0 - 8.4 gm/dL Final     Total Protein   Date Value Ref Range Status   02/18/2025 6.1 6.0 - 8.4 g/dL Final     Albumin   Date Value Ref Range Status   04/10/2025 4.2 3.5 - 5.2 g/dL Final   02/18/2025 1.9 (L) 3.5 - 5.2 g/dL Final     Total Bilirubin   Date Value Ref Range Status   02/18/2025 7.9 (H) 0.1 - 1.0 mg/dL Final     Comment:     For infants and newborns, interpretation of results should be based  on gestational age, weight and in agreement with clinical  observations.    Premature Infant recommended reference ranges:  Up to 24 hours.............<8.0 mg/dL  Up to 48 hours............<12.0 mg/dL  3-5 days..................<15.0 mg/dL  6-29 days.................<15.0 mg/dL       Bilirubin Total   Date Value Ref Range Status   04/10/2025 2.5 (H) 0.1 - 1.0 mg/dL Final     Comment:     For infants and newborns, interpretation of results should be based   on gestational age, weight and in agreement with clinical   observations.    Premature Infant recommended reference ranges:   0-24 hours:  <8.0 mg/dL   24-48 hours: <12.0 mg/dL   3-5 days:    <15.0 mg/dL   6-29 days:   <15.0 mg/dL     AST   Date Value Ref Range Status   04/10/2025 35 11 - 45 unit/L Final   02/18/2025 31 10 - 40 U/L Final     Alkaline Phosphatase   Date Value Ref Range Status   02/18/2025 141 40 - 150 U/L Final     ALP   Date Value Ref Range Status   04/10/2025 135 40 - 150 unit/L Final     ALT   Date Value Ref Range Status   04/10/2025 23 10 - 44 unit/L Final   02/18/2025 14 10 - 44 U/L Final     Coags: No results found for: "INR", "PROTIME", "PTT"  FLP:      Lab Results   Component Value Date    CHOL 166 04/10/2025    CHOL 117 (L) 06/07/2023    CHOL 113 (L) 11/25/2022     Lab Results   Component Value Date    HDL 39 (L) 04/10/2025    HDL 40 06/07/2023    HDL 39 (L) 11/25/2022     Lab Results   Component Value Date    LDLCALC 93.8 04/10/2025    LDLCALC 53.4 (L) 06/07/2023    LDLCALC 49.4 (L) 11/25/2022 "     Lab Results   Component Value Date    TRIG 166 (H) 04/10/2025    TRIG 118 06/07/2023    TRIG 123 11/25/2022     Lab Results   Component Value Date    CHOLHDL 23.5 04/10/2025    CHOLHDL 34.2 06/07/2023    CHOLHDL 34.5 11/25/2022      DM:      Hemoglobin A1c   Date Value Ref Range Status   04/10/2025 5.6 4.0 - 5.6 % Final     Comment:     ADA Screening Guidelines:  5.7-6.4%  Consistent with prediabetes  >=6.5%  Consistent with diabetes    High levels of fetal hemoglobin interfere with the HbA1C  assay. Heterozygous hemoglobin variants (HbS, HgC, etc)do  not significantly interfere with this assay.   However, presence of multiple variants may affect accuracy.     LDL Cholesterol   Date Value Ref Range Status   04/10/2025 93.8 63.0 - 159.0 mg/dL Final     Comment:     The National Cholesterol Education Program (NCEP) has set the  following guidelines (reference values) for LDL Cholesterol:  Optimal.......................<130 mg/dL  Borderline High...............130-159 mg/dL  High..........................160-189 mg/dL  Very High.....................>190 mg/dL  LDL calculated using the Friedewald equation.     Creatinine   Date Value Ref Range Status   04/10/2025 1.1 0.5 - 1.4 mg/dL Final     Thyroid:   TSH   Date Value Ref Range Status   04/10/2025 1.139 0.400 - 4.000 uIU/mL Final     Anemia:   Iron   Date Value Ref Range Status   03/03/2023 145 30 - 160 ug/dL Final     TIBC   Date Value Ref Range Status   03/03/2023 204 (L) 250 - 450 ug/dL Final     Ferritin   Date Value Ref Range Status   04/10/2025 2,503.0 (H) 20.0 - 300.0 ng/mL Final     Vitamin B12   Date Value Ref Range Status   04/10/2025 570 210 - 950 pg/mL Final     Vitamin B-12   Date Value Ref Range Status   11/23/2022 429 210 - 950 pg/mL Final     Folate   Date Value Ref Range Status   12/28/2023 9.3 ng/mL Final     Comment:                                Reference Range                             Low:           <3.4                              Borderline:    3.4-5.4                             Normal:        >5.4          Folate Level   Date Value Ref Range Status   04/10/2025 11.9 4.0 - 24.0 ng/mL Final     Cardiac:   Troponin I   Date Value Ref Range Status   06/06/2024 0.006 0.000 - 0.026 ng/mL Final     Comment:     The reference interval for Troponin I represents the 99th percentile   cutoff   for our facility and is consistent with 3rd generation assay   performance.       Urinalysis:   Urine Culture, Routine   Date Value Ref Range Status   02/17/2025   Final    Results called to and read back by: Josette Bernal RN  02/19/2025 02/17/2025 09:57  Final   02/17/2025 ESCHERICHIA COLI ESBL  >100,000 cfu/ml   (A)  Final     Color, UA   Date Value Ref Range Status   04/23/2025 Yellow Straw, Faith, Yellow, Light-Orange Final   02/17/2025 Yellow Yellow, Straw, Faith Final     Spec Grav UA   Date Value Ref Range Status   04/23/2025 1.005 1.005 - 1.030 Final     Nitrite, UA   Date Value Ref Range Status   02/17/2025 Negative Negative Final     Nitrites, UA   Date Value Ref Range Status   04/23/2025 Negative Negative Final     Ketones, UA   Date Value Ref Range Status   02/17/2025 Negative Negative Final     Urobilinogen, UA   Date Value Ref Range Status   04/23/2025 Negative <2.0 EU/dL Final     WBC, UA   Date Value Ref Range Status   04/23/2025 >100 (H) 0 - 5 /HPF Final   02/17/2025 75 (H) 0 - 5 /hpf Final       Other Results:  EKG (my interpretation):     Radiology:  Echo Saline Bubble? Yes; Ultrasound enhancing contrast? No    Left Ventricle: The left ventricle is normal in size. Normal wall   thickness. There is normal systolic function with a visually estimated   ejection fraction of 55 - 60%. There is normal diastolic function.    Right Ventricle: Normal right ventricular cavity size. Systolic   function is normal.    Pulmonary Artery: The estimated pulmonary artery systolic pressure is   27 mmHg.          Assessment/Plan     Lana Chou is a 30  y.o. female with:  1. Acute non intractable tension-type headache  Assessment & Plan:  Cont Tylenol 1gm po tid prn   Use ice cap   Get blue light blocker for computer       2. 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  Dispense: 20 tablet; Refill: 0  -     Ambulatory referral/consult to Hematology / Oncology; Future; Expected date: 07/03/2025  -     Ambulatory referral/consult to Optometry; Future; Expected date: 07/03/2025  -     Ambulatory referral/consult to Infectious Disease; Future; Expected date: 07/10/2025    3. Urine test positive for microalbuminuria  Assessment & Plan:  Repeat urine microalbumin and urine protein/creat after adequate hydration with 1-2 liters of water daily     Orders:  -     Microalbumin/Creatinine Ratio, Urine; Future; Expected date: 07/03/2025  -     Protein/Creatinine Ratio, Urine    4. Preventative health care  Assessment & Plan:  Get urine micro and urine protein/creat - if worsening, will refer to nephrology and start ACE or ARB ie enalapril   Refer to Hematology   Schedule appt in 8/21/2025 at 1:30pm when seeing Akilah  Schedule eye and dental exams - eye referral placed   Get Bexsero now - needs referral to ID for immunizations         Orders:  -     Ambulatory referral/consult to Optometry; Future; Expected date: 07/03/2025                 I spent a total of 45 minutes on the day of the visit.This includes face to face time and non-face to face time preparing to see the patient (eg, review of tests), obtaining and/or reviewing separately obtained history, documenting clinical information in the electronic or other health  record, independently interpreting results and communicating results to the patient/family/caregiver, or care coordinator.   Code Status:     Luann Loza MD

## 2025-07-03 ENCOUNTER — TELEPHONE (OUTPATIENT)
Dept: PRIMARY CARE CLINIC | Facility: CLINIC | Age: 30
End: 2025-07-03
Payer: COMMERCIAL

## 2025-07-03 ENCOUNTER — OFFICE VISIT (OUTPATIENT)
Dept: PRIMARY CARE CLINIC | Facility: CLINIC | Age: 30
End: 2025-07-03
Payer: COMMERCIAL

## 2025-07-03 ENCOUNTER — TELEPHONE (OUTPATIENT)
Dept: PRIMARY CARE CLINIC | Facility: CLINIC | Age: 30
End: 2025-07-03

## 2025-07-03 VITALS
SYSTOLIC BLOOD PRESSURE: 98 MMHG | WEIGHT: 113.56 LBS | BODY MASS INDEX: 21.44 KG/M2 | HEART RATE: 96 BPM | OXYGEN SATURATION: 97 % | DIASTOLIC BLOOD PRESSURE: 56 MMHG | HEIGHT: 61 IN

## 2025-07-03 DIAGNOSIS — Z00.00 PREVENTATIVE HEALTH CARE: ICD-10-CM

## 2025-07-03 DIAGNOSIS — D57.1 HEMOGLOBIN SS DISEASE WITHOUT CRISIS: ICD-10-CM

## 2025-07-03 DIAGNOSIS — G44.209 ACUTE NON INTRACTABLE TENSION-TYPE HEADACHE: Primary | ICD-10-CM

## 2025-07-03 DIAGNOSIS — R80.9 URINE TEST POSITIVE FOR MICROALBUMINURIA: ICD-10-CM

## 2025-07-03 PROCEDURE — 99999 PR PBB SHADOW E&M-EST. PATIENT-LVL V: CPT | Mod: PBBFAC,,, | Performed by: INTERNAL MEDICINE

## 2025-07-03 RX ORDER — OXYCODONE AND ACETAMINOPHEN 5; 325 MG/1; MG/1
1 TABLET ORAL EVERY 4 HOURS PRN
Qty: 20 TABLET | Refills: 0 | Status: SHIPPED | OUTPATIENT
Start: 2025-07-03

## 2025-07-03 NOTE — ASSESSMENT & PLAN NOTE
Get urine micro and urine protein/creat - if worsening, will refer to nephrology and start ACE or ARB ie enalapril   Refer to Hematology   Schedule appt in 8/21/2025 at 1:30pm when seeing Akilah  Schedule eye and dental exams - eye referral placed   Get Bexsero now - needs referral to ID for immunizations

## 2025-07-03 NOTE — PATIENT INSTRUCTIONS
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  Dispense: 20 tablet; Refill: 0  -     Ambulatory referral/consult to Hematology / Oncology; Future; Expected date: 07/03/2025  -     Ambulatory referral/consult to Optometry; Future; Expected date: 07/03/2025  -     Ambulatory referral/consult to Infectious Disease; Future; Expected date: 07/10/2025    Urine test positive for microalbuminuria  Assessment & Plan:  Repeat urine microalbumin    Orders:  -     Microalbumin/Creatinine Ratio, Urine; Future; Expected date: 07/03/2025    Preventative health care  Assessment & Plan:  Get urine micro   Schedule appt in 8/2025 when seeing Akilah Lucas eye and dental exams  Get Bexsero now - needs referral to ID for immunizations         Orders:  -     Ambulatory referral/consult to Optometry; Future; Expected date: 07/03/2025

## 2025-07-03 NOTE — ASSESSMENT & PLAN NOTE
Repeat urine microalbumin and urine protein/creat after adequate hydration with 1-2 liters of water daily

## 2025-07-04 NOTE — ASSESSMENT & PLAN NOTE
Cont folic acid 1mg daily  Cont hydroxyurea 1500mg daily   Refill Percocet 5/325mg every 4-6 hours prn severe pain   Hydrate with 1-2 liters water daily   Drink Ensure 1-2 bottles daily   Refer to Hematology

## 2025-07-04 NOTE — TELEPHONE ENCOUNTER
Please link urine protein/creat to urine microalbumin - thanks!  Please schedule pt for Appt on August 21 at 1pm and her daughter Akilah Chou at 1:30pm - thanks!

## 2025-07-08 DIAGNOSIS — R80.9 MICROALBUMINURIA: Primary | ICD-10-CM

## 2025-07-11 ENCOUNTER — LAB VISIT (OUTPATIENT)
Dept: LAB | Facility: HOSPITAL | Age: 30
End: 2025-07-11
Attending: INTERNAL MEDICINE
Payer: COMMERCIAL

## 2025-07-11 DIAGNOSIS — R80.9 MICROALBUMINURIA: ICD-10-CM

## 2025-07-11 LAB
CREAT UR-MCNC: 60.5 MG/DL (ref 15–325)
PROT UR-MCNC: <7 MG/DL
PROT/CREAT UR: NORMAL MG/G{CREAT}

## 2025-07-11 PROCEDURE — 84156 ASSAY OF PROTEIN URINE: CPT

## 2025-07-11 PROCEDURE — 82570 ASSAY OF URINE CREATININE: CPT | Mod: 59

## 2025-07-12 LAB
ALBUMIN/CREAT UR: 10.2 UG/MG
CREAT UR-MCNC: 59 MG/DL (ref 15–325)
MICROALBUMIN UR-MCNC: 6 UG/ML (ref ?–5000)

## 2025-07-21 ENCOUNTER — HOSPITAL ENCOUNTER (EMERGENCY)
Facility: HOSPITAL | Age: 30
Discharge: HOME | End: 2025-07-21
Attending: STUDENT IN AN ORGANIZED HEALTH CARE EDUCATION/TRAINING PROGRAM
Payer: COMMERCIAL

## 2025-07-21 VITALS
WEIGHT: 112 LBS | TEMPERATURE: 98 F | BODY MASS INDEX: 21.16 KG/M2 | OXYGEN SATURATION: 96 % | SYSTOLIC BLOOD PRESSURE: 101 MMHG | HEART RATE: 84 BPM | DIASTOLIC BLOOD PRESSURE: 58 MMHG | RESPIRATION RATE: 20 BRPM

## 2025-07-21 DIAGNOSIS — D57.00 SICKLE CELL PAIN CRISIS: ICD-10-CM

## 2025-07-21 LAB
ABSOLUTE EOSINOPHIL (OHS): 0.14 K/UL
ABSOLUTE MONOCYTE (OHS): 2.39 K/UL (ref 0.3–1)
ABSOLUTE NEUTROPHIL COUNT (OHS): 9.88 K/UL (ref 1.8–7.7)
ALBUMIN SERPL BCP-MCNC: 4.7 G/DL (ref 3.5–5.2)
ALP SERPL-CCNC: 103 UNIT/L (ref 40–150)
ALT SERPL W/O P-5'-P-CCNC: 29 UNIT/L (ref 10–44)
ANION GAP (OHS): 12 MMOL/L (ref 8–16)
ANISOCYTOSIS BLD QL SMEAR: NORMAL
AST SERPL-CCNC: 50 UNIT/L (ref 11–45)
B-HCG UR QL: NEGATIVE
BACTERIA #/AREA URNS AUTO: NORMAL /HPF
BASOPHILS # BLD AUTO: 0.12 K/UL
BASOPHILS NFR BLD AUTO: 0.7 %
BILIRUB SERPL-MCNC: 5.5 MG/DL (ref 0.1–1)
BILIRUB UR QL STRIP.AUTO: NEGATIVE
BUN SERPL-MCNC: 15 MG/DL (ref 6–20)
CALCIUM SERPL-MCNC: 9.5 MG/DL (ref 8.7–10.5)
CHLORIDE SERPL-SCNC: 109 MMOL/L (ref 95–110)
CLARITY UR: ABNORMAL
CO2 SERPL-SCNC: 17 MMOL/L (ref 23–29)
COLOR UR AUTO: YELLOW
CREAT SERPL-MCNC: 1 MG/DL (ref 0.5–1.4)
CTP QC/QA: YES
ERYTHROCYTE [DISTWIDTH] IN BLOOD BY AUTOMATED COUNT: 23.1 % (ref 11.5–14.5)
GFR SERPLBLD CREATININE-BSD FMLA CKD-EPI: >60 ML/MIN/1.73/M2
GLUCOSE SERPL-MCNC: 91 MG/DL (ref 70–110)
GLUCOSE UR QL STRIP: NEGATIVE
HCG INTACT+B SERPL-ACNC: <1.2 MIU/ML
HCT VFR BLD AUTO: 19.8 % (ref 37–48.5)
HGB BLD-MCNC: 7.3 GM/DL (ref 12–16)
HGB UR QL STRIP: NEGATIVE
IMM GRANULOCYTES # BLD AUTO: 0.44 K/UL (ref 0–0.04)
IMM GRANULOCYTES NFR BLD AUTO: 2.6 % (ref 0–0.5)
KETONES UR QL STRIP: NEGATIVE
LEUKOCYTE ESTERASE UR QL STRIP: ABNORMAL
LIPASE SERPL-CCNC: 96 U/L (ref 4–60)
LYMPHOCYTES # BLD AUTO: 3.9 K/UL (ref 1–4.8)
MCH RBC QN AUTO: 31.2 PG (ref 27–31)
MCHC RBC AUTO-ENTMCNC: 36.9 G/DL (ref 32–36)
MCV RBC AUTO: 85 FL (ref 82–98)
MICROSCOPIC COMMENT: NORMAL
NITRITE UR QL STRIP: NEGATIVE
NUCLEATED RBC (/100WBC) (OHS): 3 /100 WBC
PH UR STRIP: 6 [PH]
PLATELET # BLD AUTO: 438 K/UL (ref 150–450)
PLATELET BLD QL SMEAR: NORMAL
PMV BLD AUTO: 9.1 FL (ref 9.2–12.9)
POIKILOCYTOSIS BLD QL SMEAR: SLIGHT
POLYCHROMASIA BLD QL SMEAR: NORMAL
POTASSIUM SERPL-SCNC: 4.8 MMOL/L (ref 3.5–5.1)
PROT SERPL-MCNC: 8.8 GM/DL (ref 6–8.4)
PROT UR QL STRIP: NEGATIVE
RBC # BLD AUTO: 2.34 M/UL (ref 4–5.4)
RBC #/AREA URNS AUTO: 2 /HPF (ref 0–4)
RELATIVE EOSINOPHIL (OHS): 0.8 %
RELATIVE LYMPHOCYTE (OHS): 23.1 % (ref 18–48)
RELATIVE MONOCYTE (OHS): 14.2 % (ref 4–15)
RELATIVE NEUTROPHIL (OHS): 58.6 % (ref 38–73)
RETICS/RBC NFR AUTO: 17.1 % (ref 0.5–2.5)
SICKLE CELLS BLD QL SMEAR: NORMAL
SODIUM SERPL-SCNC: 138 MMOL/L (ref 136–145)
SP GR UR STRIP: 1.01
SQUAMOUS #/AREA URNS AUTO: 19 /HPF
STOMATOCYTES BLD QL SMEAR: PRESENT
TARGETS BLD QL SMEAR: NORMAL
UROBILINOGEN UR STRIP-ACNC: ABNORMAL EU/DL
WBC # BLD AUTO: 16.87 K/UL (ref 3.9–12.7)
WBC #/AREA URNS AUTO: 1 /HPF (ref 0–5)

## 2025-07-21 PROCEDURE — 93010 ELECTROCARDIOGRAM REPORT: CPT | Mod: ,,, | Performed by: INTERNAL MEDICINE

## 2025-07-21 PROCEDURE — 81025 URINE PREGNANCY TEST: CPT

## 2025-07-21 PROCEDURE — 93005 ELECTROCARDIOGRAM TRACING: CPT

## 2025-07-21 PROCEDURE — 96375 TX/PRO/DX INJ NEW DRUG ADDON: CPT

## 2025-07-21 PROCEDURE — 85045 AUTOMATED RETICULOCYTE COUNT: CPT

## 2025-07-21 PROCEDURE — 96376 TX/PRO/DX INJ SAME DRUG ADON: CPT

## 2025-07-21 PROCEDURE — 83690 ASSAY OF LIPASE: CPT | Performed by: STUDENT IN AN ORGANIZED HEALTH CARE EDUCATION/TRAINING PROGRAM

## 2025-07-21 PROCEDURE — 80053 COMPREHEN METABOLIC PANEL: CPT

## 2025-07-21 PROCEDURE — 96361 HYDRATE IV INFUSION ADD-ON: CPT

## 2025-07-21 PROCEDURE — 63600175 PHARM REV CODE 636 W HCPCS: Performed by: STUDENT IN AN ORGANIZED HEALTH CARE EDUCATION/TRAINING PROGRAM

## 2025-07-21 PROCEDURE — 99285 EMERGENCY DEPT VISIT HI MDM: CPT | Mod: 25

## 2025-07-21 PROCEDURE — 96374 THER/PROPH/DIAG INJ IV PUSH: CPT

## 2025-07-21 PROCEDURE — 84702 CHORIONIC GONADOTROPIN TEST: CPT | Performed by: STUDENT IN AN ORGANIZED HEALTH CARE EDUCATION/TRAINING PROGRAM

## 2025-07-21 PROCEDURE — 81003 URINALYSIS AUTO W/O SCOPE: CPT

## 2025-07-21 PROCEDURE — 85025 COMPLETE CBC W/AUTO DIFF WBC: CPT

## 2025-07-21 PROCEDURE — 25000003 PHARM REV CODE 250: Performed by: STUDENT IN AN ORGANIZED HEALTH CARE EDUCATION/TRAINING PROGRAM

## 2025-07-21 RX ORDER — HYDROCODONE BITARTRATE AND ACETAMINOPHEN 500; 5 MG/1; MG/1
TABLET ORAL
Status: DISCONTINUED | OUTPATIENT
Start: 2025-07-21 | End: 2025-07-22 | Stop reason: HOSPADM

## 2025-07-21 RX ORDER — DIPHENHYDRAMINE HCL 25 MG
25 CAPSULE ORAL
Status: COMPLETED | OUTPATIENT
Start: 2025-07-21 | End: 2025-07-21

## 2025-07-21 RX ORDER — MORPHINE SULFATE 4 MG/ML
4 INJECTION, SOLUTION INTRAMUSCULAR; INTRAVENOUS
Refills: 0 | Status: COMPLETED | OUTPATIENT
Start: 2025-07-21 | End: 2025-07-21

## 2025-07-21 RX ORDER — FAMOTIDINE 10 MG/ML
20 INJECTION, SOLUTION INTRAVENOUS
Status: COMPLETED | OUTPATIENT
Start: 2025-07-21 | End: 2025-07-21

## 2025-07-21 RX ADMIN — MORPHINE SULFATE 4 MG: 4 INJECTION, SOLUTION INTRAMUSCULAR; INTRAVENOUS at 09:07

## 2025-07-21 RX ADMIN — FAMOTIDINE 20 MG: 10 INJECTION, SOLUTION INTRAVENOUS at 07:07

## 2025-07-21 RX ADMIN — DIPHENHYDRAMINE HYDROCHLORIDE 25 MG: 25 CAPSULE ORAL at 07:07

## 2025-07-21 RX ADMIN — MORPHINE SULFATE 4 MG: 4 INJECTION, SOLUTION INTRAMUSCULAR; INTRAVENOUS at 07:07

## 2025-07-21 RX ADMIN — SODIUM CHLORIDE, POTASSIUM CHLORIDE, SODIUM LACTATE AND CALCIUM CHLORIDE 1000 ML: 600; 310; 30; 20 INJECTION, SOLUTION INTRAVENOUS at 06:07

## 2025-07-21 NOTE — ED PROVIDER NOTES
Encounter Date: 2025       History     Chief Complaint   Patient presents with    Sickle Cell Pain Crisis     Pt complaining of sickle cell pain crisis and vomiting since 5pm today.     HPI    29 y.o. female, with a PMHx of CKD, sickle cell anemia , presenting with sickle cell pain crisis x 1 day.    Patient reports pain is generalized, no focal joint pain, or chest pain, no dyspnea, no cough, no pleuritic chest pain.  Reports she had 1 episode of vomiting today when she took ibuprofen on an empty stomach to try to treat the pain.    Reports she normally takes oxycodone when she has sickle cell pain crisis, however she has run out, and not refilled her medication.  Has required blood transfusion in the past.  Last menstrual period was late .    Denies hematuria, denies focal abdominal pain, denies fever/chills.      Review of patient's allergies indicates:   Allergen Reactions    Rocephin [ceftriaxone] Shortness Of Breath, Itching and Anxiety     Nausea, vomiting. No hives, swelling, or anaphylaxis. Can tolerate if necessary, but would avoid if possible.     Past Medical History:   Diagnosis Date    Bilateral leg edema 10/15/2024    Chronic kidney disease (CKD) 2018    Emesis 2022    Encounter for surveillance of vaginal ring hormonal contraceptive device 11/15/2023    LGSIL on Pap smear of cervix 2017    LLQ abdominal pain 2023    Poor weight gain in adult 03/15/2023    Pregnancy with 18 completed weeks gestation 2024    Pyelonephritis affecting pregnancy in third trimester 2024    E. Coli urosepsis       Sickle cell anemia     Sickle cell disease     Sickle cell pain crisis 2024    Sickle-cell disease without crisis     Status post  section 2025    UTI (urinary tract infection) 2023     Past Surgical History:   Procedure Laterality Date    BREAST LUMPECTOMY      CARDIAC SURGERY      port insertion     SECTION N/A 2025     Procedure:  SECTION;  Surgeon: Tyson Landers MD;  Location: [facility] L[facility] OR;  Service: OB/GYN;  Laterality: N/A;    NEPHRECTOMY       Family History   Problem Relation Name Age of Onset    Sickle cell trait Father      Sickle cell trait Mother      Sickle cell trait Sister      Sickle cell trait Sister      Breast cancer Neg Hx      Colon cancer Neg Hx      Ovarian cancer Neg Hx       Social History[1]  Review of Systems    Physical Exam     Initial Vitals [25 1810]   BP Pulse Resp Temp SpO2   (!) 100/51 96 18 98.1 °F (36.7 °C) 98 %      MAP       --         Physical Exam    Nursing note and vitals reviewed.  Constitutional: No distress.   HENT:   Head: Atraumatic. Mouth/Throat: Oropharynx is clear and moist and mucous membranes are normal.   Eyes: Right conjunctiva is not injected. Left conjunctiva is not injected. No scleral icterus.   Cardiovascular:  Normal heart sounds.           Pulmonary/Chest: Effort normal and breath sounds normal. No respiratory distress. She has no wheezes. She has no rales.   Abdominal: Abdomen is soft. She exhibits no distension. There is no abdominal tenderness.   Musculoskeletal:         General: No edema. Normal range of motion.     Neurological: No cranial nerve deficit.   Skin: Skin is warm. No ecchymosis and no rash noted.         ED Course   Procedures  Labs Reviewed   COMPREHENSIVE METABOLIC PANEL - Abnormal       Result Value    Sodium 138      Potassium 4.8      Chloride 109      CO2 17 (*)     Glucose 91      BUN 15      Creatinine 1.0      Calcium 9.5      Protein Total 8.8 (*)     Albumin 4.7      Bilirubin Total 5.5 (*)           AST 50 (*)     ALT 29      Anion Gap 12      eGFR >60     RETICULOCYTES - Abnormal    Retic Count, Automated 17.1 (*)    URINALYSIS, REFLEX TO URINE CULTURE - Abnormal    Color, UA Yellow      Appearance, UA Hazy (*)     pH, UA 6.0      Spec Grav UA 1.010      Protein, UA Negative      Glucose, UA Negative      Ketones, UA  Negative      Bilirubin, UA Negative      Blood, UA Negative      Nitrites, UA Negative      Urobilinogen, UA 2.0-3.0 (*)     Leukocyte Esterase, UA Trace (*)    CBC WITH DIFFERENTIAL - Abnormal    WBC 16.87 (*)     RBC 2.34 (*)     HGB 7.3 (*)     HCT 19.8 (*)     MCV 85      MCH 31.2 (*)     MCHC 36.9 (*)     RDW 23.1 (*)     Platelet Count 438      MPV 9.1 (*)     Nucleated RBC 3 (*)     Neut % 58.6      Lymph % 23.1      Mono % 14.2      Eos % 0.8      Basophil % 0.7      Imm Grans % 2.6 (*)     Neut # 9.88 (*)     Lymph # 3.90      Mono # 2.39 (*)     Eos # 0.14      Baso # 0.12      Imm Grans # 0.44 (*)    LIPASE - Abnormal    Lipase Level 96 (*)    CBC W/ AUTO DIFFERENTIAL    Narrative:     The following orders were created for panel order CBC Auto Differential.  Procedure                               Abnormality         Status                     ---------                               -----------         ------                     CBC with Differential[[id number]]       Abnormal            Final result                 Please view results for these tests on the individual orders.   MORPHOLOGY    Platelet Estimate Appears Normal      Anisocytosis Moderate      Poik Slight      Polychromasia Occasional      Target Cell Occasional      Stomatocytes Present      Sickle Cell Moderate     HCG, QUANTITATIVE    Beta HCG Quant <1.20     GREY TOP URINE HOLD    Extra Tube Hold for add-ons.     URINALYSIS MICROSCOPIC    RBC, UA 2      WBC, UA 1      Bacteria, UA Rare      Squamous Epithelial Cells, UA 19      Microscopic Comment       POCT URINE PREGNANCY    POC Preg Test, Ur Negative       Acceptable Yes          ECG Results              EKG 12-lead (Final result)  Result time 07/22/25 07:55:56      Final result by Unknown User (07/22/25 07:55:56)                                      Imaging Results              X-Ray Chest AP Portable (Final result)  Result time 07/21/25 18:49:09      Final result by  "Oralia Diaz MD (07/21/25 18:49:09)                   Impression:      No acute intrathoracic abnormality detected.      Electronically signed by: Oralia Diaz  Date:    07/21/2025  Time:    18:49               Narrative:    EXAMINATION:  AP PORTABLE CHEST    CLINICAL HISTORY:  sickle cell;    TECHNIQUE:  AP portable chest radiograph was submitted.    COMPARISON:  09/28/2024    FINDINGS:  AP portable chest radiograph demonstrates a cardiac silhouette within normal limits.  There is no focal consolidation, pneumothorax, or pleural effusion.  There is "hair artifact" at the shoulders.                                       Medications   lactated ringers bolus 1,000 mL (0 mLs Intravenous Stopped 7/21/25 2156)   morphine injection 4 mg (4 mg Intravenous Given 7/21/25 1942)   diphenhydrAMINE capsule 25 mg (25 mg Oral Given 7/21/25 1942)   famotidine (PF) injection 20 mg (20 mg Intravenous Given 7/21/25 1941)   morphine injection 4 mg (4 mg Intravenous Given 7/21/25 2159)     Medical Decision Making  29 y.o. female, with a PMHx of CKD, sickle cell anemia , presenting with sickle cell pain crisis x 1 day.      Differential diagnosis includes but is not limited to: sickle cell pain crisis, biliary pathology, anemia, pancreatitis    Less concern for biliary pathology or pancreatitis as patient denies any focal abdominal pain, no abdominal tenderness on exam, with only 1 episode of nausea/vomiting today after taking ibuprofen, with no repeat episodes of nausea or vomiting.  Has not been having postprandial pain or nausea or vomiting symptoms.    No chest pain, dyspnea, cough, or new infiltrates on chest x-ray to suggest acute chest.  Satting 96% on room air.    Given IV morphine for pain control, no indication for PRBC transfusion given Hb >7, no chest pain, dyspnea or other symptoms of anemia.     I discussed with the patient/family the diagnosis, treatment plan, indications for return to the emergency department, " as well as for expected follow-up. The patient/family verbalized an understanding. The patient/family is asked if there were any questions or concerns, which were addressed to patient/family satisfaction. Patient/family understands and is agreeable to the plan.           Amount and/or Complexity of Data Reviewed  Labs: ordered. Decision-making details documented in ED Course.    Risk  OTC drugs.  Prescription drug management.               ED Course as of 25 2317   Mon  Retic(!): 17.1 [LF]    Lipase(!): 96 [LF]    Squam Epithel, UA: 19 [LF]    Bacteria, UA: Rare  Contaminated sample, no dysuria or urinary frequency to suggest UTI [LF]    Hemoglobin(!): 7.3  Drop from 8.6 one month ago [LF]    Hemoglobin(!): 7.3 [LF]    Blood pressure currently 90 systolic, will hold morphine, continue fluids, assessment, no chest pain, dyspnea, headache [LF]    BP(!): 98/47  BP of 98/47, at baseline for patient, no orthostatic symptoms, or chest pain (98/56 per chart review baseline) [LF]      ED Course User Index  [LF] Viviana Cortez MD                               Clinical Impression:  Final diagnoses:  [D57.00] Sickle cell pain crisis          ED Disposition Condition    Discharge Stable          ED Prescriptions    None       Follow-up Information       Follow up With Specialties Details Why Contact Info    Luann Loza MD Internal Medicine  sickle cell pain crisis, ED follow up [address]  [place] [address]  [phone]      [facility] HEMATOLOGY/ONCOLOGY Hematology and Oncology  sickle cell pain crisis ED follow up [address] [facility] [place] [address] [phone]                   [1]   Social History  Tobacco Use    Smoking status: Former     Types: Vaping with nicotine     Start date: 2022     Quit date: 2024     Years since quittin.0    Smokeless tobacco: Never   Substance Use Topics     Alcohol use: Not Currently    Drug use: No        Viviana Cortez MD  07/23/25 [id number]

## 2025-07-21 NOTE — ED TRIAGE NOTES
Pt presents with c/o generalized pain from sickle cell pain, vomiting. Pt reports symptoms started at 5 pm

## 2025-07-22 ENCOUNTER — TELEPHONE (OUTPATIENT)
Dept: HEMATOLOGY/ONCOLOGY | Facility: CLINIC | Age: 30
End: 2025-07-22
Payer: COMMERCIAL

## 2025-07-22 LAB — HOLD SPECIMEN: NORMAL

## 2025-07-26 LAB
OHS QRS DURATION: 54 MS
OHS QTC CALCULATION: 435 MS

## 2025-08-05 ENCOUNTER — OFFICE VISIT (OUTPATIENT)
Dept: HEMATOLOGY/ONCOLOGY | Facility: CLINIC | Age: 30
End: 2025-08-05
Payer: COMMERCIAL

## 2025-08-05 DIAGNOSIS — D57.1 HEMOGLOBIN SS DISEASE WITHOUT CRISIS: ICD-10-CM

## 2025-08-05 DIAGNOSIS — D57.00 SICKLE CELL PAIN CRISIS: ICD-10-CM

## 2025-08-05 NOTE — PROGRESS NOTES
The patient location is: [place]  The chief complaint leading to consultation is: Sickle Cell Anemia    Visit type: audiovisual    Face to Face time with patient: 25  60 minutes of total time spent on the encounter, which includes face to face time and non-face to face time preparing to see the patient (eg, review of tests), Obtaining and/or reviewing separately obtained history, Documenting clinical information in the electronic or other health record, Independently interpreting results (not separately reported) and communicating results to the patient/family/caregiver, or Care coordination (not separately reported).         Each patient to whom he or she provides medical services by telemedicine is:  (1) informed of the relationship between the physician and patient and the respective role of any other health care provider with respect to management of the patient; and (2) notified that he or she may decline to receive medical services by telemedicine and may withdraw from such care at any time.    Notes:  see below  Subjective:       Patient ID: Lana Chou is a 30 y.o. female.    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 Pregnancy earlier this year with delivery 2/20/2025. Now several VOE events since delivery.  Reports many simple transfusions during pregnancy for anemia while at baseline hemoglobin, ferritin now 2000. Previously 500.    Received pediatric sickle cell care at [facility] by Dr. Burks. Had a port in childhood, removed when no longer needed - no history of thrombus or infection. No history of exchange transfusion.     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 Gallbladder removed in 2019. Autologous splenectomy.    Fall of last year hemoglobin S quantification 15-25%/    Sickle Cell End Organ Damage  Left nephrectomy  History of  microalbuminuria  Cholecystectomy  Splenectomy    Current Disease Modifying Therapy  1500mg Hydrea daily (goal weight based farooq 1800)- recently restarted April 2025, was holding prior for pregnancy    Prior Disease Modifying Therapy  Same as above    Home Pain Regimen MME 45/day  Percocet 5mg every 4 hours prn pain  Ibuprofen 600mg every 6 hours prn pain    Hemoglobin Baseline  7-8 grams    IV Access  No    Health Maintenance  ECHO - 8/2024 with normal EF  UA - 7/2025 without proteinuria  Eye Exam - scheduled 9/2025  Dex - NA  Immunizations - UTD    Age Appropriate Cancer Screenings  5/2023 Pap    Hospital Admissions this Year   2/2025 - pregnancy/delivery    Review of Systems   Constitutional:  Negative for appetite change and unexpected weight change.   HENT:  Negative for mouth sores.    Eyes:  Negative for visual disturbance.   Respiratory:  Negative for cough and shortness of breath.    Cardiovascular:  Negative for chest pain.   Gastrointestinal:  Negative for abdominal pain and diarrhea.   Genitourinary:  Negative for frequency.   Musculoskeletal:  Negative for back pain.   Integumentary:  Negative for rash.   Neurological:  Negative for headaches.   Hematological:  Negative for adenopathy.   Psychiatric/Behavioral:  The patient is not nervous/anxious.          Objective:      Physical Exam No exam for telehealth visit    Current Medications[1]   Lab Results   Component Value Date    WBC 16.87 (H) 07/21/2025    HGB 7.3 (L) 07/21/2025    HCT 19.8 (LL) 07/21/2025    MCV 85 07/21/2025     07/21/2025        CMP  Sodium   Date Value Ref Range Status   07/21/2025 138 136 - 145 mmol/L Final   02/18/2025 137 136 - 145 mmol/L Final     Potassium   Date Value Ref Range Status   07/21/2025 4.8 3.5 - 5.1 mmol/L Final   02/18/2025 3.9 3.5 - 5.1 mmol/L Final     Chloride   Date Value Ref Range Status   07/21/2025 109 95 - 110 mmol/L Final   02/18/2025 113 (H) 95 - 110 mmol/L Final     CO2   Date Value Ref Range  Status   07/21/2025 17 (L) 23 - 29 mmol/L Final   02/18/2025 17 (L) 23 - 29 mmol/L Final     Glucose   Date Value Ref Range Status   07/21/2025 91 70 - 110 mg/dL Final   02/18/2025 87 70 - 110 mg/dL Final     BUN   Date Value Ref Range Status   07/21/2025 15 6 - 20 mg/dL Final     Creatinine   Date Value Ref Range Status   07/21/2025 1.0 0.5 - 1.4 mg/dL Final     Calcium   Date Value Ref Range Status   07/21/2025 9.5 8.7 - 10.5 mg/dL Final   02/18/2025 8.6 (L) 8.7 - 10.5 mg/dL Final     Protein Total   Date Value Ref Range Status   07/21/2025 8.8 (H) 6.0 - 8.4 gm/dL Final     Total Protein   Date Value Ref Range Status   02/18/2025 6.1 6.0 - 8.4 g/dL Final     Albumin   Date Value Ref Range Status   07/21/2025 4.7 3.5 - 5.2 g/dL Final   02/18/2025 1.9 (L) 3.5 - 5.2 g/dL Final     Total Bilirubin   Date Value Ref Range Status   02/18/2025 7.9 (H) 0.1 - 1.0 mg/dL Final     Comment:     For infants and newborns, interpretation of results should be based  on gestational age, weight and in agreement with clinical  observations.    Premature Infant recommended reference ranges:  Up to 24 hours.............<8.0 mg/dL  Up to 48 hours............<12.0 mg/dL  3-5 days..................<15.0 mg/dL  6-29 days.................<15.0 mg/dL       Bilirubin Total   Date Value Ref Range Status   07/21/2025 5.5 (H) 0.1 - 1.0 mg/dL Final     Comment:     For infants and newborns, interpretation of results should be based   on gestational age, weight and in agreement with clinical   observations.    Premature Infant recommended reference ranges:   0-24 hours:  <8.0 mg/dL   24-48 hours: <12.0 mg/dL   3-5 days:    <15.0 mg/dL   6-29 days:   <15.0 mg/dL     Alkaline Phosphatase   Date Value Ref Range Status   02/18/2025 141 40 - 150 U/L Final     ALP   Date Value Ref Range Status   07/21/2025 103 40 - 150 unit/L Final     AST   Date Value Ref Range Status   07/21/2025 50 (H) 11 - 45 unit/L Final   02/18/2025 31 10 - 40 U/L Final     ALT    Date Value Ref Range Status   07/21/2025 29 10 - 44 unit/L Final   02/18/2025 14 10 - 44 U/L Final     Anion Gap   Date Value Ref Range Status   07/21/2025 12 8 - 16 mmol/L Final     eGFR if    Date Value Ref Range Status   05/14/2022 >60 >60 mL/min/1.73 m^2 Final     eGFR if non    Date Value Ref Range Status   05/14/2022 >60 >60 mL/min/1.73 m^2 Final     Comment:     Calculation used to obtain the estimated glomerular filtration  rate (eGFR) is the CKD-EPI equation.             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 It is possible Hgb S increased since holding hydrea for pregnancy and recent restart. In addition to summer heat elements and post-pregnancy hormonal changes may be reason for recent increased VOE. Will monitor Hgb and Hgb S quant since back on Hydrea. Gets labs at [facility].   Eye exam scheduled for next month. Wears contacts.          BMT Chart Routing  Urgent    Follow up with physician 4 months. virtual   Follow up with DIVYA    Provider visit type Benign hem   Infusion scheduling note    Injection scheduling note    Labs CBC, CMP, ferritin, folate and hemoglobin   Scheduling:  Preferred lab:  Lab interval:  labs this week and monthly at [facility] lab   Imaging    Pharmacy appointment    Other referrals                        [1]   Current Outpatient Medications   Medication Sig Dispense Refill    ergocalciferol (ERGOCALCIFEROL) 50,000 unit Cap Take 1 capsule (50,000 Units total) by mouth every 7 days. 12 capsule 3    folic acid (FOLVITE) 1 MG tablet Take 1 tablet (1 mg total) by mouth once daily. 90 tablet 3    hydroxyurea (HYDREA) 500 mg Cap Take 3 capsules (1,500 mg total) by mouth  once daily. 90 capsule 5    ibuprofen (ADVIL,MOTRIN) 600 MG tablet Take 1 tablet (600 mg total) by mouth every 6 (six) hours. 40 tablet 1    oxyCODONE-acetaminophen (PERCOCET) 5-325 mg per tablet Take 1 tablet by mouth every 4 (four) hours as needed for Pain. 20 tablet 0    triamcinolone acetonide 0.1% (KENALOG) 0.1 % ointment Apply topically 2 (two) times daily. 30 g 0     No current facility-administered medications for this visit.

## 2025-08-06 ENCOUNTER — LAB VISIT (OUTPATIENT)
Dept: LAB | Facility: HOSPITAL | Age: 30
End: 2025-08-06
Attending: INTERNAL MEDICINE
Payer: COMMERCIAL

## 2025-08-06 DIAGNOSIS — D57.1 HEMOGLOBIN SS DISEASE WITHOUT CRISIS: ICD-10-CM

## 2025-08-06 LAB
ABSOLUTE EOSINOPHIL (OHS): 0.11 K/UL
ABSOLUTE MONOCYTE (OHS): 1.51 K/UL (ref 0.3–1)
ABSOLUTE NEUTROPHIL COUNT (OHS): 6.46 K/UL (ref 1.8–7.7)
ALBUMIN SERPL BCP-MCNC: 4.7 G/DL (ref 3.5–5.2)
ALP SERPL-CCNC: 94 UNIT/L (ref 40–150)
ALT SERPL W/O P-5'-P-CCNC: 42 UNIT/L (ref 10–44)
ANION GAP (OHS): 9 MMOL/L (ref 8–16)
AST SERPL-CCNC: 68 UNIT/L (ref 11–45)
BASOPHILS # BLD AUTO: 0.06 K/UL
BASOPHILS NFR BLD AUTO: 0.5 %
BILIRUB SERPL-MCNC: 6.2 MG/DL (ref 0.1–1)
BUN SERPL-MCNC: 9 MG/DL (ref 6–20)
CALCIUM SERPL-MCNC: 9.7 MG/DL (ref 8.7–10.5)
CHLORIDE SERPL-SCNC: 106 MMOL/L (ref 95–110)
CO2 SERPL-SCNC: 23 MMOL/L (ref 23–29)
CREAT SERPL-MCNC: 0.8 MG/DL (ref 0.5–1.4)
ERYTHROCYTE [DISTWIDTH] IN BLOOD BY AUTOMATED COUNT: 23.3 % (ref 11.5–14.5)
FERRITIN SERPL-MCNC: 1440.8 NG/ML (ref 20–300)
GFR SERPLBLD CREATININE-BSD FMLA CKD-EPI: >60 ML/MIN/1.73/M2
GLUCOSE SERPL-MCNC: 85 MG/DL (ref 70–110)
HCT VFR BLD AUTO: 19 % (ref 37–48.5)
HGB BLD-MCNC: 6.8 GM/DL (ref 12–16)
IMM GRANULOCYTES # BLD AUTO: 0.07 K/UL (ref 0–0.04)
IMM GRANULOCYTES NFR BLD AUTO: 0.6 % (ref 0–0.5)
LYMPHOCYTES # BLD AUTO: 3.15 K/UL (ref 1–4.8)
MCH RBC QN AUTO: 31.6 PG (ref 27–31)
MCHC RBC AUTO-ENTMCNC: 35.8 G/DL (ref 32–36)
MCV RBC AUTO: 88 FL (ref 82–98)
NUCLEATED RBC (/100WBC) (OHS): 2 /100 WBC
PLATELET # BLD AUTO: 447 K/UL (ref 150–450)
PMV BLD AUTO: 9.2 FL (ref 9.2–12.9)
POTASSIUM SERPL-SCNC: 4.2 MMOL/L (ref 3.5–5.1)
PROT SERPL-MCNC: 7.9 GM/DL (ref 6–8.4)
RBC # BLD AUTO: 2.15 M/UL (ref 4–5.4)
RELATIVE EOSINOPHIL (OHS): 1 %
RELATIVE LYMPHOCYTE (OHS): 27.7 % (ref 18–48)
RELATIVE MONOCYTE (OHS): 13.3 % (ref 4–15)
RELATIVE NEUTROPHIL (OHS): 56.9 % (ref 38–73)
SODIUM SERPL-SCNC: 138 MMOL/L (ref 136–145)
WBC # BLD AUTO: 11.36 K/UL (ref 3.9–12.7)

## 2025-08-06 PROCEDURE — 80053 COMPREHEN METABOLIC PANEL: CPT

## 2025-08-06 PROCEDURE — 85025 COMPLETE CBC W/AUTO DIFF WBC: CPT

## 2025-08-06 PROCEDURE — 36415 COLL VENOUS BLD VENIPUNCTURE: CPT

## 2025-08-06 PROCEDURE — 83020 HEMOGLOBIN ELECTROPHORESIS: CPT

## 2025-08-06 PROCEDURE — 82728 ASSAY OF FERRITIN: CPT

## 2025-08-06 PROCEDURE — 82746 ASSAY OF FOLIC ACID SERUM: CPT

## 2025-08-07 LAB — FOLATE SERPL-MCNC: 10.6 NG/ML (ref 4–24)

## 2025-08-08 LAB
HGB S MFR BLD ELPH: 93 %
Lab: NORMAL

## 2025-08-22 ENCOUNTER — CLINICAL SUPPORT (OUTPATIENT)
Dept: INFECTIOUS DISEASES | Facility: CLINIC | Age: 30
End: 2025-08-22
Payer: COMMERCIAL

## 2025-08-22 ENCOUNTER — OFFICE VISIT (OUTPATIENT)
Dept: INFECTIOUS DISEASES | Facility: CLINIC | Age: 30
End: 2025-08-22
Payer: COMMERCIAL

## 2025-08-22 VITALS
WEIGHT: 113.56 LBS | HEART RATE: 121 BPM | BODY MASS INDEX: 21.44 KG/M2 | HEIGHT: 61 IN | TEMPERATURE: 99 F | DIASTOLIC BLOOD PRESSURE: 56 MMHG | SYSTOLIC BLOOD PRESSURE: 85 MMHG

## 2025-08-22 DIAGNOSIS — Z00.00 HEALTHCARE MAINTENANCE: Primary | ICD-10-CM

## 2025-08-22 DIAGNOSIS — D57.09 SICKLE CELL DISEASE WITH CRISIS AND OTHER COMPLICATION: Primary | ICD-10-CM

## 2025-08-22 DIAGNOSIS — D57.09 SICKLE CELL DISEASE WITH CRISIS AND OTHER COMPLICATION: ICD-10-CM

## 2025-08-22 PROCEDURE — 99999 PR PBB SHADOW E&M-EST. PATIENT-LVL III: CPT | Mod: PBBFAC,,, | Performed by: STUDENT IN AN ORGANIZED HEALTH CARE EDUCATION/TRAINING PROGRAM

## 2025-08-22 PROCEDURE — 99999 PR PBB SHADOW E&M-EST. PATIENT-LVL I: CPT | Mod: PBBFAC,,,
